# Patient Record
Sex: FEMALE | Race: WHITE | NOT HISPANIC OR LATINO
[De-identification: names, ages, dates, MRNs, and addresses within clinical notes are randomized per-mention and may not be internally consistent; named-entity substitution may affect disease eponyms.]

---

## 2017-03-17 ENCOUNTER — APPOINTMENT (OUTPATIENT)
Dept: OTOLARYNGOLOGY | Facility: CLINIC | Age: 67
End: 2017-03-17

## 2017-03-17 VITALS
HEIGHT: 60 IN | BODY MASS INDEX: 38.87 KG/M2 | HEART RATE: 67 BPM | SYSTOLIC BLOOD PRESSURE: 133 MMHG | WEIGHT: 198 LBS | DIASTOLIC BLOOD PRESSURE: 76 MMHG

## 2017-03-17 DIAGNOSIS — H90.3 SENSORINEURAL HEARING LOSS, BILATERAL: ICD-10-CM

## 2017-03-17 DIAGNOSIS — R09.81 NASAL CONGESTION: ICD-10-CM

## 2017-03-17 DIAGNOSIS — R49.0 DYSPHONIA: ICD-10-CM

## 2017-04-12 ENCOUNTER — APPOINTMENT (OUTPATIENT)
Dept: INTERNAL MEDICINE | Facility: CLINIC | Age: 67
End: 2017-04-12

## 2017-04-12 VITALS
HEART RATE: 68 BPM | SYSTOLIC BLOOD PRESSURE: 116 MMHG | DIASTOLIC BLOOD PRESSURE: 80 MMHG | WEIGHT: 196 LBS | BODY MASS INDEX: 38.48 KG/M2 | HEIGHT: 60 IN | OXYGEN SATURATION: 97 %

## 2017-04-12 LAB
25(OH)D3 SERPL-MCNC: 30.6 NG/ML
ALBUMIN SERPL ELPH-MCNC: 4 G/DL
ALP BLD-CCNC: 48 U/L
ALT SERPL-CCNC: 24 U/L
ANION GAP SERPL CALC-SCNC: 13 MMOL/L
APTT BLD: 32.2 SEC
AST SERPL-CCNC: 22 U/L
BASOPHILS # BLD AUTO: 0.02 K/UL
BASOPHILS NFR BLD AUTO: 0.4 %
BILIRUB SERPL-MCNC: 0.4 MG/DL
BUN SERPL-MCNC: 20 MG/DL
CALCIUM SERPL-MCNC: 9.3 MG/DL
CHLORIDE SERPL-SCNC: 101 MMOL/L
CO2 SERPL-SCNC: 23 MMOL/L
CREAT SERPL-MCNC: 0.63 MG/DL
EOSINOPHIL # BLD AUTO: 0.1 K/UL
EOSINOPHIL NFR BLD AUTO: 1.8 %
FOLATE SERPL-MCNC: 12.3 NG/ML
GLUCOSE SERPL-MCNC: 112 MG/DL
HBA1C MFR BLD HPLC: 6.1
HBA1C MFR BLD HPLC: 6.1 %
HCG SERPL QL: NEGATIVE
HCT VFR BLD CALC: 40.6 %
HGB BLD-MCNC: 12.7 G/DL
IMM GRANULOCYTES NFR BLD AUTO: 0.2 %
INR PPP: 0.92 RATIO
IRON SERPL-MCNC: 85 UG/DL
LYMPHOCYTES # BLD AUTO: 2.15 K/UL
LYMPHOCYTES NFR BLD AUTO: 38.9 %
MAN DIFF?: NORMAL
MCHC RBC-ENTMCNC: 29 PG
MCHC RBC-ENTMCNC: 31.3 GM/DL
MCV RBC AUTO: 92.7 FL
MONOCYTES # BLD AUTO: 0.5 K/UL
MONOCYTES NFR BLD AUTO: 9.1 %
NEUTROPHILS # BLD AUTO: 2.74 K/UL
NEUTROPHILS NFR BLD AUTO: 49.6 %
PAPP-A SERPL-ACNC: <1 MIU/ML
PLATELET # BLD AUTO: 260 K/UL
POTASSIUM SERPL-SCNC: 4.4 MMOL/L
PROT SERPL-MCNC: 7 G/DL
PT BLD: 10.4 SEC
RBC # BLD: 4.38 M/UL
RBC # FLD: 14 %
SODIUM SERPL-SCNC: 137 MMOL/L
TSH SERPL-ACNC: 0.93 UIU/ML
VIT B12 SERPL-MCNC: 378 PG/ML
WBC # FLD AUTO: 5.52 K/UL

## 2017-04-12 RX ORDER — OMEPRAZOLE 20 MG/1
20 CAPSULE, DELAYED RELEASE ORAL
Qty: 28 | Refills: 0 | Status: COMPLETED | COMMUNITY
Start: 2017-03-17

## 2017-04-18 ENCOUNTER — APPOINTMENT (OUTPATIENT)
Dept: OTOLARYNGOLOGY | Facility: CLINIC | Age: 67
End: 2017-04-18

## 2017-04-18 VITALS
BODY MASS INDEX: 38.48 KG/M2 | DIASTOLIC BLOOD PRESSURE: 89 MMHG | WEIGHT: 196 LBS | HEART RATE: 73 BPM | SYSTOLIC BLOOD PRESSURE: 131 MMHG | HEIGHT: 60 IN

## 2017-04-18 DIAGNOSIS — R68.89 OTHER GENERAL SYMPTOMS AND SIGNS: ICD-10-CM

## 2017-04-18 LAB — VIT B1 SERPL-MCNC: 111.8 NMOL/L

## 2017-05-15 ENCOUNTER — APPOINTMENT (OUTPATIENT)
Dept: INTERNAL MEDICINE | Facility: CLINIC | Age: 67
End: 2017-05-15

## 2017-05-15 VITALS
HEIGHT: 60 IN | WEIGHT: 196 LBS | SYSTOLIC BLOOD PRESSURE: 126 MMHG | BODY MASS INDEX: 38.48 KG/M2 | DIASTOLIC BLOOD PRESSURE: 78 MMHG

## 2017-05-20 ENCOUNTER — EMERGENCY (EMERGENCY)
Facility: HOSPITAL | Age: 67
LOS: 1 days | Discharge: HOME | End: 2017-05-20
Attending: EMERGENCY MEDICINE | Admitting: EMERGENCY MEDICINE
Payer: MEDICARE

## 2017-05-20 VITALS
TEMPERATURE: 98 F | RESPIRATION RATE: 18 BRPM | WEIGHT: 195.11 LBS | DIASTOLIC BLOOD PRESSURE: 57 MMHG | OXYGEN SATURATION: 94 % | SYSTOLIC BLOOD PRESSURE: 96 MMHG | HEIGHT: 60 IN | HEART RATE: 73 BPM

## 2017-05-20 DIAGNOSIS — F17.210 NICOTINE DEPENDENCE, CIGARETTES, UNCOMPLICATED: ICD-10-CM

## 2017-05-20 DIAGNOSIS — Z79.899 OTHER LONG TERM (CURRENT) DRUG THERAPY: ICD-10-CM

## 2017-05-20 DIAGNOSIS — I10 ESSENTIAL (PRIMARY) HYPERTENSION: ICD-10-CM

## 2017-05-20 DIAGNOSIS — M25.562 PAIN IN LEFT KNEE: ICD-10-CM

## 2017-05-20 DIAGNOSIS — M79.89 OTHER SPECIFIED SOFT TISSUE DISORDERS: ICD-10-CM

## 2017-05-20 DIAGNOSIS — R06.02 SHORTNESS OF BREATH: ICD-10-CM

## 2017-05-20 LAB
APTT BLD: 19.5 SEC — LOW (ref 27.5–37.4)
HCT VFR BLD CALC: 40.5 % — SIGNIFICANT CHANGE UP (ref 34.5–45)
HGB BLD-MCNC: 13.6 G/DL — SIGNIFICANT CHANGE UP (ref 11.5–15.5)
INR BLD: 0.87 RATIO — LOW (ref 0.88–1.16)
MCHC RBC-ENTMCNC: 31.1 PG — SIGNIFICANT CHANGE UP (ref 27–34)
MCHC RBC-ENTMCNC: 33.4 GM/DL — SIGNIFICANT CHANGE UP (ref 32–36)
MCV RBC AUTO: 93.1 FL — SIGNIFICANT CHANGE UP (ref 80–100)
PLATELET # BLD AUTO: 142 K/UL — LOW (ref 150–400)
PROTHROM AB SERPL-ACNC: 9.5 SEC — LOW (ref 9.8–12.7)
RBC # BLD: 4.36 M/UL — SIGNIFICANT CHANGE UP (ref 3.8–5.2)
RBC # FLD: 12.4 % — SIGNIFICANT CHANGE UP (ref 10.3–14.5)
WBC # BLD: 7.8 K/UL — SIGNIFICANT CHANGE UP (ref 3.8–10.5)
WBC # FLD AUTO: 7.8 K/UL — SIGNIFICANT CHANGE UP (ref 3.8–10.5)

## 2017-05-20 PROCEDURE — 99284 EMERGENCY DEPT VISIT MOD MDM: CPT | Mod: 25

## 2017-05-20 PROCEDURE — 85730 THROMBOPLASTIN TIME PARTIAL: CPT

## 2017-05-20 PROCEDURE — 73562 X-RAY EXAM OF KNEE 3: CPT | Mod: 26,LT

## 2017-05-20 PROCEDURE — 99285 EMERGENCY DEPT VISIT HI MDM: CPT

## 2017-05-20 PROCEDURE — 94640 AIRWAY INHALATION TREATMENT: CPT

## 2017-05-20 PROCEDURE — 93971 EXTREMITY STUDY: CPT | Mod: 26

## 2017-05-20 PROCEDURE — 85610 PROTHROMBIN TIME: CPT

## 2017-05-20 PROCEDURE — 73562 X-RAY EXAM OF KNEE 3: CPT

## 2017-05-20 PROCEDURE — 93971 EXTREMITY STUDY: CPT

## 2017-05-20 PROCEDURE — 85027 COMPLETE CBC AUTOMATED: CPT

## 2017-05-20 RX ORDER — IPRATROPIUM/ALBUTEROL SULFATE 18-103MCG
3 AEROSOL WITH ADAPTER (GRAM) INHALATION ONCE
Qty: 0 | Refills: 0 | Status: COMPLETED | OUTPATIENT
Start: 2017-05-20 | End: 2017-05-20

## 2017-05-20 RX ORDER — IBUPROFEN 200 MG
600 TABLET ORAL ONCE
Qty: 0 | Refills: 0 | Status: COMPLETED | OUTPATIENT
Start: 2017-05-20 | End: 2017-05-20

## 2017-05-20 RX ADMIN — Medication 3 MILLILITER(S): at 13:04

## 2017-05-20 RX ADMIN — Medication 600 MILLIGRAM(S): at 13:04

## 2017-05-20 NOTE — ED ADULT NURSE NOTE - OBJECTIVE STATEMENT
67yo female pt AxOx3 ambulatory to ED c/o LLE swelling and pain and L knee pain, intermittent, throbbing, pain moves around. Pt denies CP/SOB/fever/chills. B/L lungs wheezing noted, resp even, unlabored. Pt is a daily smoker. B/L lower extremities swelling noted 65yo female pt AxOx3 ambulatory to ED c/o LLE swelling and pain and L knee pain, intermittent, throbbing, pain moves around. Pt denies CP/SOB/fever/chills. B/L lungs min wheezing noted, resp even, unlabored. Pt is a daily smoker. B/L lower extremities swelling noted, skin intact, no redness noted. VSS. NAD noted. MD at bedside for eval. Labs drawn and sent. Safety maintained. Spouse at bedside.

## 2017-05-20 NOTE — ED PROVIDER NOTE - ATTENDING CONTRIBUTION TO CARE
PMD Arsalan Hawkins, PCP, [facility], [facility]  66y female pmh HTN,no dm,hld,cad,cancer. pt has hx of  chronic swelling. Pt now comes to ed co pain left knee, Worse with bending. No truama, no fc, rash, tick bite, no hip pain. Pt concerned for dvt and came to ed +tobacco. no change in chronic edema, No cough/hemoptysis cp, PE WDWN female awake alert nad except rt knee. NCAT chest clear ap abd soft mild obese no ttp,+bs. CV no rgm, Neuro no focal defects. Rt knee no swelling no rash/warmth. Mild pain on flexion, no instability. maribel 1+pedal edema.  Iker Murillo MD, Facep

## 2017-05-20 NOTE — ED PROVIDER NOTE - MEDICAL DECISION MAKING DETAILS
unlikely DVT but will obtain US, chronic LE- no worsening SOB or concern of heart failure/liver disease. will get xray knee likely arthritic pain.

## 2017-05-20 NOTE — ED PROVIDER NOTE - CARE PLAN
Principal Discharge DX:	Acute pain of left knee  Instructions for follow-up, activity and diet:	1. Return to ED for worsening, progressive or any other concerning symptoms   2. Follow up with your primary care doctor in 2-3days   3. Take motrin 600mg every 6 hours as needed for pain and or Take Tylenol up to 650 mg every 6 hours as needed for pain.

## 2017-05-20 NOTE — ED PROVIDER NOTE - NS ED ROS FT
ROS: +SOB. no cough. no n/v/d/c. no abd pain. no rash. no bleeding. no urinary complaints. no weakness. no vision changes. no HA. no neck/back pain. + extremity swelling.

## 2017-05-20 NOTE — ED PROVIDER NOTE - PLAN OF CARE
1. Return to ED for worsening, progressive or any other concerning symptoms   2. Follow up with your primary care doctor in 2-3days   3. Take motrin 600mg every 6 hours as needed for pain and or Take Tylenol up to 650 mg every 6 hours as needed for pain.

## 2017-05-20 NOTE — ED PROVIDER NOTE - PHYSICAL EXAMINATION
Gen: NAD, AOx3  Head: NCAT  HEENT: PERRL, oral mucosa moist, normal conjunctiva, neck supple  Lung: scattered wheezes b/l, no respiratory distress  CV: rrr, +harsh systolic murmur, Normal perfusion  Abd: soft, NTND  MSK: +2 b/l LE edema, no asymmetry, no visible deformities, +ttp left lateral joint line, +crepitus with pressure on patella  Neuro: No focal neurologic deficits  Skin: No rash   Psych: normal affect

## 2017-05-20 NOTE — ED PROVIDER NOTE - OBJECTIVE STATEMENT
67yo F with HTN/COPD current smoker with 1 week of left knee pain worse with going down stairs, throbbing, intermittent, moves around knee, mostly posterior/lateral. chronic LE swelling, doesn't appreciate any increase. 67yo F with HTN/COPD current smoker with 1 week of left knee pain worse with going down stairs, throbbing, intermittent, moves around knee, mostly posterior/lateral. chronic LE swelling, doesn't appreciate any increase or asymmetry, also with ankle pain. atraumatic. no fever/chills. chronic SOB. being worked up for gastric bypass    PCP- Levi Hawikns

## 2017-06-12 ENCOUNTER — APPOINTMENT (OUTPATIENT)
Dept: INTERNAL MEDICINE | Facility: CLINIC | Age: 67
End: 2017-06-12

## 2017-07-17 ENCOUNTER — APPOINTMENT (OUTPATIENT)
Dept: INTERNAL MEDICINE | Facility: CLINIC | Age: 67
End: 2017-07-17

## 2017-07-17 VITALS
BODY MASS INDEX: 38.48 KG/M2 | WEIGHT: 196 LBS | HEIGHT: 60 IN | DIASTOLIC BLOOD PRESSURE: 82 MMHG | SYSTOLIC BLOOD PRESSURE: 138 MMHG

## 2017-07-18 ENCOUNTER — APPOINTMENT (OUTPATIENT)
Dept: OTOLARYNGOLOGY | Facility: CLINIC | Age: 67
End: 2017-07-18

## 2017-07-19 ENCOUNTER — MEDICATION RENEWAL (OUTPATIENT)
Age: 67
End: 2017-07-19

## 2017-08-14 ENCOUNTER — APPOINTMENT (OUTPATIENT)
Dept: INTERNAL MEDICINE | Facility: CLINIC | Age: 67
End: 2017-08-14

## 2017-08-14 VITALS
HEIGHT: 60 IN | WEIGHT: 202 LBS | SYSTOLIC BLOOD PRESSURE: 138 MMHG | BODY MASS INDEX: 39.66 KG/M2 | DIASTOLIC BLOOD PRESSURE: 72 MMHG

## 2017-08-17 ENCOUNTER — RX RENEWAL (OUTPATIENT)
Age: 67
End: 2017-08-17

## 2017-08-22 ENCOUNTER — FORM ENCOUNTER (OUTPATIENT)
Age: 67
End: 2017-08-22

## 2017-08-23 ENCOUNTER — APPOINTMENT (OUTPATIENT)
Dept: RADIOLOGY | Facility: HOSPITAL | Age: 67
End: 2017-08-23
Payer: MEDICARE

## 2017-08-23 ENCOUNTER — APPOINTMENT (OUTPATIENT)
Dept: ULTRASOUND IMAGING | Facility: HOSPITAL | Age: 67
End: 2017-08-23
Payer: MEDICARE

## 2017-08-23 ENCOUNTER — OUTPATIENT (OUTPATIENT)
Dept: OUTPATIENT SERVICES | Facility: HOSPITAL | Age: 67
LOS: 1 days | End: 2017-08-23
Payer: MEDICARE

## 2017-08-23 DIAGNOSIS — I10 ESSENTIAL (PRIMARY) HYPERTENSION: ICD-10-CM

## 2017-08-23 PROCEDURE — 74241: CPT

## 2017-08-23 PROCEDURE — 74241: CPT | Mod: 26

## 2017-08-23 PROCEDURE — 76700 US EXAM ABDOM COMPLETE: CPT | Mod: 26

## 2017-08-23 PROCEDURE — 76700 US EXAM ABDOM COMPLETE: CPT

## 2017-09-11 ENCOUNTER — APPOINTMENT (OUTPATIENT)
Dept: INTERNAL MEDICINE | Facility: CLINIC | Age: 67
End: 2017-09-11

## 2017-09-11 VITALS
BODY MASS INDEX: 39.27 KG/M2 | DIASTOLIC BLOOD PRESSURE: 86 MMHG | HEART RATE: 62 BPM | WEIGHT: 200 LBS | SYSTOLIC BLOOD PRESSURE: 138 MMHG | HEIGHT: 60 IN

## 2017-09-18 ENCOUNTER — APPOINTMENT (OUTPATIENT)
Dept: CT IMAGING | Facility: CLINIC | Age: 67
End: 2017-09-18
Payer: MEDICARE

## 2017-09-18 ENCOUNTER — OUTPATIENT (OUTPATIENT)
Dept: OUTPATIENT SERVICES | Facility: HOSPITAL | Age: 67
LOS: 1 days | End: 2017-09-18
Payer: MEDICARE

## 2017-09-18 DIAGNOSIS — Z00.8 ENCOUNTER FOR OTHER GENERAL EXAMINATION: ICD-10-CM

## 2017-09-18 PROCEDURE — 71250 CT THORAX DX C-: CPT

## 2017-09-18 PROCEDURE — 71250 CT THORAX DX C-: CPT | Mod: 26

## 2017-09-27 ENCOUNTER — MED ADMIN CHARGE (OUTPATIENT)
Age: 67
End: 2017-09-27

## 2017-10-16 ENCOUNTER — RX RENEWAL (OUTPATIENT)
Age: 67
End: 2017-10-16

## 2017-10-27 ENCOUNTER — APPOINTMENT (OUTPATIENT)
Dept: SURGERY | Facility: CLINIC | Age: 67
End: 2017-10-27
Payer: MEDICARE

## 2017-10-27 PROCEDURE — 99215 OFFICE O/P EST HI 40 MIN: CPT

## 2017-11-03 ENCOUNTER — OUTPATIENT (OUTPATIENT)
Dept: OUTPATIENT SERVICES | Facility: HOSPITAL | Age: 67
LOS: 1 days | End: 2017-11-03
Payer: MEDICARE

## 2017-11-03 VITALS
HEART RATE: 81 BPM | SYSTOLIC BLOOD PRESSURE: 110 MMHG | HEIGHT: 60 IN | DIASTOLIC BLOOD PRESSURE: 70 MMHG | TEMPERATURE: 98 F | OXYGEN SATURATION: 97 % | RESPIRATION RATE: 16 BRPM | WEIGHT: 197.98 LBS

## 2017-11-03 DIAGNOSIS — I35.0 NONRHEUMATIC AORTIC (VALVE) STENOSIS: ICD-10-CM

## 2017-11-03 DIAGNOSIS — G47.33 OBSTRUCTIVE SLEEP APNEA (ADULT) (PEDIATRIC): ICD-10-CM

## 2017-11-03 DIAGNOSIS — Z98.890 OTHER SPECIFIED POSTPROCEDURAL STATES: Chronic | ICD-10-CM

## 2017-11-03 DIAGNOSIS — J44.9 CHRONIC OBSTRUCTIVE PULMONARY DISEASE, UNSPECIFIED: ICD-10-CM

## 2017-11-03 DIAGNOSIS — E66.9 OBESITY, UNSPECIFIED: ICD-10-CM

## 2017-11-03 DIAGNOSIS — Z01.818 ENCOUNTER FOR OTHER PREPROCEDURAL EXAMINATION: ICD-10-CM

## 2017-11-03 DIAGNOSIS — E66.01 MORBID (SEVERE) OBESITY DUE TO EXCESS CALORIES: ICD-10-CM

## 2017-11-03 DIAGNOSIS — J38.3 OTHER DISEASES OF VOCAL CORDS: ICD-10-CM

## 2017-11-03 LAB
ALBUMIN SERPL ELPH-MCNC: 4.3 G/DL — SIGNIFICANT CHANGE UP (ref 3.3–5)
ALP SERPL-CCNC: 61 U/L — SIGNIFICANT CHANGE UP (ref 40–120)
ALT FLD-CCNC: 30 U/L — SIGNIFICANT CHANGE UP (ref 10–45)
ANION GAP SERPL CALC-SCNC: 16 MMOL/L — SIGNIFICANT CHANGE UP (ref 5–17)
AST SERPL-CCNC: 29 U/L — SIGNIFICANT CHANGE UP (ref 10–40)
BILIRUB SERPL-MCNC: 0.6 MG/DL — SIGNIFICANT CHANGE UP (ref 0.2–1.2)
BLD GP AB SCN SERPL QL: NEGATIVE — SIGNIFICANT CHANGE UP
BUN SERPL-MCNC: 21 MG/DL — SIGNIFICANT CHANGE UP (ref 7–23)
CALCIUM SERPL-MCNC: 10 MG/DL — SIGNIFICANT CHANGE UP (ref 8.4–10.5)
CHLORIDE SERPL-SCNC: 100 MMOL/L — SIGNIFICANT CHANGE UP (ref 96–108)
CO2 SERPL-SCNC: 23 MMOL/L — SIGNIFICANT CHANGE UP (ref 22–31)
CREAT SERPL-MCNC: 0.73 MG/DL — SIGNIFICANT CHANGE UP (ref 0.5–1.3)
GLUCOSE SERPL-MCNC: 102 MG/DL — HIGH (ref 70–99)
HBA1C BLD-MCNC: 5.9 % — HIGH (ref 4–5.6)
HCT VFR BLD CALC: 39.2 % — SIGNIFICANT CHANGE UP (ref 34.5–45)
HGB BLD-MCNC: 12.5 G/DL — SIGNIFICANT CHANGE UP (ref 11.5–15.5)
MCHC RBC-ENTMCNC: 28.4 PG — SIGNIFICANT CHANGE UP (ref 27–34)
MCHC RBC-ENTMCNC: 31.9 GM/DL — LOW (ref 32–36)
MCV RBC AUTO: 89.1 FL — SIGNIFICANT CHANGE UP (ref 80–100)
PLATELET # BLD AUTO: 282 K/UL — SIGNIFICANT CHANGE UP (ref 150–400)
POTASSIUM SERPL-MCNC: 4.5 MMOL/L — SIGNIFICANT CHANGE UP (ref 3.5–5.3)
POTASSIUM SERPL-SCNC: 4.5 MMOL/L — SIGNIFICANT CHANGE UP (ref 3.5–5.3)
PROT SERPL-MCNC: 7.6 G/DL — SIGNIFICANT CHANGE UP (ref 6–8.3)
RBC # BLD: 4.4 M/UL — SIGNIFICANT CHANGE UP (ref 3.8–5.2)
RBC # FLD: 13.7 % — SIGNIFICANT CHANGE UP (ref 10.3–14.5)
RH IG SCN BLD-IMP: POSITIVE — SIGNIFICANT CHANGE UP
SODIUM SERPL-SCNC: 139 MMOL/L — SIGNIFICANT CHANGE UP (ref 135–145)
WBC # BLD: 6.88 K/UL — SIGNIFICANT CHANGE UP (ref 3.8–10.5)
WBC # FLD AUTO: 6.88 K/UL — SIGNIFICANT CHANGE UP (ref 3.8–10.5)

## 2017-11-03 PROCEDURE — 83036 HEMOGLOBIN GLYCOSYLATED A1C: CPT

## 2017-11-03 PROCEDURE — G0463: CPT

## 2017-11-03 PROCEDURE — 86901 BLOOD TYPING SEROLOGIC RH(D): CPT

## 2017-11-03 PROCEDURE — 85027 COMPLETE CBC AUTOMATED: CPT

## 2017-11-03 PROCEDURE — 86850 RBC ANTIBODY SCREEN: CPT

## 2017-11-03 PROCEDURE — 80053 COMPREHEN METABOLIC PANEL: CPT

## 2017-11-03 PROCEDURE — 86900 BLOOD TYPING SEROLOGIC ABO: CPT

## 2017-11-03 RX ORDER — CEFAZOLIN SODIUM 1 G
2000 VIAL (EA) INJECTION ONCE
Qty: 0 | Refills: 0 | Status: DISCONTINUED | OUTPATIENT
Start: 2017-11-15 | End: 2017-11-16

## 2017-11-03 RX ORDER — PANTOPRAZOLE SODIUM 20 MG/1
1 TABLET, DELAYED RELEASE ORAL
Qty: 0 | Refills: 0 | COMMUNITY

## 2017-11-03 RX ORDER — NIFEDIPINE 30 MG
1 TABLET, EXTENDED RELEASE 24 HR ORAL
Qty: 0 | Refills: 0 | COMMUNITY

## 2017-11-03 RX ORDER — LISINOPRIL 2.5 MG/1
1 TABLET ORAL
Qty: 0 | Refills: 0 | COMMUNITY

## 2017-11-03 NOTE — H&P PST ADULT - OTHER CARE PROVIDERS
Dr. Fields Cardio next appt 11/7 , Dr. Erazo [facility] [phone] appt made for patient on 11/6/17 10.45 am

## 2017-11-03 NOTE — H&P PST ADULT - HEARING CHANGE
L/with intermittent ear aches > 6 months/R R/with intermittent ear aches x 6 months patient to follow up with ENT 11/6 prior to surgery./L

## 2017-11-03 NOTE — H&P PST ADULT - HISTORY OF PRESENT ILLNESS
66 year old female with PMH of HTN, Emphysema/ chronic bronchitis, Aortic stenosis ( will obtain recent Echo), GERD with complaints of Obesity planned for laparoscopic vertical sleeve gastrectomy.       *** Patient was evaluated by ENT for throat discomfort/ Hearing difficulties 4/2017, found to have some very mild leukoplakia on left vocal cord with recommendations to follow up in 6 weeks, patient didn't have a follow up since, made appointment for 11/6 10.45 am for ENT eval with Dr. Santana prior to surgery.

## 2017-11-03 NOTE — H&P PST ADULT - PROBLEM SELECTOR PLAN 2
continue Home Inhaler ( patient to bring inhaler to [facility] the am of surgery)  will obtain recent Pulmonary note

## 2017-11-03 NOTE — H&P PST ADULT - PROBLEM SELECTOR PLAN 1
planned for laparoscopic vertical sleeve gastrectomy.   PST labs send  preprocedure surgical scrub instructions given

## 2017-11-03 NOTE — H&P PST ADULT - RESPIRATORY AND THORAX COMMENTS
hx of COPD/emphysema/ chronic bronchitis ex-smoker quit 3 months ago hx of COPD/emphysema/ chronic bronchitis

## 2017-11-03 NOTE — H&P PST ADULT - PMH
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

## 2017-11-06 ENCOUNTER — APPOINTMENT (OUTPATIENT)
Dept: OTOLARYNGOLOGY | Facility: CLINIC | Age: 67
End: 2017-11-06
Payer: MEDICARE

## 2017-11-06 VITALS
WEIGHT: 197 LBS | HEART RATE: 71 BPM | DIASTOLIC BLOOD PRESSURE: 81 MMHG | BODY MASS INDEX: 38.68 KG/M2 | HEIGHT: 60 IN | SYSTOLIC BLOOD PRESSURE: 120 MMHG

## 2017-11-06 DIAGNOSIS — H92.02 OTALGIA, LEFT EAR: ICD-10-CM

## 2017-11-06 DIAGNOSIS — K21.9 GASTRO-ESOPHAGEAL REFLUX DISEASE W/OUT ESOPHAGITIS: ICD-10-CM

## 2017-11-06 PROCEDURE — 31575 DIAGNOSTIC LARYNGOSCOPY: CPT

## 2017-11-06 PROCEDURE — 99214 OFFICE O/P EST MOD 30 MIN: CPT | Mod: 25

## 2017-11-15 ENCOUNTER — APPOINTMENT (OUTPATIENT)
Dept: SURGERY | Facility: HOSPITAL | Age: 67
End: 2017-11-15
Payer: MEDICARE

## 2017-11-15 ENCOUNTER — INPATIENT (INPATIENT)
Facility: HOSPITAL | Age: 67
LOS: 0 days | Discharge: HOME | DRG: 621 | End: 2017-11-16
Attending: SURGERY | Admitting: SURGERY
Payer: MEDICARE

## 2017-11-15 ENCOUNTER — RESULT REVIEW (OUTPATIENT)
Age: 67
End: 2017-11-15

## 2017-11-15 VITALS
DIASTOLIC BLOOD PRESSURE: 87 MMHG | TEMPERATURE: 98 F | HEART RATE: 60 BPM | SYSTOLIC BLOOD PRESSURE: 116 MMHG | WEIGHT: 196.87 LBS | RESPIRATION RATE: 18 BRPM | OXYGEN SATURATION: 97 %

## 2017-11-15 DIAGNOSIS — Z98.890 OTHER SPECIFIED POSTPROCEDURAL STATES: Chronic | ICD-10-CM

## 2017-11-15 DIAGNOSIS — E66.01 MORBID (SEVERE) OBESITY DUE TO EXCESS CALORIES: ICD-10-CM

## 2017-11-15 LAB
ANION GAP SERPL CALC-SCNC: 11 MMOL/L — SIGNIFICANT CHANGE UP (ref 5–17)
BASOPHILS # BLD AUTO: 0 K/UL — SIGNIFICANT CHANGE UP (ref 0–0.2)
BASOPHILS NFR BLD AUTO: 0.1 % — SIGNIFICANT CHANGE UP (ref 0–2)
BUN SERPL-MCNC: 14 MG/DL — SIGNIFICANT CHANGE UP (ref 7–23)
CALCIUM SERPL-MCNC: 8.5 MG/DL — SIGNIFICANT CHANGE UP (ref 8.4–10.5)
CHLORIDE SERPL-SCNC: 107 MMOL/L — SIGNIFICANT CHANGE UP (ref 96–108)
CO2 SERPL-SCNC: 25 MMOL/L — SIGNIFICANT CHANGE UP (ref 22–31)
CREAT SERPL-MCNC: 0.72 MG/DL — SIGNIFICANT CHANGE UP (ref 0.5–1.3)
EOSINOPHIL # BLD AUTO: 0 K/UL — SIGNIFICANT CHANGE UP (ref 0–0.5)
EOSINOPHIL NFR BLD AUTO: 0.4 % — SIGNIFICANT CHANGE UP (ref 0–6)
GLUCOSE BLDC GLUCOMTR-MCNC: 108 MG/DL — HIGH (ref 70–99)
GLUCOSE SERPL-MCNC: 127 MG/DL — HIGH (ref 70–99)
HCT VFR BLD CALC: 36.4 % — SIGNIFICANT CHANGE UP (ref 34.5–45)
HGB BLD-MCNC: 12.1 G/DL — SIGNIFICANT CHANGE UP (ref 11.5–15.5)
LYMPHOCYTES # BLD AUTO: 1.2 K/UL — SIGNIFICANT CHANGE UP (ref 1–3.3)
LYMPHOCYTES # BLD AUTO: 11.2 % — LOW (ref 13–44)
MAGNESIUM SERPL-MCNC: 2 MG/DL — SIGNIFICANT CHANGE UP (ref 1.6–2.6)
MCHC RBC-ENTMCNC: 30.7 PG — SIGNIFICANT CHANGE UP (ref 27–34)
MCHC RBC-ENTMCNC: 33.4 GM/DL — SIGNIFICANT CHANGE UP (ref 32–36)
MCV RBC AUTO: 91.9 FL — SIGNIFICANT CHANGE UP (ref 80–100)
MONOCYTES # BLD AUTO: 0.3 K/UL — SIGNIFICANT CHANGE UP (ref 0–0.9)
MONOCYTES NFR BLD AUTO: 2.3 % — SIGNIFICANT CHANGE UP (ref 2–14)
NEUTROPHILS # BLD AUTO: 9.4 K/UL — HIGH (ref 1.8–7.4)
NEUTROPHILS NFR BLD AUTO: 86 % — HIGH (ref 43–77)
PHOSPHATE SERPL-MCNC: 3.9 MG/DL — SIGNIFICANT CHANGE UP (ref 2.5–4.5)
PLATELET # BLD AUTO: 212 K/UL — SIGNIFICANT CHANGE UP (ref 150–400)
POTASSIUM SERPL-MCNC: 4.1 MMOL/L — SIGNIFICANT CHANGE UP (ref 3.5–5.3)
POTASSIUM SERPL-SCNC: 4.1 MMOL/L — SIGNIFICANT CHANGE UP (ref 3.5–5.3)
RBC # BLD: 3.96 M/UL — SIGNIFICANT CHANGE UP (ref 3.8–5.2)
RBC # FLD: 12.5 % — SIGNIFICANT CHANGE UP (ref 10.3–14.5)
RH IG SCN BLD-IMP: POSITIVE — SIGNIFICANT CHANGE UP
SODIUM SERPL-SCNC: 143 MMOL/L — SIGNIFICANT CHANGE UP (ref 135–145)
WBC # BLD: 10.9 K/UL — HIGH (ref 3.8–10.5)
WBC # FLD AUTO: 10.9 K/UL — HIGH (ref 3.8–10.5)

## 2017-11-15 PROCEDURE — 43775 LAP SLEEVE GASTRECTOMY: CPT | Mod: 82

## 2017-11-15 PROCEDURE — 88305 TISSUE EXAM BY PATHOLOGIST: CPT | Mod: 26

## 2017-11-15 RX ORDER — HYDROMORPHONE HYDROCHLORIDE 2 MG/ML
0.5 INJECTION INTRAMUSCULAR; INTRAVENOUS; SUBCUTANEOUS
Qty: 0 | Refills: 0 | Status: DISCONTINUED | OUTPATIENT
Start: 2017-11-15 | End: 2017-11-16

## 2017-11-15 RX ORDER — POTASSIUM CHLORIDE 20 MEQ
10 PACKET (EA) ORAL
Qty: 0 | Refills: 0 | Status: DISCONTINUED | OUTPATIENT
Start: 2017-11-15 | End: 2017-11-16

## 2017-11-15 RX ORDER — HEPARIN SODIUM 5000 [USP'U]/ML
5000 INJECTION INTRAVENOUS; SUBCUTANEOUS ONCE
Qty: 0 | Refills: 0 | Status: COMPLETED | OUTPATIENT
Start: 2017-11-15 | End: 2017-11-15

## 2017-11-15 RX ORDER — SODIUM CHLORIDE 9 MG/ML
1000 INJECTION, SOLUTION INTRAVENOUS
Qty: 0 | Refills: 0 | Status: DISCONTINUED | OUTPATIENT
Start: 2017-11-15 | End: 2017-11-16

## 2017-11-15 RX ORDER — ACETAMINOPHEN 500 MG
1000 TABLET ORAL ONCE
Qty: 0 | Refills: 0 | Status: COMPLETED | OUTPATIENT
Start: 2017-11-16 | End: 2017-11-16

## 2017-11-15 RX ORDER — ACETAMINOPHEN 500 MG
1000 TABLET ORAL ONCE
Qty: 0 | Refills: 0 | Status: DISCONTINUED | OUTPATIENT
Start: 2017-11-16 | End: 2017-11-16

## 2017-11-15 RX ORDER — HEPARIN SODIUM 5000 [USP'U]/ML
5000 INJECTION INTRAVENOUS; SUBCUTANEOUS EVERY 8 HOURS
Qty: 0 | Refills: 0 | Status: DISCONTINUED | OUTPATIENT
Start: 2017-11-15 | End: 2017-11-16

## 2017-11-15 RX ORDER — FENTANYL CITRATE 50 UG/ML
25 INJECTION INTRAVENOUS
Qty: 0 | Refills: 0 | Status: DISCONTINUED | OUTPATIENT
Start: 2017-11-15 | End: 2017-11-15

## 2017-11-15 RX ORDER — HYDRALAZINE HCL 50 MG
10 TABLET ORAL EVERY 6 HOURS
Qty: 0 | Refills: 0 | Status: DISCONTINUED | OUTPATIENT
Start: 2017-11-15 | End: 2017-11-16

## 2017-11-15 RX ORDER — ONDANSETRON 8 MG/1
4 TABLET, FILM COATED ORAL EVERY 6 HOURS
Qty: 0 | Refills: 0 | Status: DISCONTINUED | OUTPATIENT
Start: 2017-11-15 | End: 2017-11-16

## 2017-11-15 RX ORDER — KETOROLAC TROMETHAMINE 30 MG/ML
30 SYRINGE (ML) INJECTION EVERY 6 HOURS
Qty: 0 | Refills: 0 | Status: DISCONTINUED | OUTPATIENT
Start: 2017-11-15 | End: 2017-11-16

## 2017-11-15 RX ORDER — HYDRALAZINE HCL 50 MG
10 TABLET ORAL EVERY 6 HOURS
Qty: 0 | Refills: 0 | Status: DISCONTINUED | OUTPATIENT
Start: 2017-11-15 | End: 2017-11-15

## 2017-11-15 RX ORDER — SODIUM CHLORIDE 9 MG/ML
3 INJECTION INTRAMUSCULAR; INTRAVENOUS; SUBCUTANEOUS EVERY 8 HOURS
Qty: 0 | Refills: 0 | Status: DISCONTINUED | OUTPATIENT
Start: 2017-11-15 | End: 2017-11-15

## 2017-11-15 RX ORDER — CEFAZOLIN SODIUM 1 G
2000 VIAL (EA) INJECTION EVERY 8 HOURS
Qty: 0 | Refills: 0 | Status: COMPLETED | OUTPATIENT
Start: 2017-11-15 | End: 2017-11-15

## 2017-11-15 RX ORDER — PANTOPRAZOLE SODIUM 20 MG/1
40 TABLET, DELAYED RELEASE ORAL DAILY
Qty: 0 | Refills: 0 | Status: DISCONTINUED | OUTPATIENT
Start: 2017-11-15 | End: 2017-11-16

## 2017-11-15 RX ORDER — ONDANSETRON 8 MG/1
4 TABLET, FILM COATED ORAL EVERY 4 HOURS
Qty: 0 | Refills: 0 | Status: DISCONTINUED | OUTPATIENT
Start: 2017-11-15 | End: 2017-11-15

## 2017-11-15 RX ORDER — LIDOCAINE HCL 20 MG/ML
0.2 VIAL (ML) INJECTION ONCE
Qty: 0 | Refills: 0 | Status: DISCONTINUED | OUTPATIENT
Start: 2017-11-15 | End: 2017-11-15

## 2017-11-15 RX ORDER — HYOSCYAMINE SULFATE 0.13 MG
0.12 TABLET ORAL EVERY 6 HOURS
Qty: 0 | Refills: 0 | Status: DISCONTINUED | OUTPATIENT
Start: 2017-11-15 | End: 2017-11-16

## 2017-11-15 RX ORDER — ACETAMINOPHEN 500 MG
1000 TABLET ORAL ONCE
Qty: 0 | Refills: 0 | Status: COMPLETED | OUTPATIENT
Start: 2017-11-15 | End: 2017-11-15

## 2017-11-15 RX ADMIN — HYDROMORPHONE HYDROCHLORIDE 0.5 MILLIGRAM(S): 2 INJECTION INTRAMUSCULAR; INTRAVENOUS; SUBCUTANEOUS at 15:45

## 2017-11-15 RX ADMIN — HYDROMORPHONE HYDROCHLORIDE 0.5 MILLIGRAM(S): 2 INJECTION INTRAMUSCULAR; INTRAVENOUS; SUBCUTANEOUS at 14:30

## 2017-11-15 RX ADMIN — Medication 30 MILLIGRAM(S): at 18:02

## 2017-11-15 RX ADMIN — HEPARIN SODIUM 5000 UNIT(S): 5000 INJECTION INTRAVENOUS; SUBCUTANEOUS at 18:32

## 2017-11-15 RX ADMIN — Medication 100 MILLIGRAM(S): at 22:21

## 2017-11-15 RX ADMIN — Medication 1000 MILLIGRAM(S): at 20:08

## 2017-11-15 RX ADMIN — Medication 400 MILLIGRAM(S): at 19:48

## 2017-11-15 RX ADMIN — ONDANSETRON 4 MILLIGRAM(S): 8 TABLET, FILM COATED ORAL at 20:12

## 2017-11-15 RX ADMIN — ONDANSETRON 4 MILLIGRAM(S): 8 TABLET, FILM COATED ORAL at 18:08

## 2017-11-15 RX ADMIN — SODIUM CHLORIDE 250 MILLILITER(S): 9 INJECTION, SOLUTION INTRAVENOUS at 14:48

## 2017-11-15 RX ADMIN — Medication 0.12 MILLIGRAM(S): at 14:52

## 2017-11-15 RX ADMIN — HYDROMORPHONE HYDROCHLORIDE 0.5 MILLIGRAM(S): 2 INJECTION INTRAMUSCULAR; INTRAVENOUS; SUBCUTANEOUS at 15:27

## 2017-11-15 RX ADMIN — HYDROMORPHONE HYDROCHLORIDE 0.5 MILLIGRAM(S): 2 INJECTION INTRAMUSCULAR; INTRAVENOUS; SUBCUTANEOUS at 14:15

## 2017-11-15 RX ADMIN — Medication 30 MILLIGRAM(S): at 19:42

## 2017-11-15 RX ADMIN — Medication 100 MILLIGRAM(S): at 20:08

## 2017-11-15 RX ADMIN — PANTOPRAZOLE SODIUM 40 MILLIGRAM(S): 20 TABLET, DELAYED RELEASE ORAL at 14:48

## 2017-11-15 RX ADMIN — Medication 10 MILLIGRAM(S): at 22:21

## 2017-11-15 RX ADMIN — Medication 0.12 MILLIGRAM(S): at 20:11

## 2017-11-15 RX ADMIN — HEPARIN SODIUM 5000 UNIT(S): 5000 INJECTION INTRAVENOUS; SUBCUTANEOUS at 09:11

## 2017-11-15 NOTE — PATIENT PROFILE ADULT. - PMH
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

## 2017-11-15 NOTE — BRIEF OPERATIVE NOTE - PROCEDURE
<<-----Click on this checkbox to enter Procedure Lysis of adhesions  11/15/2017    Active  ALLYSON  Umbilical hernia repair  11/15/2017    Active  ALLYSON  Gastrectomy, sleeve, laparoscopic  11/15/2017    Active  ALLYSON

## 2017-11-15 NOTE — PHARMACY COMMUNICATION NOTE - COMMENTS
Patient medication reconciliation done. Patient currently taking:     Lisinopril 20mg tab by mouth daily  Nifedipine ER 30mg tab by mouth daily   Hydrochlorothiazide 12.5mg tab by mouth daily  Zolpidem 5mg tab by mouth at bedtime as needed  Stiolto respimat 2 inhalations daily  Vitamin D3 daily    Patient was informed to take daily multivitamins post surgically. Patient reeducated on NSAID avoidance (ibuprofen, ASA, naproxen, aleve) as they increased risk of GI bleeding. Patient informed to use APAP for mild pain otherwise contact prescriber for consult. Patient was informed on indications and directions for administration for hyoscyamine SL, oxycodone liquid, ondansetron ODT, and omeprazole . Patient was instructed to take the medications as follows:    Crush lisinopril, vitamin D and zolpidem  Discontinue hydrochlorothiazide after surgery per Dr. Fields (Cardiology)  Switch nifedipine ER to amlodipine 5mg tab by mouth daily (crush) per Dr. Fields  Continue Stiolto inhaler

## 2017-11-15 NOTE — PROGRESS NOTE ADULT - SUBJECTIVE AND OBJECTIVE BOX
Surgery Post-Op Note    Pre-Op Dx: Obesity  Procedure: Gastrectomy, sleeve, laparoscopic  Umbilical hernia repair  Lysis of adhesions    Surgeon: Dr. Lopez    Subjective:   Pt seen and examined at the bedside. Pt w/ no complaints. Denies F/C/N/V. Pain controlled with medication.     Vital Signs Last 24 Hrs  T(C): 36.2 (15 Nov 2017 13:50), Max: 36.8 (15 Nov 2017 08:41)  T(F): 97.2 (15 Nov 2017 13:50), Max: 98.2 (15 Nov 2017 08:41)  HR: 64 (15 Nov 2017 19:00) (58 - 81)  BP: 165/76 (15 Nov 2017 19:00) (116/87 - 165/76)  BP(mean): 109 (15 Nov 2017 19:00) (94 - 116)  RR: 15 (15 Nov 2017 19:00) (14 - 18)  SpO2: 93% (15 Nov 2017 19:00) (81% - 99%)    Physical Exam:  General: NAD, resting comfortably in bed  Neuro: A/O x 3, no focal deficits  Pulmonary: Nonlabored breathing, no respiratory distress  Cardiovascular: NSR  Abdominal: soft, ATTP. ND  Incision: C/D/I   Extremities: WWP        LABS:                        12.1   10.9  )-----------( 212      ( 15 Nov 2017 14:48 )             36.4     11-15    143  |  107  |  14  ----------------------------<  127<H>  4.1   |  25  |  0.72    Ca    8.5      15 Nov 2017 14:48  Phos  3.9     11-15  Mg     2.0     11-15    Assessment:66y Female s/p lap vertical sleeve    Plan:  IVF, Diet: NPO, will advance to [facility] in the am  d/v dung, f/u TOV  monitor vitals  Pain/nausea control PRN  Incentive spirometer/OOB/Ambulate

## 2017-11-15 NOTE — BRIEF OPERATIVE NOTE - OPERATION/FINDINGS
Adhesions around umbilicus and right lateral abdomen, divided.  Small umbilical hernia repaired primarily.  Sleeve gastrectomy performed over 36-Fr calibration tube.  No leak on methylene blue test.  Staple line reinforced with interrupted omentopexy and Evicel.

## 2017-11-16 ENCOUNTER — TRANSCRIPTION ENCOUNTER (OUTPATIENT)
Age: 67
End: 2017-11-16

## 2017-11-16 VITALS — WEIGHT: 99.87 LBS

## 2017-11-16 LAB
ANION GAP SERPL CALC-SCNC: 12 MMOL/L — SIGNIFICANT CHANGE UP (ref 5–17)
BASOPHILS # BLD AUTO: 0 K/UL — SIGNIFICANT CHANGE UP (ref 0–0.2)
BASOPHILS NFR BLD AUTO: 0.3 % — SIGNIFICANT CHANGE UP (ref 0–2)
BUN SERPL-MCNC: 11 MG/DL — SIGNIFICANT CHANGE UP (ref 7–23)
CALCIUM SERPL-MCNC: 8.8 MG/DL — SIGNIFICANT CHANGE UP (ref 8.4–10.5)
CHLORIDE SERPL-SCNC: 104 MMOL/L — SIGNIFICANT CHANGE UP (ref 96–108)
CO2 SERPL-SCNC: 24 MMOL/L — SIGNIFICANT CHANGE UP (ref 22–31)
CREAT SERPL-MCNC: 0.75 MG/DL — SIGNIFICANT CHANGE UP (ref 0.5–1.3)
EOSINOPHIL # BLD AUTO: 0 K/UL — SIGNIFICANT CHANGE UP (ref 0–0.5)
EOSINOPHIL NFR BLD AUTO: 0.2 % — SIGNIFICANT CHANGE UP (ref 0–6)
GLUCOSE SERPL-MCNC: 100 MG/DL — HIGH (ref 70–99)
HCT VFR BLD CALC: 36.5 % — SIGNIFICANT CHANGE UP (ref 34.5–45)
HGB BLD-MCNC: 12.1 G/DL — SIGNIFICANT CHANGE UP (ref 11.5–15.5)
LYMPHOCYTES # BLD AUTO: 2.2 K/UL — SIGNIFICANT CHANGE UP (ref 1–3.3)
LYMPHOCYTES # BLD AUTO: 23.4 % — SIGNIFICANT CHANGE UP (ref 13–44)
MCHC RBC-ENTMCNC: 30.2 PG — SIGNIFICANT CHANGE UP (ref 27–34)
MCHC RBC-ENTMCNC: 33.1 GM/DL — SIGNIFICANT CHANGE UP (ref 32–36)
MCV RBC AUTO: 91.3 FL — SIGNIFICANT CHANGE UP (ref 80–100)
MONOCYTES # BLD AUTO: 0.8 K/UL — SIGNIFICANT CHANGE UP (ref 0–0.9)
MONOCYTES NFR BLD AUTO: 8.3 % — SIGNIFICANT CHANGE UP (ref 2–14)
NEUTROPHILS # BLD AUTO: 6.3 K/UL — SIGNIFICANT CHANGE UP (ref 1.8–7.4)
NEUTROPHILS NFR BLD AUTO: 67.8 % — SIGNIFICANT CHANGE UP (ref 43–77)
PLATELET # BLD AUTO: 228 K/UL — SIGNIFICANT CHANGE UP (ref 150–400)
POTASSIUM SERPL-MCNC: 3.9 MMOL/L — SIGNIFICANT CHANGE UP (ref 3.5–5.3)
POTASSIUM SERPL-SCNC: 3.9 MMOL/L — SIGNIFICANT CHANGE UP (ref 3.5–5.3)
RBC # BLD: 4 M/UL — SIGNIFICANT CHANGE UP (ref 3.8–5.2)
RBC # FLD: 12.7 % — SIGNIFICANT CHANGE UP (ref 10.3–14.5)
SODIUM SERPL-SCNC: 140 MMOL/L — SIGNIFICANT CHANGE UP (ref 135–145)
WBC # BLD: 9.3 K/UL — SIGNIFICANT CHANGE UP (ref 3.8–10.5)
WBC # FLD AUTO: 9.3 K/UL — SIGNIFICANT CHANGE UP (ref 3.8–10.5)

## 2017-11-16 PROCEDURE — 93010 ELECTROCARDIOGRAM REPORT: CPT

## 2017-11-16 RX ORDER — ONDANSETRON 8 MG/1
1 TABLET, FILM COATED ORAL
Qty: 21 | Refills: 0
Start: 2017-11-16 | End: 2017-11-23

## 2017-11-16 RX ORDER — NIFEDIPINE 30 MG
1 TABLET, EXTENDED RELEASE 24 HR ORAL
Qty: 0 | Refills: 0 | COMMUNITY

## 2017-11-16 RX ORDER — OXYCODONE HYDROCHLORIDE 5 MG/1
5 TABLET ORAL
Qty: 120 | Refills: 0
Start: 2017-11-16 | End: 2017-11-20

## 2017-11-16 RX ORDER — OMEPRAZOLE 10 MG/1
1 CAPSULE, DELAYED RELEASE ORAL
Qty: 30 | Refills: 0
Start: 2017-11-16 | End: 2017-12-16

## 2017-11-16 RX ORDER — OMEPRAZOLE 10 MG/1
1 CAPSULE, DELAYED RELEASE ORAL
Qty: 0 | Refills: 0 | COMMUNITY

## 2017-11-16 RX ADMIN — Medication 1000 MILLIGRAM(S): at 09:04

## 2017-11-16 RX ADMIN — ONDANSETRON 4 MILLIGRAM(S): 8 TABLET, FILM COATED ORAL at 01:44

## 2017-11-16 RX ADMIN — HEPARIN SODIUM 5000 UNIT(S): 5000 INJECTION INTRAVENOUS; SUBCUTANEOUS at 06:37

## 2017-11-16 RX ADMIN — Medication 10 MILLIGRAM(S): at 10:45

## 2017-11-16 RX ADMIN — ONDANSETRON 4 MILLIGRAM(S): 8 TABLET, FILM COATED ORAL at 11:36

## 2017-11-16 RX ADMIN — Medication 30 MILLIGRAM(S): at 01:44

## 2017-11-16 RX ADMIN — Medication 30 MILLIGRAM(S): at 11:37

## 2017-11-16 RX ADMIN — Medication 0.12 MILLIGRAM(S): at 01:44

## 2017-11-16 RX ADMIN — Medication 400 MILLIGRAM(S): at 08:45

## 2017-11-16 RX ADMIN — Medication 10 MILLIGRAM(S): at 06:37

## 2017-11-16 RX ADMIN — Medication 0.12 MILLIGRAM(S): at 08:44

## 2017-11-16 RX ADMIN — Medication 30 MILLIGRAM(S): at 07:08

## 2017-11-16 RX ADMIN — PANTOPRAZOLE SODIUM 40 MILLIGRAM(S): 20 TABLET, DELAYED RELEASE ORAL at 11:36

## 2017-11-16 RX ADMIN — Medication 400 MILLIGRAM(S): at 01:45

## 2017-11-16 RX ADMIN — Medication 30 MILLIGRAM(S): at 06:37

## 2017-11-16 RX ADMIN — ONDANSETRON 4 MILLIGRAM(S): 8 TABLET, FILM COATED ORAL at 06:38

## 2017-11-16 NOTE — DISCHARGE NOTE ADULT - CARE PLAN
Principal Discharge DX:	Obesity (BMI 30-39.9)  Goal:	Pt will make healthy lifestyle changes for improve quality of life  Instructions for follow-up, activity and diet:	as explained and outlined on the gastric sleeve instructions.  if you have shortness of breath, chest pain, calf pain with swelling, difficulty breathing visit the nearest emergency room. For abdominal pain, vomiting, fever, general  weakness, inflammation at your surgical sites, call the surgeon's office immediately.

## 2017-11-16 NOTE — DISCHARGE NOTE ADULT - PATIENT PORTAL LINK FT
“You can access the Follow[facility] Patient Portal, offered by [facility], by registering with the following website: [email or web address]”

## 2017-11-16 NOTE — PROGRESS NOTE ADULT - SUBJECTIVE AND OBJECTIVE BOX
Post Op Day#: 1    Subjective: Feeling good, a bit of abdominal soreness, no abdominal pain    Objective: A bit of nausea last night with epigastric discomfort. Now resolved, EKG sinus bradycardia with arrythmia,  no change from baseline EKG.                 ambulated around the unit, effective pain control. Afebrile, v/s stable. Tolerating bariatric liquid. Return of normal voiding pattern.                                               Vital Signs Last 24 Hrs  T(C): 36.9 (16 Nov 2017 08:23), Max: 36.9 (15 Nov 2017 22:07)  T(F): 98.5 (16 Nov 2017 08:23), Max: 98.5 (15 Nov 2017 22:07)  HR: 65 (16 Nov 2017 08:23) (50 - 81)  BP: 130/70 (16 Nov 2017 08:23) (130/70 - 165/79)  BP(mean): 102 (15 Nov 2017 19:30) (94 - 116)  RR: 18 (16 Nov 2017 08:23) (14 - 18)  SpO2: 97% (16 Nov 2017 08:23) (81% - 99%)                                               I&O's Summary    15 Nov 2017 07:01  -  16 Nov 2017 07:00  --------------------------------------------------------  IN: 3250 mL / OUT: 1375 mL / NET: 1875 mL    16 Nov 2017 07:01  -  16 Nov 2017 11:42  --------------------------------------------------------  IN: 125 mL / OUT: 0 mL / NET: 125 mL    ORAL 400ml+                                                                    12.1   9.3   )-----------( 228      ( 16 Nov 2017 07:21 )             36.5                                                 11-16    140  |  104  |  11  ----------------------------<  100<H>  3.9   |  24  |  0.75    Ca    8.8      16 Nov 2017 07:21  Phos  3.9     11-15  Mg     2.0     11-15      Physical Exam:         Lungs:  clear breath sounds b/l       Heart:  Regular rate & rhythm       Abdomen:  Soft, non-distended.  Scopes sites clean, dry and intact. + bs, - flatus, no rebound or guarding       Skin:  No rash       Extremities: + pulses, no edema, no calf tenderness, negative aubrey's                Caprini VTE Risk Score: 3-4 ( moderate) No extended prophylaxis recommendedpost-discharge  [facility] VTE RISK SCORE: 12 (low) No extended prophylaxis recommended post-discharge    Assessment and Plan: S/P lap Vertical Sleeve Gastrectomy, HH Repair, UHR    - Bariatric Clear diet today and advance to protocol-derived staged meal progression supervised by RD   - continue DVT/GI prophylaxis, Incentive spirometry  - Ambulation at least every 3 hours or as indicated  - Procedure specific education including diet, vitamins and VTE prevention. Written materials given  - Medication reviewed and reconciled, Bariatric meds obtained from [facility]  - Met Bariatric 8-Point d/c criteria   - Follow up with Dr Lopez in 7-10 days, Dietitian and PMD in 30 days.      Najma Elena, LULU, ANP  [phone] Post Op Day#: 1    Subjective: Feeling good, a bit of abdominal soreness, no abdominal pain    Objective: A bit of nausea last night with epigastric discomfort. Now resolved, EKG sinus bradycardia with arrythmia,  no change from baseline EKG.                 ambulated around the unit, effective pain control. Afebrile, v/s stable. Tolerating bariatric liquid. Return of normal voiding pattern.                                               Vital Signs Last 24 Hrs  T(C): 36.9 (16 Nov 2017 08:23), Max: 36.9 (15 Nov 2017 22:07)  T(F): 98.5 (16 Nov 2017 08:23), Max: 98.5 (15 Nov 2017 22:07)  HR: 65 (16 Nov 2017 08:23) (50 - 81)  BP: 130/70 (16 Nov 2017 08:23) (130/70 - 165/79)  BP(mean): 102 (15 Nov 2017 19:30) (94 - 116)  RR: 18 (16 Nov 2017 08:23) (14 - 18)  SpO2: 97% (16 Nov 2017 08:23) (81% - 99%)                                               I&O's Summary    15 Nov 2017 07:01  -  16 Nov 2017 07:00  --------------------------------------------------------  IN: 3250 mL / OUT: 1375 mL / NET: 1875 mL    16 Nov 2017 07:01  -  16 Nov 2017 11:42  --------------------------------------------------------  IN: 125 mL / OUT: 0 mL / NET: 125 mL    ORAL 400ml+                                                                    12.1   9.3   )-----------( 228      ( 16 Nov 2017 07:21 )             36.5                                                 11-16    140  |  104  |  11  ----------------------------<  100<H>  3.9   |  24  |  0.75    Ca    8.8      16 Nov 2017 07:21  Phos  3.9     11-15  Mg     2.0     11-15      Physical Exam:         Lungs:  clear breath sounds b/l       Heart:  Regular rate & rhythm       Abdomen:  Soft, non-distended.  Scopes sites clean, dry and intact. + bs, - flatus, no rebound or guarding       Skin:  No rash       Extremities: + pulses, no edema, no calf tenderness, negative aubrey's                Caprini VTE Risk Score: 3-4 ( moderate) No extended prophylaxis recommendedpost-discharge  [facility] VTE RISK SCORE: 12 (low) No extended prophylaxis recommended post-discharge    Assessment and Plan: S/P lap Vertical Sleeve Gastrectomy, UHR    - Bariatric Clear diet today and advance to protocol-derived staged meal progression supervised by RD   - continue DVT/GI prophylaxis, Incentive spirometry  - Ambulation at least every 3 hours or as indicated  - Procedure specific education including diet, vitamins and VTE prevention. Written materials given  - Medication reviewed and reconciled, Bariatric meds obtained from [facility]  - Met Bariatric 8-Point d/c criteria   - Follow up with Dr Lopez in 7-10 days, Dietitian and PMD in 30 days.      Najma Elena, LULU, ANP  [phone]

## 2017-11-16 NOTE — DIETITIAN INITIAL EVALUATION ADULT. - PERTINENT MEDS FT
lactated ringers, MVI/Thiamine/folic acid in NaCl 0.9%, aluminum hydroxide/magnesium hydroxide/simethicone, Levsin, Zofran, Protonix, KCl

## 2017-11-16 NOTE — DISCHARGE NOTE ADULT - INSTRUCTIONS
Bariatric clear liquid diet today and then start bariatric full liquid diet tomorrow for a period of 2 weeks. Follow up with your dietitian in 30 days. No carbonated beverages,

## 2017-11-16 NOTE — DIETITIAN INITIAL EVALUATION ADULT. - OTHER INFO
Consult received for bariatric surgery. Per chart pt has tried multiple wt loss attempts. Per outpatient RD note on 9/8/17 pt has tried [employer], [employer], Weight Watchers, shakes and soups and watching portion sizes but was unable to lose a significant amount of wt and keep it off. Pt reports weighing 204 pounds before going on the 2 week full liquid diet and her presurgical wt was 196.9 pounds. Pt now S/P laparoscopic vertical sleeve gastrectomy. Pt denies nausea/vomiting, sipping clear liquid bariatric diet at time of visit. Pt with knowledge of bariatric full liquid diet however receptive to in depth review/reinforcement. Pt states having protein shakes at home. Pt states she also purchased some of the necessary vitamins. Pt was advised to purchase chewable/liquid multivitamin with added elemental iron, chewable/liquid calcium citrate with vitamin D and sublingual B12. Pt was advised to take the chewable/liquid multivitamin with added elemental iron at least 2 hours apart from the chewable/liquid calcium citrate with vitamin D for optimal absorption. Pt states she plans to follow up with outpatient RD. Pt able to teach back all points discussed during interview.

## 2017-11-16 NOTE — DISCHARGE NOTE ADULT - NS AS DC STROKE ED MATERIALS
Call 911 for Stroke/Risk Factors for Stroke/Prescribed Medications/Stroke Warning Signs and Symptoms/Stroke Education Booklet/Need for Followup After Discharge

## 2017-11-16 NOTE — PROVIDER CONTACT NOTE (OTHER) - ACTION/TREATMENT ORDERED:
MD notified and to assess and Hydralazine to be admin. MD suspects that blood tinged sputum maybe d/t intubation. Will con't to monitor

## 2017-11-16 NOTE — DIETITIAN INITIAL EVALUATION ADULT. - ADHERENCE
Pt reports following a full liquid diet for 2 weeks PTA as instructed by outpatient RD. Pt reports having Premier protein shakes, yogurt, broth, Sugar free jello, tea, lentil and other pureed soups.

## 2017-11-16 NOTE — DIETITIAN INITIAL EVALUATION ADULT. - ENERGY NEEDS
Ht: 60“, Wt: 196.9lbs- presurgical, BMI: 38.5kg/m2, IBW: 100 lbs (+/-10%), %IBW: 197%  no edema or pressure injuries

## 2017-11-16 NOTE — DISCHARGE NOTE ADULT - MEDICATION SUMMARY - MEDICATIONS TO STOP TAKING
I will STOP taking the medications listed below when I get home from the hospital:    NIFEdipine 30 mg oral tablet, extended release  -- 1 tab(s) by mouth once a day Am - hold for 1 month after surgery    hydroCHLOROthiazide 12.5 mg oral capsule  -- 1 cap(s) by mouth once a day - discontinue after surgery

## 2017-11-16 NOTE — DISCHARGE NOTE ADULT - NS AS ACTIVITY OBS
Walking-Outdoors allowed/No Heavy lifting/straining/Walking-Indoors allowed/Showering allowed/Stairs allowed

## 2017-11-16 NOTE — PROGRESS NOTE ADULT - SUBJECTIVE AND OBJECTIVE BOX
Bariatric Surgery Progress Note    Post Op Day#:     24 hr events/Subjective:     No acute issues overnight  Pain controlled with medications  Nausea controlled with anti-emetics   Voiding      Procedure:  Gastrectomy, sleeve, laparoscopic  Umbilical hernia repair  Lysis of adhesions      Vital Signs Last 24 Hrs  T(C): 36.9 (16 Nov 2017 00:00), Max: 36.9 (15 Nov 2017 22:07)  T(F): 98.5 (16 Nov 2017 00:00), Max: 98.5 (15 Nov 2017 22:07)  HR: 67 (16 Nov 2017 00:34) (50 - 81)  BP: 137/83 (16 Nov 2017 00:00) (116/87 - 165/79)  BP(mean): 102 (15 Nov 2017 19:30) (94 - 116)  RR: 18 (16 Nov 2017 00:00) (14 - 18)  SpO2: 96% (16 Nov 2017 00:34) (81% - 99%)    Weight (kg): 89.3 (11-15 @ 08:41)  I&O's Summary    15 Nov 2017 07:01  -  16 Nov 2017 05:15  --------------------------------------------------------  IN: 1400 mL / OUT: 1025 mL / NET: 375 mL      I&O's Detail    15 Nov 2017 07:01  -  16 Nov 2017 05:15  --------------------------------------------------------  IN:    multivitamin/thiamine/folic acid in sodium chloride 0.9%: 1400 mL  Total IN: 1400 mL    OUT:    Indwelling Catheter - Urethral: 625 mL    Voided: 400 mL  Total OUT: 1025 mL    Total NET: 375 mL          Physical Exam:    General:  Appears stated age, well-groomed, well-nourished, no distress  Chest:  clear breath sounds  Cardiovascular:  Regular rate & rhythm  Abdomen:  Soft, incisions clean, dry intact, tenderness around incisions, no rebound or guarding  Skin:  No rash  Neuro/Psych:  Alert, oriented to time, place and person                           12.1   10.9  )-----------( 212      ( 15 Nov 2017 14:48 )             36.4       11-15    143  |  107  |  14  ----------------------------<  127<H>  4.1   |  25  |  0.72    Ca    8.5      15 Nov 2017 14:48  Phos  3.9     11-15  Mg     2.0     11-15        acetaminophen  IVPB. milliGRAM(s) IV Intermittent once  acetaminophen  IVPB. milliGRAM(s) IV Intermittent once  aluminum hydroxide/magnesium hydroxide/simethicone Suspension milliLiter(s) Oral every 4 hours PRN  ceFAZolin   IVPB milliGRAM(s) IV Intermittent once  heparin  Injectable Unit(s) SubCutaneous every 8 hours  hydrALAZINE Injectable milliGRAM(s) IV Push every 6 hours  HYDROmorphone  Injectable milliGRAM(s) IV Push every 10 minutes PRN  hyoscyamine SL milliGRAM(s) SubLingual every 6 hours  ketorolac   Injectable milliGRAM(s) IV Push every 6 hours  lactated ringers. milliLiter(s) IV Continuous <Continuous>  multivitamin/thiamine/folic acid in sodium chloride 0.9% milliLiter(s) IV Continuous <Continuous>  ondansetron Injectable milliGRAM(s) IV Push every 6 hours  pantoprazole  Injectable milliGRAM(s) IV Push daily  potassium chloride  10 mEq/100 mL IVPB milliEquivalent(s) IV Intermittent every 1 hour PRN          Assessment and Plan:  66y y/o Female s/p Gastrectomy, sleeve, laparoscopic  Umbilical hernia repair  Lysis of adhesions  , POD #     - Start PO liquid oxycodone PRN for pain  - Continue ambulation   - Encourage Incentive Spirometer use  - Bariatric clears   - Continue chemical and mechanical DVT prophylaxis  - Discharge home once tolerating adequate PO and 8 point discharge criteria met    Discuss with attending Bariatric Surgery Progress Note    Post Op Day#: 1    24 hr events/Subjective:     Patient was bradycardic to the 40s while sleeping  Had 1 episode of bloody emesis  Pain controlled with medications  Nausea controlled with anti-emetics   Voiding      Procedure:  Gastrectomy, sleeve, laparoscopic  Umbilical hernia repair  Lysis of adhesions      Vital Signs Last 24 Hrs  T(C): 36.9 (16 Nov 2017 00:00), Max: 36.9 (15 Nov 2017 22:07)  T(F): 98.5 (16 Nov 2017 00:00), Max: 98.5 (15 Nov 2017 22:07)  HR: 67 (16 Nov 2017 00:34) (50 - 81)  BP: 137/83 (16 Nov 2017 00:00) (116/87 - 165/79)  BP(mean): 102 (15 Nov 2017 19:30) (94 - 116)  RR: 18 (16 Nov 2017 00:00) (14 - 18)  SpO2: 96% (16 Nov 2017 00:34) (81% - 99%)    Weight (kg): 89.3 (11-15 @ 08:41)  I&O's Summary    15 Nov 2017 07:01  -  16 Nov 2017 05:15  --------------------------------------------------------  IN: 1400 mL / OUT: 1025 mL / NET: 375 mL      I&O's Detail    15 Nov 2017 07:01  -  16 Nov 2017 05:15  --------------------------------------------------------  IN:    multivitamin/thiamine/folic acid in sodium chloride 0.9%: 1400 mL  Total IN: 1400 mL    OUT:    Indwelling Catheter - Urethral: 625 mL    Voided: 400 mL  Total OUT: 1025 mL    Total NET: 375 mL          Physical Exam:    General:  Appears stated age, well-groomed, well-nourished, no distress  Chest:  clear breath sounds  Cardiovascular:  Bradycardic  Abdomen:  Soft, incisions clean, dry intact, tenderness around incisions, no rebound or guarding  Skin:  No rash  Neuro/Psych:  Alert, oriented to time, place and person                           12.1   10.9  )-----------( 212      ( 15 Nov 2017 14:48 )             36.4       11-15    143  |  107  |  14  ----------------------------<  127<H>  4.1   |  25  |  0.72    Ca    8.5      15 Nov 2017 14:48  Phos  3.9     11-15  Mg     2.0     11-15        acetaminophen  IVPB. milliGRAM(s) IV Intermittent once  acetaminophen  IVPB. milliGRAM(s) IV Intermittent once  aluminum hydroxide/magnesium hydroxide/simethicone Suspension milliLiter(s) Oral every 4 hours PRN  ceFAZolin   IVPB milliGRAM(s) IV Intermittent once  heparin  Injectable Unit(s) SubCutaneous every 8 hours  hydrALAZINE Injectable milliGRAM(s) IV Push every 6 hours  HYDROmorphone  Injectable milliGRAM(s) IV Push every 10 minutes PRN  hyoscyamine SL milliGRAM(s) SubLingual every 6 hours  ketorolac   Injectable milliGRAM(s) IV Push every 6 hours  lactated ringers. milliLiter(s) IV Continuous <Continuous>  multivitamin/thiamine/folic acid in sodium chloride 0.9% milliLiter(s) IV Continuous <Continuous>  ondansetron Injectable milliGRAM(s) IV Push every 6 hours  pantoprazole  Injectable milliGRAM(s) IV Push daily  potassium chloride  10 mEq/100 mL IVPB milliEquivalent(s) IV Intermittent every 1 hour PRN          Assessment and Plan:  66y y/o Female s/p Gastrectomy, sleeve, laparoscopic  Umbilical hernia repair  Lysis of adhesions  , POD # 1    - Start PO liquid oxycodone PRN for pain  - Continue ambulation   - Encourage Incentive Spirometer use  - Bariatric clears   - Continue chemical and mechanical DVT prophylaxis  - Discharge home once tolerating adequate PO and 8 point discharge criteria met    Discuss with attending

## 2017-11-16 NOTE — DISCHARGE NOTE ADULT - HOSPITAL COURSE
On November 14, 2017 Mrs. Lal  underwent Laparoscopic Hiatal Hernia Repair and Sleeve Gastrectomy for morbid obesity BMI 38.  She received VTE and SSI prophylaxis. Following intubation an indwelling razo was placed. The procedure went as planned, intraoperative leak test showed no evidence of leak.  She was subsequently extubated in the OR and taken to the recovery room. Postoperatively her pain was managed with opioid and non-opioid analgesia. Once hemodynamically stable she was transferred to the bariatric unit and placed on bedside continuous pulse oximetry. She was able to ambulate with assistance evening of surgery and voided following razo removal. .      On POD#1, she remained stable, c/o of chest discomfort overnight for which EGD obtained and findings within normal limits to preop baseline. She ambulated independently around the unit. The rest of her hospital course was uneventful and she was subsequently cleared for discharge. Procedure specific written discharge instructions explained, written materials given to include signs and symptoms of postop complications and VTE prevention. Routine bariatric medications obtained from pharmacy. She will follow a protocol-derived staged meal progression supervised by her dietician and follow up in 30 days. She will see Dr Lopez in 7-10 days with subsequent visits at one, three and six months, and then annually. On November 14, 2017 Mrs. Lal  underwent Laparoscopic Umbilical Hernia Repair and Sleeve Gastrectomy for morbid obesity BMI 38.  She received VTE and SSI prophylaxis. Following intubation an indwelling razo was placed. The procedure went as planned, intraoperative leak test showed no evidence of leak.  She was subsequently extubated in the OR and taken to the recovery room. Postoperatively her pain was managed with opioid and non-opioid analgesia. Once hemodynamically stable she was transferred to the bariatric unit and placed on bedside continuous pulse oximetry. She was able to ambulate with assistance evening of surgery and voided following razo removal. .      On POD#1, she remained stable, c/o of chest discomfort overnight for which EGD obtained and findings within normal limits to preop baseline. She ambulated independently around the unit. The rest of her hospital course was uneventful and she was subsequently cleared for discharge. Procedure specific written discharge instructions explained, written materials given to include signs and symptoms of postop complications and VTE prevention. Routine bariatric medications obtained from pharmacy. She will follow a protocol-derived staged meal progression supervised by her dietician and follow up in 30 days. She will see Dr Lopez in 7-10 days with subsequent visits at one, three and six months, and then annually.

## 2017-11-16 NOTE — DISCHARGE NOTE ADULT - MEDICATION SUMMARY - MEDICATIONS TO TAKE
I will START or STAY ON the medications listed below when I get home from the hospital:    oxyCODONE 5 mg/5 mL oral solution  -- 5 milliliter(s) by mouth every 4 hours MDD:30 as needed  -- Caution federal law prohibits the transfer of this drug to any person other  than the person for whom it was prescribed.  May cause drowsiness.  Alcohol may intensify this effect.  Use care when operating dangerous machinery.  This prescription cannot be refilled.  Using more of this medication than prescribed may cause serious breathing problems.    -- Indication: For pain    lisinopril 20 mg oral tablet  -- 1 tab(s) by mouth once a day (at bedtime)  -- Indication: For hypertension    Zofran ODT 4 mg oral tablet, disintegrating  -- 1 tab(s) by mouth 3 times a day MDD:3 as needed  -- Indication: For nausea    zolpidem 10 mg oral tablet  -- 1 tab(s) by mouth once a day (at bedtime)  -- Indication: For Anxiety    Stiolto Respimat  -- inhaled 2 times a day ( patient to bring medication to [facility] the day of procedure).   -- Indication: For COPD (chronic obstructive pulmonary disease)    NIFEdipine 30 mg oral tablet, extended release  -- 1 tab(s) by mouth once a day Am - hold for 1 month after surgery  -- Indication: For heart diseasse    amLODIPine 5 mg oral tablet  -- 1 tab(s) by mouth once a day - start after surgery  -- Indication: For heart disease    omeprazole 40 mg oral delayed release capsule  -- 1 cap(s) by mouth once a day MDD:1 open and mix in applesauce  -- do not swallow whole,mix content in applesauce  -- Indication: For reflux

## 2017-11-16 NOTE — DISCHARGE NOTE ADULT - CARE PROVIDER_API CALL
Tashi Lopez), Surgery  [address] [facility]  [address] [place] [id number]  Phone: [phone]  Fax: [phone]

## 2017-11-16 NOTE — PROGRESS NOTE ADULT - ATTENDING COMMENTS
I saw and examined the patient, and reviewed  the history and data with the patient and staff  Agree with note which was also reviewed and edited where appropriate.  D/W patient, RN, residents and Fellow    Evaluate for pm D/C.

## 2017-11-16 NOTE — DISCHARGE NOTE ADULT - PLAN OF CARE
Pt will make healthy lifestyle changes for improve quality of life as explained and outlined on the gastric sleeve instructions.  if you have shortness of breath, chest pain, calf pain with swelling, difficulty breathing visit the nearest emergency room. For abdominal pain, vomiting, fever, general  weakness, inflammation at your surgical sites, call the surgeon's office immediately.

## 2017-11-24 LAB — SURGICAL PATHOLOGY STUDY: SIGNIFICANT CHANGE UP

## 2017-11-27 ENCOUNTER — APPOINTMENT (OUTPATIENT)
Dept: SURGERY | Facility: CLINIC | Age: 67
End: 2017-11-27
Payer: MEDICARE

## 2017-11-27 PROCEDURE — 99024 POSTOP FOLLOW-UP VISIT: CPT

## 2017-11-27 RX ORDER — HYDROCHLOROTHIAZIDE 12.5 MG/1
12.5 TABLET ORAL
Refills: 0 | Status: DISCONTINUED | COMMUNITY
Start: 2017-09-11 | End: 2017-11-27

## 2017-11-27 RX ORDER — SODIUM SULFATE, POTASSIUM SULFATE, MAGNESIUM SULFATE 17.5; 3.13; 1.6 G/ML; G/ML; G/ML
17.5-3.13-1.6 SOLUTION, CONCENTRATE ORAL
Qty: 354 | Refills: 0 | Status: DISCONTINUED | COMMUNITY
Start: 2017-04-04 | End: 2017-11-27

## 2017-11-27 RX ORDER — NIFEDIPINE 60 MG/1
60 TABLET, FILM COATED, EXTENDED RELEASE ORAL
Qty: 90 | Refills: 0 | Status: DISCONTINUED | COMMUNITY
Start: 2017-08-22 | End: 2017-11-27

## 2017-11-27 RX ORDER — NIFEDIPINE 30 MG/1
30 TABLET, FILM COATED, EXTENDED RELEASE ORAL
Qty: 30 | Refills: 0 | Status: DISCONTINUED | COMMUNITY
Start: 2017-10-26 | End: 2017-11-27

## 2017-11-27 RX ORDER — METHYLPREDNISOLONE 4 MG/1
4 TABLET ORAL
Qty: 1 | Refills: 0 | Status: DISCONTINUED | COMMUNITY
Start: 2017-03-17 | End: 2017-11-27

## 2017-12-13 ENCOUNTER — MEDICATION RENEWAL (OUTPATIENT)
Age: 67
End: 2017-12-13

## 2017-12-19 PROCEDURE — 86901 BLOOD TYPING SEROLOGIC RH(D): CPT

## 2017-12-19 PROCEDURE — 93005 ELECTROCARDIOGRAM TRACING: CPT

## 2017-12-19 PROCEDURE — 85027 COMPLETE CBC AUTOMATED: CPT

## 2017-12-19 PROCEDURE — 80048 BASIC METABOLIC PNL TOTAL CA: CPT

## 2017-12-19 PROCEDURE — 84100 ASSAY OF PHOSPHORUS: CPT

## 2017-12-19 PROCEDURE — 83735 ASSAY OF MAGNESIUM: CPT

## 2017-12-19 PROCEDURE — 82962 GLUCOSE BLOOD TEST: CPT

## 2017-12-19 PROCEDURE — 86900 BLOOD TYPING SEROLOGIC ABO: CPT

## 2017-12-19 PROCEDURE — C1889: CPT

## 2017-12-19 PROCEDURE — 88305 TISSUE EXAM BY PATHOLOGIST: CPT

## 2018-01-03 ENCOUNTER — MEDICATION RENEWAL (OUTPATIENT)
Age: 68
End: 2018-01-03

## 2018-01-08 ENCOUNTER — APPOINTMENT (OUTPATIENT)
Dept: SURGERY | Facility: CLINIC | Age: 68
End: 2018-01-08
Payer: MEDICARE

## 2018-01-08 VITALS
HEART RATE: 67 BPM | WEIGHT: 178.31 LBS | SYSTOLIC BLOOD PRESSURE: 131 MMHG | DIASTOLIC BLOOD PRESSURE: 79 MMHG | HEIGHT: 60 IN | BODY MASS INDEX: 35.01 KG/M2 | OXYGEN SATURATION: 97 %

## 2018-01-08 PROCEDURE — 99024 POSTOP FOLLOW-UP VISIT: CPT

## 2018-03-20 ENCOUNTER — RX RENEWAL (OUTPATIENT)
Age: 68
End: 2018-03-20

## 2018-04-09 ENCOUNTER — APPOINTMENT (OUTPATIENT)
Dept: SURGERY | Facility: CLINIC | Age: 68
End: 2018-04-09
Payer: MEDICARE

## 2018-04-09 VITALS
SYSTOLIC BLOOD PRESSURE: 114 MMHG | OXYGEN SATURATION: 97 % | TEMPERATURE: 98.3 F | DIASTOLIC BLOOD PRESSURE: 77 MMHG | RESPIRATION RATE: 15 BRPM | BODY MASS INDEX: 33.38 KG/M2 | HEART RATE: 55 BPM | HEIGHT: 60 IN | WEIGHT: 170 LBS

## 2018-04-09 LAB
BASOPHILS # BLD AUTO: 0.02 K/UL
BASOPHILS NFR BLD AUTO: 0.3 %
EOSINOPHIL # BLD AUTO: 0.11 K/UL
EOSINOPHIL NFR BLD AUTO: 1.5 %
HCT VFR BLD CALC: 41.2 %
HGB BLD-MCNC: 13 G/DL
IMM GRANULOCYTES NFR BLD AUTO: 0 %
LYMPHOCYTES # BLD AUTO: 2.75 K/UL
LYMPHOCYTES NFR BLD AUTO: 38.4 %
MAN DIFF?: NORMAL
MCHC RBC-ENTMCNC: 28.6 PG
MCHC RBC-ENTMCNC: 31.6 GM/DL
MCV RBC AUTO: 90.5 FL
MONOCYTES # BLD AUTO: 0.59 K/UL
MONOCYTES NFR BLD AUTO: 8.2 %
NEUTROPHILS # BLD AUTO: 3.7 K/UL
NEUTROPHILS NFR BLD AUTO: 51.6 %
PLATELET # BLD AUTO: 298 K/UL
RBC # BLD: 4.55 M/UL
RBC # FLD: 14.8 %
WBC # FLD AUTO: 7.17 K/UL

## 2018-04-09 PROCEDURE — 99213 OFFICE O/P EST LOW 20 MIN: CPT

## 2018-04-09 RX ORDER — OMEPRAZOLE 20 MG/1
20 CAPSULE, DELAYED RELEASE ORAL DAILY
Qty: 1 | Refills: 0 | Status: DISCONTINUED | COMMUNITY
Start: 2017-12-13 | End: 2018-04-09

## 2018-04-09 RX ORDER — OMEPRAZOLE MAGNESIUM 20 MG/1
20 TABLET, DELAYED RELEASE ORAL DAILY
Qty: 1 | Refills: 3 | Status: DISCONTINUED | COMMUNITY
Start: 2017-03-17 | End: 2018-04-09

## 2018-04-10 ENCOUNTER — APPOINTMENT (OUTPATIENT)
Dept: ORTHOPEDIC SURGERY | Facility: CLINIC | Age: 68
End: 2018-04-10
Payer: MEDICARE

## 2018-04-10 VITALS — WEIGHT: 170 LBS | HEIGHT: 60 IN | BODY MASS INDEX: 33.38 KG/M2

## 2018-04-10 DIAGNOSIS — M76.62 ACHILLES TENDINITIS, LEFT LEG: ICD-10-CM

## 2018-04-10 DIAGNOSIS — M77.9 ENTHESOPATHY, UNSPECIFIED: ICD-10-CM

## 2018-04-10 DIAGNOSIS — M21.6X2 OTHER ACQUIRED DEFORMITIES OF LEFT FOOT: ICD-10-CM

## 2018-04-10 LAB
25(OH)D3 SERPL-MCNC: 36.7 NG/ML
ALBUMIN SERPL ELPH-MCNC: 4.4 G/DL
ALP BLD-CCNC: 63 U/L
ALT SERPL-CCNC: 23 U/L
ANION GAP SERPL CALC-SCNC: 14 MMOL/L
AST SERPL-CCNC: 21 U/L
BILIRUB SERPL-MCNC: 0.6 MG/DL
BUN SERPL-MCNC: 19 MG/DL
CALCIUM SERPL-MCNC: 9.7 MG/DL
CHLORIDE SERPL-SCNC: 97 MMOL/L
CO2 SERPL-SCNC: 26 MMOL/L
CREAT SERPL-MCNC: 0.72 MG/DL
FOLATE SERPL-MCNC: >20 NG/ML
GLUCOSE SERPL-MCNC: 101 MG/DL
IRON SERPL-MCNC: 77 UG/DL
POTASSIUM SERPL-SCNC: 4.5 MMOL/L
PROT SERPL-MCNC: 7.1 G/DL
SODIUM SERPL-SCNC: 137 MMOL/L
VIT B12 SERPL-MCNC: 969 PG/ML

## 2018-04-10 PROCEDURE — 99203 OFFICE O/P NEW LOW 30 MIN: CPT

## 2018-04-10 PROCEDURE — 73630 X-RAY EXAM OF FOOT: CPT | Mod: LT

## 2018-05-07 PROBLEM — M77.9 ENTHESOPATHY OF FOOT: Status: ACTIVE | Noted: 2018-05-07

## 2018-07-16 PROBLEM — J44.9 CHRONIC OBSTRUCTIVE PULMONARY DISEASE, UNSPECIFIED: Chronic | Status: ACTIVE | Noted: 2017-11-03

## 2018-07-18 ENCOUNTER — RX RENEWAL (OUTPATIENT)
Age: 68
End: 2018-07-18

## 2018-07-23 ENCOUNTER — APPOINTMENT (OUTPATIENT)
Dept: SURGERY | Facility: CLINIC | Age: 68
End: 2018-07-23
Payer: MEDICARE

## 2018-07-23 PROBLEM — I35.0 NONRHEUMATIC AORTIC (VALVE) STENOSIS: Chronic | Status: ACTIVE | Noted: 2017-11-03

## 2018-07-23 PROBLEM — Z87.891 PERSONAL HISTORY OF NICOTINE DEPENDENCE: Chronic | Status: ACTIVE | Noted: 2017-11-03

## 2018-07-23 PROBLEM — K21.9 GASTRO-ESOPHAGEAL REFLUX DISEASE WITHOUT ESOPHAGITIS: Chronic | Status: ACTIVE | Noted: 2017-05-20

## 2018-07-23 PROBLEM — J38.3 OTHER DISEASES OF VOCAL CORDS: Chronic | Status: ACTIVE | Noted: 2017-11-03

## 2018-07-23 PROBLEM — I10 ESSENTIAL (PRIMARY) HYPERTENSION: Chronic | Status: ACTIVE | Noted: 2017-05-20

## 2018-07-23 PROBLEM — E66.9 OBESITY, UNSPECIFIED: Chronic | Status: ACTIVE | Noted: 2017-11-03

## 2018-07-23 PROCEDURE — 99406 BEHAV CHNG SMOKING 3-10 MIN: CPT

## 2018-07-23 PROCEDURE — 99213 OFFICE O/P EST LOW 20 MIN: CPT | Mod: 25

## 2018-08-13 ENCOUNTER — APPOINTMENT (OUTPATIENT)
Dept: INTERNAL MEDICINE | Facility: CLINIC | Age: 68
End: 2018-08-13
Payer: MEDICARE

## 2018-08-13 VITALS — SYSTOLIC BLOOD PRESSURE: 106 MMHG | DIASTOLIC BLOOD PRESSURE: 66 MMHG

## 2018-08-13 DIAGNOSIS — M70.61 TROCHANTERIC BURSITIS, RIGHT HIP: ICD-10-CM

## 2018-08-13 DIAGNOSIS — Z91.89 OTHER SPECIFIED PERSONAL RISK FACTORS, NOT ELSEWHERE CLASSIFIED: ICD-10-CM

## 2018-08-13 PROCEDURE — 99214 OFFICE O/P EST MOD 30 MIN: CPT

## 2018-08-13 RX ORDER — OMEPRAZOLE 20 MG/1
20 CAPSULE, DELAYED RELEASE ORAL DAILY
Qty: 30 | Refills: 0 | Status: DISCONTINUED | COMMUNITY
Start: 2017-07-19 | End: 2018-08-13

## 2018-08-13 RX ORDER — OMEPRAZOLE 40 MG/1
40 CAPSULE, DELAYED RELEASE ORAL TWICE DAILY
Qty: 60 | Refills: 1 | Status: COMPLETED | COMMUNITY
Start: 2018-01-03 | End: 2018-01-13

## 2018-08-13 NOTE — PHYSICAL EXAM
[No Acute Distress] : no acute distress [Well Nourished] : well nourished [Well Developed] : well developed [Well-Appearing] : well-appearing [PERRL] : pupils equal round and reactive to light [Normal Oropharynx] : the oropharynx was normal [No JVD] : no jugular venous distention [Thyroid Normal, No Nodules] : the thyroid was normal and there were no nodules present [No Respiratory Distress] : no respiratory distress  [Clear to Auscultation] : lungs were clear to auscultation bilaterally [No Accessory Muscle Use] : no accessory muscle use [Normal Rate] : normal rate  [Regular Rhythm] : with a regular rhythm [Normal S1, S2] : normal S1 and S2 [No Edema] : there was no peripheral edema [de-identified] : II/VI syst murmur [de-identified] : R trochanteric tenderness

## 2018-08-13 NOTE — ASSESSMENT
[FreeTextEntry1] : 1) Needs to halve the AMBIEN as all women max dosage is 5mg\par 2) taper PPI add ranitidine 150BID and omeprazole 40 QOD then 20mg\par 3) high potassium and Mg++ foods avocado, banana, nuts for leg crqmps\par 4) stretching for trochanteric bursitis\par 5) Smoking Cessation Program w vouchers [address] [facility] email  Still smoking. States she doesn't need help, yet "dabbling" with cigs as recent as yesterday.

## 2018-08-13 NOTE — HISTORY OF PRESENT ILLNESS
[FreeTextEntry1] : HTN and pre DM\par \par Reports awakening with leg cramps R>L almost every night. Recently restarted the GYM 6 mos and stopped achilles\par R shin and R thigh [de-identified] : 68 yo woman\par HTN, preDM. obesity, smoker, S/P bariatric surgery\par \par 1/4 PPD x 20+ years\par Sleeve surgery [id number] Dr Moore\par \par Helping someone  Pt living in her house and smoking in her house. Last cigarette. Yesterday. Two weeks before

## 2018-08-13 NOTE — PHYSICAL EXAM
[No Acute Distress] : no acute distress [Well Nourished] : well nourished [Well Developed] : well developed [Well-Appearing] : well-appearing [PERRL] : pupils equal round and reactive to light [Normal Oropharynx] : the oropharynx was normal [No JVD] : no jugular venous distention [Thyroid Normal, No Nodules] : the thyroid was normal and there were no nodules present [No Respiratory Distress] : no respiratory distress  [Clear to Auscultation] : lungs were clear to auscultation bilaterally [No Accessory Muscle Use] : no accessory muscle use [Normal Rate] : normal rate  [Regular Rhythm] : with a regular rhythm [Normal S1, S2] : normal S1 and S2 [No Edema] : there was no peripheral edema [de-identified] : II/VI syst murmur [de-identified] : R trochanteric tenderness

## 2018-08-13 NOTE — ASSESSMENT
[FreeTextEntry1] : 1) Needs to halve the AMBIEN as all women max dosage is 5mg\par 2) taper PPI add ranitidine 150BID and omeprazole 40 QOD then 20mg\par 3) high potassium and Mg++ foods avocado, banana, nuts for leg crqmps\par 4) stretching for trochanteric bursitis\par 5) Smoking Cessation Program w vouchers [facility] [facility] email  Still smoking. States she doesn't need help, yet "dabbling" with cigs as recent as yesterday.

## 2018-08-13 NOTE — HISTORY OF PRESENT ILLNESS
[FreeTextEntry1] : HTN and pre DM\par \par Reports awakening with leg cramps R>L almost every night. Recently restarted the GYM 6 mos and stopped achilles\par R shin and R thigh [de-identified] : 68 yo woman\par HTN, preDM. obesity, smoker, S/P bariatric surgery\par \par 1/4 PPD x 20+ years\par Sleeve surgery [id number] Dr Moore\par \par Helping someone  Pt living in her house and smoking in her house. Last cigarette. Yesterday. Two weeks before

## 2018-08-15 LAB
CHOLEST SERPL-MCNC: 207 MG/DL
CHOLEST/HDLC SERPL: 2.9 RATIO
HBA1C MFR BLD HPLC: 5.9 %
HCV AB SER QL: NONREACTIVE
HCV S/CO RATIO: 0.14 S/CO
HDLC SERPL-MCNC: 71 MG/DL
LDLC SERPL CALC-MCNC: 120 MG/DL
TRIGL SERPL-MCNC: 81 MG/DL

## 2018-09-19 ENCOUNTER — APPOINTMENT (OUTPATIENT)
Dept: INTERNAL MEDICINE | Facility: CLINIC | Age: 68
End: 2018-09-19
Payer: MEDICARE

## 2018-09-19 VITALS
OXYGEN SATURATION: 96 % | BODY MASS INDEX: 32.98 KG/M2 | HEART RATE: 75 BPM | WEIGHT: 168 LBS | TEMPERATURE: 98.4 F | HEIGHT: 60 IN | DIASTOLIC BLOOD PRESSURE: 70 MMHG | SYSTOLIC BLOOD PRESSURE: 120 MMHG

## 2018-09-19 VITALS — SYSTOLIC BLOOD PRESSURE: 122 MMHG | DIASTOLIC BLOOD PRESSURE: 70 MMHG

## 2018-09-19 DIAGNOSIS — Z87.891 PERSONAL HISTORY OF NICOTINE DEPENDENCE: ICD-10-CM

## 2018-09-19 DIAGNOSIS — Z13.820 ENCOUNTER FOR SCREENING FOR OSTEOPOROSIS: ICD-10-CM

## 2018-09-19 PROCEDURE — 99397 PER PM REEVAL EST PAT 65+ YR: CPT | Mod: 25

## 2018-09-19 PROCEDURE — 90662 IIV NO PRSV INCREASED AG IM: CPT

## 2018-09-19 PROCEDURE — G0008: CPT

## 2018-09-19 RX ORDER — OMEPRAZOLE 40 MG/1
40 CAPSULE, DELAYED RELEASE ORAL
Refills: 0 | Status: COMPLETED | COMMUNITY
End: 2018-08-19

## 2018-09-19 RX ORDER — ZOSTER VACCINE RECOMBINANT, ADJUVANTED 50 MCG/0.5
50 KIT INTRAMUSCULAR
Qty: 1 | Refills: 0 | Status: COMPLETED | OUTPATIENT
Start: 2018-08-13 | End: 2018-08-13

## 2018-09-19 NOTE — HISTORY OF PRESENT ILLNESS
To:  Dr. Bowser  Date:  2024  Patient Name: Blaze Barnes-Age / Sex: 10/22/1968-A: 55 y  male  Medical Records: [id number]   CSN: [id number]      Clearance for Surgery Requested by Surgeon    Request for:  Cardiac Clearance    Requested by Surgeon: Dr. Bryce Herrera    Surgical Date: 3/6/2024    Procedure: ROBOTIC UMBILICAL HERNIORRHAPHY, N/A      Please fax the clearance note to the Pre-Admission Testing department.  Thank you.   [FreeTextEntry1] : CPE   [de-identified] : PULM Dr JASON Raza ordered CT\par \par 67\par 1) went to ENT placed on PPI and patient weaned herself off it\par 2) COPD on LABA=antiAch   PULM\par 3) HTN   Cardiologist Dr. Lise Fields gives amlo + HCTZ 12.5  lisinopril 20\par 4) at risk CAD\par 5) 3 weeks ago urgent care scratchy throat  Z altagracia given\par 6) R ache leg painful awakens her.  On crestor   Never before crestor

## 2018-09-19 NOTE — HEALTH RISK ASSESSMENT
[Good] : ~his/her~ current health as good [] : Yes [No falls in past year] : Patient reported no falls in the past year [0] : 2) Feeling down, depressed, or hopeless: Not at all (0) [Discussed at today's visit] : Advance Directives Discussed at today's visit [Change in mental status noted] : No change in mental status noted [PapSmearDate] : 07/17

## 2018-09-19 NOTE — REVIEW OF SYSTEMS
[Vomiting] : vomiting [Muscle Pain] : muscle pain [Fever] : no fever [Dryness] : no dryness  [Hearing Loss] : no hearing loss [Chest Pain] : no chest pain [Palpitations] : no palpitations [Lower Ext Edema] : no lower extremity edema [Shortness Of Breath] : no shortness of breath [Wheezing] : no wheezing [Cough] : no cough [Dyspnea on Exertion] : no dyspnea on exertion [Abdominal Pain] : no abdominal pain [Nausea] : no nausea [Constipation] : no constipation [Diarrhea] : diarrhea [Headache] : no headache [Dizziness] : no dizziness [Fainting] : no fainting [FreeTextEntry9] : RLE awakens her

## 2018-09-23 ENCOUNTER — FORM ENCOUNTER (OUTPATIENT)
Age: 68
End: 2018-09-23

## 2018-09-24 ENCOUNTER — APPOINTMENT (OUTPATIENT)
Dept: CT IMAGING | Facility: CLINIC | Age: 68
End: 2018-09-24
Payer: MEDICARE

## 2018-09-24 ENCOUNTER — OUTPATIENT (OUTPATIENT)
Dept: OUTPATIENT SERVICES | Facility: HOSPITAL | Age: 68
LOS: 1 days | End: 2018-09-24
Payer: MEDICARE

## 2018-09-24 DIAGNOSIS — Z00.8 ENCOUNTER FOR OTHER GENERAL EXAMINATION: ICD-10-CM

## 2018-09-24 DIAGNOSIS — Z98.890 OTHER SPECIFIED POSTPROCEDURAL STATES: Chronic | ICD-10-CM

## 2018-09-24 PROCEDURE — 71250 CT THORAX DX C-: CPT | Mod: 26

## 2018-09-24 PROCEDURE — 71250 CT THORAX DX C-: CPT

## 2018-10-30 ENCOUNTER — RX RENEWAL (OUTPATIENT)
Age: 68
End: 2018-10-30

## 2018-11-26 ENCOUNTER — APPOINTMENT (OUTPATIENT)
Dept: SURGERY | Facility: CLINIC | Age: 68
End: 2018-11-26
Payer: MEDICARE

## 2018-11-26 VITALS
HEART RATE: 58 BPM | WEIGHT: 165 LBS | BODY MASS INDEX: 32.39 KG/M2 | DIASTOLIC BLOOD PRESSURE: 75 MMHG | RESPIRATION RATE: 15 BRPM | OXYGEN SATURATION: 96 % | SYSTOLIC BLOOD PRESSURE: 115 MMHG | TEMPERATURE: 98.6 F | HEIGHT: 60 IN

## 2018-11-26 DIAGNOSIS — E66.9 OBESITY, UNSPECIFIED: ICD-10-CM

## 2018-11-26 DIAGNOSIS — H91.90 UNSPECIFIED HEARING LOSS, UNSPECIFIED EAR: ICD-10-CM

## 2018-11-26 DIAGNOSIS — Z98.84 BARIATRIC SURGERY STATUS: ICD-10-CM

## 2018-11-26 PROCEDURE — 99214 OFFICE O/P EST MOD 30 MIN: CPT

## 2018-11-26 RX ORDER — TIOTROPIUM BROMIDE AND OLODATEROL 3.124; 2.736 UG/1; UG/1
2.5-2.5 SPRAY, METERED RESPIRATORY (INHALATION)
Qty: 4 | Refills: 0 | Status: DISCONTINUED | COMMUNITY
Start: 2017-10-16 | End: 2018-11-26

## 2018-11-26 RX ORDER — SENNOSIDES 50; 8.6 MG/1; MG/1
200 TABLET ORAL
Qty: 90 | Refills: 3 | Status: DISCONTINUED | COMMUNITY
Start: 2018-09-19 | End: 2018-11-26

## 2018-11-26 RX ORDER — NICOTINE 4 MG/1
10 INHALANT RESPIRATORY (INHALATION)
Qty: 1 | Refills: 11 | Status: DISCONTINUED | COMMUNITY
Start: 2018-09-19 | End: 2018-11-26

## 2018-11-26 RX ORDER — ZOSTER VACCINE RECOMBINANT, ADJUVANTED 50 MCG/0.5
50 KIT INTRAMUSCULAR
Qty: 1 | Refills: 0 | Status: DISCONTINUED | COMMUNITY
Start: 2018-09-19 | End: 2018-11-26

## 2019-03-11 ENCOUNTER — APPOINTMENT (OUTPATIENT)
Dept: PULMONOLOGY | Facility: CLINIC | Age: 69
End: 2019-03-11
Payer: MEDICARE

## 2019-03-11 VITALS
HEIGHT: 60 IN | BODY MASS INDEX: 31.22 KG/M2 | OXYGEN SATURATION: 95 % | WEIGHT: 159 LBS | DIASTOLIC BLOOD PRESSURE: 70 MMHG | HEART RATE: 94 BPM | SYSTOLIC BLOOD PRESSURE: 130 MMHG

## 2019-03-11 VITALS — RESPIRATION RATE: 16 BRPM

## 2019-03-11 DIAGNOSIS — I35.0 NONRHEUMATIC AORTIC (VALVE) STENOSIS: ICD-10-CM

## 2019-03-11 PROCEDURE — 99213 OFFICE O/P EST LOW 20 MIN: CPT

## 2019-03-11 RX ORDER — AMLODIPINE BESYLATE 10 MG/1
10 TABLET ORAL
Refills: 0 | Status: DISCONTINUED | COMMUNITY
End: 2019-03-11

## 2019-03-11 RX ORDER — HYDROCHLOROTHIAZIDE 12.5 MG/1
12.5 TABLET ORAL
Qty: 30 | Refills: 5 | Status: DISCONTINUED | COMMUNITY
End: 2019-03-11

## 2019-03-11 NOTE — PHYSICAL EXAM
[General Appearance - Well Developed] : well developed [General Appearance - Well Nourished] : well nourished [Normal Conjunctiva] : the conjunctiva exhibited no abnormalities [Normal Oropharynx] : normal oropharynx [] : the neck was supple [Heart Sounds] : normal S1 and S2 [Auscultation Breath Sounds / Voice Sounds] : lungs were clear to auscultation bilaterally [Abdomen Soft] : soft [Abdomen Tenderness] : non-tender [Nail Clubbing] : no clubbing of the fingernails [Cyanosis, Localized] : no localized cyanosis

## 2019-03-22 ENCOUNTER — OTHER (OUTPATIENT)
Age: 69
End: 2019-03-22

## 2019-03-22 NOTE — ASSESSMENT
[FreeTextEntry1] : Clinically doing well s/p AVR\par \par Continue present bronchodilator therapy\par Change HFN to PRN\par \par For f/u CT Chest.

## 2019-03-22 NOTE — HISTORY OF PRESENT ILLNESS
[FreeTextEntry1] : Had aortic valve replaced 1 month ago , still having chest discomfort and breathing is getting better over past month Using Stiolto and nebulizer 3 times a day. has some mucus which is also getting better\par Still some pain. Not SOB. Positive TERRAZAS.\par \par No longer on Lasix, only on baby asa

## 2019-04-09 ENCOUNTER — FORM ENCOUNTER (OUTPATIENT)
Age: 69
End: 2019-04-09

## 2019-04-10 ENCOUNTER — OUTPATIENT (OUTPATIENT)
Dept: OUTPATIENT SERVICES | Facility: HOSPITAL | Age: 69
LOS: 1 days | End: 2019-04-10
Payer: MEDICARE

## 2019-04-10 ENCOUNTER — APPOINTMENT (OUTPATIENT)
Dept: CT IMAGING | Facility: CLINIC | Age: 69
End: 2019-04-10
Payer: MEDICARE

## 2019-04-10 DIAGNOSIS — Z00.8 ENCOUNTER FOR OTHER GENERAL EXAMINATION: ICD-10-CM

## 2019-04-10 DIAGNOSIS — Z98.890 OTHER SPECIFIED POSTPROCEDURAL STATES: Chronic | ICD-10-CM

## 2019-04-10 PROCEDURE — 71250 CT THORAX DX C-: CPT

## 2019-04-10 PROCEDURE — 71250 CT THORAX DX C-: CPT | Mod: 26

## 2019-06-03 ENCOUNTER — APPOINTMENT (OUTPATIENT)
Dept: SURGERY | Facility: CLINIC | Age: 69
End: 2019-06-03

## 2019-09-18 ENCOUNTER — APPOINTMENT (OUTPATIENT)
Dept: INTERNAL MEDICINE | Facility: CLINIC | Age: 69
End: 2019-09-18
Payer: MEDICARE

## 2019-09-18 VITALS
DIASTOLIC BLOOD PRESSURE: 82 MMHG | OXYGEN SATURATION: 97 % | HEART RATE: 68 BPM | TEMPERATURE: 97.7 F | SYSTOLIC BLOOD PRESSURE: 144 MMHG

## 2019-09-18 DIAGNOSIS — H57.89 OTHER SPECIFIED DISORDERS OF EYE AND ADNEXA: ICD-10-CM

## 2019-09-18 PROCEDURE — 36415 COLL VENOUS BLD VENIPUNCTURE: CPT

## 2019-09-18 RX ORDER — OFLOXACIN 3 MG/ML
0.3 SOLUTION/ DROPS OPHTHALMIC
Qty: 1 | Refills: 0 | Status: COMPLETED | COMMUNITY
Start: 2019-09-18 | End: 2019-10-03

## 2019-09-23 ENCOUNTER — RX RENEWAL (OUTPATIENT)
Age: 69
End: 2019-09-23

## 2019-09-25 ENCOUNTER — RX RENEWAL (OUTPATIENT)
Age: 69
End: 2019-09-25

## 2019-10-24 ENCOUNTER — APPOINTMENT (OUTPATIENT)
Dept: INTERNAL MEDICINE | Facility: CLINIC | Age: 69
End: 2019-10-24
Payer: MEDICARE

## 2019-10-24 VITALS — TEMPERATURE: 98.2 F

## 2019-10-24 DIAGNOSIS — Z23 ENCOUNTER FOR IMMUNIZATION: ICD-10-CM

## 2019-10-24 PROCEDURE — 90653 IIV ADJUVANT VACCINE IM: CPT

## 2019-10-24 PROCEDURE — G0008: CPT

## 2019-11-22 ENCOUNTER — EMERGENCY (EMERGENCY)
Facility: HOSPITAL | Age: 69
LOS: 1 days | Discharge: HOME | End: 2019-11-22
Attending: EMERGENCY MEDICINE
Payer: COMMERCIAL

## 2019-11-22 VITALS
SYSTOLIC BLOOD PRESSURE: 134 MMHG | TEMPERATURE: 98 F | RESPIRATION RATE: 18 BRPM | DIASTOLIC BLOOD PRESSURE: 82 MMHG | HEART RATE: 69 BPM | OXYGEN SATURATION: 95 %

## 2019-11-22 VITALS
HEART RATE: 73 BPM | DIASTOLIC BLOOD PRESSURE: 70 MMHG | SYSTOLIC BLOOD PRESSURE: 129 MMHG | OXYGEN SATURATION: 94 % | RESPIRATION RATE: 16 BRPM | TEMPERATURE: 98 F

## 2019-11-22 DIAGNOSIS — Z98.890 OTHER SPECIFIED POSTPROCEDURAL STATES: Chronic | ICD-10-CM

## 2019-11-22 PROCEDURE — 99285 EMERGENCY DEPT VISIT HI MDM: CPT

## 2019-11-22 PROCEDURE — 99283 EMERGENCY DEPT VISIT LOW MDM: CPT
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
## 2019-11-22 NOTE — ED ADULT NURSE NOTE - NSIMPLEMENTINTERV_GEN_ALL_ED
Implemented All Fall Risk Interventions:  [place] to call system. Call bell, personal items and telephone within reach. Instruct patient to call for assistance. Room bathroom lighting operational. Non-slip footwear when patient is off stretcher. Physically safe environment: no spills, clutter or unnecessary equipment. Stretcher in lowest position, wheels locked, appropriate side rails in place. Provide visual cue, wrist band, yellow gown, etc. Monitor gait and stability. Monitor for mental status changes and reorient to person, place, and time. Review medications for side effects contributing to fall risk. Reinforce activity limits and safety measures with patient and family.

## 2019-11-22 NOTE — ED PROVIDER NOTE - NSFOLLOWUPINSTRUCTIONS_ED_ALL_ED_FT
- stay hydrated.   -take home medications as prescribed  - follow up with your pcp in 1-2 days.  - follow up with ortho in 2-3 days.  - return if symptoms worsen, fever, weakness, numbness/tingling, blurred vision, difficulty ambulating and all other concerns. - stay hydrated.   -take home medications as prescribed  - follow up with your pcp in 1-2 days.  - return if symptoms worsen, fever, weakness, numbness/tingling, blurred vision, difficulty ambulating and all other concerns. -Patient is safe for confinement--    - stay hydrated.   -take home medications as prescribed  - follow up with your pcp in 1-2 days  - return if symptoms worsen, fever, weakness, numbness/tingling, blurred vision, difficulty ambulating and all other concerns. -Patient is fit for confinement--    - stay hydrated.   -take home medications as prescribed  - follow up with your pcp in 1-2 days  - return if symptoms worsen, fever, weakness, numbness/tingling, blurred vision, difficulty ambulating and all other concerns.
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
## 2020-03-04 ENCOUNTER — APPOINTMENT (OUTPATIENT)
Dept: PULMONOLOGY | Facility: CLINIC | Age: 70
End: 2020-03-04
Payer: MEDICARE

## 2020-03-04 VITALS — SYSTOLIC BLOOD PRESSURE: 133 MMHG | DIASTOLIC BLOOD PRESSURE: 80 MMHG

## 2020-03-04 VITALS
OXYGEN SATURATION: 96 % | BODY MASS INDEX: 31.02 KG/M2 | WEIGHT: 158 LBS | RESPIRATION RATE: 16 BRPM | HEART RATE: 75 BPM | TEMPERATURE: 98.2 F | HEIGHT: 60 IN

## 2020-03-04 PROCEDURE — 99214 OFFICE O/P EST MOD 30 MIN: CPT | Mod: 25

## 2020-03-04 PROCEDURE — 71046 X-RAY EXAM CHEST 2 VIEWS: CPT

## 2020-03-04 PROCEDURE — 99406 BEHAV CHNG SMOKING 3-10 MIN: CPT

## 2020-03-04 PROCEDURE — 94060 EVALUATION OF WHEEZING: CPT

## 2020-03-04 RX ORDER — TIOTROPIUM BROMIDE AND OLODATEROL 3.124; 2.736 UG/1; UG/1
2.5-2.5 SPRAY, METERED RESPIRATORY (INHALATION)
Qty: 1 | Refills: 3 | Status: DISCONTINUED | COMMUNITY
Start: 2018-10-30 | End: 2020-03-04

## 2020-03-04 NOTE — REVIEW OF SYSTEMS
[Nasal Congestion] : nasal congestion [Negative] : Endocrine [TextBox_3] : hpi [TextBox_30] : hpi [TextBox_44] : aortic valve replaement

## 2020-03-04 NOTE — ASSESSMENT
[FreeTextEntry1] : prednisone charisse, no further antibiotics, gave samples of Nicorette lozenges 4mg\par gave samples of Stiolto and will see if anoro is cheaper at the pharmacy\par Continue present bronchodilator therapy\par Change HFN to PRN\par \par For f/u CT Chest after treatment will set up on r/t in 2 weeks\par If no improvement will call or come into office earlier or go to ER

## 2020-03-04 NOTE — HISTORY OF PRESENT ILLNESS
[Current] : current [< 30 pack-years] : < 30 pack-years [TextBox_4] : Congestion and cough no temp for 1 month , went to [facility] med 2 times and got Keflex for 7 days and Tessalon pearls without help, has wheeze getting worse , now with dyspnea past 2 weeks, using proair 6 times a day. could’t afford stiolto since the beginning of year , did find it helpful. finished Keflex 3 days ago\par \par due for f/u ct chest  [TextBox_11] : 1 [TextBox_13] : 15

## 2020-03-04 NOTE — PROCEDURE
[FreeTextEntry1] : \par \par 03/04/2020\par Pulmonary function testing\par These data demonstrate a moderate obstructive ventilatory deficit. There is no significant bronchodilator response.

## 2020-03-04 NOTE — PHYSICAL EXAM
[General Appearance - Well Nourished] : well nourished [General Appearance - Well Developed] : well developed [Normal Conjunctiva] : the conjunctiva exhibited no abnormalities [Normal Oropharynx] : normal oropharynx [Heart Sounds] : normal S1 and S2 [Abdomen Soft] : soft [Cyanosis, Localized] : no localized cyanosis [Nail Clubbing] : no clubbing of the fingernails [Abdomen Tenderness] : non-tender [] : no respiratory distress [Exaggerated Use Of Accessory Muscles For Inspiration] : no accessory muscle use [Lungs Percussion] : the lungs were normal to percussion [FreeTextEntry1] : 1-2 plus bilateral wheeze.

## 2020-03-18 ENCOUNTER — APPOINTMENT (OUTPATIENT)
Dept: PULMONOLOGY | Facility: CLINIC | Age: 70
End: 2020-03-18

## 2020-10-21 ENCOUNTER — RX RENEWAL (OUTPATIENT)
Age: 70
End: 2020-10-21

## 2020-12-11 ENCOUNTER — RX RENEWAL (OUTPATIENT)
Age: 70
End: 2020-12-11

## 2020-12-17 ENCOUNTER — APPOINTMENT (OUTPATIENT)
Dept: OTOLARYNGOLOGY | Facility: CLINIC | Age: 70
End: 2020-12-17

## 2021-01-30 NOTE — H&P PST ADULT - PSYCHIATRIC DETAILS
01/30/21 1028   Vital Signs   BP (!) 156/78   Temp 97.7 °F (36.5 °C)   Pulse 111   Resp 16   Weight 158 lb 1.1 oz (71.7 kg)   Weight Method Bed scale   Percent Weight Change -4.14   Post-Hemodialysis Assessment   Post-Treatment Procedures Blood returned; Access bleeding time < 10 minutes   Machine Disinfection Process Exterior Machine Disinfection   Rinseback Volume (ml) 300 ml   Total Liters Processed (l/min) 0 l/min   Dialyzer Clearance Lightly streaked   Duration of Treatment (minutes) 180 minutes   Hemodialysis Intake (ml) 300 ml   Hemodialysis Output (ml) 3300 ml   NET Removed (ml) 3000 ml   Tolerated Treatment Good   Patient Response to Treatment tolerated well, blood returned, needles pulled, sites held, stasis acheived, bandaids applied, +thrill, +bruit normal behavior/normal affect

## 2021-03-30 ENCOUNTER — APPOINTMENT (OUTPATIENT)
Dept: PULMONOLOGY | Facility: CLINIC | Age: 71
End: 2021-03-30
Payer: MEDICARE

## 2021-03-30 VITALS
SYSTOLIC BLOOD PRESSURE: 193 MMHG | OXYGEN SATURATION: 98 % | HEART RATE: 78 BPM | DIASTOLIC BLOOD PRESSURE: 82 MMHG | RESPIRATION RATE: 15 BRPM | TEMPERATURE: 98.2 F

## 2021-03-30 DIAGNOSIS — F17.200 NICOTINE DEPENDENCE, UNSPECIFIED, UNCOMPLICATED: ICD-10-CM

## 2021-03-30 PROCEDURE — 99072 ADDL SUPL MATRL&STAF TM PHE: CPT

## 2021-03-30 PROCEDURE — 99214 OFFICE O/P EST MOD 30 MIN: CPT | Mod: 25

## 2021-03-30 PROCEDURE — G0296 VISIT TO DETERM LDCT ELIG: CPT

## 2021-03-30 RX ORDER — AMOXICILLIN AND CLAVULANATE POTASSIUM 875; 125 MG/1; MG/1
875-125 TABLET, COATED ORAL TWICE DAILY
Qty: 14 | Refills: 0 | Status: DISCONTINUED | COMMUNITY
Start: 2020-03-31 | End: 2021-03-30

## 2021-03-30 RX ORDER — UMECLIDINIUM BROMIDE AND VILANTEROL TRIFENATATE 62.5; 25 UG/1; UG/1
62.5-25 POWDER RESPIRATORY (INHALATION)
Qty: 1 | Refills: 2 | Status: DISCONTINUED | COMMUNITY
Start: 2020-03-04 | End: 2021-03-30

## 2021-03-30 RX ORDER — LISINOPRIL 20 MG/1
20 TABLET ORAL
Refills: 0 | Status: DISCONTINUED | COMMUNITY
Start: 2021-03-30 | End: 2021-03-30

## 2021-03-30 RX ORDER — TIOTROPIUM BROMIDE AND OLODATEROL 3.124; 2.736 UG/1; UG/1
2.5-2.5 SPRAY, METERED RESPIRATORY (INHALATION)
Qty: 1 | Refills: 5 | Status: DISCONTINUED | COMMUNITY
Start: 2019-03-11 | End: 2021-03-30

## 2021-03-30 RX ORDER — PREDNISONE 10 MG/1
10 TABLET ORAL
Qty: 40 | Refills: 0 | Status: DISCONTINUED | COMMUNITY
Start: 2020-03-04 | End: 2021-03-30

## 2021-03-30 NOTE — PHYSICAL EXAM
[General Appearance - Well Developed] : well developed [General Appearance - Well Nourished] : well nourished [Normal Conjunctiva] : the conjunctiva exhibited no abnormalities [Normal Oropharynx] : normal oropharynx [Heart Sounds] : normal S1 and S2 [] : no respiratory distress [Exaggerated Use Of Accessory Muscles For Inspiration] : no accessory muscle use [Lungs Percussion] : the lungs were normal to percussion [Abdomen Soft] : soft [Abdomen Tenderness] : non-tender [Nail Clubbing] : no clubbing of the fingernails [Cyanosis, Localized] : no localized cyanosis [FreeTextEntry1] : Rare wheeze.

## 2021-03-30 NOTE — ASSESSMENT
[FreeTextEntry1] : \par Change HFN to PRN\par Change to Losartan 100/day. D/C Lisinopril\par Change to Wixela and Spiriva\par For PFT. \par Smoking cessation.

## 2021-03-30 NOTE — COUNSELING
[Risk of tobacco use and health benefits of smoking cessation discussed] : Risk of tobacco use and health benefits of smoking cessation discussed [Cessation strategies including cessation program discussed] : Cessation strategies including cessation program discussed [Use of nicotine replacement therapies and other medications discussed] : Use of nicotine replacement therapies and other medications discussed [Encouraged to pick a quit date and identify support needed to quit] : Encouraged to pick a quit date and identify support needed to quit [Willing to Quit Smoking] : Willing to quit smoking [ - Annual Lung Cancer Screening/Share Decision Making Discussion] : Annual Lung Cancer Screening/Share Decision Making Discussion. (I have advised this patient to have a Low Dose CT (LDCT) scan of the lungs and have discussed the following with the patient in a shared decision making discussion:   Benefits of Detection and Early Treatment: There is adequate evidence that annual screening for lung cancer with LDCT in a population of high-risk persons can prevent a substantial number of lung cancer–related deaths. The magnitude of benefit depends on the individual patient's risk for lung cancer, as those who are at highest risk are most likely to benefit. Screening cannot prevent most lung cancer–related deaths, and does not replace smoking cessation. Harms of Detection and Early Intervention and Treatment: The harms associated with LDCT screening include false-negative and false-positive results, incidental findings, over diagnosis, and radiation exposure. False-positive LDCT results occur in a substantial proportion of screened persons; 95% of all positive results do not lead to a diagnosis of cancer. In a high-quality screening program, further imaging can resolve most false-positive results; however, some patients may require invasive procedures. Radiation harms, including cancer resulting from cumulative exposure to radiation, vary depending on the age at the start of screening; the number of scans received; and the person's exposure to other sources of radiation, particularly other medical imaging.)

## 2021-03-30 NOTE — HISTORY OF PRESENT ILLNESS
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 Was put on for increase b/p [TextBox_11] : 1 [TextBox_13] : 30

## 2021-03-30 NOTE — DISCUSSION/SUMMARY
[FreeTextEntry1] : Chronic obstructive pulmonary disease.\par Abnormal chest CT.\par Current every day smoker.\par Component of increased cough may be related to ACE inhibitor.

## 2021-03-31 RX ORDER — FLUTICASONE PROPIONATE AND SALMETEROL 250; 50 UG/1; UG/1
250-50 POWDER RESPIRATORY (INHALATION)
Qty: 1 | Refills: 5 | Status: DISCONTINUED | COMMUNITY
Start: 2021-03-30 | End: 2021-03-31

## 2021-04-20 ENCOUNTER — APPOINTMENT (OUTPATIENT)
Dept: OTOLARYNGOLOGY | Facility: CLINIC | Age: 71
End: 2021-04-20
Payer: MEDICARE

## 2021-04-20 VITALS
TEMPERATURE: 97.4 F | DIASTOLIC BLOOD PRESSURE: 81 MMHG | BODY MASS INDEX: 31.61 KG/M2 | HEIGHT: 60 IN | HEART RATE: 66 BPM | SYSTOLIC BLOOD PRESSURE: 168 MMHG | WEIGHT: 161 LBS

## 2021-04-20 DIAGNOSIS — H90.3 SENSORINEURAL HEARING LOSS, BILATERAL: ICD-10-CM

## 2021-04-20 DIAGNOSIS — R09.82 POSTNASAL DRIP: ICD-10-CM

## 2021-04-20 DIAGNOSIS — J34.89 OTHER SPECIFIED DISORDERS OF NOSE AND NASAL SINUSES: ICD-10-CM

## 2021-04-20 PROCEDURE — 92557 COMPREHENSIVE HEARING TEST: CPT

## 2021-04-20 PROCEDURE — 31231 NASAL ENDOSCOPY DX: CPT

## 2021-04-20 PROCEDURE — 99204 OFFICE O/P NEW MOD 45 MIN: CPT | Mod: 25

## 2021-04-20 PROCEDURE — 92567 TYMPANOMETRY: CPT

## 2021-04-20 PROCEDURE — 99072 ADDL SUPL MATRL&STAF TM PHE: CPT

## 2021-04-20 NOTE — REASON FOR VISIT
[Initial Evaluation] : an initial evaluation for [FreeTextEntry2] : hearing loss and throat discomfort

## 2021-04-20 NOTE — PHYSICAL EXAM
[Nasal Endoscopy Performed] : nasal endoscopy was performed, see procedure section for findings [Midline] : trachea located in midline position [Normal] : no rashes [de-identified] : Septal perf noted and mucus discharge

## 2021-04-20 NOTE — HISTORY OF PRESENT ILLNESS
[de-identified] : Patient complains of throat discomfort x several months. It feels like there is something in her throat, constantly clearing her throat. She is currently a smoker. ALso complains of hearing loss, constantly asking people to repeat themselves. \par Pt has no ear pain, ear drainage, tinnitus, vertigo, nasal congestion, nasal discharge, epistaxis, sinus infections, facial pain, facial pressure, throat pain, dysphagia or fevers\par \par

## 2021-04-20 NOTE — ASSESSMENT
[FreeTextEntry1] : Patient is a smoker here for evaluation also complaining of some diminished hearing in her ears bilaterally think she may need a hearing aid on examination there is no cerumen endoscopically she has a large septal perforation a lot of thick secretions which were suctioned out and fiberoptic evaluation of her larynx is perfectly normal with no evidence of any tumors or masses obviously she was told to stop smoking she had a chest x-ray done by her pulmonologist.  She will follow up with us annually hearing does show that she has bilateral sensorineural hearing loss she certainly is indicated for hearing aid evaluation and we referred her over for that.\par \par I personally saw and examined [ ] in detail. I have spoken to Amalia BARFIELD regarding the assessment and plan of care. I reviewed the above assessment and plan of care, and agree. I have made changes in the body of the note where appropriate.\par

## 2021-04-20 NOTE — PROCEDURE
[FreeTextEntry6] : Nasal Endoscopy (06489)\par \par After informed verbal consent, nasal endoscopy was performed due to anterior rhinoscopy insufficient to account for symptoms. Flexible  scope # 36 was used.  Topical vasoconstrictor and anesthetic was used.  The exam showed a septal perforation and mucus \par \par The nasal endoscope is passed via the right nasal cavity. The inferior, middle, and superior turbinates responded to vasoconstrictors. The inferior, middle and superior meati were examined. The osteomeatal complex is clear with no polyposis or purulence. The sphenoethmoidal recess is clear with no polyposis or purulence. The choana is patent. \par \par The nasal endoscope is passed via the left nasal cavity. The inferior, middle, and superior turbinates responded to vasoconstrictors. The inferior, middle and superior meati were examined. The osteomeatal complex is clear with no polyposis or purulence. The sphenoethmoidal recess is clear with no polyposis or purulence. The choana is patent.  \par \par There is []% obstruction of the nasopharynx with adenoid tissue\par

## 2021-05-03 ENCOUNTER — APPOINTMENT (OUTPATIENT)
Dept: PULMONOLOGY | Facility: CLINIC | Age: 71
End: 2021-05-03

## 2021-05-24 NOTE — PATIENT PROFILE ADULT. - TOBACCO USE
Pt given biopsy results and reminded of surgical consultation appointment    ----- Message from Nataliia León MD sent at 5/24/2021 11:00 AM EDT -----  PATHOLOGY:Invasive mammary carcinoma with ductal and lobular features.The pathology result is concordant with the imaging findings.Recommend surgical consultation.The patient will be notified of the results and recommendations by our breast care nurse.      
Former smoker

## 2021-06-16 NOTE — ED ADULT NURSE NOTE - PRO INTERPRETER NEED 2
[facility] Daily Progress     Patient Name: Alla Shrestha  Date: 2/27/2018  Visit #: 11/12  PTA visit #:  6+2  Referral Diagnosis: Primary osteoarthritis of right knee  Referring provider: Justice Willingham PAC  Visit Diagnosis:     ICD-10-CM    1. Primary osteoarthritis of right knee M17.11    2. History of total right knee replacement Z96.651    3. Knee stiffness, right M25.661    4. Swelling of knee joint, right M25.461    5. Abnormality of gait R26.9    6. Essential hypertension I10    7. Mixed hyperlipidemia E78.2    8. Mild intermittent asthma in adult without complication J45.20    9. CKD (chronic kidney disease) stage 4, GFR 15-29 ml/min N18.4          Assessment:     HEP/POC compliance is  good .  Patient demonstrates understanding/independence with home program.  Response to Intervention good  Patient is benefitting from skilled physical therapy and is making steady progress toward functional goals.  Patient is appropriate to continue with skilled physical therapy intervention, as indicated by initial plan of care.    Goal Status:  Pt. will demonstrate/verbalize independence in self-management of condition in : 6 weeks  Pt. will be independent with home exercise program in : 6 weeks  Pt. will be able to walk : 30 minutes;around the house;with less pain;with less difficulty;for household mobility;for community mobility;in 12 weeks  Comment:: without AD   status: able to ambulate at home without walker  Patient will stand : 30 minutes;with less pain;with less difficultty;for home chores;in 12 weeks  Patient will sit: 30 minutes;for driving;for work;with less pain;with less difficultty;in 6 weeks  Patient will transfer: sit/stand;for car;for toileting;for in/out of bed;for in/out of chair;in 12 weeks;Partially met  Patient will work: without restrictions;in 6 weeks  Patient will return to: other activity;in 4 weeks  other activity: driving  Pt will: be able to drive in 4-6 weeks.    status:  "is driving now. Met    Plan / Patient Education:     Continue with initial plan of care.  Progress with home program as tolerated.  continue x 1 visit.     Subjective:     Pain Rating: 3  Returned to work full time last week. Having more swelling in both legs with prolonged sitting for work. She is trying to take walk breaks every hour. Working on finding something to elevate legs during the day.  Pain is \"okay\". (L) knee is doing really good. (R) knee could still use a little work, hurts a little at night.  .       Objective:     Generalized swelling (B) lower legs.  Substitution patterns noted with step exercises, uses hip flex/ext instead of flexing knee with step ups.     Treatment Today     TREATMENT MINUTES COMMENTS   Evaluation     Self-care/ Home management     Manual therapy     Neuromuscular Re-education     Therapeutic Activity     Therapeutic Exercises 30 Exercises per flow sheet.   Discussed doing ankle pumps at work for swelling, using compression socks if she still has them    Gait training     Modality__________________                Total 30    Blank areas are intentional and mean the treatment did not include these items.       Nanette Adkins  2/27/2018    " English

## 2021-07-20 ENCOUNTER — APPOINTMENT (OUTPATIENT)
Dept: PULMONOLOGY | Facility: CLINIC | Age: 71
End: 2021-07-20
Payer: MEDICARE

## 2021-07-20 VITALS
HEART RATE: 74 BPM | DIASTOLIC BLOOD PRESSURE: 75 MMHG | SYSTOLIC BLOOD PRESSURE: 147 MMHG | TEMPERATURE: 98.7 F | OXYGEN SATURATION: 93 %

## 2021-07-20 PROCEDURE — 71046 X-RAY EXAM CHEST 2 VIEWS: CPT

## 2021-07-20 PROCEDURE — 99214 OFFICE O/P EST MOD 30 MIN: CPT | Mod: 25

## 2021-07-20 PROCEDURE — 94010 BREATHING CAPACITY TEST: CPT

## 2021-07-21 NOTE — ASSESSMENT
[FreeTextEntry1] : \par Change  to trelegy 200\par Course of prednisone and Zithromax.\par ADA diet while on prednisone.\par Follow-up in few weeks or sooner on a as needed basis.\par Smoking cessation.\par

## 2021-07-21 NOTE — PHYSICAL EXAM
[General Appearance - Well Developed] : well developed [General Appearance - Well Nourished] : well nourished [Normal Conjunctiva] : the conjunctiva exhibited no abnormalities [Normal Oropharynx] : normal oropharynx [Heart Sounds] : normal S1 and S2 [] : no respiratory distress [Exaggerated Use Of Accessory Muscles For Inspiration] : no accessory muscle use [Lungs Percussion] : the lungs were normal to percussion [Abdomen Soft] : soft [Abdomen Tenderness] : non-tender [Nail Clubbing] : no clubbing of the fingernails [Cyanosis, Localized] : no localized cyanosis [FreeTextEntry1] : 2+ wheezing auscultated bilaterally.  Bilateral rhonchi.

## 2021-07-21 NOTE — HISTORY OF PRESENT ILLNESS
[Current] : current [>= 30 pack years] : >= 30 pack years [TextBox_4] : \par Some SOB. Feels has increased. \par Better in A/C\par Is smoking about 2/day\par Using neb 1-2 x day\par More frequent beta agonist use. \par On Breo. 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

## 2021-07-21 NOTE — DISCUSSION/SUMMARY
[FreeTextEntry1] : Chronic obstructive pulmonary disease exacerbation\par Abnormal chest CT.\par Current every day smoker.\par

## 2021-07-21 NOTE — PROCEDURE
[FreeTextEntry1] : Addendum to chest radiograph report.  There is no significant change compared to prior radiograph of March 4, 2020.\par \par 07/20/2021\par Pulmonary function testing\par These data demonstrate a moderate obstructive ventilatory deficit. \par No significant change in flow rates compared to March 2020.  Decreased compared to 2018.

## 2021-09-01 ENCOUNTER — RX RENEWAL (OUTPATIENT)
Age: 71
End: 2021-09-01

## 2021-12-23 ENCOUNTER — RX RENEWAL (OUTPATIENT)
Age: 71
End: 2021-12-23

## 2022-02-14 ENCOUNTER — NON-APPOINTMENT (OUTPATIENT)
Age: 72
End: 2022-02-14

## 2022-03-07 ENCOUNTER — RX RENEWAL (OUTPATIENT)
Age: 72
End: 2022-03-07

## 2022-03-18 ENCOUNTER — APPOINTMENT (OUTPATIENT)
Dept: PULMONOLOGY | Facility: CLINIC | Age: 72
End: 2022-03-18
Payer: MEDICARE

## 2022-03-18 VITALS
HEART RATE: 104 BPM | SYSTOLIC BLOOD PRESSURE: 135 MMHG | OXYGEN SATURATION: 92 % | DIASTOLIC BLOOD PRESSURE: 83 MMHG | TEMPERATURE: 96.1 F

## 2022-03-18 DIAGNOSIS — R50.9 FEVER, UNSPECIFIED: ICD-10-CM

## 2022-03-18 DIAGNOSIS — J44.9 CHRONIC OBSTRUCTIVE PULMONARY DISEASE, UNSPECIFIED: ICD-10-CM

## 2022-03-18 PROCEDURE — 71046 X-RAY EXAM CHEST 2 VIEWS: CPT

## 2022-03-18 PROCEDURE — 99214 OFFICE O/P EST MOD 30 MIN: CPT | Mod: 25

## 2022-03-18 RX ORDER — AZITHROMYCIN 250 MG/1
250 TABLET, FILM COATED ORAL
Qty: 1 | Refills: 0 | Status: DISCONTINUED | COMMUNITY
Start: 2021-07-20 | End: 2022-03-18

## 2022-03-18 NOTE — PHYSICAL EXAM
[No Acute Distress] : no acute distress [Supple] : supple [Normal S1, S2] : normal s1, s2 [No Murmurs] : no murmurs [Normal to Percussion] : normal to percussion [No Abnormalities] : no abnormalities [Benign] : benign [No HSM] : no hsm [No Clubbing] : no clubbing [No Cyanosis] : no cyanosis [No Edema] : no edema [TextBox_68] : 1-2 plus diffuse wheezing.

## 2022-03-18 NOTE — HISTORY OF PRESENT ILLNESS
[Former] : former [TextBox_4] : has been having fevers low grade for 1 week and saw Dr Green internist last week and was Xray: and told it was virus\par no treatment was given\par having wheezing \par using albuterol 3 times a day\par on trelegy daily got from friend. \par cant smoke for weeks\par COVID negative swab\par flu negative\par \par Stopped smoking 1 month ago. \par No DM.  [YearQuit] : 2022

## 2022-03-18 NOTE — DISCUSSION/SUMMARY
[FreeTextEntry1] : Chronic obstructive pulmonary disease exacerbation [place] secondary to infectious process.\par Fever now improved.  No definitive evidence of pneumonitis.\par Abnormal chest CT.\par Current every day smoker.,just quit\par

## 2022-03-18 NOTE — ASSESSMENT
[FreeTextEntry1] : \par Change  to trelegy 200\par Course of prednisone and oral antibiotic.\par ADA diet while on prednisone.\par Follow-up in few weeks or sooner on a as needed basis.\par Arrange lung function testing on return to office.\par We will need CT of the chest.  We will arrange on return to office.\par Smoking cessation.cont to stay off cigarettes\par

## 2022-03-21 LAB
ANION GAP SERPL CALC-SCNC: 14 MMOL/L
BASOPHILS # BLD AUTO: 0.06 K/UL
BASOPHILS NFR BLD AUTO: 0.3 %
BUN SERPL-MCNC: 10 MG/DL
CALCIUM SERPL-MCNC: 9.1 MG/DL
CHLORIDE SERPL-SCNC: 97 MMOL/L
CO2 SERPL-SCNC: 24 MMOL/L
CREAT SERPL-MCNC: 0.7 MG/DL
EGFR: 92 ML/MIN/1.73M2
EOSINOPHIL # BLD AUTO: 0.02 K/UL
EOSINOPHIL NFR BLD AUTO: 0.1 %
ESTIMATED AVERAGE GLUCOSE: 120 MG/DL
GLUCOSE SERPL-MCNC: 113 MG/DL
HBA1C MFR BLD HPLC: 5.8 %
HCT VFR BLD CALC: 39.8 %
HGB BLD-MCNC: 12.9 G/DL
IMM GRANULOCYTES NFR BLD AUTO: 0.6 %
LYMPHOCYTES # BLD AUTO: 0.71 K/UL
LYMPHOCYTES NFR BLD AUTO: 3.6 %
MAN DIFF?: NORMAL
MCHC RBC-ENTMCNC: 29.7 PG
MCHC RBC-ENTMCNC: 32.4 GM/DL
MCV RBC AUTO: 91.7 FL
MONOCYTES # BLD AUTO: 0.89 K/UL
MONOCYTES NFR BLD AUTO: 4.5 %
NEUTROPHILS # BLD AUTO: 18.18 K/UL
NEUTROPHILS NFR BLD AUTO: 90.9 %
PLATELET # BLD AUTO: 225 K/UL
POTASSIUM SERPL-SCNC: 4 MMOL/L
RBC # BLD: 4.34 M/UL
RBC # FLD: 13.3 %
SODIUM SERPL-SCNC: 136 MMOL/L
WBC # FLD AUTO: 19.98 K/UL

## 2022-04-01 ENCOUNTER — APPOINTMENT (OUTPATIENT)
Dept: PULMONOLOGY | Facility: CLINIC | Age: 72
End: 2022-04-01
Payer: MEDICARE

## 2022-04-01 VITALS
HEART RATE: 87 BPM | DIASTOLIC BLOOD PRESSURE: 82 MMHG | BODY MASS INDEX: 31.83 KG/M2 | WEIGHT: 163 LBS | SYSTOLIC BLOOD PRESSURE: 125 MMHG | RESPIRATION RATE: 14 BRPM | TEMPERATURE: 97.8 F | OXYGEN SATURATION: 94 %

## 2022-04-01 DIAGNOSIS — R91.1 SOLITARY PULMONARY NODULE: ICD-10-CM

## 2022-04-01 PROCEDURE — ZZZZZ: CPT

## 2022-04-01 PROCEDURE — 94727 GAS DIL/WSHOT DETER LNG VOL: CPT

## 2022-04-01 PROCEDURE — 94729 DIFFUSING CAPACITY: CPT

## 2022-04-01 PROCEDURE — 94010 BREATHING CAPACITY TEST: CPT

## 2022-04-01 PROCEDURE — 99214 OFFICE O/P EST MOD 30 MIN: CPT | Mod: 25

## 2022-04-01 PROCEDURE — G0296 VISIT TO DETERM LDCT ELIG: CPT

## 2022-04-01 RX ORDER — CEFUROXIME AXETIL 500 MG/1
500 TABLET ORAL
Qty: 14 | Refills: 0 | Status: DISCONTINUED | COMMUNITY
Start: 2022-03-18 | End: 2022-04-01

## 2022-04-02 NOTE — ASSESSMENT
[FreeTextEntry1] : cont trelegy 200\par add ALVesco 80 samples 2 puffs daily\par \par repeat cbc done in office\par \par ct screening ct ordered\par Urged continued smoking cessation.\par \par \par 35 minutes spent in evaluation and management.

## 2022-04-02 NOTE — DISCUSSION/SUMMARY
[FreeTextEntry1] : Chronic obstructive pulmonary disease status post exacerbation with significant improvement.\par Lung function stable but some persistent wheezing.\par Abnormal chest CT.\par Prior smoker discontinued x6 weeks.\par Due for follow-up CT of the chest screening protocol appropriate.  Discussed.

## 2022-04-02 NOTE — PROCEDURE
[FreeTextEntry1] : 04/01/2022 \par PA and lateral chest radiograph without acute infiltrates.\par \par \par 04/01/2022\par Pulmonary function testing\par There is a moderate ventilatory impairment in a combined obstructive and restrictive pattern. There is elevation in the RV/TLC ratio indicative of possible air trapping. There is a mild diffusion impairment. Corrects to normal with lung volume correction \par There is no significant change in flow rates compared to July 2021.\par \par \par

## 2022-04-02 NOTE — HISTORY OF PRESENT ILLNESS
[Former] : former [TextBox_4] : Here for f/u\par \par done with steroids did not take the last 2 days because had stomach virus and antibiotics last week feeling better\par small amount of mucus\par On trelegy\par less sob. Significantly improved. \par \par still not smoking , for the past 6 weeks\par \par S/p AVR 2/19 [YearQuit] : 2022

## 2022-04-04 LAB
BASOPHILS # BLD AUTO: 0.04 K/UL
BASOPHILS NFR BLD AUTO: 0.3 %
EOSINOPHIL # BLD AUTO: 0.03 K/UL
EOSINOPHIL NFR BLD AUTO: 0.2 %
HCT VFR BLD CALC: 37.8 %
HGB BLD-MCNC: 12 G/DL
IMM GRANULOCYTES NFR BLD AUTO: 0.6 %
LYMPHOCYTES # BLD AUTO: 2.06 K/UL
LYMPHOCYTES NFR BLD AUTO: 16.3 %
MAN DIFF?: NORMAL
MCHC RBC-ENTMCNC: 29.3 PG
MCHC RBC-ENTMCNC: 31.7 GM/DL
MCV RBC AUTO: 92.2 FL
MONOCYTES # BLD AUTO: 1.21 K/UL
MONOCYTES NFR BLD AUTO: 9.6 %
NEUTROPHILS # BLD AUTO: 9.23 K/UL
NEUTROPHILS NFR BLD AUTO: 73 %
PLATELET # BLD AUTO: 290 K/UL
RBC # BLD: 4.1 M/UL
RBC # FLD: 13.2 %
WBC # FLD AUTO: 12.64 K/UL

## 2022-04-09 ENCOUNTER — INPATIENT (INPATIENT)
Facility: HOSPITAL | Age: 72
LOS: 17 days | Discharge: HOME | DRG: 216 | End: 2022-04-27
Attending: THORACIC SURGERY (CARDIOTHORACIC VASCULAR SURGERY) | Admitting: SPECIALIST
Payer: MEDICARE

## 2022-04-09 VITALS
HEIGHT: 60 IN | RESPIRATION RATE: 18 BRPM | SYSTOLIC BLOOD PRESSURE: 111 MMHG | OXYGEN SATURATION: 98 % | TEMPERATURE: 98 F | DIASTOLIC BLOOD PRESSURE: 60 MMHG | HEART RATE: 70 BPM

## 2022-04-09 DIAGNOSIS — I63.9 CEREBRAL INFARCTION, UNSPECIFIED: ICD-10-CM

## 2022-04-09 DIAGNOSIS — Z98.890 OTHER SPECIFIED POSTPROCEDURAL STATES: Chronic | ICD-10-CM

## 2022-04-09 LAB
ALBUMIN SERPL ELPH-MCNC: 3.4 G/DL — SIGNIFICANT CHANGE UP (ref 3.3–5)
ALP SERPL-CCNC: 64 U/L — SIGNIFICANT CHANGE UP (ref 40–120)
ALT FLD-CCNC: 24 U/L — SIGNIFICANT CHANGE UP (ref 10–45)
ANION GAP SERPL CALC-SCNC: 13 MMOL/L — SIGNIFICANT CHANGE UP (ref 5–17)
APTT BLD: 32.2 SEC — SIGNIFICANT CHANGE UP (ref 27.5–35.5)
AST SERPL-CCNC: 23 U/L — SIGNIFICANT CHANGE UP (ref 10–40)
BASOPHILS # BLD AUTO: 0.02 K/UL — SIGNIFICANT CHANGE UP (ref 0–0.2)
BASOPHILS NFR BLD AUTO: 0.2 % — SIGNIFICANT CHANGE UP (ref 0–2)
BILIRUB SERPL-MCNC: 0.5 MG/DL — SIGNIFICANT CHANGE UP (ref 0.2–1.2)
BUN SERPL-MCNC: 16 MG/DL — SIGNIFICANT CHANGE UP (ref 7–23)
CALCIUM SERPL-MCNC: 8.9 MG/DL — SIGNIFICANT CHANGE UP (ref 8.4–10.5)
CHLORIDE SERPL-SCNC: 104 MMOL/L — SIGNIFICANT CHANGE UP (ref 96–108)
CO2 SERPL-SCNC: 24 MMOL/L — SIGNIFICANT CHANGE UP (ref 22–31)
CREAT SERPL-MCNC: 0.58 MG/DL — SIGNIFICANT CHANGE UP (ref 0.5–1.3)
EGFR: 97 ML/MIN/1.73M2 — SIGNIFICANT CHANGE UP
EOSINOPHIL # BLD AUTO: 0.06 K/UL — SIGNIFICANT CHANGE UP (ref 0–0.5)
EOSINOPHIL NFR BLD AUTO: 0.6 % — SIGNIFICANT CHANGE UP (ref 0–6)
FLUAV AG NPH QL: SIGNIFICANT CHANGE UP
FLUBV AG NPH QL: SIGNIFICANT CHANGE UP
GLUCOSE SERPL-MCNC: 114 MG/DL — HIGH (ref 70–99)
HCT VFR BLD CALC: 30.8 % — LOW (ref 34.5–45)
HGB BLD-MCNC: 9.8 G/DL — LOW (ref 11.5–15.5)
IMM GRANULOCYTES NFR BLD AUTO: 0.5 % — SIGNIFICANT CHANGE UP (ref 0–1.5)
INR BLD: 1.3 RATIO — HIGH (ref 0.88–1.16)
LYMPHOCYTES # BLD AUTO: 1.59 K/UL — SIGNIFICANT CHANGE UP (ref 1–3.3)
LYMPHOCYTES # BLD AUTO: 15.2 % — SIGNIFICANT CHANGE UP (ref 13–44)
MCHC RBC-ENTMCNC: 28.7 PG — SIGNIFICANT CHANGE UP (ref 27–34)
MCHC RBC-ENTMCNC: 31.8 GM/DL — LOW (ref 32–36)
MCV RBC AUTO: 90.3 FL — SIGNIFICANT CHANGE UP (ref 80–100)
MONOCYTES # BLD AUTO: 1.03 K/UL — HIGH (ref 0–0.9)
MONOCYTES NFR BLD AUTO: 9.9 % — SIGNIFICANT CHANGE UP (ref 2–14)
NEUTROPHILS # BLD AUTO: 7.69 K/UL — HIGH (ref 1.8–7.4)
NEUTROPHILS NFR BLD AUTO: 73.6 % — SIGNIFICANT CHANGE UP (ref 43–77)
NRBC # BLD: 0 /100 WBCS — SIGNIFICANT CHANGE UP (ref 0–0)
PLATELET # BLD AUTO: 296 K/UL — SIGNIFICANT CHANGE UP (ref 150–400)
POTASSIUM SERPL-MCNC: 3.8 MMOL/L — SIGNIFICANT CHANGE UP (ref 3.5–5.3)
POTASSIUM SERPL-SCNC: 3.8 MMOL/L — SIGNIFICANT CHANGE UP (ref 3.5–5.3)
PROT SERPL-MCNC: 7.1 G/DL — SIGNIFICANT CHANGE UP (ref 6–8.3)
PROTHROM AB SERPL-ACNC: 15.1 SEC — HIGH (ref 10.5–13.4)
RBC # BLD: 3.41 M/UL — LOW (ref 3.8–5.2)
RBC # FLD: 13.4 % — SIGNIFICANT CHANGE UP (ref 10.3–14.5)
RSV RNA NPH QL NAA+NON-PROBE: SIGNIFICANT CHANGE UP
SARS-COV-2 RNA SPEC QL NAA+PROBE: SIGNIFICANT CHANGE UP
SODIUM SERPL-SCNC: 141 MMOL/L — SIGNIFICANT CHANGE UP (ref 135–145)
TROPONIN T, HIGH SENSITIVITY RESULT: 338 NG/L — HIGH (ref 0–51)
TROPONIN T, HIGH SENSITIVITY RESULT: 349 NG/L — HIGH (ref 0–51)
WBC # BLD: 10.44 K/UL — SIGNIFICANT CHANGE UP (ref 3.8–10.5)
WBC # FLD AUTO: 10.44 K/UL — SIGNIFICANT CHANGE UP (ref 3.8–10.5)

## 2022-04-09 PROCEDURE — 0042T: CPT

## 2022-04-09 PROCEDURE — 93010 ELECTROCARDIOGRAM REPORT: CPT

## 2022-04-09 PROCEDURE — 99232 SBSQ HOSP IP/OBS MODERATE 35: CPT

## 2022-04-09 PROCEDURE — 70496 CT ANGIOGRAPHY HEAD: CPT | Mod: 26,MA

## 2022-04-09 PROCEDURE — 99285 EMERGENCY DEPT VISIT HI MDM: CPT | Mod: 25

## 2022-04-09 PROCEDURE — 70498 CT ANGIOGRAPHY NECK: CPT | Mod: 26,MA

## 2022-04-09 RX ORDER — ALBUTEROL 90 UG/1
2 AEROSOL, METERED ORAL EVERY 6 HOURS
Refills: 0 | Status: DISCONTINUED | OUTPATIENT
Start: 2022-04-09 | End: 2022-04-18

## 2022-04-09 RX ORDER — CHOLECALCIFEROL (VITAMIN D3) 125 MCG
1000 CAPSULE ORAL DAILY
Refills: 0 | Status: DISCONTINUED | OUTPATIENT
Start: 2022-04-09 | End: 2022-04-18

## 2022-04-09 RX ORDER — ATORVASTATIN CALCIUM 80 MG/1
80 TABLET, FILM COATED ORAL AT BEDTIME
Refills: 0 | Status: DISCONTINUED | OUTPATIENT
Start: 2022-04-09 | End: 2022-04-11

## 2022-04-09 RX ORDER — LISINOPRIL 2.5 MG/1
1 TABLET ORAL
Qty: 0 | Refills: 0 | DISCHARGE

## 2022-04-09 RX ORDER — CHOLECALCIFEROL (VITAMIN D3) 125 MCG
1000 CAPSULE ORAL ONCE
Refills: 0 | Status: DISCONTINUED | OUTPATIENT
Start: 2022-04-09 | End: 2022-04-09

## 2022-04-09 RX ORDER — ENOXAPARIN SODIUM 100 MG/ML
40 INJECTION SUBCUTANEOUS EVERY 24 HOURS
Refills: 0 | Status: DISCONTINUED | OUTPATIENT
Start: 2022-04-09 | End: 2022-04-12

## 2022-04-09 RX ORDER — AMLODIPINE BESYLATE 2.5 MG/1
1 TABLET ORAL
Qty: 0 | Refills: 0 | DISCHARGE

## 2022-04-09 RX ORDER — TIOTROPIUM BROMIDE AND OLODATEROL 3.124; 2.736 UG/1; UG/1
0 SPRAY, METERED RESPIRATORY (INHALATION)
Qty: 0 | Refills: 0 | DISCHARGE

## 2022-04-09 RX ORDER — ZOLPIDEM TARTRATE 10 MG/1
5 TABLET ORAL AT BEDTIME
Refills: 0 | Status: DISCONTINUED | OUTPATIENT
Start: 2022-04-09 | End: 2022-04-12

## 2022-04-09 RX ORDER — ASPIRIN/CALCIUM CARB/MAGNESIUM 324 MG
81 TABLET ORAL DAILY
Refills: 0 | Status: DISCONTINUED | OUTPATIENT
Start: 2022-04-09 | End: 2022-04-18

## 2022-04-09 RX ADMIN — Medication 1000 UNIT(S): at 22:27

## 2022-04-09 RX ADMIN — Medication 81 MILLIGRAM(S): at 19:34

## 2022-04-09 RX ADMIN — ENOXAPARIN SODIUM 40 MILLIGRAM(S): 100 INJECTION SUBCUTANEOUS at 22:27

## 2022-04-09 RX ADMIN — ATORVASTATIN CALCIUM 80 MILLIGRAM(S): 80 TABLET, FILM COATED ORAL at 22:27

## 2022-04-09 RX ADMIN — ZOLPIDEM TARTRATE 5 MILLIGRAM(S): 10 TABLET ORAL at 23:18

## 2022-04-09 NOTE — ED PROVIDER NOTE - PHYSICAL EXAMINATION
Const: Well-nourished, Well-developed, appearing stated age.  Eyes: no conjunctival injection, and symmetrical lids.  HEENT: Head NCAT, no lesions. Atraumatic external nose and ears.   Neck: Symmetric, trachea midline.   CVS: +S1/S2, Radial and DP pulses 2+ b/l.   RESP: Unlabored respiratory effort. Clear to auscultation bilaterally.  GI: Nontender/Nondistended, No CVA tenderness b/l.   MSK: Normocephalic/Atraumatic, Lower Extremities w/o edema b/l.   Skin: Warm, dry and intact.   Neuro: No dysmetria or dysarthria. Motor & Sensation grossly intact. +Right hemianopsia. Ambulation wnl.   Psych: Awake, Alert, & Oriented (AAO) x3. Appropriate mood and affect.

## 2022-04-09 NOTE — ED PROVIDER NOTE - OBJECTIVE STATEMENT
71F w/ prior "open heart surgery", HTN, Emphysema/ chronic bronchitis, Aortic stenosis, GERD s/p laparoscopic vertical sleeve gastrectomy presents with acute onset speech disturbance, vision change that occurred around 10am. Code stroke called upon presentation. PT states that the symptoms were transient, but she feels normal now. Per EMS, pt's family reported blurry vision, gait imbalance, and speech disturbance (mild loss in fluency).

## 2022-04-09 NOTE — CONSULT NOTE ADULT - SUBJECTIVE AND OBJECTIVE BOX
HPI:  71F w/ prior open heart surgery, HTN, Emphysema/ chronic bronchitis, Aortic stenosis, GERD s/p laparoscopic vertical sleeve gastrectomy presents with acute onset speech disturbance, vision change and gait imbalance. Per EMS, LKN: 4/9/22 at 10am per family. Pt's family reported blurry vision, gait imbalance, and speech disturbance (mild loss in fluency). Pt unable to provide full hx.     (Stroke only)  LKN: 4/9/22 at 10am  NIHSS: 3  preMRS: 0  Pt is not a candidate for tpa due to outside tpa window   Pt is not a candidate for mechanical thrombectomy due to no large vessel occlusion on CTA    REVIEW OF SYSTEMS    A 10-system ROS was performed and is negative except for those items noted above and/or in the HPI.    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      FAMILY HISTORY:    SOCIAL HISTORY:   T/E/D:   Occupation:   Lives with:     MEDICATIONS (HOME):  Home Medications:  amLODIPine 5 mg oral tablet: 1 tab(s) orally once a day - start after surgery (15 Nov 2017 17:11)  lisinopril 20 mg oral tablet: 1 tab(s) orally once a day (at bedtime) (15 Nov 2017 17:11)  Stiolto Respimat: inhaled 2 times a day ( patient to bring medication to [facility] the day of procedure).  (15 Nov 2017 17:11)  zolpidem 10 mg oral tablet: 1 tab(s) orally once a day (at bedtime) (15 Nov 2017 17:11)    MEDICATIONS  (STANDING):    MEDICATIONS  (PRN):    ALLERGIES/INTOLERANCES:  Allergies  No Known Allergies    Intolerances    VITALS & EXAMINATION:  Vital Signs Last 24 Hrs  T(C): 36.7 (09 Apr 2022 17:02), Max: 36.7 (09 Apr 2022 17:02)  T(F): 98 (09 Apr 2022 17:02), Max: 98 (09 Apr 2022 17:02)  HR: 70 (09 Apr 2022 17:02) (70 - 70)  BP: 111/60 (09 Apr 2022 17:02) (111/60 - 111/60)  BP(mean): --  RR: 18 (09 Apr 2022 17:02) (18 - 18)  SpO2: 98% (09 Apr 2022 17:02) (98% - 98%)    General:  Constitutional: Obese Female, appears stated age, in no apparent distress including pain  Head: Normocephalic & atraumatic.  Respiratory: No increased work of breathing  Extremities: No cyanosis, clubbing, or edema.  Skin: No rashes, bruising, or discoloration.    Neurological (>12):  MS: Awake, alert, oriented to person, place, situation, time. Normal affect. Follows all commands.    Language: Speech is clear, fluent with good repetition & comprehension (able to name objects___)    CNs: PERRL (R = 3mm, L = 3mm). LHH. EOMI no nystagmus. V1-3 intact to LT, well developed masseter muscles b/l. No facial asymmetry b/l, full eye closure strength b/l. Hearing grossly normal (rubbing fingers) b/l. Symmetric palate elevation in midline. Gag reflex deferred. Head turning & shoulder shrug intact b/l. Tongue midline, normal movements, no atrophy.    Motor: Normal muscle bulk & tone. No noticeable tremor or seizure. No pronator drift.              Deltoid	Biceps	Triceps	Wrist	Finger ABd	   R	5	5	5	5	5		5 	  L	5	5	5	5	5		5    	H-Flex	K-Flex	K-Ext	D-Flex	P-Flex  R	5	5	5	5	5	  L	5	5	5	5	5	     Sensation: Intact to LT b/l throughout.     Cortical: Extinction on DSS (neglect): none    Reflexes:              Biceps(C5)       BR(C6)     Triceps(C7)               Patellar(L4)    Achilles(S1)    Plantar Resp  R	2	          2	             2		        2		    2		Down   L	2	          2	             2		        2		    2		Down     Coordination: intact rapid-alt movements. No dysmetria to FTN/HTS    Gait: Normal Romberg. No postural instability. Normal stance and tandem gait.     LABORATORY:    STUDIES & IMAGING:  Studies (EKG, EEG, EMG, etc):     Radiology (XR, CT, MR, U/S, TTE/FERMIN):

## 2022-04-09 NOTE — PATIENT PROFILE ADULT - FALL HARM RISK - HARM RISK INTERVENTIONS

## 2022-04-09 NOTE — ED ADULT NURSE NOTE - OBJECTIVE STATEMENT
70 y/o Female presenting to the ED by EMS from home, A&Ox3, complaining of p 70 y/o Female presenting to the ED by EMS from home, A&Ox3, complaining of episode of dizziness, unsteady gait and slight aphasia which has now resolved. Upon arrival code stroke initiated, pt brought right to CT scan by EMS. Pt found to have Right hemonus hemianopsia. Pupils 4mm PERRL, equal strength and sensation in all extremities. NIH score found to be 3 by neuro MD. Pt denies CP, SOB, N/V/D, abdominal pain. Pt able to ambulate but slightly unsteady on her feet, red socks in place. Pt warm to touch, found to be 100.2 rectally, MD aware. Cardiac monitor in place showing NSR. Pt in no current distress. Safety and comfort measures provided, bed locked and in lowest position, side rails up for safety. Call bell within reach.

## 2022-04-09 NOTE — ED PROVIDER NOTE - NS ED ROS FT
CONST: no fevers, no chills, no trauma  EYES: no pain, + blurry vision   ENT: no sore throat, no epistaxis, no rhinorrhea  CV: no chest pain, no palpitations, no orthopnea, no extremity pain or swelling  RESP: no shortness of breath, no cough, no sputum, no pleurisy, no wheezing  ABD: no abdominal pain, no nausea, no vomiting, no diarrhea, no black or bloody stool  : no dysuria, no hematuria, no frequency, no urgency  MSK: no back pain, no neck pain, no extremity pain  NEURO: no headache, no sensory disturbances, no focal weakness, no dizziness, +word finding difficulty   HEME: no easy bleeding or bruising  SKIN: no diaphoresis, no rash

## 2022-04-09 NOTE — H&P ADULT - ATTENDING COMMENTS
VASCULAR NEUROLOGY ATTENDING  The patient is seen and examined the history and imaging are reviewed. I agree with the resident note unless otherwise noted. Patient with R PCA infarct. Possible large artery athero vs. cardioembolism. Agree with ASA statin. Will need TTE and ILR. PT/OT/SS. Early hospital discharge.

## 2022-04-09 NOTE — H&P ADULT - NSHPLABSRESULTS_GEN_ALL_CORE
LABS:                        9.8    10.44 )-----------( 296      ( 09 Apr 2022 17:13 )             30.8     09 Apr 2022 17:13    141    |  104    |  16     ----------------------------<  114    3.8     |  24     |  0.58     Ca    8.9        09 Apr 2022 17:13    TPro  7.1    /  Alb  3.4    /  TBili  0.5    /  DBili  x      /  AST  23     /  ALT  24     /  AlkPhos  64     09 Apr 2022 17:13    PT/INR - ( 09 Apr 2022 17:13 )   PT: 15.1 sec;   INR: 1.30 ratio         PTT - ( 09 Apr 2022 17:13 )  PTT:32.2 sec      RADIOLOGY:

## 2022-04-09 NOTE — ED PROVIDER NOTE - PROGRESS NOTE DETAILS
Pt's initial trop elevated - reassessed, no CP/SOB or other ACS symptoms. EKG non actionable. Delta trop negative. Admitted to neuro stroke unit. LEORA Matias PGY3

## 2022-04-09 NOTE — H&P ADULT - NSHPPHYSICALEXAM_GEN_ALL_CORE
Vital Signs Last 24 Hrs  T(C): 36.7 (09 Apr 2022 17:02), Max: 36.7 (09 Apr 2022 17:02)  T(F): 98 (09 Apr 2022 17:02), Max: 98 (09 Apr 2022 17:02)  HR: 70 (09 Apr 2022 17:02) (70 - 70)  BP: 111/60 (09 Apr 2022 17:02) (111/60 - 111/60)  BP(mean): --  RR: 18 (09 Apr 2022 17:02) (18 - 18)  SpO2: 98% (09 Apr 2022 17:02) (98% - 98%)    General:  Constitutional: Obese, appears stated age, in no apparent distress including pain  Head: Normocephalic & atraumatic.   Respiratory: No increased work of breathing  Skin: No rashes, bruising, or discoloration.    Neurological (>12):  MS: Awake, alert, oriented to person, place, situation, time. Normal affect. Follows all commands.  Language: Speech is clear, mildly dysfluent with good repetition & comprehension (able to name objects mask, thumb). No paraphasic errors    CNs: PERRL (R = 3mm, L = 3mm). LHH. EOMI but looking more to R, able to cross midline. V1-3 intact to LT, well developed masseter muscles b/l. No facial asymmetry b/l, full eye closure strength b/l. Hearing grossly normal (rubbing fingers) b/l. Symmetric palate elevation in midline. Gag reflex deferred. Head turning & shoulder shrug intact b/l. Tongue midline, normal movements, no atrophy.    Motor: Normal muscle bulk. No noticeable resting tremor.  LLE drift on subsequent exam but not initial exam.              Deltoid	Biceps	Triceps	Wrist	Finger ABd	   R	    5	    5	        5	        5	        5		        5 	  L	    4+	    5	        5	        5	        4+		        5    	H-Flex	K-Flex	K-Ext	D-Flex	P-Flex  R	5	        5	        5	        5	        5	          L	4+	        5	        5	        4+	        5	           Sensation: Intact to LT b/l throughout.   Cortical: Extinction on DSS (neglect): none    Reflexes:              Biceps(C5)       BR(C6)         Triceps(C7)       Patellar(L4)    Achilles(S1)    Plantar Resp  R	    2	       2	             		              2	0		       mute  L	    2	       2	             		              2	0		       mute    Coordination: No dysmetria to FTN.  Gait: Normal Romberg. No postural instability. Normal stance although cautious at first and gait.

## 2022-04-09 NOTE — H&P ADULT - ASSESSMENT
71F w/ AS w/ prior open heart aortic valve replacement 2019, HTN, BETSEY, former smoker, Emphysema/ chronic bronchitis, GERD s/p laparoscopic vertical sleeve gastrectomy presents with acute onset speech disturbance, vision change and gait imbalance. Per EMS, LKN: 4/9/22 at 10am per family. Pt's family reported blurry vision, gait imbalance, and speech disturbance (mild loss in fluency). She still endorses blurry vision but denies any weakness, numbness/tingling. Pt takes a baby aspirin 81mg daily. NIHSS: 3, preMRS: 0. No tpa or MT. Neuro exam notable for LHH, possible 4+/5 L hemiparesis. CTH w/     Impression: LHH w/ possible L hemiparesis possibly 2/2 R brain dysfunction, possibly 2/2 R PCA infarct seen on CTH vs. R MCA territory infarct.    Plan:  -f/u CTH and CTA final read    Stroke acute management:  - Frequent neuro-checks q4h and VS q4h; STAT CTH for change in neuro exam  - Permissive HTN up to 220/110 for 24-48h from symptom onset followed by gradual normotension over 2-3 days   - NS at 100/hr for 10 hrs  - NPO unless passes dysphagia screen; swallow eval if fails  - DVT ppx: SCDs    Secondary prevention of stroke:  -Aspirin 81mg PO daily (325 per rectum if fails dysphagia)   -will discuss consider further antiplatelets  -Atorvastatin 80 mg PO daily (long-term goal LDL < 70)  -Tight glucose control (long-term goal HgbA1c < 6%)  -Rehabilitation: PT/OT/S+S/SW/CM consults    Stroke workup:  -MRI brain w/o contrast  -TTE  -Consider implantable loop recorder  -Telemetry to monitor for arrhythmia  -Check HgbA1C, fasting lipid panel, utox    Other:   -outpatient angiogram for 6.7mm Acomm aneurysm seen on CTA  -[facility]    Case discussed with stroke fellow Dr. Antonia Bajwa, under supervision of attending Dr. Melody Arcos  Case to be seen and discussed with Dr. Dubois     71F RH w/ AS w/ prior open heart aortic valve replacement 2019, HTN, BETSEY, former smoker, Emphysema/ chronic bronchitis, GERD s/p laparoscopic vertical sleeve gastrectomy presents with acute onset speech disturbance, vision change and gait imbalance. Per EMS, LKN: 4/9/22 at 10am per family. Pt's family reported blurry vision, gait imbalance, and speech disturbance (mild loss in fluency). She still endorses blurry vision but denies any weakness, numbness/tingling. Pt takes a baby aspirin 81mg daily. NIHSS: 3, preMRS: 0. No tpa or MT. Neuro exam notable for LHH, possible 4+/5 L hemiparesis. CTH w/     Impression: LHH w/ possible L hemiparesis possibly 2/2 R brain dysfunction, possibly 2/2 R PCA infarct seen on CTH vs. R MCA territory infarct.    Plan:  -f/u CTH and CTA final read    Stroke acute management:  - Frequent neuro-checks q4h and VS q4h; STAT CTH for change in neuro exam  - Permissive HTN up to 220/110 for 24-48h from symptom onset followed by gradual normotension over 2-3 days   - NS at 100/hr for 10 hrs  - NPO unless passes dysphagia screen; swallow eval if fails  - DVT ppx: SCDs    Secondary prevention of stroke:  -Aspirin 81mg PO daily (325 per rectum if fails dysphagia)   -will discuss consider further antiplatelets  -increase home rosuvastatin to atorvastatin 80 mg PO daily (long-term goal LDL < 70)  -Tight glucose control (long-term goal HgbA1c < 6%)  -Rehabilitation: PT/OT/S+S/SW/CM consults    Stroke workup:  -MRI brain w/o contrast  -TTE  -Consider implantable loop recorder  -Telemetry to monitor for arrhythmia  -Check HgbA1C, fasting lipid panel, utox    Other:   -outpatient angiogram for 6.7mm Acomm aneurysm seen on CTA    Case discussed with stroke fellow Dr. Antonia Bajwa, under supervision of attending Dr. Melody Arcos  Case to be seen and discussed with Dr. Dubois

## 2022-04-09 NOTE — H&P ADULT - HISTORY OF PRESENT ILLNESS
71F w/ AS w/ prior open heart aortic valve replacement 2019, HTN, BETSEY, former smoker, Emphysema/ chronic bronchitis, GERD s/p laparoscopic vertical sleeve gastrectomy presents with acute onset speech disturbance, vision change and gait imbalance. Per EMS, LKN: 4/9/22 at 10am per family. Pt's family reported blurry vision, gait imbalance, and speech disturbance (mild loss in fluency). Pt unable to provide full hx because she was confused on timing. She still endorses blurry vision but denies any weakness, numbness/tingling. Pt takes a baby aspirin 81mg daily. Pt ambulates without assistance or assistive devices at baseline.    (Stroke only)  LKN: 4/9/22 at 10am  NIHSS: 3  preMRS: 0  Pt is not a candidate for tpa due to outside tpa window   Pt is not a candidate for mechanical thrombectomy due to no large vessel occlusion on CTA     71F RH w/ AS w/ prior open heart aortic valve replacement 2019, HTN, BETSEY, former smoker, Emphysema/ chronic bronchitis, GERD s/p laparoscopic vertical sleeve gastrectomy presents with acute onset speech disturbance, vision change and gait imbalance. Per EMS, LKN: 4/9/22 at 10am per family. Pt's family reported blurry vision, gait imbalance, and speech disturbance (mild loss in fluency). Pt unable to provide full hx because she was confused on timing. She still endorses blurry vision but denies any weakness, numbness/tingling. Pt takes a baby aspirin 81mg daily. Pt ambulates without assistance or assistive devices at baseline.    (Stroke only)  LKN: 4/9/22 at 10am  NIHSS: 3  preMRS: 0  Pt is not a candidate for tpa due to outside tpa window   Pt is not a candidate for mechanical thrombectomy due to no large vessel occlusion on CTA

## 2022-04-09 NOTE — ED ADULT NURSE NOTE - INTERVENTIONS DEFINITIONS
[place] to call system/Call bell, personal items and telephone within reach/Non-slip footwear when patient is off stretcher/Physically safe environment: no spills, clutter or unnecessary equipment/Stretcher in lowest position, wheels locked, appropriate side rails in place/Monitor for mental status changes and reorient to person, place, and time/Provide visual clues: red socks

## 2022-04-09 NOTE — H&P ADULT - NSHPREVIEWOFSYSTEMS_GEN_ALL_CORE
General: No fevers, chills  HEENT: + acute visual changes  CVS: No chest pain, palpitations  Resp: No cough, dyspnea, wheezing  GI: No abdominal pain, nausea, vomiting  : No dysuria, frequency  MSK: No joint pain or swelling  Ext: No edema, no claudication  Skin: No rashes or itching  Heme: No easy bruising  Neuro: No confusion  Endocrine: No excessive heat or cold symptoms

## 2022-04-09 NOTE — ED PROVIDER NOTE - ATTENDING CONTRIBUTION TO CARE
Attending MD Wilkins:  I personally have seen and examined this patient. I have performed a substantive portion of the visit including all aspects of the medical decision making.  Resident note reviewed and agree on plan of care and except where noted.        71F w/ prior "open heart surgery", HTN, Emphysema/ chronic bronchitis, Aortic stenosis, GERD s/p laparoscopic vertical sleeve gastrectomy presents with acute onset speech disturbance, vision change that occurred around 10 to 12pm today. Code stroke activated by triage team. Exam notable for subtle RUE drift, left hemianopsia. CT with hypodensity R occipital lobe concerning for subacute CVA. Outside of window for IV tPA. No LVO noted by report. Plan for admission to stroke service for further work up.           *The above represents an initial assessment/impression. Please refer to progress notes for potential changes in patient clinical course*

## 2022-04-09 NOTE — ED PROVIDER NOTE - NSICDXPASTMEDICALHX_GEN_ALL_CORE_FT
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

## 2022-04-10 DIAGNOSIS — Z95.2 PRESENCE OF PROSTHETIC HEART VALVE: ICD-10-CM

## 2022-04-10 DIAGNOSIS — I63.9 CEREBRAL INFARCTION, UNSPECIFIED: ICD-10-CM

## 2022-04-10 DIAGNOSIS — I10 ESSENTIAL (PRIMARY) HYPERTENSION: ICD-10-CM

## 2022-04-10 LAB
A1C WITH ESTIMATED AVERAGE GLUCOSE RESULT: 6.3 % — HIGH (ref 4–5.6)
APPEARANCE UR: ABNORMAL
BACTERIA # UR AUTO: NEGATIVE — SIGNIFICANT CHANGE UP
BILIRUB UR-MCNC: NEGATIVE — SIGNIFICANT CHANGE UP
CHOLEST SERPL-MCNC: 106 MG/DL — SIGNIFICANT CHANGE UP
CK MB BLD-MCNC: 3.8 % — HIGH (ref 0–3.5)
CK MB CFR SERPL CALC: 2.2 NG/ML — SIGNIFICANT CHANGE UP (ref 0–3.8)
CK SERPL-CCNC: 58 U/L — SIGNIFICANT CHANGE UP (ref 25–170)
COLOR SPEC: YELLOW — SIGNIFICANT CHANGE UP
DIFF PNL FLD: ABNORMAL
EPI CELLS # UR: 15 /HPF — HIGH
ESTIMATED AVERAGE GLUCOSE: 134 MG/DL — HIGH (ref 68–114)
FERRITIN SERPL-MCNC: 314 NG/ML — HIGH (ref 15–150)
FOLATE SERPL-MCNC: 15.3 NG/ML — SIGNIFICANT CHANGE UP
GLUCOSE UR QL: NEGATIVE — SIGNIFICANT CHANGE UP
HCT VFR BLD CALC: 28.3 % — LOW (ref 34.5–45)
HCT VFR BLD CALC: 31 % — LOW (ref 34.5–45)
HCV AB S/CO SERPL IA: 0.1 S/CO — SIGNIFICANT CHANGE UP (ref 0–0.99)
HCV AB SERPL-IMP: SIGNIFICANT CHANGE UP
HDLC SERPL-MCNC: 32 MG/DL — LOW
HGB BLD-MCNC: 8.9 G/DL — LOW (ref 11.5–15.5)
HGB BLD-MCNC: 9.6 G/DL — LOW (ref 11.5–15.5)
HYALINE CASTS # UR AUTO: 4 /LPF — HIGH (ref 0–2)
IRON SATN MFR SERPL: 11 % — LOW (ref 14–50)
IRON SATN MFR SERPL: 19 UG/DL — LOW (ref 30–160)
KETONES UR-MCNC: NEGATIVE — SIGNIFICANT CHANGE UP
LEUKOCYTE ESTERASE UR-ACNC: ABNORMAL
LIPID PNL WITH DIRECT LDL SERPL: 62 MG/DL — SIGNIFICANT CHANGE UP
MCHC RBC-ENTMCNC: 28.5 PG — SIGNIFICANT CHANGE UP (ref 27–34)
MCHC RBC-ENTMCNC: 28.8 PG — SIGNIFICANT CHANGE UP (ref 27–34)
MCHC RBC-ENTMCNC: 31 GM/DL — LOW (ref 32–36)
MCHC RBC-ENTMCNC: 31.4 GM/DL — LOW (ref 32–36)
MCV RBC AUTO: 91.6 FL — SIGNIFICANT CHANGE UP (ref 80–100)
MCV RBC AUTO: 92 FL — SIGNIFICANT CHANGE UP (ref 80–100)
NITRITE UR-MCNC: NEGATIVE — SIGNIFICANT CHANGE UP
NON HDL CHOLESTEROL: 75 MG/DL — SIGNIFICANT CHANGE UP
NRBC # BLD: 0 /100 WBCS — SIGNIFICANT CHANGE UP (ref 0–0)
NRBC # BLD: 0 /100 WBCS — SIGNIFICANT CHANGE UP (ref 0–0)
PH UR: 6 — SIGNIFICANT CHANGE UP (ref 5–8)
PLATELET # BLD AUTO: 283 K/UL — SIGNIFICANT CHANGE UP (ref 150–400)
PLATELET # BLD AUTO: 289 K/UL — SIGNIFICANT CHANGE UP (ref 150–400)
PROT UR-MCNC: ABNORMAL
RBC # BLD: 3.09 M/UL — LOW (ref 3.8–5.2)
RBC # BLD: 3.37 M/UL — LOW (ref 3.8–5.2)
RBC # FLD: 13.5 % — SIGNIFICANT CHANGE UP (ref 10.3–14.5)
RBC # FLD: 13.6 % — SIGNIFICANT CHANGE UP (ref 10.3–14.5)
RBC CASTS # UR COMP ASSIST: 3 /HPF — SIGNIFICANT CHANGE UP (ref 0–4)
SP GR SPEC: 1.03 — HIGH (ref 1.01–1.02)
TIBC SERPL-MCNC: 178 UG/DL — LOW (ref 220–430)
TRIGL SERPL-MCNC: 63 MG/DL — SIGNIFICANT CHANGE UP
TROPONIN T, HIGH SENSITIVITY RESULT: 264 NG/L — HIGH (ref 0–51)
TROPONIN T, HIGH SENSITIVITY RESULT: 269 NG/L — HIGH (ref 0–51)
TROPONIN T, HIGH SENSITIVITY RESULT: 308 NG/L — HIGH (ref 0–51)
TROPONIN T, HIGH SENSITIVITY RESULT: 333 NG/L — HIGH (ref 0–51)
TSH SERPL-MCNC: 0.63 UIU/ML — SIGNIFICANT CHANGE UP (ref 0.27–4.2)
UIBC SERPL-MCNC: 159 UG/DL — SIGNIFICANT CHANGE UP (ref 110–370)
UROBILINOGEN FLD QL: ABNORMAL
VIT B12 SERPL-MCNC: 1570 PG/ML — HIGH (ref 232–1245)
WBC # BLD: 9.03 K/UL — SIGNIFICANT CHANGE UP (ref 3.8–10.5)
WBC # BLD: 9.79 K/UL — SIGNIFICANT CHANGE UP (ref 3.8–10.5)
WBC # FLD AUTO: 9.03 K/UL — SIGNIFICANT CHANGE UP (ref 3.8–10.5)
WBC # FLD AUTO: 9.79 K/UL — SIGNIFICANT CHANGE UP (ref 3.8–10.5)
WBC UR QL: 23 /HPF — HIGH (ref 0–5)

## 2022-04-10 PROCEDURE — 70551 MRI BRAIN STEM W/O DYE: CPT | Mod: 26

## 2022-04-10 RX ORDER — BENZOCAINE AND MENTHOL 5; 1 G/100ML; G/100ML
1 LIQUID ORAL DAILY
Refills: 0 | Status: DISCONTINUED | OUTPATIENT
Start: 2022-04-10 | End: 2022-04-18

## 2022-04-10 RX ORDER — PANTOPRAZOLE SODIUM 20 MG/1
40 TABLET, DELAYED RELEASE ORAL DAILY
Refills: 0 | Status: DISCONTINUED | OUTPATIENT
Start: 2022-04-10 | End: 2022-04-13

## 2022-04-10 RX ORDER — CEFTRIAXONE 500 MG/1
1000 INJECTION, POWDER, FOR SOLUTION INTRAMUSCULAR; INTRAVENOUS EVERY 24 HOURS
Refills: 0 | Status: DISCONTINUED | OUTPATIENT
Start: 2022-04-10 | End: 2022-04-11

## 2022-04-10 RX ORDER — ALPRAZOLAM 0.25 MG
1 TABLET ORAL ONCE
Refills: 0 | Status: DISCONTINUED | OUTPATIENT
Start: 2022-04-10 | End: 2022-04-10

## 2022-04-10 RX ORDER — SENNA PLUS 8.6 MG/1
1 TABLET ORAL DAILY
Refills: 0 | Status: DISCONTINUED | OUTPATIENT
Start: 2022-04-10 | End: 2022-04-18

## 2022-04-10 RX ADMIN — SENNA PLUS 1 TABLET(S): 8.6 TABLET ORAL at 16:24

## 2022-04-10 RX ADMIN — ENOXAPARIN SODIUM 40 MILLIGRAM(S): 100 INJECTION SUBCUTANEOUS at 21:39

## 2022-04-10 RX ADMIN — Medication 1000 UNIT(S): at 11:23

## 2022-04-10 RX ADMIN — Medication 81 MILLIGRAM(S): at 11:23

## 2022-04-10 RX ADMIN — Medication 5 MILLIGRAM(S): at 17:47

## 2022-04-10 RX ADMIN — CEFTRIAXONE 100 MILLIGRAM(S): 500 INJECTION, POWDER, FOR SOLUTION INTRAMUSCULAR; INTRAVENOUS at 23:33

## 2022-04-10 RX ADMIN — PANTOPRAZOLE SODIUM 40 MILLIGRAM(S): 20 TABLET, DELAYED RELEASE ORAL at 23:33

## 2022-04-10 RX ADMIN — ATORVASTATIN CALCIUM 80 MILLIGRAM(S): 80 TABLET, FILM COATED ORAL at 21:39

## 2022-04-10 RX ADMIN — Medication 1 MILLIGRAM(S): at 14:54

## 2022-04-10 RX ADMIN — Medication 30 MILLILITER(S): at 17:54

## 2022-04-10 RX ADMIN — BENZOCAINE AND MENTHOL 1 LOZENGE: 5; 1 LIQUID ORAL at 22:44

## 2022-04-10 RX ADMIN — Medication 30 MILLILITER(S): at 13:30

## 2022-04-10 NOTE — OCCUPATIONAL THERAPY INITIAL EVALUATION ADULT - PRECAUTIONS/LIMITATIONS, REHAB EVAL
71F RH w/ AS w/ prior open heart aortic valve replacement 2019, HTN, BETSEY, former smoker, Emphysema/ chronic bronchitis, GERD s/p laparoscopic vertical sleeve gastrectomy presents with acute onset speech disturbance, vision change and gait imbalance. Per EMS, LKN: 4/9/22 at 10am per family. Pt's family reported blurry vision, gait imbalance, and speech disturbance (mild loss in fluency). She still endorses blurry vision but denies any weakness, numbness/tingling. Pt takes a baby aspirin 81mg daily. NIHSS: 3, preMRS: 0. No tpa or MT. Neuro exam notable for LHH, possible 4+/5 L hemiparesis. Impression: LHH w/ possible L hemiparesis possibly 2/2 R brain dysfunction, possibly 2/2 R PCA infarct seen on CTH vs. R MCA territory infarct./fall precautions

## 2022-04-10 NOTE — CONSULT NOTE ADULT - SUBJECTIVE AND OBJECTIVE BOX
CHIEF COMPLAINT:    HISTORY OF PRESENT ILLNESS:  71F RH w/ AS s/p aortic valve replacement 2019, HTN, BETSEY, former smoker, Emphysema/ chronic bronchitis, GERD s/p laparoscopic vertical sleeve gastrectomy presents with acute onset speech disturbance, vision change and gait imbalance. Per EMS, LKN: 4/9/22 at 10am per family. Pt's family reported blurry vision, gait imbalance, and speech disturbance (mild loss in fluency). Pt unable to provide full hx because she was confused on timing. She still endorses blurry vision but denies any weakness, numbness/tingling. Pt takes a baby aspirin 81mg daily. Pt ambulates without assistance or assistive devices at baseline.    (Stroke only)  LKN: 4/9/22 at 10am  NIHSS: 3  preMRS: 0  Pt is not a candidate for tpa due to outside tpa window   Pt is not a candidate for mechanical thrombectomy due to no large vessel occlusion on CTA        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            MEDICATIONS:  aspirin enteric coated 81 milliGRAM(s) Oral daily  enoxaparin Injectable 40 milliGRAM(s) SubCutaneous every 24 hours      ALBUTerol    90 MICROgram(s) HFA Inhaler 2 Puff(s) Inhalation every 6 hours PRN    zolpidem 5 milliGRAM(s) Oral at bedtime PRN      atorvastatin 80 milliGRAM(s) Oral at bedtime    cholecalciferol 1000 Unit(s) Oral daily      FAMILY HISTORY:      SOCIAL HISTORY:    [ ] Non-smoker  [ ] Smoker  [ ] Alcohol    Allergies    No Known Allergies    Intolerances    	    REVIEW OF SYSTEMS:  CONSTITUTIONAL: No fever, weight loss, or fatigue  EYES: No eye pain, visual disturbances, or discharge  ENMT:  No difficulty hearing, tinnitus, vertigo; No sinus or throat pain  NECK: No pain or stiffness  RESPIRATORY: No cough, wheezing, chills or hemoptysis; No Shortness of Breath  CARDIOVASCULAR: No chest pain, palpitations, passing out, dizziness, or leg swelling  GASTROINTESTINAL: No abdominal or epigastric pain. No nausea, vomiting, or hematemesis; No diarrhea or constipation. No melena or hematochezia.  GENITOURINARY: No dysuria, frequency, hematuria, or incontinence  NEUROLOGICAL: No headaches, memory loss, loss of strength, numbness, or tremors  SKIN: No itching, burning, rashes, or lesions   LYMPH Nodes: No enlarged glands  ENDOCRINE: No heat or cold intolerance; No hair loss  MUSCULOSKELETAL: + joint pain or swelling; No muscle, back, or extremity pain  PSYCHIATRIC: No depression, anxiety, mood swings, or difficulty sleeping  HEME/LYMPH: No easy bruising, or bleeding gums  ALLERY AND IMMUNOLOGIC: No hives or eczema	    [ ] All others negative	  [ ] Unable to obtain    PHYSICAL EXAM:  T(C): 37.3 (04-10-22 @ 05:07), Max: 37.9 (04-09-22 @ 18:00)  HR: 78 (04-10-22 @ 05:07) (70 - 92)  BP: 101/64 (04-10-22 @ 05:07) (101/64 - 123/65)  RR: 18 (04-10-22 @ 05:07) (18 - 20)  SpO2: 95% (04-10-22 @ 05:07) (95% - 99%)  Wt(kg): --  I&O's Summary    09 Apr 2022 07:01  -  10 Apr 2022 07:00  --------------------------------------------------------  IN: 240 mL / OUT: 0 mL / NET: 240 mL        Appearance: Normal	  HEENT:   Normal oral mucosa, PERRL, EOMI	  Lymphatic: No lymphadenopathy  Cardiovascular: Normal S1 S2, No JVD, 3/6 systolic ejection murmurs, No edema  Respiratory: Lungs clear to auscultation	  Psychiatry: A & O x 3, Mood & affect appropriate  Gastrointestinal:  Soft, Non-tender, + BS	  Skin: No rashes, No ecchymoses, No cyanosis	  Extremities: Normal range of motion, No clubbing, cyanosis or edema  Vascular: Peripheral pulses palpable 2+ bilaterally  Neurological (>12):  MS: Awake, alert, oriented to person, place, situation, time. Normal affect. Follows all commands.  Language: Speech is clear, mildly dysfluent with good repetition & comprehension (able to name objects mask, thumb). No paraphasic errors    CNs: PERRL (R = 3mm, L = 3mm). LHH. EOMI but looking more to R, able to cross midline. V1-3 intact to LT, well developed masseter muscles b/l. No facial asymmetry b/l, full eye closure strength b/l. Hearing grossly normal (rubbing fingers) b/l. Symmetric palate elevation in midline. Gag reflex deferred. Head turning & shoulder shrug intact b/l. Tongue midline, normal movements, no atrophy.    Motor: Normal muscle bulk. No noticeable resting tremor.  LLE drift on subsequent exam but not initial exam.              Deltoid	Biceps	Triceps	Wrist	Finger ABd	   R	    5	    5	        5	        5	        5		        5 	  L	    4+	    5	        5	        5	        4+		        5    	H-Flex	K-Flex	K-Ext	D-Flex	P-Flex  R	5	        5	        5	        5	        5	          L	4+	        5	        5	        4+	        5	           Sensation: Intact to LT b/l throughout.   Cortical: Extinction on DSS (neglect): none    Reflexes:              Biceps(C5)       BR(C6)         Triceps(C7)       Patellar(L4)    Achilles(S1)    Plantar Resp  R	    2	       2	             		              2	0		       mute  L	    2	       2	             		              2	0		       mute    Coordination: No dysmetria to FTN.  Gait: Normal Romberg. No postural instability. Normal stance although cautious at first and gait.      TELEMETRY: SR 	    ECG:  	  RADIOLOGY:  < from: CT Perfusion w/ Maps w/ IV Cont (04.09.22 @ 17:34) >    ACC: [id number] EXAM:  CT PERFUSION W MAPS IC                        ACC: [id number] EXAM:  CT ANGIO BRAIN (W)AW IC                        ACC: [id number] EXAM:  CT BRAIN STROKE PROTOCOL                        ACC: [id number] EXAM:  CT ANGIO NECK (W)AW IC                        PROCEDURE DATE:  04/09/2022          INTERPRETATION:  CT angiography of the [place] and neck.   Noncontrast brain CT. Perfusion maps    CLINICAL INDICATION: Left homonymous hemianopsia    TECHNIQUE: Direct axial CT scanning of the [place] and neck was   obtained from the vertex to the level of the clavicular heads after the   dynamic intravenous injection of 1 20 cc of Omnipaque 300. Sagittal and   coronal maximum intensity projection reformats were provided.    Three-dimensional reconstructions were performed by the radiologist using   the [employer] workstation. Contiguous axial images of the brain were   obtained with sagittal coronal reformatted images. Rapid artificial   intelligence was used for theplantar evaluation of intracranial   hemorrhage. Perfusion data was post processed with rapid perfusion   software    COMPARISON: None available    FINDINGS:    Brain CT::    No acute hemorrhage, hydrocephalus, midline shift or extra-axial   collections are identified. There is an acute right PCA distribution   infarct without hemorrhage.    Age-appropriate involutional changes and mild microvascular ischemic   changes are present.    The orbits are not remarkable in appearance.    There is a left maxillary sinus polyp versus retention cysts.    The tympanomastoid cavities are free of acute disease.    Neck CTA:    The visualized aortic arch is patent. Atherosclerotic calcified plaque is   noted along the border as well as the takeoff of the great vessels.    There is narrowing of the takeoff of the right vertebral artery. The   remainder of the right vertebral artery is normal.    The left vertebral artery and its origin are well visualized. There is   mild left vertebral arterial dominance.    The common carotid, internal carotid and external carotid arteries are   patent. Calcification is noted at the left carotid bifurcation without   stenosis.    Brain CTA:    No large vessel occlusion or stenosis is identified.    There is a downward projecting anterior communicating artery aneurysm   extending from the junction of the right A1 A2 segment measuring 6.7 mm.    The dural sinuses enhance normally. Perfusion maps:    CBF less than 30%: 5 mL  Tmax greater than 6 seconds: 51 mL.    Mismatch volume 46 mL.    There is a core infarct in the distribution of the right posterior   cerebral artery. There is area of brain at risk involving the right PCA   distribution but with additional areas noted within the right frontal and   bilateral temporal regions suggesting there is some element of artifact.    Impression:    Brain CT: Acute right occipital lobe infarct in the distribution of the   right PCA. No evidence for hemorrhage.    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 No   hemodynamically significant stenosis within the anterior circulation.    Brain CTA: No large vessel occlusion or stenosis. 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    Perfusion maps: Core infarct in the distribution of the right PCA.   Questionable areas of brain at risk in the right PCA as well as the   bilateral temporal and right frontal region.      The results of the brain CT were discussed with Dr. Delgado at 5:33 PM on   4/9/2022.    The results of the head and neck CTA and perfusion maps were discussed   with Dr. Delgado at 5:44 PM on 4/9/2022.    The additional finding of and a, aneurysm was discussed with Dr. Delgado at   6:00 PM on 4/9/2022.    --- End of Report ---            DANITA URBANO MD; Attending Radiologist  This document has been electronically signed. Apr 9 2022  6:14PM    < end of copied text >    OTHER: 	  	  LABS:	 	    CARDIAC MARKERS:                                  8.9    9.03  )-----------( 289      ( 10 Apr 2022 06:34 )             28.3     04-09    139  |  102  |  15  ----------------------------<  110<H>  3.9   |  24  |  0.58    Ca    8.7      09 Apr 2022 18:45    TPro  7.1  /  Alb  3.4  /  TBili  0.5  /  DBili  x   /  AST  23  /  ALT  24  /  AlkPhos  64  04-09    proBNP:   Lipid Profile:   HgA1c:   TSH:

## 2022-04-10 NOTE — PHYSICAL THERAPY INITIAL EVALUATION ADULT - PRECAUTIONS/LIMITATIONS, REHAB EVAL
numbness/tingling. Pt takes a baby aspirin 81mg daily. NIHSS: 3, preMRS: 0. No tpa or MT. Neuro exam notable for LHH, possible 4+/5 L hemiparesis. CTH w/ Impression: LHH w/ possible L hemiparesis possibly 2/2 R brain dysfunction, possibly 2/2 R PCA infarct seen on CTH vs. R MCA territory infarct. numbness/tingling. Pt takes a baby aspirin 81mg daily. NIHSS: 3, preMRS: 0. No tpa or MT. Neuro exam notable for LHH, possible 4+/5 L hemiparesis. CTH w/ Impression: LHH w/ possible L hemiparesis possibly 2/2 R brain dysfunction, possibly 2/2 R PCA infarct seen on CTH vs. R MCA territory infarct./fall precautions

## 2022-04-10 NOTE — SPEECH LANGUAGE PATHOLOGY EVALUATION - SLP ORGANIZATION
pt asked to name 7 animals: "horse, cow, goat, sheep, rooster, cat, dog, cow". Perseveration and unaware of errors./impaired

## 2022-04-10 NOTE — SPEECH LANGUAGE PATHOLOGY EVALUATION - SLP ABLE TO IDENTIFY OBJECTS
frequent latency in response and self corrections, also if items was placed too far on the R or L (most often) pt unable to locate/impaired

## 2022-04-10 NOTE — SPEECH LANGUAGE PATHOLOGY EVALUATION - SLP PERTINENT HISTORY OF CURRENT PROBLEM
ILENE AGUSTIN is a 71F RH w/ AS w/ prior open heart aortic valve replacement 2019, HTN, BETSEY, former smoker, Emphysema/ chronic bronchitis, GERD s/p laparoscopic vertical sleeve gastrectomy presents with acute onset speech disturbance, vision change and gait imbalance. Per EMS, LKN: 4/9/22 at 10am per family. Pt's family reported blurry vision, gait imbalance, and speech disturbance (mild loss in fluency).

## 2022-04-10 NOTE — SPEECH LANGUAGE PATHOLOGY EVALUATION - SLP DIAGNOSIS
Pt p/w core infarct in the distribution of the right PCA. Presenting w/ Cognitive linguistic deficits and notable L neglect throughout testing.

## 2022-04-10 NOTE — SPEECH LANGUAGE PATHOLOGY EVALUATION - RESPONSIVE NAMING
Needs refill for Adderall. [facility] [place]    Also asks if his dosage for Zoloft can be decreased  - he feels it is making him extremely fatigued.    Please send new RX to [facility] also latency in responses 75%, self corrected to 100%/impaired

## 2022-04-10 NOTE — CONSULT NOTE ADULT - ASSESSMENT
71F RH w/ AS w/ prior open heart aortic valve replacement 2019, HTN, BETSEY, former smoker, Emphysema/ chronic bronchitis, GERD s/p laparoscopic vertical sleeve gastrectomy presents with acute onset speech disturbance, vision change and gait imbalance. Per EMS, LKN: 4/9/22 at 10am per family. Pt's family reported blurry vision, gait imbalance, and speech disturbance (mild loss in fluency). She still endorses blurry vision but denies any weakness, numbness/tingling. Pt takes a baby aspirin 81mg daily. NIHSS: 3, preMRS: 0. No tpa or MT. Neuro exam notable for LHH, possible 4+/5 L hemiparesis. CTH w/     Impression:  Core infarct in the distribution of the right PCA.

## 2022-04-10 NOTE — SPEECH LANGUAGE PATHOLOGY EVALUATION - SLP ABLE TO FOLLOW COMMANDS
Of note, pt indicating that she is slightly [place], therefore provided increase in volume of voice, pt frequently repeating instructions aloud while completing task and/or asking for repetition of direction

## 2022-04-10 NOTE — SPEECH LANGUAGE PATHOLOGY EVALUATION - SLP PRECAUTIONS/LIMITATIONS: VISION
+glasses; pt indicating that her current glasses are 'not the best for seeing'./assistive device in use

## 2022-04-10 NOTE — SPEECH LANGUAGE PATHOLOGY EVALUATION - COMMENTS
Verbal expression p/w mild deficits. Uncertain if related to visual disturbance as frequently appreciated with objects/pictures. Significantly reduced ability for reading tasks for one-two words in [employer]AR[employer] as well as with Colored materials for better visualization. For instance pt unable to read "Yes" in Left corner of paper in VF of 4 and unable to read "don't know" in bottom L corner on same paper. Pt indicate that "I see the Don't..", pt then proceeded to spell out "K-N-O-W" and then said "Don't know". She indicated that is just did not "compute". Continued history:  -Pt is not a candidate for tpa due to outside tpa window   -Pt is not a candidate for mechanical thrombectomy due to no large vessel occlusion on CTA  -Neuro exam notable for LHH, possible 4+/5 L hemiparesis.    Imaging:   -Brain CT: Acute right occipital lobe infarct in the distribution of the right PCA. No evidence for hemorrhage.  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 No hemodynamically significant stenosis within the anterior circulation.  -Brain CTA: No large vessel occlusion or stenosis. 6.7 mm anteriorly and inferiorly projecting anterior communicating artery aneurysm. Perfusion maps: Core infarct in the distribution of the right PCA. Questionable areas of brain at risk in the right PCA as well as the bilateral temporal and right frontal region.    Speech history: No reports in SCM. Pt is new to service. Pt denied history of SLP intervention PTA. GOAL; Pt  will improve cognitive linguistic skills for functional communication. Uncertain if pt in denial of deficits as frequently providing rationale for inability to locate or complete task such as eye glasses not being the correct prescription, having cataracts  in addition to indicating that she does not see the bed in the room as "her bed" that is why she was unable to locate it. Auditory comprehension skills appear w/ mild-moderate deficits as described above. Additional testing warranted.  It should be noted that pt frequently unable to locate objects on the R or L (most often noted), which were approximately 1-3 feet on her peripheral, such as water pitcher, cups, bed and her personal belonging bag that was on top of her bed. Concern for safety with regards to this persistent occurrences as pt becoming confused on location of items within 1 foot proximity. adequate functional Cognitive linguistic skills p/w suspected deficits as described above. Additional testing is warranted.  Clock drawing- pt able to draw [place] for clock, put all numbers in the correct location, no numbers missing. Pt asked to place the hands on the clock to indicate 1230. Notable hesitations. Pt required 2-3 minutes to be able to place the hands on the clock for 1230, appearing unable to locate the area to place them with frequently moving the paper around. Patient able to write her name w/ ease. No other writing task provided. See above for clock drawing details.

## 2022-04-10 NOTE — SPEECH LANGUAGE PATHOLOGY EVALUATION - SLP PATIENT/FAMILY GOALS STATEMENT
I know I had a little stroke and I have some things going on but I have to get back to where I was before, no excuses

## 2022-04-10 NOTE — CONSULT NOTE ADULT - PROBLEM SELECTOR RECOMMENDATION 3
Permissive HTN up to 220/110 for 24-48h from symptom onset followed by gradual normotension over 2-3 days

## 2022-04-11 ENCOUNTER — TRANSCRIPTION ENCOUNTER (OUTPATIENT)
Age: 72
End: 2022-04-11

## 2022-04-11 LAB
ANION GAP SERPL CALC-SCNC: 12 MMOL/L — SIGNIFICANT CHANGE UP (ref 5–17)
BUN SERPL-MCNC: 10 MG/DL — SIGNIFICANT CHANGE UP (ref 7–23)
CALCIUM SERPL-MCNC: 8.5 MG/DL — SIGNIFICANT CHANGE UP (ref 8.4–10.5)
CHLORIDE SERPL-SCNC: 101 MMOL/L — SIGNIFICANT CHANGE UP (ref 96–108)
CO2 SERPL-SCNC: 24 MMOL/L — SIGNIFICANT CHANGE UP (ref 22–31)
CREAT SERPL-MCNC: 0.51 MG/DL — SIGNIFICANT CHANGE UP (ref 0.5–1.3)
EGFR: 100 ML/MIN/1.73M2 — SIGNIFICANT CHANGE UP
GLUCOSE SERPL-MCNC: 104 MG/DL — HIGH (ref 70–99)
HCT VFR BLD CALC: 27.6 % — LOW (ref 34.5–45)
HGB BLD-MCNC: 8.6 G/DL — LOW (ref 11.5–15.5)
MCHC RBC-ENTMCNC: 28.5 PG — SIGNIFICANT CHANGE UP (ref 27–34)
MCHC RBC-ENTMCNC: 31.2 GM/DL — LOW (ref 32–36)
MCV RBC AUTO: 91.4 FL — SIGNIFICANT CHANGE UP (ref 80–100)
NRBC # BLD: 0 /100 WBCS — SIGNIFICANT CHANGE UP (ref 0–0)
PLATELET # BLD AUTO: 289 K/UL — SIGNIFICANT CHANGE UP (ref 150–400)
POTASSIUM SERPL-MCNC: 4.4 MMOL/L — SIGNIFICANT CHANGE UP (ref 3.5–5.3)
POTASSIUM SERPL-SCNC: 4.4 MMOL/L — SIGNIFICANT CHANGE UP (ref 3.5–5.3)
RBC # BLD: 3.02 M/UL — LOW (ref 3.8–5.2)
RBC # FLD: 13.4 % — SIGNIFICANT CHANGE UP (ref 10.3–14.5)
SODIUM SERPL-SCNC: 137 MMOL/L — SIGNIFICANT CHANGE UP (ref 135–145)
VIT D25+D1,25 OH+D1,25 PNL SERPL-MCNC: 51.6 PG/ML — SIGNIFICANT CHANGE UP (ref 19.9–79.3)
WBC # BLD: 8.71 K/UL — SIGNIFICANT CHANGE UP (ref 3.8–10.5)
WBC # FLD AUTO: 8.71 K/UL — SIGNIFICANT CHANGE UP (ref 3.8–10.5)

## 2022-04-11 PROCEDURE — 99233 SBSQ HOSP IP/OBS HIGH 50: CPT

## 2022-04-11 PROCEDURE — 99222 1ST HOSP IP/OBS MODERATE 55: CPT

## 2022-04-11 PROCEDURE — 99232 SBSQ HOSP IP/OBS MODERATE 35: CPT

## 2022-04-11 PROCEDURE — 93306 TTE W/DOPPLER COMPLETE: CPT | Mod: 26

## 2022-04-11 RX ORDER — ATORVASTATIN CALCIUM 80 MG/1
40 TABLET, FILM COATED ORAL AT BEDTIME
Refills: 0 | Status: DISCONTINUED | OUTPATIENT
Start: 2022-04-11 | End: 2022-04-18

## 2022-04-11 RX ORDER — ALPRAZOLAM 0.25 MG
0.25 TABLET ORAL ONCE
Refills: 0 | Status: DISCONTINUED | OUTPATIENT
Start: 2022-04-11 | End: 2022-04-11

## 2022-04-11 RX ADMIN — Medication 81 MILLIGRAM(S): at 11:18

## 2022-04-11 RX ADMIN — Medication 5 MILLIGRAM(S): at 21:16

## 2022-04-11 RX ADMIN — ALBUTEROL 2 PUFF(S): 90 AEROSOL, METERED ORAL at 13:47

## 2022-04-11 RX ADMIN — ZOLPIDEM TARTRATE 5 MILLIGRAM(S): 10 TABLET ORAL at 21:16

## 2022-04-11 RX ADMIN — ATORVASTATIN CALCIUM 40 MILLIGRAM(S): 80 TABLET, FILM COATED ORAL at 21:16

## 2022-04-11 RX ADMIN — PANTOPRAZOLE SODIUM 40 MILLIGRAM(S): 20 TABLET, DELAYED RELEASE ORAL at 11:20

## 2022-04-11 RX ADMIN — Medication 1000 UNIT(S): at 11:18

## 2022-04-11 RX ADMIN — Medication 0.25 MILLIGRAM(S): at 13:57

## 2022-04-11 RX ADMIN — SENNA PLUS 1 TABLET(S): 8.6 TABLET ORAL at 11:18

## 2022-04-11 RX ADMIN — Medication 30 MILLILITER(S): at 11:19

## 2022-04-11 NOTE — PROGRESS NOTE ADULT - SUBJECTIVE AND OBJECTIVE BOX
PULMONARY PROGRESS NOTE    ILENE ORTIZ  MRN-[id number]    Patient is a 71y old  Female who presents with a chief complaint of concern for stroke (11 Apr 2022 13:43)      HPI:  -known to Dr Leigh  -COPD on trelegy not on oxygen, former heavy smoker. recent COPD flare (finished prednisone early april) also with AS w/ prior open heart aortic valve replacement 2019, HTN, BETSEY, GERD s/p laparoscopic vertical sleeve gastrectomy admitted with speech disturbance  - found to have right PCA infarct & a brain aneurysm  - seen today on room air, noc ough, no wheezing. overwhelmed with neuro findings     ROS:   -    ACTIVE MEDICATION LIST:  MEDICATIONS  (STANDING):  aspirin enteric coated 81 milliGRAM(s) Oral daily  atorvastatin 40 milliGRAM(s) Oral at bedtime  benzocaine 15 mG/menthol 3.6 mG Lozenge 1 Lozenge Oral daily  bisacodyl 5 milliGRAM(s) Oral at bedtime  cholecalciferol 1000 Unit(s) Oral daily  enoxaparin Injectable 40 milliGRAM(s) SubCutaneous every 24 hours  pantoprazole  Injectable 40 milliGRAM(s) IV Push daily  senna 1 Tablet(s) Oral daily    MEDICATIONS  (PRN):  ALBUTerol    90 MICROgram(s) HFA Inhaler 2 Puff(s) Inhalation every 6 hours PRN Shortness of Breath and/or Wheezing  aluminum hydroxide/magnesium hydroxide/simethicone Suspension 30 milliLiter(s) Oral every 4 hours PRN Dyspepsia  zolpidem 5 milliGRAM(s) Oral at bedtime PRN Insomnia      EXAM:  Vital Signs Last 24 Hrs  T(C): 36.8 (11 Apr 2022 13:07), Max: 37.6 (10 Apr 2022 21:32)  T(F): 98.3 (11 Apr 2022 13:07), Max: 99.7 (10 Apr 2022 21:32)  HR: 80 (11 Apr 2022 13:07) (76 - 81)  BP: 146/73 (11 Apr 2022 13:07) (109/71 - 146/73)  BP(mean): --  RR: 23 (11 Apr 2022 13:07) (18 - 24)  SpO2: 96% (11 Apr 2022 13:07) (95% - 96%)    GENERAL: The patient is awake and alert in no apparent distress.     LUNGS:  slight wheeze b/l expiratory                            8.6    8.71  )-----------( 289      ( 11 Apr 2022 06:01 )             27.6       04-11    137  |  101  |  10  ----------------------------<  104<H>  4.4   |  24  |  0.51    Ca    8.5      11 Apr 2022 06:01    TPro  7.1  /  Alb  3.4  /  TBili  0.5  /  DBili  x   /  AST  23  /  ALT  24  /  AlkPhos  64  04-09     NO CHEST XRAY      PROBLEM LIST:  71y Female with HEALTH ISSUES - PROBLEM Dx:  CVA (cerebrovascular accident)    S/P aortic valve replacement    HTN (hypertension)              RECS:  trelegy outpatient  if she remains inpatient, symbicort 180 BID and spiriva qhs  close f/u with Dr Leigh  should be assessed for BETSEY again outpatient          Please call with any questions.    Renetta Snyder DO  [facility] Pulmonary/Sleep Medicine  [phone]

## 2022-04-11 NOTE — DISCHARGE NOTE PROVIDER - NSDCFUADDINST_GEN_ALL_CORE_FT
Resume activity as tolerated  Resume low cholesterol low sodium diet  Shower daily and wash all incisions with soap and water  Ambulate at least four times daily  Use incentive spirometer every hour during the day  Record daily weight and report changes greater than 3lbs  Please follow up with your cardiologist in 7-10 days; Dr Connor  Please follow up with Dr Jay 5/6 at 2:15pm  Please follow up with Dr Sharma from infectious disease. Call office for an appointment.  Please follow up with electrophysiology; Dr Arsalan Lucero on 5/4/22 at 10:40 AM call Dr. Jay for fever > 101  PICC line removal when antibiotics completed 5/30 - call Dr. Sharma  refer to cardiac surgery do and don't checklist  no driving until cleared by Dr. Jay   antibiotic prophylaxis prior to any invasive procedure including dental work

## 2022-04-11 NOTE — DISCHARGE NOTE PROVIDER - REASON FOR ADMISSION
concern for stroke S/P 4/18/22 Reop aortic valve replacement with tissue valve/ aortic root abscess/ RA pericardial patch/ reimplantation of coronary arteries

## 2022-04-11 NOTE — DISCHARGE NOTE PROVIDER - NSDCACTIVITY_GEN_ALL_CORE
Do not drive or operate machinery Do not drive or operate machinery/Stairs allowed/Walking - Indoors allowed/No heavy lifting/straining/Walking - Outdoors allowed Sex allowed/Do not drive or operate machinery/Showering allowed/Do not make important decisions/Stairs allowed/Walking - Indoors allowed/No heavy lifting/straining/Walking - Outdoors allowed/Follow Instructions Provided by your Surgical Team

## 2022-04-11 NOTE — DISCHARGE NOTE PROVIDER - NSDCCPTREATMENT_GEN_ALL_CORE_FT
PRINCIPAL PROCEDURE  Procedure: Homograft aortic valve replacement  Findings and Treatment: epicardial lead placement

## 2022-04-11 NOTE — PROGRESS NOTE ADULT - ASSESSMENT
71F RH w/ AS w/ prior open heart aortic valve replacement 2019, HTN, BETSEY, former smoker, Emphysema/ chronic bronchitis, GERD s/p laparoscopic vertical sleeve gastrectomy presents with acute onset speech disturbance, vision change and gait imbalance. Per EMS, LKN: 4/9/22 at 10am per family. Pt's family reported blurry vision, gait imbalance, and speech disturbance (mild loss in fluency). She still endorses blurry vision but denies any weakness, numbness/tingling. Pt takes a baby aspirin 81mg daily. NIHSS: 3, preMRS: 0. No tpa or MT. Neuro exam notable for LHH, possible 4+/5 L hemiparesis.    Impression:  R PCA infarct due to possible large artery athero vs. cardioembolism      NEURO: Continue close monitoring for neurologic deterioration, permissive HTN, LDL 62:  MRI Brain w/o noted above,  Physical therapy/OT: AR    ANTITHROMBOTIC THERAPY: ASA    PULMONARY:  protecting airway, saturating well     CARDIOVASCULAR: check TTE, cardiac monitoring, S/P aortic valve replacement cardiology consulted, Troponin elevated.                               SBP goal: normotension    GASTROINTESTINAL:  dysphagia screen- passed, tolerating diet       Diet: Regular    RENAL: BUN/Cr without acute change, good urine output      Na Goal: Greater than 135     Sharma: N    HEMATOLOGY: H/H stable, Platelets 289     DVT ppx: Heparin s.c [] LMWH [x]     ID: afebrile, no leukocytosis, UA concerning for UTI started on IV abx.      OTHER:     DISPOSITION: AR once stable and workup is complete      CORE MEASURES:        Admission NIHSS: 3     TPA: [] YES [x] NO      LDL/HDL: 62/32     Depression Screen: 0     Statin Therapy: Y     Dysphagia Screen: [x] PASS [] FAIL     Smoking [] YES [x] NO      Afib [] YES [x] NO     Stroke Education [x] YES [] NO    Obtain screening lower extremity venous ultrasound in patients who meet 1 or more of the following criteria as patient is high risk for DVT/PE on admission:   [] History of DVT/PE  []Hypercoagulable states (Factor V Leiden, Cancer, OCP, etc. )  []Prolonged immobility (hemiplegia/hemiparesis/post operative or any other extended immobilization)  [] Transferred from outside facility (Rehab or Long term care)  [] Age </= to 50. 71F RH w/ AS w/ prior open heart aortic valve replacement 2019, HTN, BETSEY, former smoker, Emphysema/ chronic bronchitis, GERD s/p laparoscopic vertical sleeve gastrectomy presents with acute onset speech disturbance, vision change and gait imbalance. Per EMS, LKN: 4/9/22 at 10am per family. Pt's family reported blurry vision, gait imbalance, and speech disturbance (mild loss in fluency). She still endorses blurry vision but denies any weakness, numbness/tingling. Pt takes a baby aspirin 81mg daily. NIHSS: 3, preMRS: 0. No tpa or MT. Neuro exam notable for LHH, possible 4+/5 L hemiparesis.    Impression:  R PCA infarct etiology ESUS and incidental 6 mm ACOM aneurysm    NEURO: Continue close monitoring for neurologic deterioration, found to have an incidental aneurysm will follow with Dr. Contreras as outpatient, LDL 62: continue home dose statin  MRI Brain w/o noted above,  Physical therapy/OT: AR    ANTITHROMBOTIC THERAPY: ASA    PULMONARY:  protecting airway, saturating well     CARDIOVASCULAR: check TTE, cardiac monitoring, S/P aortic valve replacement cardiology consulted, Troponin elevated. ILR placed to rule out A. Fib as possible source                              SBP goal: normotension    GASTROINTESTINAL:  dysphagia screen- passed, tolerating diet       Diet: Regular    RENAL: BUN/Cr without acute change, good urine output      Na Goal: Greater than 135     Sharma: N    HEMATOLOGY: H/H 8.6/27.6 anemia of chronic disease, Platelets 289     DVT ppx: Heparin s.c [] LMWH [x]     ID: afebrile, no leukocytosis    OTHER: Plan of care discussed with patient and sister at bedside.     DISPOSITION: AR once stable and workup is complete      CORE MEASURES:        Admission NIHSS: 3     TPA: [] YES [x] NO      LDL/HDL: 62/32     Depression Screen: 0     Statin Therapy: Y     Dysphagia Screen: [x] PASS [] FAIL     Smoking [] YES [x] NO      Afib [] YES [x] NO     Stroke Education [x] YES [] NO    Obtain screening lower extremity venous ultrasound in patients who meet 1 or more of the following criteria as patient is high risk for DVT/PE on admission:   [] History of DVT/PE  []Hypercoagulable states (Factor V Leiden, Cancer, OCP, etc. )  []Prolonged immobility (hemiplegia/hemiparesis/post operative or any other extended immobilization)  [] Transferred from outside facility (Rehab or Long term care)  [] Age </= to 50.

## 2022-04-11 NOTE — CONSULT NOTE ADULT - SUBJECTIVE AND OBJECTIVE BOX
EP attending    HISTORY OF PRESENT ILLNESS: HPI:  71F RH w/ AS w/ prior open heart aortic valve replacement 2019, HTN, BETSEY, former smoker, Emphysema/ chronic bronchitis, GERD s/p laparoscopic vertical sleeve gastrectomy presents with acute onset speech disturbance, vision change and gait imbalance. Per EMS, LKN: 4/9/22 at 10am per family. Pt's family reported blurry vision, gait imbalance, and speech disturbance (mild loss in fluency). Pt unable to provide full hx because she was confused on timing. She still endorses blurry vision but denies any weakness, numbness/tingling. Pt takes a baby aspirin 81mg daily. Pt ambulates without assistance or assistive devices at baseline.    (Stroke only)  LKN: 4/9/22 at 10am  NIHSS: 3  preMRS: 0  Pt is not a candidate for tpa due to outside tpa window   Pt is not a candidate for mechanical thrombectomy due to no large vessel occlusion on CTA   (09 Apr 2022 17:56)    Date of service 4/11- recovering well from stroke on medical management, still has some wordfinding difficulty but is otherwise without complaint Can transfer from bed-to-chair and vice versa on her own, and eating a normal consistency diet.  10 pt ROS is otherwise negative.  Assigns her followup to Dr Marquez at [facility], and requests all records be sent to him for further management.    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    MEDICATIONS  (STANDING):  aspirin enteric coated 81 milliGRAM(s) Oral daily  atorvastatin 40 milliGRAM(s) Oral at bedtime  benzocaine 15 mG/menthol 3.6 mG Lozenge 1 Lozenge Oral daily  bisacodyl 5 milliGRAM(s) Oral at bedtime  cholecalciferol 1000 Unit(s) Oral daily  enoxaparin Injectable 40 milliGRAM(s) SubCutaneous every 24 hours  pantoprazole  Injectable 40 milliGRAM(s) IV Push daily  senna 1 Tablet(s) Oral daily    Allergies    No Known Allergies    Intolerances    FAMILY HISTORY:    Non-contributary for premature coronary disease or sudden cardiac death    SOCIAL HISTORY:    [ x] Non-smoker  [ ] Smoker  [ ] Alcohol    T(C): 36.8 (04-11-22 @ 13:07), Max: 37.6 (04-10-22 @ 21:32)  HR: 80 (04-11-22 @ 13:07) (76 - 81)  BP: 146/73 (04-11-22 @ 13:07) (109/71 - 146/73)  RR: 23 (04-11-22 @ 13:07) (18 - 24)  SpO2: 96% (04-11-22 @ 13:07) (95% - 96%)  Wt(kg): --    Appearance: Normal appearing adult woman in no acute distress, resting comfortably  HEENT:   Normal oral mucosa, PERRL, EOMI	  Lymphatic: No lymphadenopathy , no edema  Cardiovascular: Normal S1 S2, No JVD, No murmurs , Peripheral pulses palpable 2+ bilaterally  Respiratory: Lungs clear to auscultation, normal effort 	  Gastrointestinal:  Soft, Non-tender, + BS	  Skin: No rashes, No ecchymoses, No cyanosis, warm to touch  Musculoskeletal: Normal range of motion, normal strength  Psychiatry:  Mood & affect appropriate      TELEMETRY: NSR, no AFib 	    ECG: NSR  Echo: pending  	  LABS:	 	                     8.6    8.71  )-----------( 289      ( 11 Apr 2022 06:01 )             27.6     04-11    137  |  101  |  10  ----------------------------<  104<H>  4.4   |  24  |  0.51    Ca    8.5      11 Apr 2022 06:01    TPro  7.1  /  Alb  3.4  /  TBili  0.5  /  DBili  x   /  AST  23  /  ALT  24  /  AlkPhos  64  04-09    ASSESSMENT/PLAN: 	71y Female with acute PCA stroke.  Recovering well.  Hx Aortic Valve replacement, BETSEY, COPD.    Stroke workup ongoing, either large vessel dz or cardioembolic.  A loop recorder was recommended specifically for long term surveillance for atrial fibrillation after stroke of unknown source.  Her Echocardiogram is pending but the TTE results are unlikely to .   From my perspective, OK for discharge when cleared by neurology.  Will follow up with Dr Marquez at [facility].      Austin Dia M.D.  Cardiac Electrophysiology    office [phone]  pager [phone]

## 2022-04-11 NOTE — PROGRESS NOTE ADULT - SUBJECTIVE AND OBJECTIVE BOX
THE PATIENT WAS SEEN AND EXAMINED BY ME WITH THE HOUSESTAFF AND STROKE TEAM DURING MORNING ROUNDS.   HPI:  71F RH w/ AS w/ prior open heart aortic valve replacement 2019, HTN, BETSEY, former smoker, Emphysema/ chronic bronchitis, GERD s/p laparoscopic vertical sleeve gastrectomy presents with acute onset speech disturbance, vision change and gait imbalance. Per EMS, LKN: 4/9/22 at 10am per family. Pt's family reported blurry vision, gait imbalance, and speech disturbance (mild loss in fluency). Pt unable to provide full hx because she was confused on timing. She still endorses blurry vision but denies any weakness, numbness/tingling. Pt takes a baby aspirin 81mg daily. Pt ambulates without assistance or assistive devices at baseline.  (Stroke only)  LKN: 4/9/22 at 10am  NIHSS: 3  preMRS: 0  Pt is not a candidate for tpa due to outside tpa window   Pt is not a candidate for mechanical thrombectomy due to no large vessel occlusion on CTA      SUBJECTIVE: No events overnight.  No new neurologic complaints.      ALBUTerol    90 MICROgram(s) HFA Inhaler 2 Puff(s) Inhalation every 6 hours PRN  aluminum hydroxide/magnesium hydroxide/simethicone Suspension 30 milliLiter(s) Oral every 4 hours PRN  aspirin enteric coated 81 milliGRAM(s) Oral daily  atorvastatin 80 milliGRAM(s) Oral at bedtime  benzocaine 15 mG/menthol 3.6 mG Lozenge 1 Lozenge Oral daily  bisacodyl 5 milliGRAM(s) Oral at bedtime  cefTRIAXone   IVPB 1000 milliGRAM(s) IV Intermittent every 24 hours  cholecalciferol 1000 Unit(s) Oral daily  enoxaparin Injectable 40 milliGRAM(s) SubCutaneous every 24 hours  pantoprazole  Injectable 40 milliGRAM(s) IV Push daily  senna 1 Tablet(s) Oral daily  zolpidem 5 milliGRAM(s) Oral at bedtime PRN      PHYSICAL EXAM:   Vital Signs Last 24 Hrs  T(C): 37.1 (11 Apr 2022 05:12), Max: 37.6 (10 Apr 2022 21:32)  T(F): 98.8 (11 Apr 2022 05:12), Max: 99.7 (10 Apr 2022 21:32)  HR: 77 (11 Apr 2022 05:12) (76 - 88)  BP: 109/71 (11 Apr 2022 05:12) (109/71 - 143/82)  RR: 18 (11 Apr 2022 05:12) (18 - 18)  SpO2: 95% (11 Apr 2022 05:12) (95% - 99%)    General: No acute distress  HEENT: EOM intact, H  Abdomen: Soft, nontender, nondistended   Extremities: No edema    NEUROLOGICAL EXAM:  Mental status: Awake, alert, oriented x3, no aphasia, no neglect, normal memory   Cranial Nerves: No facial asymmetry, no nystagmus, no dysarthria,  tongue midline  Motor exam: Normal tone, no drift, 5/5 RUE, 5/5 RLE, 5/5 LUE, 5/5 LLE, normal fine finger movements.  Sensation: Intact to light touch   Coordination/ Gait: No dysmetria, AMILCAR intact and symmetric bilaterally    LABS:                        8.6    8.71  )-----------( 289      ( 11 Apr 2022 06:01 )             27.6    04-09    139  |  102  |  15  ----------------------------<  110<H>  3.9   |  24  |  0.58    Ca    8.7      09 Apr 2022 18:45    TPro  7.1  /  Alb  3.4  /  TBili  0.5  /  DBili  x   /  AST  23  /  ALT  24  /  AlkPhos  64  04-09  PT/INR - ( 09 Apr 2022 17:13 )   PT: 15.1 sec;   INR: 1.30 ratio         PTT - ( 09 Apr 2022 17:13 )  PTT:32.2 sec      IMAGING: Reviewed by me.     04/09/22:  Brain CT: Acute right occipital lobe infarct in the distribution of the   right PCA. No evidence for hemorrhage.    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 No   hemodynamically significant stenosis within the anterior circulation.    Brain CTA: No large vessel occlusion or stenosis. 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    Perfusion maps: Core infarct in the distribution of the right PCA.   Questionable areas of brain at risk in the right PCA as well as the   bilateral temporal and right frontal region.     THE PATIENT WAS SEEN AND EXAMINED BY ME WITH THE HOUSESTAFF AND STROKE TEAM DURING MORNING ROUNDS.   HPI:  71F RH w/ AS w/ prior open heart aortic valve replacement 2019, HTN, BETSEY, former smoker, Emphysema/ chronic bronchitis, GERD s/p laparoscopic vertical sleeve gastrectomy presents with acute onset speech disturbance, vision change and gait imbalance. Per EMS, LKN: 4/9/22 at 10am per family. Pt's family reported blurry vision, gait imbalance, and speech disturbance (mild loss in fluency). Pt unable to provide full hx because she was confused on timing. She still endorses blurry vision but denies any weakness, numbness/tingling. Pt takes a baby aspirin 81mg daily. Pt ambulates without assistance or assistive devices at baseline.  (Stroke only)  LKN: 4/9/22 at 10am  NIHSS: 3  preMRS: 0  Pt is not a candidate for tpa due to outside tpa window   Pt is not a candidate for mechanical thrombectomy due to no large vessel occlusion on CTA      SUBJECTIVE: No events overnight.  No new neurologic complaints.      ALBUTerol    90 MICROgram(s) HFA Inhaler 2 Puff(s) Inhalation every 6 hours PRN  aluminum hydroxide/magnesium hydroxide/simethicone Suspension 30 milliLiter(s) Oral every 4 hours PRN  aspirin enteric coated 81 milliGRAM(s) Oral daily  atorvastatin 80 milliGRAM(s) Oral at bedtime  benzocaine 15 mG/menthol 3.6 mG Lozenge 1 Lozenge Oral daily  bisacodyl 5 milliGRAM(s) Oral at bedtime  cefTRIAXone   IVPB 1000 milliGRAM(s) IV Intermittent every 24 hours  cholecalciferol 1000 Unit(s) Oral daily  enoxaparin Injectable 40 milliGRAM(s) SubCutaneous every 24 hours  pantoprazole  Injectable 40 milliGRAM(s) IV Push daily  senna 1 Tablet(s) Oral daily  zolpidem 5 milliGRAM(s) Oral at bedtime PRN      PHYSICAL EXAM:   Vital Signs Last 24 Hrs  T(C): 37.1 (11 Apr 2022 05:12), Max: 37.6 (10 Apr 2022 21:32)  T(F): 98.8 (11 Apr 2022 05:12), Max: 99.7 (10 Apr 2022 21:32)  HR: 77 (11 Apr 2022 05:12) (76 - 88)  BP: 109/71 (11 Apr 2022 05:12) (109/71 - 143/82)  RR: 18 (11 Apr 2022 05:12) (18 - 18)  SpO2: 95% (11 Apr 2022 05:12) (95% - 99%)    General: No acute distress  HEENT: EOM intact, H  Abdomen: Soft, nontender, nondistended   Extremities: No edema    NEUROLOGICAL EXAM:  Mental status: Awake, alert, oriented x3, no aphasia, no neglect, normal memory   Cranial Nerves: No facial asymmetry, no nystagmus, no dysarthria,  tongue midline  Motor exam: Normal tone, no drift, 5/5 RUE, 5/5 RLE, 5/5 LUE, 5/5 LLE, normal fine finger movements.  Sensation: Intact to light touch   Coordination/ Gait: No dysmetria, AMILCAR intact and symmetric bilaterally    LABS:                        8.6    8.71  )-----------( 289      ( 11 Apr 2022 06:01 )             27.6    04-09    139  |  102  |  15  ----------------------------<  110<H>  3.9   |  24  |  0.58    Ca    8.7      09 Apr 2022 18:45    TPro  7.1  /  Alb  3.4  /  TBili  0.5  /  DBili  x   /  AST  23  /  ALT  24  /  AlkPhos  64  04-09  PT/INR - ( 09 Apr 2022 17:13 )   PT: 15.1 sec;   INR: 1.30 ratio         PTT - ( 09 Apr 2022 17:13 )  PTT:32.2 sec      IMAGING: Reviewed by me.     MRI HEAD 04/10/22: Acute right occipital lobe infarct in a right PCA vascular distribution   with an additional punctate acute infarct involving the left cerebellum   and right splenium of the corpus callosum.    04/09/22:  Brain CT: Acute right occipital lobe infarct in the distribution of the   right PCA. No evidence for hemorrhage.    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 No   hemodynamically significant stenosis within the anterior circulation.    Brain CTA: No large vessel occlusion or stenosis. 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    Perfusion maps: Core infarct in the distribution of the right PCA.   Questionable areas of brain at risk in the right PCA as well as the   bilateral temporal and right frontal region.

## 2022-04-11 NOTE — DISCHARGE NOTE PROVIDER - NSDCFUSCHEDAPPT_GEN_ALL_CORE_FT
ILENE ORTIZ ; 05/04/2022 ; NPP Cardio Electro [address]  [place] [facility]  Cardio Electro [address]  Scheduled Appointment: 05/04/2022    Ramez Jay  [place] [facility]  CT Surg [address]  Scheduled Appointment: 05/06/2022

## 2022-04-11 NOTE — DISCHARGE NOTE PROVIDER - NSDCHHNEEDSERVICE_GEN_ALL_CORE
Medication teaching and assessment/Observation and assessment/Rehabilitation services/Teaching and training/Wound care and assessment Medication teaching and assessment/Observation and assessment/Teaching and training/Wound care and assessment

## 2022-04-11 NOTE — CONSULT NOTE ADULT - SUBJECTIVE AND OBJECTIVE BOX
Patient is a 71y old  Female who presents with a chief complaint of concern for stroke (2022 10:46)    Admission HPI:  71F RH w/ AS w/ prior open heart aortic valve replacement 2019, HTN, BETSEY, former smoker, Emphysema/ chronic bronchitis, GERD s/p laparoscopic vertical sleeve gastrectomy presents with acute onset speech disturbance, vision change and gait imbalance. Per EMS, LKN: 22 at 10am per family. Pt's family reported blurry vision, gait imbalance, and speech disturbance (mild loss in fluency). Pt unable to provide full hx because she was confused on timing. She still endorses blurry vision but denies any weakness, numbness/tingling. Pt takes a baby aspirin 81mg daily. Pt ambulates without assistance or assistive devices at baseline.    (Stroke only)  LKN: 22 at 10am  NIHSS: 3  preMRS: 0  Pt is not a candidate for tpa due to outside tpa window   Pt is not a candidate for mechanical thrombectomy due to no large vessel occlusion on CTA     (2022 17:56)    Interval History:  patient had imaging    CT Brain Stroke Protocol (22 @ 17:30) >  Brain CT: Acute right occipital lobe infarct in the distribution of the   right PCA. No evidence for hemorrhage.    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 No   hemodynamically significant stenosis within the anterior circulation.    Brain CTA: No large vessel occlusion or stenosis. 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    Perfusion maps: Core infarct in the distribution of the right PCA.   Questionable areas of brain at risk in the right PCA as well as the   bilateral temporal and right frontal region.     MR Head No Cont (04.10.22 @ 18:38) >  Acute right occipital lobe infarct in a right PCA vascular distribution   with an additional punctate acute infarct involving the left cerebellum   and right splenium of the corpus callosum.    REVIEW OF SYSTEMS: No chest pain, shortness of breath, nausea, vomiting or diarhea; other ROS neg     PAST MEDICAL & SURGICAL HISTORY  Acid reflux    HTN (hypertension)    BETSEY on CPAP    Ex-smoker    COPD (chronic obstructive pulmonary disease)    Obesity (BMI 30-39.9)    Aortic valve stenosis, etiology of cardiac valve disease unspecified    Leukoplakia of vocal cords    S/P right knee arthroscopy    S/P wrist surgery    FUNCTIONAL HISTORY:   Lives alone  PTA Independent    CURRENT FUNCTIONAL STATUS:  CG transfers/ gait    FAMILY HISTORY   Neg    MEDICATIONS   ALBUTerol    90 MICROgram(s) HFA Inhaler 2 Puff(s) Inhalation every 6 hours PRN  aluminum hydroxide/magnesium hydroxide/simethicone Suspension 30 milliLiter(s) Oral every 4 hours PRN  aspirin enteric coated 81 milliGRAM(s) Oral daily  atorvastatin 80 milliGRAM(s) Oral at bedtime  benzocaine 15 mG/menthol 3.6 mG Lozenge 1 Lozenge Oral daily  bisacodyl 5 milliGRAM(s) Oral at bedtime  cefTRIAXone   IVPB 1000 milliGRAM(s) IV Intermittent every 24 hours  cholecalciferol 1000 Unit(s) Oral daily  enoxaparin Injectable 40 milliGRAM(s) SubCutaneous every 24 hours  pantoprazole  Injectable 40 milliGRAM(s) IV Push daily  senna 1 Tablet(s) Oral daily  zolpidem 5 milliGRAM(s) Oral at bedtime PRN    ALLERGIES  No Known Allergies    VITALS  T(C): 36.7 (22 @ 09:16), Max: 37.6 (04-10-22 @ 21:32)  HR: 76 (22 @ 09:16) (76 - 88)  BP: 119/65 (22 @ 09:16) (109/71 - 143/82)  RR: 24 (22 @ 09:16) (18 - 24)  SpO2: 96% (22 @ 09:16) (95% - 99%)  Wt(kg): --    PHYSICAL EXAM  Constitutional - NAD, Comfortable  HEENT - NCAT, EOMI  Neck - Supple, No limited ROM  Chest - CTA bilaterally, No wheeze, No rhonchi, No crackles  Cardiovascular - RRR, S1S2, No murmurs  Abdomen - BS+, Soft, NTND  Extremities - No C/C/E, No calf tenderness   Neurologic Exam -                    Cognitive - Awake, Alert, AAO to self, place, date, year, situation     Communication - Fluent, No dysarthria, no aphasia     Cranial Nerves - CN 2-12 intact     Motor - No focal deficits      Sensory - Intact to LT     Reflexes - DTR Intact, No primitive reflexive  Psychiatric - Mood stable, Affect WNL    RECENT LABS/IMAGING  CBC Full  -  ( 2022 06:01 )  WBC Count : 8.71 K/uL  RBC Count : 3.02 M/uL  Hemoglobin : 8.6 g/dL  Hematocrit : 27.6 %  Platelet Count - Automated : 289 K/uL  Mean Cell Volume : 91.4 fl  Mean Cell Hemoglobin : 28.5 pg  Mean Cell Hemoglobin Concentration : 31.2 gm/dL  Auto Neutrophil # : x  Auto Lymphocyte # : x  Auto Monocyte # : x  Auto Eosinophil # : x  Auto Basophil # : x  Auto Neutrophil % : x  Auto Lymphocyte % : x  Auto Monocyte % : x  Auto Eosinophil % : x  Auto Basophil % : x    04-    137  |  101  |  10  ----------------------------<  104<H>  4.4   |  24  |  0.51    Ca    8.5      2022 06:01    TPro  7.1  /  Alb  3.4  /  TBili  0.5  /  DBili  x   /  AST  23  /  ALT  24  /  AlkPhos  64  04-09    Urinalysis Basic - ( 10 Apr 2022 21:40 )    Color: Yellow / Appearance: Slightly Turbid / S.031 / pH: x  Gluc: x / Ketone: Negative  / Bili: Negative / Urobili: 3 mg/dL   Blood: x / Protein: 30 mg/dL / Nitrite: Negative   Leuk Esterase: Large / RBC: 3 /hpf / WBC 23 /HPF   Sq Epi: x / Non Sq Epi: 15 /hpf / Bacteria: Negative    Impression:  70 yo with functional deficits secondary to diagnosis of CVA    Plan:  PT- ROM, Bed Mob, Transfers, Amb w AD and bracing as needed  OT- ADLs, bracing  SLP- Dysphagia eval and treat  Prec- Falls, Cardiac  DVT Prophylaxis- Lovenox  Skin- Turn q2 h  Dispo-  Patient is a 71y old  Female who presents with a chief complaint of concern for stroke (2022 10:46)    Admission HPI:  71F RH w/ AS w/ prior open heart aortic valve replacement 2019, HTN, BETSEY, former smoker, Emphysema/ chronic bronchitis, GERD s/p laparoscopic vertical sleeve gastrectomy presents with acute onset speech disturbance, vision change and gait imbalance. Per EMS, LKN: 22 at 10am per family. Pt's family reported blurry vision, gait imbalance, and speech disturbance (mild loss in fluency). Pt unable to provide full hx because she was confused on timing. She still endorses blurry vision but denies any weakness, numbness/tingling. Pt takes a baby aspirin 81mg daily. Pt ambulates without assistance or assistive devices at baseline.    (Stroke only)  LKN: 22 at 10am  NIHSS: 3  preMRS: 0  Pt is not a candidate for tpa due to outside tpa window   Pt is not a candidate for mechanical thrombectomy due to no large vessel occlusion on CTA     (2022 17:56)    Interval History:  patient had imaging    CT Brain Stroke Protocol (22 @ 17:30) >  Brain CT: Acute right occipital lobe infarct in the distribution of the   right PCA. No evidence for hemorrhage.    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 No   hemodynamically significant stenosis within the anterior circulation.    Brain CTA: No large vessel occlusion or stenosis. 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    Perfusion maps: Core infarct in the distribution of the right PCA.   Questionable areas of brain at risk in the right PCA as well as the   bilateral temporal and right frontal region.     MR Head No Cont (04.10.22 @ 18:38) >  Acute right occipital lobe infarct in a right PCA vascular distribution   with an additional punctate acute infarct involving the left cerebellum   and right splenium of the corpus callosum.    REVIEW OF SYSTEMS: Poor balance, poor mood, No chest pain, shortness of breath, nausea, vomiting or diarhea; other ROS neg     PAST MEDICAL & SURGICAL HISTORY  Acid reflux    HTN (hypertension)    BETSEY on CPAP    Ex-smoker    COPD (chronic obstructive pulmonary disease)    Obesity (BMI 30-39.9)    Aortic valve stenosis, etiology of cardiac valve disease unspecified    Leukoplakia of vocal cords    S/P right knee arthroscopy    S/P wrist surgery    FUNCTIONAL HISTORY:   Lives alone  PTA Independent    CURRENT FUNCTIONAL STATUS:  CG transfers/ gait    FAMILY HISTORY   Neg    MEDICATIONS   ALBUTerol    90 MICROgram(s) HFA Inhaler 2 Puff(s) Inhalation every 6 hours PRN  aluminum hydroxide/magnesium hydroxide/simethicone Suspension 30 milliLiter(s) Oral every 4 hours PRN  aspirin enteric coated 81 milliGRAM(s) Oral daily  atorvastatin 80 milliGRAM(s) Oral at bedtime  benzocaine 15 mG/menthol 3.6 mG Lozenge 1 Lozenge Oral daily  bisacodyl 5 milliGRAM(s) Oral at bedtime  cefTRIAXone   IVPB 1000 milliGRAM(s) IV Intermittent every 24 hours  cholecalciferol 1000 Unit(s) Oral daily  enoxaparin Injectable 40 milliGRAM(s) SubCutaneous every 24 hours  pantoprazole  Injectable 40 milliGRAM(s) IV Push daily  senna 1 Tablet(s) Oral daily  zolpidem 5 milliGRAM(s) Oral at bedtime PRN    ALLERGIES  No Known Allergies    VITALS  T(C): 36.7 (22 @ 09:16), Max: 37.6 (04-10-22 @ 21:32)  HR: 76 (22 @ 09:16) (76 - 88)  BP: 119/65 (22 @ 09:16) (109/71 - 143/82)  RR: 24 (22 @ 09:16) (18 - 24)  SpO2: 96% (22 @ 09:16) (95% - 99%)  Wt(kg): --    PHYSICAL EXAM  Constitutional - NAD, Comfortable, becomes tearful and not fully participatory due to poor mood  HEENT - NCAT, EOMI  Neck - Supple, No limited ROM  Chest - CTA bilaterally, No wheeze, No rhonchi, No crackles  Cardiovascular - RRR, S1S2, No murmurs  Abdomen - BS+, Soft, NTND  Extremities - No C/C/E, No calf tenderness   Neurologic Exam -                 SERVIN x 4  Speech appropriate  Follows verbal instruction  Psychiatric - Tearful    RECENT LABS/IMAGING  CBC Full  -  ( 2022 06:01 )  WBC Count : 8.71 K/uL  RBC Count : 3.02 M/uL  Hemoglobin : 8.6 g/dL  Hematocrit : 27.6 %  Platelet Count - Automated : 289 K/uL  Mean Cell Volume : 91.4 fl  Mean Cell Hemoglobin : 28.5 pg  Mean Cell Hemoglobin Concentration : 31.2 gm/dL  Auto Neutrophil # : x  Auto Lymphocyte # : x  Auto Monocyte # : x  Auto Eosinophil # : x  Auto Basophil # : x  Auto Neutrophil % : x  Auto Lymphocyte % : x  Auto Monocyte % : x  Auto Eosinophil % : x  Auto Basophil % : x    04-    137  |  101  |  10  ----------------------------<  104<H>  4.4   |  24  |  0.51    Ca    8.5      2022 06:01    TPro  7.1  /  Alb  3.4  /  TBili  0.5  /  DBili  x   /  AST  23  /  ALT  24  /  AlkPhos  64  04-09    Urinalysis Basic - ( 10 Apr 2022 21:40 )    Color: Yellow / Appearance: Slightly Turbid / S.031 / pH: x  Gluc: x / Ketone: Negative  / Bili: Negative / Urobili: 3 mg/dL   Blood: x / Protein: 30 mg/dL / Nitrite: Negative   Leuk Esterase: Large / RBC: 3 /hpf / WBC 23 /HPF   Sq Epi: x / Non Sq Epi: 15 /hpf / Bacteria: Negative    Impression:  70 yo with functional deficits secondary to diagnosis of CVA    Plan:  PT- ROM, Bed Mob, Transfers, Amb w AD and bracing as needed  OT- ADLs, bracing  SLP- Dysphagia eval and treat  Prec- Falls, Cardiac  Monitor anemia  DVT Prophylaxis- Lovenox  Skin- Turn q2 h  Dispo- Acute Rehab- can tolerate 3h/d of therapies and requires daily physician visits

## 2022-04-11 NOTE — DISCHARGE NOTE PROVIDER - NSDCCPCAREPLAN_GEN_ALL_CORE_FT
PRINCIPAL DISCHARGE DIAGNOSIS  Diagnosis: Stroke  Assessment and Plan of Treatment: Please follow up with neurologist after being discharged from rehab. Continue taking medications as prescribed. Monitor your blood pressure. Reduce fat, cholesterol and salt in your diet. Increase intake of fruits and vegetables. Limit alcohol to minimum and do not smoke. You may be at risk for falling, make changes to your home to help you walk easier. Keep up to date on vaccinations.  If you experience any symptoms of facial drooping, slurred speech, arm or leg weakness, severe headache, vision changes or any worsening symptoms, notify provider immediatley and return to ER.        SECONDARY DISCHARGE DIAGNOSES  Diagnosis: Brain aneurysm  Assessment and Plan of Treatment: found to have an incidental aneurysm, will follow with Dr. Contreras as outpatient for management     PRINCIPAL DISCHARGE DIAGNOSIS  Diagnosis: Endocarditis, valve  Assessment and Plan of Treatment:       SECONDARY DISCHARGE DIAGNOSES  Diagnosis: CHB (complete heart block)  Assessment and Plan of Treatment:     Diagnosis: Brain aneurysm  Assessment and Plan of Treatment: found to have an incidental aneurysm, will follow with Dr. Contreras as outpatient for management     PRINCIPAL DISCHARGE DIAGNOSIS  Diagnosis: S/P aortic valve replacement  Assessment and Plan of Treatment: shower daily  weigh yourself daily  continue current prescriptions as ordered  increase activity as tolerated   no added salt; low fat; low cholesterol, low salt diet.   follow up with Cardiologist, Dr. Connor,  in 1-2 weeks. Call to schedule appointment.  follow up with cardiac surgeon, Dr. Jay on May 6th at 2:15 pm; call to confirm appointment. [phone]  follow up with EP doctor, Dr. Lucero, on May 4th at 10:40 am; call to confirm appointment. [phone]  follow up with Dr. Contreras, neurologist in 2-3 weeks. Call to schedule appointment.  follow up with infectious disease doctor, Zachary Collazo, for PICC removal after antibiotics completed 5/30; call to schedule appointment. [phone]        SECONDARY DISCHARGE DIAGNOSES  Diagnosis: Brain aneurysm  Assessment and Plan of Treatment: found to have an incidental aneurysm, will follow with Dr. Contreras; follow up with Dr. Contreras- call to schedule appointment.

## 2022-04-11 NOTE — DISCHARGE NOTE PROVIDER - NPI NUMBER (FOR SYSAD[employer] USE ONLY) :
[[id number]],[[id number]] [[id number]],[[id number]],[[id number]],[[id number]]

## 2022-04-11 NOTE — DISCHARGE NOTE PROVIDER - PROVIDER TOKENS
PROVIDER:[TOKEN:[[id number]],FOLLOWUP:[2 weeks]],PROVIDER:[TOKEN:[[id number]],FOLLOWUP:[2 weeks]],PROVIDER:[TOKEN:[[id number]],FOLLOWUP:[2 weeks]] PROVIDER:[TOKEN:[[id number]],FOLLOWUP:[2 weeks]],PROVIDER:[TOKEN:[[id number]],FOLLOWUP:[2 weeks]],PROVIDER:[TOKEN:[[id number]],FOLLOWUP:[2 weeks]],PROVIDER:[TOKEN:[[id number]],FOLLOWUP:[2 weeks]],PROVIDER:[TOKEN:[[id number]]],PROVIDER:[TOKEN:[[id number]],FOLLOWUP:[2 weeks]]

## 2022-04-11 NOTE — DISCHARGE NOTE PROVIDER - HOSPITAL COURSE
71F RH w/ AS w/ prior open heart aortic valve replacement 2019, HTN, BETSEY, former smoker, Emphysema/ chronic bronchitis, GERD s/p laparoscopic vertical sleeve gastrectomy presents with acute onset speech disturbance, vision change and gait imbalance. Per EMS, LKN: 4/9/22 at 10am per family. Pt's family reported blurry vision, gait imbalance, and speech disturbance (mild loss in fluency). She still endorses blurry vision but denies any weakness, numbness/tingling. Pt takes a baby aspirin 81mg daily. NIHSS: 3, preMRS: 0. No tpa or MT. Neuro exam notable for LHH, possible 4+/5 L hemiparesis.    MRI HEAD 04/10/22: Acute right occipital lobe infarct in a right PCA vascular distribution   with an additional punctate acute infarct involving the left cerebellum   and right splenium of the corpus callosum.    04/09/22:  Brain CT: Acute right occipital lobe infarct in the distribution of the   right PCA. No evidence for hemorrhage.    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 No   hemodynamically significant stenosis within the anterior circulation.    Brain CTA: No large vessel occlusion or stenosis. 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    Perfusion maps: Core infarct in the distribution of the right PCA.   Questionable areas of brain at risk in the right PCA as well as the   bilateral temporal and right frontal region.    Impression:  R PCA infarct etiology ESUS and incidental 6 mm ACOM aneurysm.  ILR placed to rule out A. Fib as possible source    Will follow with Dr. Contreras as outpatient for aneurysm.    Evaluated by PT/OT and was recommended AR. Patient stable for discharge. 71F RH w/ AS w/ prior open heart aortic valve replacement 2019, HTN, BETSEY, former smoker, Emphysema/ chronic bronchitis, GERD s/p laparoscopic vertical sleeve gastrectomy presents with acute onset speech disturbance, vision change and gait imbalance. Per EMS, LKN: 4/9/22 at 10am per family. Pt's family reported blurry vision, gait imbalance, and speech disturbance (mild loss in fluency). She still endorses blurry vision but denies any weakness, numbness/tingling. Pt takes a baby aspirin 81mg daily. NIHSS: 3, preMRS: 0. No tpa or MT. Neuro exam notable for LHH, possible 4+/5 L hemiparesis.    MRI HEAD 04/10/22: Acute right occipital lobe infarct in a right PCA vascular distribution   with an additional punctate acute infarct involving the left cerebellum   and right splenium of the corpus callosum.    04/09/22:  Brain CT: Acute right occipital lobe infarct in the distribution of the   right PCA. No evidence for hemorrhage.    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 No   hemodynamically significant stenosis within the anterior circulation.    Brain CTA: No large vessel occlusion or stenosis. 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    Perfusion maps: Core infarct in the distribution of the right PCA.   Questionable areas of brain at risk in the right PCA as well as the   bilateral temporal and right frontal region.    Impression:  R PCA infarct etiology ESUS and incidental 6 mm ACOM aneurysm.  ILR placed to rule out A. Fib as possible source    Transthoracic Echocardiogram (04.11.22 @ 10:18)   Conclusions:  1. Normal mitral valve. Mild-moderate mitral regurgitation.  2. Bioprosthetic aortic valve.  The valve is well seated.  Peak transaortic valve gradient equals 56 mm Hg, mean  transaortic valve gradient equals 38 mm Hg, which is  elevated even in the presence of a bioprosthetic aortic  valve. However the Di is 0.34, the elevated gradients may  be secondary to the hyperdynamic left ventricle.  Mild  aortic regurgitation.  3. Moderately dilated left atrium.  LA volume index = 46  cc/m2. A large round mass measuring 3.8cm by 3cm,  is seen  in the left atrium suggestive of atrial myxoma.  It appears  to be attached to the atrial septum although no stalk is  visualized.  Endocardial visualization enhanced with intravenous  injection of Ultrasonic Enhancing Agent (Definity), the  mass appears to be vascular as it demonstrates uptake of  Definity.  4. Hyperdynamic left ventricular systolic function.  Endocardial visualization enhanced with intravenous  injection of Ultrasonic Enhancing Agent (Definity).  No  left ventricular thrombus.    Will follow with Dr. Contreras as outpatient for aneurysm.    Evaluated by PT/OT and was recommended AR. Patient stable for discharge. 71F RH w/ AS w/ prior open heart aortic valve replacement 2019, HTN, BETSEY, former smoker, Emphysema/ chronic bronchitis, GERD s/p laparoscopic vertical sleeve gastrectomy presents with acute onset speech disturbance, vision change and gait imbalance. Per EMS, LKN: 4/9/22 at 10am per family. Pt's family reported blurry vision, gait imbalance, and speech disturbance (mild loss in fluency). She still endorses blurry vision but denies any weakness, numbness/tingling. Pt takes a baby aspirin 81mg daily. NIHSS: 3, preMRS: 0. No tpa or MT. Neuro exam notable for LHH, possible 4+/5 L hemiparesis.    MRI HEAD 04/10/22: Acute right occipital lobe infarct in a right PCA vascular distribution   with an additional punctate acute infarct involving the left cerebellum   and right splenium of the corpus callosum.    04/09/22:  Brain CT: Acute right occipital lobe infarct in the distribution of the   right PCA. No evidence for hemorrhage.    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 No   hemodynamically significant stenosis within the anterior circulation.    Brain CTA: No large vessel occlusion or stenosis. 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    Perfusion maps: Core infarct in the distribution of the right PCA.   Questionable areas of brain at risk in the right PCA as well as the   bilateral temporal and right frontal region.    Impression:  R PCA infarct etiology ESUS and incidental 6 mm ACOM aneurysm.  ILR placed to rule out A. Fib as possible source    Transthoracic Echocardiogram (04.11.22 @ 10:18)   Conclusions:  1. Normal mitral valve. Mild-moderate mitral regurgitation.  2. Bioprosthetic aortic valve.  The valve is well seated.  Peak transaortic valve gradient equals 56 mm Hg, mean  transaortic valve gradient equals 38 mm Hg, which is  elevated even in the presence of a bioprosthetic aortic  valve. However the Di is 0.34, the elevated gradients may  be secondary to the hyperdynamic left ventricle.  Mild  aortic regurgitation.  3. Moderately dilated left atrium.  LA volume index = 46  cc/m2. A large round mass measuring 3.8cm by 3cm,  is seen  in the left atrium suggestive of atrial myxoma.  It appears  to be attached to the atrial septum although no stalk is  visualized.  Endocardial visualization enhanced with intravenous  injection of Ultrasonic Enhancing Agent (Definity), the  mass appears to be vascular as it demonstrates uptake of  Definity.  4. Hyperdynamic left ventricular systolic function.  Endocardial visualization enhanced with intravenous  injection of Ultrasonic Enhancing Agent (Definity).  No  left ventricular thrombus.    Will follow with Dr. Contreras as outpatient for aneurysm.    Evaluated by PT/OT and was recommended AR. Patient stable for discharge.   71F RH w/ AS w/ prior open heart aortic valve replacement 2019, HTN, BETSEY, former smoker, Emphysema/ chronic bronchitis, GERD s/p laparoscopic vertical sleeve gastrectomy presents with acute onset speech disturbance, vision change and gait imbalance.     4/9 CTH with Right PCA infarct, MRI on 4/10 Acute R occipital lobe infarct in a R PCA vascular distribution with an additional punctate acute infarct involving the L cerebellum and R splenium of the corpus callosum.    4/11 Underwent ECHO-. A large round mass measuring 3.8cm by 3cm,  is seen in the left atrium suggestive of atrial myxoma.  It appears to be attached to the atrial septum although no stalk is visualized.  4/12 CT surgery consult called  patient seen and examined in  no acute distress  family  at bedside  amenable to cardiac surgery workup  and surgery. Discussed with Stroke team benefits > risk of cardiac surgery    Of note patient febrile overnight  -tmax  101.8 on cefTRIAXone   IVPB 1000 milliGRAM(s) IV Intermittent every 24 hours for positive UA.  Blood cultures-  Cardiac cath scheduled for Wednesday 4/13. Tentative OR date for Friday April 15.  4/13 VSS; cath today; wbc 8 pt afebrile; + ceftriaxone for UTI; repeat UA neg 4//12- BC testing- ID not clearing the patient for OR for am 4/14as per neuro- pt cleared for [facility]  ?re-scheduled OR date- when cleared by ID   4/15 VSS Cleared by ID for OR Likely Mon/Tuesday 4/16 Preop workup in progress  OR Monday 4/18 OR with Dr. 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 Explant of prosthetic valve. Debridement of aortic root and abscess with sat in the non-coronary sinus and above the anterior leaflet of the mitral valve. Reconstruction using a 21mm aortic homograft, with reimplantation of right and left main coronary buttons. There was a weakening from the infection of the right atrium adjacent to the abscess cavity which was reinforced with a bovine pericardial patch.  4/20 EP consulted for CHB/SSS post-operatively   4/21 Micro PPM placed  4/22 Right groin stitch removed s/p Micra. OR Cultures negative to date.  ID Dr. Sharma following.  Continues on Vanco 1gram Q12/Ceftriaxone 2gram QD.  Will eventually need PICC line for long term ABX. TX to 2cohen floor bed.   4/23 VSS; V.Paced 80-90; continue abx as per ID; ID following; follow OR cx; BC neg 4/21; pt will need picc line  4/24 VSS, NAD. Continue abx as per ID. BCx from 4/21 remain NGTD - will f/u w/ ID regarding clearance for PICC line.   4/25 pending PICC line placement to continue Vanco and ceftriaxone for 6 week abx course  4/26 Pending insurance authorization prior to discharge   discharge planning- PT r refusing acute rehab pt wants home w/ PT      Problem/Plan - 1:  ·  Problem: S/P aortic valve replacement.   ·  Plan: 4/18 OR with Dr. 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 Explant of prosthetic valve. Debridement of aortic root and abscess with sat in the non-coronary sinus and above the anterior leaflet of the mitral valve. Reconstruction using a 21mm aortic homograft, with reimplantation of right and left main coronary buttons. There was a weakening from the infection of the right atrium adjacent to the abscess cavity which was reinforced with a bovine pericardial patch.    Continue postop care   Continue ASA 81mg QD and Lopressor 25mg Q12  Continue diuresis w/ Lasix 40mg PO QD and Aldactone 25mg Q12  ID Dr. Sharma following   Continue Vanco 1gram Q12 and Ceftriaxone 2gram QD per ID  OR cx and BCx from 4/21 NGTD - will discuss when pt will be cleared for PICC line w/ Dr. Sharma   Pulm toilet, encourage incentive spirometry 10x/hr while awake   Increase activity as tolerated, ambulate 4x daily and w/ PT   Pain management, wound care and assessment  Discharge planning- PT recommending acute rehab when stable, pt wants home w/ PT. 71F RH w/ AS w/ prior open heart aortic valve replacement 2019, HTN, BETSEY, former smoker, Emphysema/ chronic bronchitis, GERD s/p laparoscopic vertical sleeve gastrectomy presents with acute onset speech disturbance, vision change and gait imbalance.     4/9 CTH with Right PCA infarct, MRI on 4/10 Acute R occipital lobe infarct in a R PCA vascular distribution with an additional punctate acute infarct involving the L cerebellum and R splenium of the corpus callosum.    4/11 Underwent ECHO-. A large round mass measuring 3.8cm by 3cm,  is seen in the left atrium suggestive of atrial myxoma.  It appears to be attached to the atrial septum although no stalk is visualized.  4/12 CT surgery consult called  patient seen and examined in  no acute distress  family  at bedside  amenable to cardiac surgery workup  and surgery. Discussed with Stroke team benefits > risk of cardiac surgery    Of note patient febrile overnight  -tmax  101.8 on cefTRIAXone   IVPB 1000 milliGRAM(s) IV Intermittent every 24 hours for positive UA.  Blood cultures-  Cardiac cath scheduled for Wednesday 4/13. Tentative OR date for Friday April 15.  4/13 VSS; cath today; wbc 8 pt afebrile; + ceftriaxone for UTI; repeat UA neg 4//12- BC testing- ID not clearing the patient for OR for am 4/14as per neuro- pt cleared for [facility]  ?re-scheduled OR date- when cleared by ID   4/15 VSS Cleared by ID for OR Likely Mon/Tuesday 4/16 Preop workup in progress  OR Monday 4/18 OR with Dr. 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 Explant of prosthetic valve. Debridement of aortic root and abscess with sat in the non-coronary sinus and above the anterior leaflet of the mitral valve. Reconstruction using a 21mm aortic homograft, with reimplantation of right and left main coronary buttons. There was a weakening from the infection of the right atrium adjacent to the abscess cavity which was reinforced with a bovine pericardial patch.  4/20 EP consulted for CHB/SSS post-operatively   4/21 Micro PPM placed  4/22 Right groin stitch removed s/p Micra. OR Cultures negative to date.  ID Dr. Sharma following.  Continues on Vanco 1gram Q12/Ceftriaxone 2gram QD.  Will eventually need PICC line for long term ABX. TX to 2cohen floor bed.   4/23 VSS; V.Paced 80-90; continue abx as per ID; ID following; follow OR cx; BC neg 4/21; pt will need picc line  4/24 VSS, NAD. Continue abx as per ID. BCx from 4/21 remain NGTD - will f/u w/ ID regarding clearance for PICC line.   4/25 pending PICC line placement to continue Vanco and ceftriaxone for 6 week abx course  4/26 Pending insurance authorization prior to discharge   4/27 VSS; V. paced 80-90; continue abx as per ID until 5/30; discharge pt home today as per Dr. Jay- pt refusing rehab

## 2022-04-11 NOTE — DISCHARGE NOTE PROVIDER - NSDCPNSUBOBJ_GEN_ALL_CORE
PHYSICAL EXAM    Subjective: "I'm going home today."   Neurology: alert and oriented x 3, nonfocal, no gross deficits  CV : tele: v.paced 80-90   Sternal Wound :  CDI MC- sternum stable   Lungs: clear. RR easy, unlabored   Abdomen: soft, nontender, nondistended, positive bowel sounds, + postop bowel movement   Neg N/V/D   :  pt voiding without difficulty   Extremities:   SERVIN; neg edema, neg calf tenderness.   PPP bilaterally ; + RUE picc cdi with dressing    Discharge pt home today 4/27 as per Dr. Jay

## 2022-04-11 NOTE — PROGRESS NOTE ADULT - SUBJECTIVE AND OBJECTIVE BOX
Subjective: Patient seen and examined. No new events except as noted.   Feels ok   sister at bedside     REVIEW OF SYSTEMS:    CONSTITUTIONAL:+ weakness, fevers or chills  EYES/ENT: No visual changes;  No vertigo or throat pain   NECK: No pain or stiffness  RESPIRATORY: No cough, wheezing, hemoptysis; No shortness of breath  CARDIOVASCULAR: No chest pain or palpitations  GASTROINTESTINAL: No abdominal or epigastric pain. No nausea, vomiting, or hematemesis; No diarrhea or constipation. No melena or hematochezia.  GENITOURINARY: No dysuria, frequency or hematuria  NEUROLOGICAL: No numbness or weakness  SKIN: No itching, burning, rashes, or lesions   All other review of systems is negative unless indicated above.    MEDICATIONS:  MEDICATIONS  (STANDING):  aspirin enteric coated 81 milliGRAM(s) Oral daily  atorvastatin 80 milliGRAM(s) Oral at bedtime  benzocaine 15 mG/menthol 3.6 mG Lozenge 1 Lozenge Oral daily  bisacodyl 5 milliGRAM(s) Oral at bedtime  cefTRIAXone   IVPB 1000 milliGRAM(s) IV Intermittent every 24 hours  cholecalciferol 1000 Unit(s) Oral daily  enoxaparin Injectable 40 milliGRAM(s) SubCutaneous every 24 hours  pantoprazole  Injectable 40 milliGRAM(s) IV Push daily  senna 1 Tablet(s) Oral daily      PHYSICAL EXAM:  T(C): 36.7 (04-11-22 @ 09:16), Max: 37.6 (04-10-22 @ 21:32)  HR: 76 (04-11-22 @ 09:16) (76 - 88)  BP: 119/65 (04-11-22 @ 09:16) (109/71 - 143/82)  RR: 24 (04-11-22 @ 09:16) (18 - 24)  SpO2: 96% (04-11-22 @ 09:16) (95% - 99%)  Wt(kg): --  I&O's Summary    10 Apr 2022 07:01  -  11 Apr 2022 07:00  --------------------------------------------------------  IN: 840 mL / OUT: 0 mL / NET: 840 mL    11 Apr 2022 07:01  -  11 Apr 2022 10:46  --------------------------------------------------------  IN: 360 mL / OUT: 0 mL / NET: 360 mL          Appearance: Normal	  HEENT:   Normal oral mucosa, PERRL, EOMI	  Lymphatic: No lymphadenopathy , no edema  Cardiovascular: Normal S1 S2, No JVD, No murmurs , Peripheral pulses palpable 2+ bilaterally  Respiratory: Lungs clear to auscultation, normal effort 	  Gastrointestinal:  Soft, Non-tender, + BS	  Skin: No rashes, No ecchymoses, No cyanosis, warm to touch  Musculoskeletal: Normal range of motion, normal strength  Psychiatry:  Mood & affect appropriate  Ext: No edema      Appearance: Normal	  HEENT:   Normal oral mucosa, PERRL, EOMI	  Lymphatic: No lymphadenopathy  Cardiovascular: Normal S1 S2, No JVD, 3/6 systolic ejection murmurs, No edema  Respiratory: Lungs clear to auscultation	  Psychiatry: A & O x 3, Mood & affect appropriate  Gastrointestinal:  Soft, Non-tender, + BS	  Skin: No rashes, No ecchymoses, No cyanosis	  Extremities: Normal range of motion, No clubbing, cyanosis or edema  Vascular: Peripheral pulses palpable 2+ bilaterally  Neurological (>12):  MS: Awake, alert, oriented to person, place, situation, time. Normal affect. Follows all commands.  Language: Speech is clear, mildly dysfluent with good repetition & comprehension (able to name objects mask, thumb). No paraphasic errors    CNs: PERRL (R = 3mm, L = 3mm). LHH. EOMI but looking more to R, able to cross midline. V1-3 intact to LT, well developed masseter muscles b/l. No facial asymmetry b/l, full eye closure strength b/l. Hearing grossly normal (rubbing fingers) b/l. Symmetric palate elevation in midline. Gag reflex deferred. Head turning & shoulder shrug intact b/l. Tongue midline, normal movements, no atrophy.    Motor: Normal muscle bulk. No noticeable resting tremor.  LLE drift on subsequent exam but not initial exam.              Deltoid	Biceps	Triceps	Wrist	Finger ABd	   R	    5	    5	        5	        5	        5		        5 	  L	    4+	    5	        5	        5	        4+		        5    	H-Flex	K-Flex	K-Ext	D-Flex	P-Flex  R	5	        5	        5	        5	        5	          L	4+	        5	        5	        4+	        5	           Sensation: Intact to LT b/l throughout.   Cortical: Extinction on DSS (neglect): none    Reflexes:              Biceps(C5)       BR(C6)         Triceps(C7)       Patellar(L4)    Achilles(S1)    Plantar Resp  R	    2	       2	             		              2	0		       mute  L	    2	       2	             		              2	0		       mute    Coordination: No dysmetria to FTN.  Gait: Normal Romberg. No postural instability. Normal stance although cautious at first and gait.      LABS:    CARDIAC MARKERS:  CARDIAC MARKERS ( 10 Apr 2022 13:58 )  x     / x     / 58 U/L / x     / 2.2 ng/mL                                8.6    8.71  )-----------( 289      ( 11 Apr 2022 06:01 )             27.6     04-11    137  |  101  |  10  ----------------------------<  104<H>  4.4   |  24  |  0.51    Ca    8.5      11 Apr 2022 06:01    TPro  7.1  /  Alb  3.4  /  TBili  0.5  /  DBili  x   /  AST  23  /  ALT  24  /  AlkPhos  64  04-09          TELEMETRY: 	SR    ECG:  	  RADIOLOGY:   DIAGNOSTIC TESTING:  [ ] Echocardiogram:  [ ]  Catheterization:  [ ] Stress Test:    OTHER:

## 2022-04-11 NOTE — DISCHARGE NOTE PROVIDER - NSDCQMANTICOAGCONTRA_GEN_A_CORE
Patient:   CORINA ROE            MRN: [id number]            FIN: [id number]              Age:   33 years     Sex:  FEMALE     :  86   Associated Diagnoses:   None   Author:   MARGUERITE LUCIO     Procedure note performed by attending Dr aguilera (per patient request)  Date/Time: 11/10/2019 #23:59  Indication: Labor pain  Confirmed: Time-out taken prior to procedure, Patient, procedure.   Preparation: Betadine, With sterile drape.    Approach: Midline.    Procedural sedation: None.  Consent: Has signed consent.    Benefits and risks (such as PDPHA, bleeding, infection, hypotension, and nerve damage) were explained to the patient.  Patient position: Sitting  Technique: L 3-4 was identified. Local anesthetic infiltration to skin and SC with 3 mL lidocaine 1% with a 25 g, 1.5\" needle. Next, a 17 g, 3.5\" Touhy needle was advanced to loss of resistance to air at 7 cm.  No blood, CSF, or paresthesia was noted.  The epidural catheter (19g [place]) was advanced to 13 cm at the skin; negative aspiration; negative test dose of 3 mL 1.5% lidocaine with epinephrine 1:200,000.  There were no observed complications., The Integrity of all instruments and equipment used on the patient during the procedure remained intact after use..   Patient tolerated: Well.    Performed by: Dr Aguilera    Vitals between:   10-NOV-2019 05:28:56   TO   2019 05:28:56                   LAST RESULT MINIMUM MAXIMUM  Temperature 37.1 36.5 37.5  Heart Rate 71 61 102  Respiratory Rate 16 16 16  NISBP           124 94 158  NIDBP           50 50 99  NIMBP           75 71 118  SpO2                    100 99 100   Patient does not have atrial fibrillation/flutter

## 2022-04-11 NOTE — DISCHARGE NOTE PROVIDER - CARE PROVIDERS DIRECT ADDRESSES
,DirectAddress_Unknown,[email or web address],[email or web address] ,DirectAddress_Unknown,[email or web address],DirectAddress_Unknown,[email or web address] ([email or web address]

## 2022-04-11 NOTE — DISCHARGE NOTE PROVIDER - CARE PROVIDER_API CALL
Geri Purvis (NP; RN)  NP in [facility]  [address], [address]  [place] [address]  Phone: [phone]  Fax: [phone]  Follow Up Time: 2 weeks    Trino Contreras)  Neurology; Vascular Neurology  [address], [address] [place] [address]  Phone: [phone]  Fax: [phone]  Follow Up Time: 2 weeks    Iker Lanza ()  Cardiology; Internal Medicine  [address], [address]  [place] [address]  Phone: [phone]  Fax: [phone]  Follow Up Time: 2 weeks   Geri Purvis (NP; RN)  NP in [facility]  [address], [address]  [place] [address]  Phone: [phone]  Fax: [phone]  Follow Up Time: 2 weeks    Trino Contreras)  Neurology; Vascular Neurology  [address], [address] [place] [address]  Phone: [phone]  Fax: [phone]  Follow Up Time: 2 weeks    Iker Lanza (DO)  Cardiology; Internal Medicine  [address], [address]  [place] [address]  Phone: [phone]  Fax: [phone]  Follow Up Time: 2 weeks    Ramez Jay)  Surgery; Thoracic and Cardiac Surgery  [address] [place] [address]  Phone: [phone]  Fax: [phone]  Follow Up Time: 2 weeks    Gloria Lucero)  Cardiac Electrophysiology; Cardiovascular Disease; Internal Medicine  [address] [place] [address]  Phone: [phone]  Fax: [phone]  Follow Up Time:     Sim Sharma)  Infectious Disease; Internal Medicine  [address] [place] [address]  Phone: [phone]  Fax: [phone]  Follow Up Time: 2 weeks

## 2022-04-11 NOTE — DISCHARGE NOTE PROVIDER - NSDCMRMEDTOKEN_GEN_ALL_CORE_FT
albuterol 90 mcg/inh inhalation aerosol: 2 puff(s) inhaled every 6 hours, As Needed  aspirin 81 mg oral delayed release tablet: 1 tab(s) orally once a day  irbesartan 300 mg oral tablet: 1 tab(s) orally once a day  multivitamin: 1 tab(s) orally once a day  rosuvastatin 5 mg oral tablet: 1 tab(s) orally once a day  Vitamin D3 25 mcg (1000 intl units) oral tablet: 1 tab(s) orally once a day  zolpidem 10 mg oral tablet: 1 tab(s) orally once a day (at bedtime)   acetaminophen 325 mg oral capsule: 1 cap(s) orally every 8 hours, As Needed   albuterol 90 mcg/inh inhalation aerosol: 2 puff(s) inhaled every 6 hours, As Needed  aspirin 81 mg oral delayed release tablet: 1 tab(s) orally once a day  atorvastatin 20 mg oral tablet: 1 tab(s) orally once a day (at bedtime)  cefTRIAXone: 1 gram(s) intravenously once a day  furosemide 40 mg oral tablet: 1 tab(s) orally once a day  metoprolol succinate 50 mg oral tablet, extended release: 1 tab(s) orally once a day  multivitamin: 1 tab(s) orally once a day  oxyCODONE 5 mg oral tablet: 1 tab(s) orally every 6 hours MDD:four tabs  pantoprazole 40 mg oral delayed release tablet: 1 tab(s) orally once a day (before a meal)  senna oral tablet: 2 tab(s) orally once a day (at bedtime)  spironolactone 25 mg oral tablet: 1 tab(s) orally every 12 hours  vancomycin 1 g intravenous injection: 1 gram(s) intravenous every 12 hours  Vitamin D3 25 mcg (1000 intl units) oral tablet: 1 tab(s) orally once a day   acetaminophen 325 mg oral capsule: 1 cap(s) orally every 8 hours, As Needed   albuterol 90 mcg/inh inhalation aerosol: 2 puff(s) inhaled every 6 hours, As Needed  aspirin 81 mg oral delayed release tablet: 1 tab(s) orally once a day  atorvastatin 20 mg oral tablet: 1 tab(s) orally once a day (at bedtime)  cefTRIAXone: 2 gram(s) intravenously once a day until 5/30/22 then stop   furosemide 40 mg oral tablet: 1 tab(s) orally once a day  metoprolol succinate 50 mg oral tablet, extended release: 1 tab(s) orally once a day  multivitamin: 1 tab(s) orally once a day  oxyCODONE 5 mg oral tablet: 1 tab(s) orally every 6 hours MDD:four tabs  oxyCODONE 5 mg oral tablet: 1 tab(s) orally every 6 hours MDD:4  pantoprazole 40 mg oral delayed release tablet: 1 tab(s) orally once a day (before a meal)  polyethylene glycol 3350 oral powder for reconstitution: 17 gram(s) orally once a day  senna oral tablet: 2 tab(s) orally once a day (at bedtime)  spironolactone 25 mg oral tablet: 1 tab(s) orally every 12 hours  vancomycin 1 g intravenous injection: 1 gram(s) intravenous every 12 hours until 5/30/22 then stop   Vitamin D3 25 mcg (1000 intl units) oral tablet: 1 tab(s) orally once a day

## 2022-04-12 DIAGNOSIS — I51.89 OTHER ILL-DEFINED HEART DISEASES: ICD-10-CM

## 2022-04-12 DIAGNOSIS — N39.0 URINARY TRACT INFECTION, SITE NOT SPECIFIED: ICD-10-CM

## 2022-04-12 LAB
ALBUMIN SERPL ELPH-MCNC: 3.1 G/DL — LOW (ref 3.3–5)
ALP SERPL-CCNC: 76 U/L — SIGNIFICANT CHANGE UP (ref 40–120)
ALT FLD-CCNC: 24 U/L — SIGNIFICANT CHANGE UP (ref 10–45)
ANION GAP SERPL CALC-SCNC: 10 MMOL/L — SIGNIFICANT CHANGE UP (ref 5–17)
ANION GAP SERPL CALC-SCNC: 13 MMOL/L — SIGNIFICANT CHANGE UP (ref 5–17)
APPEARANCE UR: CLEAR — SIGNIFICANT CHANGE UP
APTT BLD: 30.5 SEC — SIGNIFICANT CHANGE UP (ref 27.5–35.5)
AST SERPL-CCNC: 25 U/L — SIGNIFICANT CHANGE UP (ref 10–40)
BILIRUB SERPL-MCNC: 0.3 MG/DL — SIGNIFICANT CHANGE UP (ref 0.2–1.2)
BILIRUB UR-MCNC: NEGATIVE — SIGNIFICANT CHANGE UP
BUN SERPL-MCNC: 14 MG/DL — SIGNIFICANT CHANGE UP (ref 7–23)
BUN SERPL-MCNC: 15 MG/DL — SIGNIFICANT CHANGE UP (ref 7–23)
CALCIUM SERPL-MCNC: 8.7 MG/DL — SIGNIFICANT CHANGE UP (ref 8.4–10.5)
CALCIUM SERPL-MCNC: 8.7 MG/DL — SIGNIFICANT CHANGE UP (ref 8.4–10.5)
CHLORIDE SERPL-SCNC: 100 MMOL/L — SIGNIFICANT CHANGE UP (ref 96–108)
CHLORIDE SERPL-SCNC: 103 MMOL/L — SIGNIFICANT CHANGE UP (ref 96–108)
CO2 SERPL-SCNC: 25 MMOL/L — SIGNIFICANT CHANGE UP (ref 22–31)
CO2 SERPL-SCNC: 26 MMOL/L — SIGNIFICANT CHANGE UP (ref 22–31)
COLOR SPEC: YELLOW — SIGNIFICANT CHANGE UP
CREAT SERPL-MCNC: 0.49 MG/DL — LOW (ref 0.5–1.3)
CREAT SERPL-MCNC: 0.58 MG/DL — SIGNIFICANT CHANGE UP (ref 0.5–1.3)
DIFF PNL FLD: NEGATIVE — SIGNIFICANT CHANGE UP
EGFR: 101 ML/MIN/1.73M2 — SIGNIFICANT CHANGE UP
EGFR: 97 ML/MIN/1.73M2 — SIGNIFICANT CHANGE UP
GLUCOSE SERPL-MCNC: 114 MG/DL — HIGH (ref 70–99)
GLUCOSE SERPL-MCNC: 121 MG/DL — HIGH (ref 70–99)
GLUCOSE UR QL: NEGATIVE — SIGNIFICANT CHANGE UP
HCT VFR BLD CALC: 27.9 % — LOW (ref 34.5–45)
HCT VFR BLD CALC: 30.1 % — LOW (ref 34.5–45)
HGB BLD-MCNC: 8.7 G/DL — LOW (ref 11.5–15.5)
HGB BLD-MCNC: 9.5 G/DL — LOW (ref 11.5–15.5)
INR BLD: 1.3 RATIO — HIGH (ref 0.88–1.16)
KETONES UR-MCNC: NEGATIVE — SIGNIFICANT CHANGE UP
LEUKOCYTE ESTERASE UR-ACNC: NEGATIVE — SIGNIFICANT CHANGE UP
MCHC RBC-ENTMCNC: 28.4 PG — SIGNIFICANT CHANGE UP (ref 27–34)
MCHC RBC-ENTMCNC: 29.1 PG — SIGNIFICANT CHANGE UP (ref 27–34)
MCHC RBC-ENTMCNC: 31.2 GM/DL — LOW (ref 32–36)
MCHC RBC-ENTMCNC: 31.6 GM/DL — LOW (ref 32–36)
MCV RBC AUTO: 91.2 FL — SIGNIFICANT CHANGE UP (ref 80–100)
MCV RBC AUTO: 92 FL — SIGNIFICANT CHANGE UP (ref 80–100)
MRSA PCR RESULT.: SIGNIFICANT CHANGE UP
NITRITE UR-MCNC: NEGATIVE — SIGNIFICANT CHANGE UP
NRBC # BLD: 0 /100 WBCS — SIGNIFICANT CHANGE UP (ref 0–0)
NRBC # BLD: 0 /100 WBCS — SIGNIFICANT CHANGE UP (ref 0–0)
NT-PROBNP SERPL-SCNC: 1462 PG/ML — HIGH (ref 0–300)
PA ADP PRP-ACNC: 349 PRU — SIGNIFICANT CHANGE UP (ref 194–417)
PH UR: 5.5 — SIGNIFICANT CHANGE UP (ref 5–8)
PLATELET # BLD AUTO: 292 K/UL — SIGNIFICANT CHANGE UP (ref 150–400)
PLATELET # BLD AUTO: 320 K/UL — SIGNIFICANT CHANGE UP (ref 150–400)
POTASSIUM SERPL-MCNC: 3.9 MMOL/L — SIGNIFICANT CHANGE UP (ref 3.5–5.3)
POTASSIUM SERPL-MCNC: 4.3 MMOL/L — SIGNIFICANT CHANGE UP (ref 3.5–5.3)
POTASSIUM SERPL-SCNC: 3.9 MMOL/L — SIGNIFICANT CHANGE UP (ref 3.5–5.3)
POTASSIUM SERPL-SCNC: 4.3 MMOL/L — SIGNIFICANT CHANGE UP (ref 3.5–5.3)
PROT SERPL-MCNC: 6.8 G/DL — SIGNIFICANT CHANGE UP (ref 6–8.3)
PROT UR-MCNC: SIGNIFICANT CHANGE UP
PROTHROM AB SERPL-ACNC: 15 SEC — HIGH (ref 10.5–13.4)
RBC # BLD: 3.06 M/UL — LOW (ref 3.8–5.2)
RBC # BLD: 3.27 M/UL — LOW (ref 3.8–5.2)
RBC # FLD: 13.3 % — SIGNIFICANT CHANGE UP (ref 10.3–14.5)
RBC # FLD: 13.5 % — SIGNIFICANT CHANGE UP (ref 10.3–14.5)
S AUREUS DNA NOSE QL NAA+PROBE: SIGNIFICANT CHANGE UP
SODIUM SERPL-SCNC: 138 MMOL/L — SIGNIFICANT CHANGE UP (ref 135–145)
SODIUM SERPL-SCNC: 139 MMOL/L — SIGNIFICANT CHANGE UP (ref 135–145)
SP GR SPEC: 1.03 — HIGH (ref 1.01–1.02)
T3 SERPL-MCNC: 75 NG/DL — LOW (ref 80–200)
T4 AB SER-ACNC: 7 UG/DL — SIGNIFICANT CHANGE UP (ref 4.6–12)
TSH SERPL-MCNC: 1 UIU/ML — SIGNIFICANT CHANGE UP (ref 0.27–4.2)
UROBILINOGEN FLD QL: NEGATIVE — SIGNIFICANT CHANGE UP
WBC # BLD: 9.23 K/UL — SIGNIFICANT CHANGE UP (ref 3.8–10.5)
WBC # BLD: 9.4 K/UL — SIGNIFICANT CHANGE UP (ref 3.8–10.5)
WBC # FLD AUTO: 9.23 K/UL — SIGNIFICANT CHANGE UP (ref 3.8–10.5)
WBC # FLD AUTO: 9.4 K/UL — SIGNIFICANT CHANGE UP (ref 3.8–10.5)

## 2022-04-12 PROCEDURE — 99232 SBSQ HOSP IP/OBS MODERATE 35: CPT

## 2022-04-12 PROCEDURE — 99221 1ST HOSP IP/OBS SF/LOW 40: CPT

## 2022-04-12 PROCEDURE — 71045 X-RAY EXAM CHEST 1 VIEW: CPT | Mod: 26

## 2022-04-12 PROCEDURE — 99233 SBSQ HOSP IP/OBS HIGH 50: CPT

## 2022-04-12 PROCEDURE — 99222 1ST HOSP IP/OBS MODERATE 55: CPT

## 2022-04-12 RX ORDER — CEFTRIAXONE 500 MG/1
1000 INJECTION, POWDER, FOR SOLUTION INTRAMUSCULAR; INTRAVENOUS EVERY 24 HOURS
Refills: 0 | Status: COMPLETED | OUTPATIENT
Start: 2022-04-12 | End: 2022-04-14

## 2022-04-12 RX ORDER — INSULIN LISPRO 100/ML
VIAL (ML) SUBCUTANEOUS
Refills: 0 | Status: DISCONTINUED | OUTPATIENT
Start: 2022-04-12 | End: 2022-04-18

## 2022-04-12 RX ORDER — ALPRAZOLAM 0.25 MG
0.25 TABLET ORAL ONCE
Refills: 0 | Status: DISCONTINUED | OUTPATIENT
Start: 2022-04-12 | End: 2022-04-12

## 2022-04-12 RX ORDER — ENOXAPARIN SODIUM 100 MG/ML
40 INJECTION SUBCUTANEOUS ONCE
Refills: 0 | Status: COMPLETED | OUTPATIENT
Start: 2022-04-12 | End: 2022-04-12

## 2022-04-12 RX ORDER — LANOLIN ALCOHOL/MO/W.PET/CERES
1 CREAM (GRAM) TOPICAL ONCE
Refills: 0 | Status: COMPLETED | OUTPATIENT
Start: 2022-04-12 | End: 2022-04-12

## 2022-04-12 RX ORDER — ACETAMINOPHEN 500 MG
1000 TABLET ORAL ONCE
Refills: 0 | Status: COMPLETED | OUTPATIENT
Start: 2022-04-12 | End: 2022-04-12

## 2022-04-12 RX ADMIN — Medication 81 MILLIGRAM(S): at 11:35

## 2022-04-12 RX ADMIN — ENOXAPARIN SODIUM 40 MILLIGRAM(S): 100 INJECTION SUBCUTANEOUS at 00:08

## 2022-04-12 RX ADMIN — CEFTRIAXONE 100 MILLIGRAM(S): 500 INJECTION, POWDER, FOR SOLUTION INTRAMUSCULAR; INTRAVENOUS at 02:53

## 2022-04-12 RX ADMIN — Medication 1000 MILLIGRAM(S): at 03:00

## 2022-04-12 RX ADMIN — Medication 5 MILLIGRAM(S): at 22:29

## 2022-04-12 RX ADMIN — Medication 0.25 MILLIGRAM(S): at 11:55

## 2022-04-12 RX ADMIN — ENOXAPARIN SODIUM 40 MILLIGRAM(S): 100 INJECTION SUBCUTANEOUS at 13:38

## 2022-04-12 RX ADMIN — Medication 400 MILLIGRAM(S): at 02:28

## 2022-04-12 RX ADMIN — Medication 1000 UNIT(S): at 11:35

## 2022-04-12 RX ADMIN — PANTOPRAZOLE SODIUM 40 MILLIGRAM(S): 20 TABLET, DELAYED RELEASE ORAL at 11:36

## 2022-04-12 RX ADMIN — ATORVASTATIN CALCIUM 40 MILLIGRAM(S): 80 TABLET, FILM COATED ORAL at 22:29

## 2022-04-12 RX ADMIN — Medication 1 MILLIGRAM(S): at 23:13

## 2022-04-12 RX ADMIN — SENNA PLUS 1 TABLET(S): 8.6 TABLET ORAL at 11:35

## 2022-04-12 RX ADMIN — BENZOCAINE AND MENTHOL 1 LOZENGE: 5; 1 LIQUID ORAL at 11:35

## 2022-04-12 NOTE — PROGRESS NOTE ADULT - SUBJECTIVE AND OBJECTIVE BOX
PULMONARY PROGRESS NOTE    ILENE ORTIZ  MRN-[id number]    Patient is a 71y old  Female who presents with a chief complaint of concern for stroke (11 Apr 2022 13:43)      HPI:  no resp complaints  on room air  awaiting surgery    ROS:   -    MEDICATIONS  (STANDING):  aspirin enteric coated 81 milliGRAM(s) Oral daily  atorvastatin 40 milliGRAM(s) Oral at bedtime  benzocaine 15 mG/menthol 3.6 mG Lozenge 1 Lozenge Oral daily  bisacodyl 5 milliGRAM(s) Oral at bedtime  cefTRIAXone   IVPB 1000 milliGRAM(s) IV Intermittent every 24 hours  cholecalciferol 1000 Unit(s) Oral daily  pantoprazole  Injectable 40 milliGRAM(s) IV Push daily  senna 1 Tablet(s) Oral daily    MEDICATIONS  (PRN):  ALBUTerol    90 MICROgram(s) HFA Inhaler 2 Puff(s) Inhalation every 6 hours PRN Shortness of Breath and/or Wheezing  aluminum hydroxide/magnesium hydroxide/simethicone Suspension 30 milliLiter(s) Oral every 4 hours PRN Dyspepsia  zolpidem 5 milliGRAM(s) Oral at bedtime PRN Insomnia        EXAM:  Vital Signs Last 24 Hrs  T(C): 36.7 (12 Apr 2022 13:12), Max: 38.8 (12 Apr 2022 01:35)  T(F): 98 (12 Apr 2022 13:12), Max: 101.8 (12 Apr 2022 01:35)  HR: 80 (12 Apr 2022 13:12) (74 - 86)  BP: 122/75 (12 Apr 2022 13:12) (122/75 - 143/84)  BP(mean): --  RR: 19 (12 Apr 2022 13:12) (18 - 20)  SpO2: 95% (12 Apr 2022 13:12) (95% - 97%)    GENERAL: The patient is awake and alert in no apparent distress.     LUNGS:  clear bilat                           9.5    9.40  )-----------( 320      ( 12 Apr 2022 10:57 )             30.1   04-12    139  |  100  |  14  ----------------------------<  114<H>  4.3   |  26  |  0.49<L>    Ca    8.7      12 Apr 2022 10:58    TPro  6.8  /  Alb  3.1<L>  /  TBili  0.3  /  DBili  x   /  AST  25  /  ALT  24  /  AlkPhos  76  04-12        PROBLEM LIST:  71y Female with HEALTH ISSUES - PROBLEM Dx:  CVA (cerebrovascular accident)    S/P aortic valve replacement    HTN (hypertension)      RECS:   symbicort 180 BID and spiriva qhs (on trelegy outpt)  appreciate cardiothoracic surgery  close f/u with Dr Leigh  should be assessed for BETSEY again outpatient          Please call with any questions.  Lucita Zarate MD  [facility] Pulmonary/Sleep Medicine  [phone]

## 2022-04-12 NOTE — PROGRESS NOTE ADULT - ASSESSMENT
71F RH w/ AS w/ prior open heart aortic valve replacement 2019, HTN, BETSEY, former smoker, Emphysema/ chronic bronchitis, GERD s/p laparoscopic vertical sleeve gastrectomy presented with acute onset speech disturbance, vision change and gait imbalance. Per EMS, LKN: 4/9/22 at 10am per family. Pt's family reported blurry vision, gait imbalance, and speech disturbance (mild loss in fluency). She still endorses blurry vision but denies any weakness, numbness/tingling. Pt takes a baby aspirin 81mg daily. NIHSS: 3, preMRS: 0. No tpa or MT.      Impression:  Right PCA infarct etiology likely cardioembolic given findings of atrial mass, also incidental 6 mm ACOMM aneurysm.     NEURO: neurologically without acute chagne, continue close monitoring for neurologic deterioration, found to have an incidental aneurysm will follow with Dr. Contreras as outpatient for further evaluation and possible treatment, LDL 62: continue home dose statin  MRI Brain w/o noted above,  Physical therapy/OT: AR    ANTITHROMBOTIC THERAPY: ASA    PULMONARY:  protecting airway, saturating well     CARDIOVASCULAR:  TTE: ef 45%, mild-moderate MR, mild mitral stenosis, left atrial mass with increased gradient, bioprosthetic AV, mild AR, moderately dilated LA, left atrial mass suggestive of myxoma,  cardiac monitoring, S/P aortic valve replacement cardiology consulted, Troponin elevated. ILR placed to rule out A. Fib as possible source.   CT surgery following. Anticipaet cardiac cath and tentative OR pending clinical course.                             SBP goal: normotension being tolerated- avoid SBP > 160mmHg in setting of unsecured itnracranial aneurysm.     GASTROINTESTINAL:  dysphagia screen- passed, tolerating diet       Diet: Regular    RENAL: BUN/Cr without acute change, good urine output , maintain adequate hydration      Na Goal: Greater than 135     Sharma: N    HEMATOLOGY: H/H   anemia , no acute change, no active bleeding Platelets 320, she should have all age and risk appropriate      DVT ppx: Heparin s.c [] LMWH [x]     ID: febrile, no leukocytosis, UA negative for UTI, blood cx pending, infectious workup in progress     OTHER: Plan of care discussed with patient and sister at bedside.     DISPOSITION: AR once stable and workup is complete, pending CT sx service transfer for further care, accepte to Dr. Jay service per NP Noemi SHIN Please call with any questions or concerns.       CORE MEASURES:        Admission NIHSS: 3     TPA: [] YES [x] NO      LDL/HDL: 62/32     Depression Screen: 0     Statin Therapy: Y     Dysphagia Screen: [x] PASS [] FAIL     Smoking [] YES [x] NO      Afib [] YES [x] NO     Stroke Education [x] YES [] NO    Obtain screening lower extremity venous ultrasound in patients who meet 1 or more of the following criteria as patient is high risk for DVT/PE on admission:   [] History of DVT/PE  []Hypercoagulable states (Factor V Leiden, Cancer, OCP, etc. )  []Prolonged immobility (hemiplegia/hemiparesis/post operative or any other extended immobilization)  [] Transferred from outside facility (Rehab or Long term care)  [] Age </= to 50. 71F RH w/ AS w/ prior open heart aortic valve replacement 2019, HTN, BETSEY, former smoker, Emphysema/ chronic bronchitis, GERD s/p laparoscopic vertical sleeve gastrectomy presented with acute onset speech disturbance, vision change and gait imbalance. Per EMS, LKN: 4/9/22 at 10am per family. Pt's family reported blurry vision, gait imbalance, and speech disturbance (mild loss in fluency). She still endorses blurry vision but denies any weakness, numbness/tingling. Pt takes a baby aspirin 81mg daily. NIHSS: 3, preMRS: 0. No tpa or MT.      Impression:  Right PCA infarct etiology likely cardioembolic given findings of atrial mass, also incidental 6 mm ACOMM aneurysm.     NEURO: neurologically without acute chagne, continue close monitoring for neurologic deterioration, found to have an incidental aneurysm will follow with Dr. Contreras as outpatient for further evaluation and possible treatment, LDL 62: continue home dose statin  MRI Brain w/o noted above,  Physical therapy/OT: AR    ANTITHROMBOTIC THERAPY: ASA    PULMONARY:  protecting airway, saturating well     CARDIOVASCULAR:  TTE: ef 45%, mild-moderate MR, mild mitral stenosis, left atrial mass with increased gradient, bioprosthetic AV, mild AR, moderately dilated LA, left atrial mass suggestive of myxoma,  cardiac monitoring, S/P aortic valve replacement cardiology consulted, Troponin elevated. ILR placed to rule out A. Fib as possible source.   CT surgery following. Anticipaet cardiac cath and tentative OR pending clinical course.                             SBP goal: normotension being tolerated- avoid SBP > 160mmHg in setting of unsecured itnracranial aneurysm.     GASTROINTESTINAL:  dysphagia screen- passed, tolerating diet       Diet: Regular    RENAL: BUN/Cr without acute change, good urine output , maintain adequate hydration      Na Goal: Greater than 135     Sharma: N    HEMATOLOGY: H/H   anemia , no acute change, no active bleeding Platelets 320, she should have all age and risk appropriate      DVT ppx: Heparin s.c [] LMWH [x]     ID: febrile, no leukocytosis, repeat UA negative for UTI, was on ctx prior for UTI, follow up sensitivities , blood cx pending, infectious workup in progress     OTHER: Plan of care discussed with patient and sister at bedside.     DISPOSITION: AR once stable and workup is complete, pending CT sx service transfer for further care, accepte to Dr. aJy service per NP Noemi SHIN Please call with any questions or concerns.       CORE MEASURES:        Admission NIHSS: 3     TPA: [] YES [x] NO      LDL/HDL: 62/32     Depression Screen: 0     Statin Therapy: Y     Dysphagia Screen: [x] PASS [] FAIL     Smoking [] YES [x] NO      Afib [] YES [x] NO     Stroke Education [x] YES [] NO    Obtain screening lower extremity venous ultrasound in patients who meet 1 or more of the following criteria as patient is high risk for DVT/PE on admission:   [] History of DVT/PE  []Hypercoagulable states (Factor V Leiden, Cancer, OCP, etc. )  []Prolonged immobility (hemiplegia/hemiparesis/post operative or any other extended immobilization)  [] Transferred from outside facility (Rehab or Long term care)  [] Age </= to 50.

## 2022-04-12 NOTE — PROGRESS NOTE ADULT - SUBJECTIVE AND OBJECTIVE BOX
Subjective: Patient seen and examined. No new events except as noted.   feels anxious   s/p ILR   REVIEW OF SYSTEMS:    CONSTITUTIONAL:+ weakness, fevers or chills  EYES/ENT: No visual changes;  No vertigo or throat pain   NECK: No pain or stiffness  RESPIRATORY: No cough, wheezing, hemoptysis; No shortness of breath  CARDIOVASCULAR: No chest pain or palpitations  GASTROINTESTINAL: No abdominal or epigastric pain. No nausea, vomiting, or hematemesis; No diarrhea or constipation. No melena or hematochezia.  GENITOURINARY: No dysuria, frequency or hematuria  NEUROLOGICAL: No numbness or weakness  SKIN: No itching, burning, rashes, or lesions   All other review of systems is negative unless indicated above.    MEDICATIONS:  MEDICATIONS  (STANDING):  aspirin enteric coated 81 milliGRAM(s) Oral daily  atorvastatin 40 milliGRAM(s) Oral at bedtime  benzocaine 15 mG/menthol 3.6 mG Lozenge 1 Lozenge Oral daily  bisacodyl 5 milliGRAM(s) Oral at bedtime  cefTRIAXone   IVPB 1000 milliGRAM(s) IV Intermittent every 24 hours  cholecalciferol 1000 Unit(s) Oral daily  enoxaparin Injectable 40 milliGRAM(s) SubCutaneous every 24 hours  pantoprazole  Injectable 40 milliGRAM(s) IV Push daily  senna 1 Tablet(s) Oral daily      PHYSICAL EXAM:  T(C): 36.4 (04-12-22 @ 05:17), Max: 38.8 (04-12-22 @ 01:35)  HR: 74 (04-12-22 @ 05:17) (74 - 86)  BP: 130/67 (04-12-22 @ 05:17) (119/65 - 146/73)  RR: 18 (04-12-22 @ 05:17) (18 - 24)  SpO2: 95% (04-12-22 @ 05:17) (95% - 97%)  Wt(kg): --  I&O's Summary    11 Apr 2022 07:01  -  12 Apr 2022 07:00  --------------------------------------------------------  IN: 600 mL / OUT: 0 mL / NET: 600 mL        Weight (kg): 85.5 (04-12 @ 02:14)      Appearance: NAD  HEENT:   Dry  oral mucosa, PERRL, EOMI	  Lymphatic: No lymphadenopathy  Cardiovascular: Normal S1 S2, No JVD, 3/6 systolic ejection murmurs, No edema  Respiratory: Lungs clear to auscultation	  Psychiatry: A & O x 3, Mood & affect appropriate  Gastrointestinal:  Soft, Non-tender, + BS	  Skin: No rashes, No ecchymoses, No cyanosis	  Extremities: Normal range of motion, No clubbing, cyanosis or edema  Vascular: Peripheral pulses palpable 2+ bilaterally  Neurological (>12):  MS: Awake, alert, oriented to person, place, situation, time. Normal affect. Follows all commands.  Language: Speech is clear, mildly dysfluent with good repetition & comprehension (able to name objects mask, thumb). No paraphasic errors    CNs: PERRL (R = 3mm, L = 3mm). LHH. EOMI but looking more to R, able to cross midline. V1-3 intact to LT, well developed masseter muscles b/l. No facial asymmetry b/l, full eye closure strength b/l. Hearing grossly normal (rubbing fingers) b/l. Symmetric palate elevation in midline. Gag reflex deferred. Head turning & shoulder shrug intact b/l. Tongue midline, normal movements, no atrophy.    Motor: Normal muscle bulk. No noticeable resting tremor.  LLE drift on subsequent exam but not initial exam.              Deltoid	Biceps	Triceps	Wrist	Finger ABd	   R	    5	    5	        5	        5	        5		        5 	  L	    4+	    5	        5	        5	        4+		        5    	H-Flex	K-Flex	K-Ext	D-Flex	P-Flex  R	5	        5	        5	        5	        5	          L	4+	        5	        5	        4+	        5	           Sensation: Intact to LT b/l throughout.   Cortical: Extinction on DSS (neglect): none    Reflexes:              Biceps(C5)       BR(C6)         Triceps(C7)       Patellar(L4)    Achilles(S1)    Plantar Resp  R	    2	       2	             		              2	0		       mute  L	    2	       2	             		              2	0		       mute    Coordination: No dysmetria to FTN.  Gait: Normal Romberg. No postural instability. Normal stance although cautious at first and gait.          LABS:    CARDIAC MARKERS:  CARDIAC MARKERS ( 10 Apr 2022 13:58 )  x     / x     / 58 U/L / x     / 2.2 ng/mL                                8.7    9.23  )-----------( 292      ( 12 Apr 2022 05:56 )             27.9     04-12    138  |  103  |  15  ----------------------------<  121<H>  3.9   |  25  |  0.58    Ca    8.7      12 Apr 2022 05:53      proBNP:   Lipid Profile:   HgA1c:   TSH:     4          TELEMETRY: 	    ECG:  	  RADIOLOGY:   DIAGNOSTIC TESTING:  [ ] Echocardiogram:  < from: Transthoracic Echocardiogram (04.11.22 @ 10:18) >    Patient name: ILENE ORTIZ  YOB: 1950   Age: 71 (F)   MR#: [id number]  Study Date: 4/11/2022  Location: O/PSonographer: Remigio Byrd RDCS  Study quality: Technically good  Referring Physician: Jayce Dubois MD  Blood Pressure: 119/68 mmHg  Height: 152 cm  Weight: 89 kg  BSA: 1.9 m2  Heart Rate: 76 mmHg  ------------------------------------------------------------------------  PROCEDURE: Transthoracic echocardiogram with 2-D, M-Mode  and complete spectral and color flow Doppler.  INDICATION: Other cerebral infarction (I63.89)  ------------------------------------------------------------------------  Dimensions:    Normal Values:  LA:     3.7    2.0 - 4.0 cm  Ao:     2.6    2.0 - 3.8 cm  SEPTUM: 1.1    0.6 - 1.2 cm  PWT:    1.4    0.6 - 1.1 cm  LVIDd:  4.2    3.0 - 5.6 cm  LVIDs:  2.3    1.8 - 4.0 cm  Derived variables:  LVMI: 105 g/m2  RWT: 0.66  Fractional short: 45 %  EF (Babcock Rule): 68 %Doppler Peak Velocity (m/sec):  MV=1.4 AoV=3.7  ------------------------------------------------------------------------  Observations:  Mitral Valve: Normal mitral valve. Mild-moderate mitral  regurgitation. Peak mitral valve gradient equals 8 mm Hg,  mean transmitral valve gradient equals 4 mm Hg, consistent  with mildmitral stenosis.  The increased gradient may be  secondary to obstruction by the left atrial mass.  Gradients measured at a HR of 73bpm.  Aortic Valve/Aorta: Bioprosthetic aortic valve.  The valve  is well seated.  Peak transaortic valve gradient equals 56  mm Hg, mean transaortic valve gradient equals 38 mm Hg,  which is elevated even in the presence of a bioprosthetic  aortic valve. However the Di is 0.34, the elevated  gradients may be secondary to the hyperdynamic left  ventricle.  Mild aortic regurgitation.  Peak left  ventricular outflow tract gradient equals 8 mm Hg, mean  gradient is equal to 4 mm Hg, LVOT velocity time integral  equals 28 cm.  Aortic Root: 2.6 cm.  LVOT diameter: 1.9 cm.  Left Atrium: Moderately dilated left atrium.LA volume  index = 46 cc/m2. A large round mass measuring 3.8cm by  3cm,  is seen in the left atrium suggestive of atrial  myxoma.  It appears to be attached to the atrial septum  although no stalk is visualized.  Endocardial visualization enhanced with intravenous  injection of Ultrasonic Enhancing Agent (Definity), the  mass appears to be vascular as it demonstrates uptake of  Definity.  Left Ventricle: Hyperdynamic left ventricular systolic  function. Endocardial visualization enhanced with  intravenous injection of Ultrasonic Enhancing Agent  (Definity).  No left ventricular thrombus. Mild concentric  left ventricular hypertrophy. Normal diastolic function  Right Heart: Normal right atrium. Normal right ventricular  size and function. Normal tricuspid valve. No tricuspid  regurgitation. Normal pulmonic valve. No pulmonic  regurgitation.  Pericardium/Pleura: Normal pericardium with no pericardial  effusion.  Hemodynamic: Color Doppler demonstrates no evidence of a  patent foramen ovale.  ------------------------------------------------------------------------  Conclusions:  1. Normal mitral valve. Mild-moderate mitral regurgitation.  2. Bioprosthetic aortic valve.  The valve is well seated.  Peak transaortic valve gradient equals 56 mm Hg, mean  transaortic valve gradient equals 38 mm Hg, which is  elevated even in the presence of a bioprosthetic aortic  valve. However the Di is 0.34, the elevated gradients may  be secondary to the hyperdynamic left ventricle.  Mild  aortic regurgitation.  3. Moderately dilated left atrium.  LA volume index = 46  cc/m2. A large round mass measuring 3.8cm by 3cm,  is seen  in the left atrium suggestive of atrial myxoma.  It appears  to be attached to the atrial septum although no stalk is  visualized.  Endocardial visualization enhanced with intravenous  injection of Ultrasonic Enhancing Agent (Definity), the  mass appears to be vascular as it demonstrates uptake of  Definity.  4. Hyperdynamic left ventricular systolic function.  Endocardial visualization enhanced with intravenous  injection of Ultrasonic Enhancing Agent (Definity).  No  left ventricular thrombus.  *** No previous Echo exam.  Images reviewed and results discussed with Dr. Iker Lanza.  ------------------------------------------------------------------------  Confirmed on  4/11/2022 - 15:24:47 by Maco Velasquez M.D.  ------------------------------------------------------------------------    < end of copied text >    [ ]  Catheterization:  [ ] Stress Test:    OTHER:

## 2022-04-12 NOTE — PROGRESS NOTE ADULT - SUBJECTIVE AND OBJECTIVE BOX
THE PATIENT WAS SEEN AND EXAMINED BY ME WITH THE HOUSESTAFF AND STROKE TEAM DURING MORNING ROUNDS.   HPI:  71F RH w/ AS w/ prior open heart aortic valve replacement 2019, HTN, BETSEY, former smoker, Emphysema/ chronic bronchitis, GERD s/p laparoscopic vertical sleeve gastrectomy presented with acute onset speech disturbance, vision change and gait imbalance. Per EMS, LKN: 4/9/22 at 10am per family. Pt's family reported blurry vision, gait imbalance, and speech disturbance (mild loss in fluency). Pt unable to provide full hx because she was confused on timing. She  endorsed blurry vision but denied any weakness, numbness/tingling. Pt takes a baby aspirin 81mg daily. Pt ambulates without assistance or assistive devices at baseline.  LKN: 4/9/22 at 10am  NIHSS: 3  preMRS: 0  Pt was not a candidate for tpa due to outside tpa window   Pt was not a candidate for mechanical thrombectomy due to no large vessel occlusion on CTA    SUBJECTIVE: Fever noted overnight.  No new neurologic complaints.  ROS reported negative unless otherwise noted.    ALBUTerol    90 MICROgram(s) HFA Inhaler 2 Puff(s) Inhalation every 6 hours PRN  aluminum hydroxide/magnesium hydroxide/simethicone Suspension 30 milliLiter(s) Oral every 4 hours PRN  aspirin enteric coated 81 milliGRAM(s) Oral daily  atorvastatin 40 milliGRAM(s) Oral at bedtime  benzocaine 15 mG/menthol 3.6 mG Lozenge 1 Lozenge Oral daily  bisacodyl 5 milliGRAM(s) Oral at bedtime  cefTRIAXone   IVPB 1000 milliGRAM(s) IV Intermittent every 24 hours  cholecalciferol 1000 Unit(s) Oral daily  pantoprazole  Injectable 40 milliGRAM(s) IV Push daily  senna 1 Tablet(s) Oral daily  zolpidem 5 milliGRAM(s) Oral at bedtime PRN      PHYSICAL EXAM:   Vital Signs Last 24 Hrs  T(C): 36.7 (12 Apr 2022 13:12), Max: 38.8 (12 Apr 2022 01:35)  T(F): 98 (12 Apr 2022 13:12), Max: 101.8 (12 Apr 2022 01:35)  HR: 80 (12 Apr 2022 13:12) (74 - 86)  BP: 122/75 (12 Apr 2022 13:12) (122/75 - 143/84)  BP(mean): --  RR: 19 (12 Apr 2022 13:12) (18 - 20)  SpO2: 95% (12 Apr 2022 13:12) (95% - 97%)    General: No acute distress  HEENT: EOM intact, LHH to counting (is aware)  Abdomen: Soft, nontender, nondistended   Extremities: No edema    NEUROLOGICAL EXAM:  Mental status: Awake, alert, oriented x3, no aphasia, no neglect, normal memory   Cranial Nerves: No facial asymmetry, LHHA to counting, no nystagmus, no dysarthria,  tongue midline  Motor exam: Normal tone, no drift, 5/5 RUE, 5/5 RLE, 5/5 LUE, 5/5 LLE, normal fine finger movements.  Sensation: Intact to light touch   Coordination/ Gait: No dysmetria, AMILCAR intact and symmetric bilaterally      LABS:                        9.5    9.40  )-----------( 320      ( 12 Apr 2022 10:57 )             30.1    04-12    139  |  100  |  14  ----------------------------<  114<H>  4.3   |  26  |  0.49<L>    Ca    8.7      12 Apr 2022 10:58    TPro  6.8  /  Alb  3.1<L>  /  TBili  0.3  /  DBili  x   /  AST  25  /  ALT  24  /  AlkPhos  76  04-12  PT/INR - ( 12 Apr 2022 10:59 )   PT: 15.0 sec;   INR: 1.30 ratio         PTT - ( 12 Apr 2022 10:59 )  PTT:30.5 sec      IMAGING: Reviewed by me.   MRI HEAD 04/10/22: Acute right occipital lobe infarct in a right PCA vascular distribution   with an additional punctate acute infarct involving the left cerebellum   and right splenium of the corpus callosum.    04/09/22:  Brain CT: Acute right occipital lobe infarct in the distribution of the   right PCA. No evidence for hemorrhage.  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 No   hemodynamically significant stenosis within the anterior circulation.  Brain CTA: No large vessel occlusion or stenosis. 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  Perfusion maps: Core infarct in the distribution of the right PCA.   Questionable areas of brain at risk in the right PCA as well as the   bilateral temporal and right frontal region.

## 2022-04-12 NOTE — CONSULT NOTE ADULT - ASSESSMENT
This is a 71F RH w/ AS w/ prior open heart aortic valve replacement 2019, HTN, BETSEY, former smoker, Emphysema/ chronic bronchitis, GERD s/p laparoscopic vertical sleeve gastrectomy presents with acute onset speech disturbance, vision change and gait imbalance. Per EMS, LKN: 4/9/22 at 10am per family. Pt's family reported blurry vision, gait imbalance, and speech disturbance (mild loss in fluency). She still endorses blurry vision but denies any weakness, numbness/tingling. Pt takes a baby aspirin 81mg daily. NIHSS: 3, preMRS: 0. No tpa or MT. Neuro exam notable for LHH, possible 4+/5 L hemiparesis. CTH w/   Impression  Core infarct in the distribution of the right PCA.    4/11 Underwent ECHO-. A large round mass measuring 3.8cm by 3cm,  is seen in the left atrium suggestive of atrial myxoma.  It appears to be attached to the atrial septum although no stalk is visualized.  4/12 CT surgery consult called  patient seen and examined in  no acute distress  family  at bedside  amenable to cardiac surgery workup  and surgery. Discussed with Stroke team benefits > risk of cardiac surgery    Of note patient febrile overnight  -tmax  101.8 on cefTRIAXone   IVPB 1000 milliGRAM(s) IV Intermittent every 24 hours for positive UA.  Blood cultures-  Cardiac cath scheduled for Wednesday 4/13. Tentative OR date for Friday April 15.      This is a 71F RH w/ AS w/ prior open heart aortic valve replacement 2019, HTN, BETSEY, former smoker, Emphysema/ chronic bronchitis, GERD s/p laparoscopic vertical sleeve gastrectomy presents with acute onset speech disturbance, vision change and gait imbalance. Per EMS, LKN: 4/9/22 at 10am per family. Pt's family reported blurry vision, gait imbalance, and speech disturbance (mild loss in fluency). She still endorses blurry vision but denies any weakness, numbness/tingling. Pt takes a baby aspirin 81mg daily. NIHSS: 3, preMRS: 0. No tpa or MT. Neuro exam notable for LHH, possible 4+/5 L hemiparesis. CTH w/   Impression  Core infarct in the distribution of the right PCA.    4/11 Underwent ECHO-. A large round mass measuring 3.8cm by 3cm,  is seen in the left atrium suggestive of atrial myxoma.  It appears to be attached to the atrial septum although no stalk is visualized.  4/12 CT surgery consult called  patient seen and examined in  no acute distress  family  at bedside  amenable to cardiac surgery workup  and surgery. Discussed with Stroke team benefits > risk of cardiac surgery    Of note patient febrile overnight  -tmax  101.8 on cefTRIAXone   IVPB 1000 milliGRAM(s) IV Intermittent every 24 hours for positive UA.  Blood cultures-  Cardiac cath scheduled for Wednesday 4/13. Tentative OR date for Friday April 15.  Plan of care d/w Dr Jay

## 2022-04-12 NOTE — CHART NOTE - NSCHARTNOTEFT_GEN_A_CORE
Obtain screening lower extremity venous ultrasound in patients who meet 1 or more of the following criteria as patient is high risk for DVT/PE on admission:   [] History of DVT/PE  []Hypercoagulable states (Factor V Leiden, Cancer, OCP, etc. )  []Prolonged immobility (hemiplegia/hemiparesis/post operative or any other extended immobilization)  [] Transferred from outside facility (Rehab or Long term care)  [] Age </= to 50
ILENE AGUSTIN is a 71F RH w/ AS w/ prior open heart aortic valve replacement 2019, HTN, BETSEY, former smoker, Emphysema/ chronic bronchitis, GERD s/p laparoscopic vertical sleeve gastrectomy presents with acute onset speech disturbance, vision change and gait imbalance. Per EMS, LKN: 4/9/22 at 10am per family. Pt's family reported blurry vision, gait imbalance, and speech disturbance (mild loss in fluency).  -Pt is not a candidate for tpa due to outside tpa window   -Pt is not a candidate for mechanical thrombectomy due to no large vessel occlusion on CTA  -Neuro exam notable for LHH, possible 4+/5 L hemiparesis.    Imaging:   -Brain CT: Acute right occipital lobe infarct in the distribution of the right PCA. No evidence for hemorrhage.  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 No hemodynamically significant stenosis within the anterior circulation.  -Brain CTA: No large vessel occlusion or stenosis. 6.7 mm anteriorly and inferiorly projecting anterior communicating artery aneurysm. Perfusion maps: Core infarct in the distribution of the right PCA. Questionable areas of brain at risk in the right PCA as well as the bilateral temporal and right frontal region.    Speech history: No reports in SCM. Pt is new to service. Pt denied history of SLP intervention PTA.    TODAY, pt seen for completion of ALPHA. Pending transfer to cardiac unit as per RN and pt verbal report. Encountered in bed Aox4. +glasses. Pt agreeable to participate with this testing. Please see below.     Subtest 6: Understanding Medicine Labels of Assessment of Language-Related Functional Activities (MAXX) performed. The patient scored 5 out of 10. This indicates a:   - indication of need for some level of assistance on this task  Of note, pt frequently reporting that she does not take medicine at specific times during the day and will take them all at once. Provided rationale for this testing.   -On section one; pt providing the name of the person prescribed the medication as the physician, when asked to name the prescribing MD. She indicated that the number of tablets was 20, which was the page number of the paper provided to the patient. The number of tablets was x4 daily. She continued to indicate that there was not enough fake tablets to complete task. Patient then placed 5 fake tablets on the calendar at the same time for that day; correct answer was 1 tablet x4 daily.   -On section two; patient frequently having difficulty locating the section to place the fake tablet and forgetting to place on the date that is today/Tuesday.   -On section three; Frequent latency and moving paper around 2/2 pt report subjectively difficult to see the printed information/details.     Notable frustration at the initiation of this task with progressive improvement however continued to demonstrate difficulty with completion of task effectively/safely and in a timely manner.     -Neuro team previously made aware of findings from initial evaluation with concern for safety if plan to d/c to home environment.     Recommendations:   -Acute REHAB   -Will remain on consult at this time

## 2022-04-12 NOTE — CONSULT NOTE ADULT - PROBLEM SELECTOR RECOMMENDATION 2
Cardiac surgery workup  MRSA  Bedside spirometry   COVID Type and screen  Cardiac Cath for Wednesday 4/13  tentative OR date Friday 4/15 with Dr Jay  Plan d/w Dr Jay
Check TTE   appears stenotic on exam

## 2022-04-12 NOTE — CONSULT NOTE ADULT - SUBJECTIVE AND OBJECTIVE BOX
History of Present Illness:   This is a 71F RH w/ AS w/ prior open heart aortic valve replacement 2019, HTN, BETSEY, former smoker, Emphysema/ chronic bronchitis, GERD s/p laparoscopic vertical sleeve gastrectomy presents with acute onset speech disturbance, vision change and gait imbalance. Per EMS, LKN: 4/9/22 at 10am per family. Pt's family reported blurry vision, gait imbalance, and speech disturbance (mild loss in fluency). Pt unable to provide full hx because she was confused on timing. She still endorses blurry vision but denies any weakness, numbness/tingling. Pt takes a baby aspirin 81mg daily. Pt ambulates without assistance or assistive devices at baseline.  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/2017 ENT note documentation/ patient to follow up with ENT prior to sx        Past Surgical History  S/P right knee arthroscopy    S/P wrist surgery  right        MEDICATIONS  (STANDING):  aspirin enteric coated 81 milliGRAM(s) Oral daily  atorvastatin 40 milliGRAM(s) Oral at bedtime  benzocaine 15 mG/menthol 3.6 mG Lozenge 1 Lozenge Oral daily  bisacodyl 5 milliGRAM(s) Oral at bedtime  cefTRIAXone   IVPB 1000 milliGRAM(s) IV Intermittent every 24 hours  cholecalciferol 1000 Unit(s) Oral daily  pantoprazole  Injectable 40 milliGRAM(s) IV Push daily  senna 1 Tablet(s) Oral daily    MEDICATIONS  (PRN):  ALBUTerol    90 MICROgram(s) HFA Inhaler 2 Puff(s) Inhalation every 6 hours PRN Shortness of Breath and/or Wheezing  aluminum hydroxide/magnesium hydroxide/simethicone Suspension 30 milliLiter(s) Oral every 4 hours PRN Dyspepsia  zolpidem 5 milliGRAM(s) Oral at bedtime PRN Insomnia      Vital Signs Last 24 Hrs  T(C): 36.4 (04-12-22 @ 09:15), Max: 38.8 (04-12-22 @ 01:35)  T(F): 97.6 (04-12-22 @ 09:15), Max: 101.8 (04-12-22 @ 01:35)  HR: 75 (04-12-22 @ 09:15) (74 - 86)  BP: 143/84 (04-12-22 @ 09:15) (124/68 - 146/73)  RR: 20 (04-12-22 @ 09:15) (18 - 23)  SpO2: 96% (04-12-22 @ 09:15) (95% - 97%)        Admit Wt: Drug Dosing Weight  Height (cm): 152.4 (09 Apr 2022 17:02)  Weight (kg): 85.5 (12 Apr 2022 02:14)  BMI (kg/m2): 36.8 (12 Apr 2022 02:14)  BSA (m2): 1.82 (12 Apr 2022 02:14)    Allergies: No Known Allergies      SOCIAL HISTORY:  Smoker: [ ] Yes  [X] No                 Pt denies  ETOH use: [ ] Yes  [X] No             Pt denies  Ilicit Drug use:  [ ] Yes  [X] No     Pt denies    FAMILY HISTORY:      Review of Systems  GENERAL:  no weakness, fatigue, fevers or chills  NEURO: no dizziness, numbness, tingling or weakness  SKIN: no itching, burning, rashes, or lesions   HEENT: no visual changes;  no headache, no vertigo, no recent colds  RESPIRATORY: no shortness of breath, no cough, sputum, wheezing  CARDIOVASCULAR:  no chest pain,  or palpitations  GI: no abd pain. no N/V/D.  PERIPHERAL VASCULAR: no swelling, no tenderness, no erythema    PHYSICAL EXAM  General: Well nourished, well developed, NAD.                                              Neuro: Normal exam oriented to person/place & time with no focal motor or sensory  deficits.                    Eyes: Normal exam of conjunctiva & lids, pupils equally reactive.   ENT: Normal exam of nasal/oral mucosa with absence of cyanosis.   Neck: Normal exam of jugular veins, trachea & thyroid.   Chest: Normal lung exam with good air movement absence of wheezes, rales, or rhonchi.                                                                         CV:  Auscultation: normal S1S2, RRR   Carotids: No Bruits[X]  Abdominal Aorta: normal [X] nonpalpable[X]                                                                         GI: Normal exam of abdomen with no noted masses or tenderness. +BSx4Q                                                                                            Extremities: Normal no evidence of cyanosis or deformity, Edema: none  Lower Extremity Pulses: Right[+2DP] Left[+2DP] Varicosities[none]  SKIN : Normal exam to inspection & palpation.                                                           LABS:                        9.5    9.40  )-----------( 320      ( 12 Apr 2022 10:57 )             30.1     04-12    139  |  100  |  14  ----------------------------<  114<H>  4.3   |  26  |  0.49<L>    Ca    8.7      12 Apr 2022 10:58    TPro  6.8  /  Alb  3.1<L>  /  TBili  0.3  /  DBili  x   /  AST  25  /  ALT  24  /  AlkPhos  76  04-12    PT/INR - ( 12 Apr 2022 10:59 )   PT: 15.0 sec;   INR: 1.30 ratio         PTT - ( 12 Apr 2022 10:59 )  PTT:30.5 sec      Cardiac Cath:    TTE / FERMIN:   History of Present Illness:   This is a 71F RH w/ AS w/ prior open heart aortic valve replacement 2019, HTN, BETSEY, former smoker, Emphysema/ chronic bronchitis, GERD s/p laparoscopic vertical sleeve gastrectomy presents with acute onset speech disturbance, vision change and gait imbalance. Per EMS, LKN: 4/9/22 at 10am per family. Pt's family reported blurry vision, gait imbalance, and speech disturbance (mild loss in fluency). Pt unable to provide full hx because she was confused on timing. She still endorses blurry vision but denies any weakness, numbness/tingling. Pt takes a baby aspirin 81mg daily. Pt ambulates without assistance or assistive devices at baseline.  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/2017 ENT note documentation/ patient to follow up with ENT prior to sx        Past Surgical History  S/P right knee arthroscopy    S/P wrist surgery  right        MEDICATIONS  (STANDING):  aspirin enteric coated 81 milliGRAM(s) Oral daily  atorvastatin 40 milliGRAM(s) Oral at bedtime  benzocaine 15 mG/menthol 3.6 mG Lozenge 1 Lozenge Oral daily  bisacodyl 5 milliGRAM(s) Oral at bedtime  cefTRIAXone   IVPB 1000 milliGRAM(s) IV Intermittent every 24 hours  cholecalciferol 1000 Unit(s) Oral daily  pantoprazole  Injectable 40 milliGRAM(s) IV Push daily  senna 1 Tablet(s) Oral daily    MEDICATIONS  (PRN):  ALBUTerol    90 MICROgram(s) HFA Inhaler 2 Puff(s) Inhalation every 6 hours PRN Shortness of Breath and/or Wheezing  aluminum hydroxide/magnesium hydroxide/simethicone Suspension 30 milliLiter(s) Oral every 4 hours PRN Dyspepsia  zolpidem 5 milliGRAM(s) Oral at bedtime PRN Insomnia      Vital Signs Last 24 Hrs  T(C): 36.4 (04-12-22 @ 09:15), Max: 38.8 (04-12-22 @ 01:35)  T(F): 97.6 (04-12-22 @ 09:15), Max: 101.8 (04-12-22 @ 01:35)  HR: 75 (04-12-22 @ 09:15) (74 - 86)  BP: 143/84 (04-12-22 @ 09:15) (124/68 - 146/73)  RR: 20 (04-12-22 @ 09:15) (18 - 23)  SpO2: 96% (04-12-22 @ 09:15) (95% - 97%)        Admit Wt: Drug Dosing Weight  Height (cm): 152.4 (09 Apr 2022 17:02)  Weight (kg): 85.5 (12 Apr 2022 02:14)  BMI (kg/m2): 36.8 (12 Apr 2022 02:14)  BSA (m2): 1.82 (12 Apr 2022 02:14)    Allergies: No Known Allergies      SOCIAL HISTORY:  Smoker: [ ] Yes  [X] No                 Pt denies  ETOH use: [ ] Yes  [X] No             Pt denies  Ilicit Drug use:  [ ] Yes  [X] No     Pt denies    FAMILY HISTORY:      Review of Systems  GENERAL:  no weakness, fatigue, fevers or chills  NEURO: no dizziness, numbness, tingling or weakness  SKIN: no itching, burning, rashes, or lesions   HEENT: blurry  visual changes;  no headache, no vertigo, no recent colds  RESPIRATORY: no shortness of breath, no cough, sputum, wheezing  CARDIOVASCULAR:  no chest pain,  or palpitations  GI: no abd pain. no N/V/D.  PERIPHERAL VASCULAR: no swelling, no tenderness, no erythema    PHYSICAL EXAM  General: Well nourished, well developed, NAD.                                              Neuro: Normal exam oriented to person/place & time with no focal motor or sensory  deficits.                    Eyes: Normal exam of conjunctiva & lids, pupils equally reactive.   ENT: Normal exam of nasal/oral mucosa with absence of cyanosis.   Neck: Normal exam of jugular veins, trachea & thyroid.   Chest: Normal lung exam with good air movement absence of wheezes, rales, or rhonchi.                                                                         CV:  Auscultation: normal S1S2, 2/6murmur   Carotids: No Bruits[X]  Abdominal Aorta: normal [X] nonpalpable[X]                                                                         GI: Normal exam of abdomen with no noted masses or tenderness. +BSx4Q                                                                                            Extremities: Normal no evidence of cyanosis or deformity, Edema: none  Lower Extremity Pulses: Right[+2DP] Left[+2DP] Varicosities[none]  SKIN : Normal exam to inspection & palpation.                                                           LABS:                        9.5    9.40  )-----------( 320      ( 12 Apr 2022 10:57 )             30.1     04-12    139  |  100  |  14  ----------------------------<  114<H>  4.3   |  26  |  0.49<L>    Ca    8.7      12 Apr 2022 10:58    TPro  6.8  /  Alb  3.1<L>  /  TBili  0.3  /  DBili  x   /  AST  25  /  ALT  24  /  AlkPhos  76  04-12    PT/INR - ( 12 Apr 2022 10:59 )   PT: 15.0 sec;   INR: 1.30 ratio         PTT - ( 12 Apr 2022 10:59 )  PTT:30.5 sec      Cardiac Cath: scheduled for  4/13    TTE / FERMIN: < from: Transthoracic Echocardiogram (04.11.22 @ 10:18) >  Conclusions:  1. Normal mitral valve. Mild-moderate mitral regurgitation.  2. Bioprosthetic aortic valve.  The valve is well seated.  Peak transaortic valve gradient equals 56 mm Hg, mean  transaortic valve gradient equals 38 mm Hg, which is  elevated even in the presence of a bioprosthetic aortic  valve. However the Di is 0.34, the elevated gradients may  be secondary to the hyperdynamic left ventricle.  Mild  aortic regurgitation.  3. Moderately dilated left atrium.  LA volume index = 46  cc/m2. A large round mass measuring 3.8cm by 3cm,  is seen  in the left atrium suggestive of atrial myxoma.  It appears  to be attached to the atrial septum although no stalk is  visualized.  Endocardial visualization enhanced with intravenous  injection of Ultrasonic Enhancing Agent (Definity), the  mass appears to be vascular as it demonstrates uptake of  Definity.  4. Hyperdynamic left ventricular systolic function.  Endocardial visualization enhanced with intravenous  injection of Ultrasonic Enhancing Agent (Definity).  No  left ventricular thrombus.  *** No previous Echo exam.  Images reviewed and results discussed with Dr. Iker Lanza.    < end of copied text >     History of Present Illness:   This is a 71F RH w/ AS w/ prior open heart aortic valve replacement 2019, HTN, BETSEY, former smoker, Emphysema/ chronic bronchitis, GERD s/p laparoscopic vertical sleeve gastrectomy presents with acute onset speech disturbance, vision change and gait imbalance. Per EMS, LKN: 4/9/22 at 10am per family. Pt's family reported blurry vision, gait imbalance, and speech disturbance (mild loss in fluency). Pt unable to provide full hx because she was confused on timing. She still endorses blurry vision but denies any weakness, numbness/tingling. Pt takes a baby aspirin 81mg daily. Pt ambulates without assistance or assistive devices at baseline.  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/2017 ENT note documentation/ patient to follow up with ENT prior to sx        Past Surgical History  S/P right knee arthroscopy    S/P wrist surgery  right        MEDICATIONS  (STANDING):  aspirin enteric coated 81 milliGRAM(s) Oral daily  atorvastatin 40 milliGRAM(s) Oral at bedtime  benzocaine 15 mG/menthol 3.6 mG Lozenge 1 Lozenge Oral daily  bisacodyl 5 milliGRAM(s) Oral at bedtime  cefTRIAXone   IVPB 1000 milliGRAM(s) IV Intermittent every 24 hours  cholecalciferol 1000 Unit(s) Oral daily  pantoprazole  Injectable 40 milliGRAM(s) IV Push daily  senna 1 Tablet(s) Oral daily    MEDICATIONS  (PRN):  ALBUTerol    90 MICROgram(s) HFA Inhaler 2 Puff(s) Inhalation every 6 hours PRN Shortness of Breath and/or Wheezing  aluminum hydroxide/magnesium hydroxide/simethicone Suspension 30 milliLiter(s) Oral every 4 hours PRN Dyspepsia  zolpidem 5 milliGRAM(s) Oral at bedtime PRN Insomnia      Vital Signs Last 24 Hrs  T(C): 36.4 (04-12-22 @ 09:15), Max: 38.8 (04-12-22 @ 01:35)  T(F): 97.6 (04-12-22 @ 09:15), Max: 101.8 (04-12-22 @ 01:35)  HR: 75 (04-12-22 @ 09:15) (74 - 86)  BP: 143/84 (04-12-22 @ 09:15) (124/68 - 146/73)  RR: 20 (04-12-22 @ 09:15) (18 - 23)  SpO2: 96% (04-12-22 @ 09:15) (95% - 97%)        Admit Wt: Drug Dosing Weight  Height (cm): 152.4 (09 Apr 2022 17:02)  Weight (kg): 85.5 (12 Apr 2022 02:14)  BMI (kg/m2): 36.8 (12 Apr 2022 02:14)  BSA (m2): 1.82 (12 Apr 2022 02:14)    Allergies: No Known Allergies      SOCIAL HISTORY:  Smoker: [ ] Yes  [X] No                 Pt denies  ETOH use: [ ] Yes  [X] No             Pt denies  Ilicit Drug use:  [ ] Yes  [X] No     Pt denies    FAMILY HISTORY:      Review of Systems  GENERAL:  no weakness, fatigue, fevers or chills  NEURO: no dizziness, numbness, tingling or weakness  SKIN: no itching, burning, rashes, or lesions   HEENT: blurry  visual changes;  no headache, no vertigo, no recent colds  RESPIRATORY: no shortness of breath, no cough, sputum, wheezing  CARDIOVASCULAR:  no chest pain,  or palpitations  GI: no abd pain. no N/V/D.  PERIPHERAL VASCULAR: no swelling, no tenderness, no erythema    PHYSICAL EXAM  General: Well nourished, well developed, NAD.                                              Neuro: Normal exam oriented to person/place & time with no focal motor or sensory  deficits.                    Eyes: Normal exam of conjunctiva & lids, pupils equally reactive.   ENT: Normal exam of nasal/oral mucosa with absence of cyanosis.   Neck: Normal exam of jugular veins, trachea & thyroid.   Chest: Normal lung exam with good air movement absence of wheezes, rales, or rhonchi.                                                                         CV:  Auscultation: normal S1S2, 2/6murmur   Carotids: No Bruits[X]  Abdominal Aorta: normal [X] nonpalpable[X]                                                                         GI: Normal exam of abdomen with no noted masses or tenderness. +BSx4Q                                                                                            Extremities: Normal no evidence of cyanosis or deformity, Edema: none  Lower Extremity Pulses: Right[+2DP] Left[+2DP] Varicosities[none]  SKIN : Normal exam to inspection & palpation.    focal Mid sternal erythema                                                           LABS:                        9.5    9.40  )-----------( 320      ( 12 Apr 2022 10:57 )             30.1     04-12    139  |  100  |  14  ----------------------------<  114<H>  4.3   |  26  |  0.49<L>    Ca    8.7      12 Apr 2022 10:58    TPro  6.8  /  Alb  3.1<L>  /  TBili  0.3  /  DBili  x   /  AST  25  /  ALT  24  /  AlkPhos  76  04-12    PT/INR - ( 12 Apr 2022 10:59 )   PT: 15.0 sec;   INR: 1.30 ratio         PTT - ( 12 Apr 2022 10:59 )  PTT:30.5 sec      Cardiac Cath: scheduled for  4/13    TTE / FERMIN: < from: Transthoracic Echocardiogram (04.11.22 @ 10:18) >  Conclusions:  1. Normal mitral valve. Mild-moderate mitral regurgitation.  2. Bioprosthetic aortic valve.  The valve is well seated.  Peak transaortic valve gradient equals 56 mm Hg, mean  transaortic valve gradient equals 38 mm Hg, which is  elevated even in the presence of a bioprosthetic aortic  valve. However the Di is 0.34, the elevated gradients may  be secondary to the hyperdynamic left ventricle.  Mild  aortic regurgitation.  3. Moderately dilated left atrium.  LA volume index = 46  cc/m2. A large round mass measuring 3.8cm by 3cm,  is seen  in the left atrium suggestive of atrial myxoma.  It appears  to be attached to the atrial septum although no stalk is  visualized.  Endocardial visualization enhanced with intravenous  injection of Ultrasonic Enhancing Agent (Definity), the  mass appears to be vascular as it demonstrates uptake of  Definity.  4. Hyperdynamic left ventricular systolic function.  Endocardial visualization enhanced with intravenous  injection of Ultrasonic Enhancing Agent (Definity).  No  left ventricular thrombus.  *** No previous Echo exam.  Images reviewed and results discussed with Dr. Iker Lanza.    < end of copied text >

## 2022-04-12 NOTE — CONSULT NOTE ADULT - ASSESSMENT
71 year old with AVR; COPD, GERD, GBP, presented with recent cva.  During workup, an echo revealed an atrial myxoma.  Last 24 she had a fever of 101. Denies -prior fevers, but states she was treated for pna prior to this admission  She is very confused but denies any complaints at present     currently on ceftriaxone; bc pending UA negative  no obvious source of fever, atrial myxomas can cause fever but it is a diagnosis of exclusion .  Need to also rule out PVE.  will order chest xray.  will discuss  with [facility]

## 2022-04-12 NOTE — PROGRESS NOTE ADULT - SUBJECTIVE AND OBJECTIVE BOX
Patient is a 71y old  Female who presents with a chief complaint of concern for stroke (11 Apr 2022 10:46)    Patient sitting in bed.  Comfortable.  Tolerating therapies.    MEDICATIONS  (STANDING):  aspirin enteric coated 81 milliGRAM(s) Oral daily  atorvastatin 40 milliGRAM(s) Oral at bedtime  benzocaine 15 mG/menthol 3.6 mG Lozenge 1 Lozenge Oral daily  bisacodyl 5 milliGRAM(s) Oral at bedtime  cefTRIAXone   IVPB 1000 milliGRAM(s) IV Intermittent every 24 hours  cholecalciferol 1000 Unit(s) Oral daily  enoxaparin Injectable 40 milliGRAM(s) SubCutaneous every 24 hours  pantoprazole  Injectable 40 milliGRAM(s) IV Push daily  senna 1 Tablet(s) Oral daily    MEDICATIONS  (PRN):  ALBUTerol    90 MICROgram(s) HFA Inhaler 2 Puff(s) Inhalation every 6 hours PRN Shortness of Breath and/or Wheezing  aluminum hydroxide/magnesium hydroxide/simethicone Suspension 30 milliLiter(s) Oral every 4 hours PRN Dyspepsia  zolpidem 5 milliGRAM(s) Oral at bedtime PRN Insomnia    Vital Signs Last 24 Hrs  T(C): 36.4 (12 Apr 2022 09:15), Max: 38.8 (12 Apr 2022 01:35)  T(F): 97.6 (12 Apr 2022 09:15), Max: 101.8 (12 Apr 2022 01:35)  HR: 75 (12 Apr 2022 09:15) (74 - 86)  BP: 143/84 (12 Apr 2022 09:15) (124/68 - 146/73)  BP(mean): --  RR: 20 (12 Apr 2022 09:15) (18 - 23)  SpO2: 96% (12 Apr 2022 09:15) (95% - 97%)    PHYSICAL EXAM  Constitutional - NAD, Comfortable  HEENT - NCAT, EOMI  Neck - Supple, No limited ROM  Chest - CTA bilaterally, No wheeze, No rhonchi, No crackles  Cardiovascular - RRR, S1S2, No murmurs  Abdomen - BS+, Soft, NTND  Extremities - No C/C/E, No calf tenderness   Neurologic Exam -                 SERVIN x 4  Speech appropriate  Follows verbal instruction  Motor nml grade  Psychiatric - Normal                          8.7    9.23  )-----------( 292      ( 12 Apr 2022 05:56 )             27.9     04-12    138  |  103  |  15  ----------------------------<  121<H>  3.9   |  25  |  0.58    Ca    8.7      12 Apr 2022 05:53    Impression:  70 yo with functional deficits secondary to diagnosis of CVA    Plan:  PT- ROM, Bed Mob, Transfers, Amb w AD and bracing as needed  OT- ADLs, bracing  SLP- Dysphagia eval and treat  Prec- Falls, Cardiac  Monitor anemia  DVT Prophylaxis- Lovenox  Skin- Turn q2 h  Dispo- Acute Rehab- can tolerate 3h/d of therapies and requires daily physician visits

## 2022-04-12 NOTE — CONSULT NOTE ADULT - SUBJECTIVE AND OBJECTIVE BOX
Patient is a 71y old  Female who presents with a chief complaint of concern for stroke (12 Apr 2022 14:36)    HPI:  71F RH w/ AS w/ prior open heart aortic valve replacement 2019, HTN, BETSEY, former smoker, Emphysema/ chronic bronchitis, GERD s/p laparoscopic vertical sleeve gastrectomy presents with acute onset speech disturbance, vision change and gait imbalance. Per EMS, LKN: 4/9/22 at 10am per family. Pt's family reported blurry vision, gait imbalance, and speech disturbance (mild loss in fluency). Pt unable to provide full hx because she was confused on timing. She still endorses blurry vision but denies any weakness, numbness/tingling. Pt takes a baby aspirin 81mg daily. Pt ambulates without assistance or assistive devices at baseline.    (Stroke only)  LKN: 4/9/22 at 10am  NIHSS: 3  preMRS: 0  Pt is not a candidate for tpa due to outside tpa window   Pt is not a candidate for mechanical thrombectomy due to no large vessel occlusion on CTA     (09 Apr 2022 17:56)      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        Social history:         Allergic/Immunologic:	No hives or rash   Allergies    No Known Allergies    Intolerances        Antimicrobials:    cefTRIAXone   IVPB 1000 milliGRAM(s) IV Intermittent every 24 hours        Vital Signs Last 24 Hrs  T(C): 36.7 (12 Apr 2022 13:12), Max: 38.8 (12 Apr 2022 01:35)  T(F): 98 (12 Apr 2022 13:12), Max: 101.8 (12 Apr 2022 01:35)  HR: 80 (12 Apr 2022 13:12) (74 - 86)  BP: 122/75 (12 Apr 2022 13:12) (122/75 - 143/84)  BP(mean): --  RR: 19 (12 Apr 2022 13:12) (18 - 20)  SpO2: 95% (12 Apr 2022 13:12) (95% - 96%)              No cachexia     Eyes:PERRL EOMI.NO discharge or conjunctival injection    ENMT:No sinus tenderness.No thrush.No pharyngeal exudate or erythema.Fair dental hygiene    Neck:Supple,No LN,no JVD      Respiratory:Good air entry bilaterally,CTA    Cardiovascular:S1 S2 wnl, No murmurs,rub or gallops    Gastrointestinal:Soft BS(+) no tenderness no masses ,No rebound or guarding    Genitourinary:No CVA tendereness     Rectal:    Extremities:No cyanosis,clubbing or edema.    Vascular:peripheral pulses felt    Neurological:AAO X 3,No grossly focal deficits    Skin:No rash     Lymph Nodes:No palpable LNs                                     9.5    9.40  )-----------( 320      ( 12 Apr 2022 10:57 )             30.1         04-12    139  |  100  |  14  ----------------------------<  114<H>  4.3   |  26  |  0.49<L>    Ca    8.7      12 Apr 2022 10:58    TPro  6.8  /  Alb  3.1<L>  /  TBili  0.3  /  DBili  x   /  AST  25  /  ALT  24  /  AlkPhos  76  04-12      RECENT CULTURES:      MICROBIOLOGY:          Radiology:      Assessment:        Recommendations and Plan:    Pager [phone]  After 5 pm/weekends or if no response :[phone]

## 2022-04-12 NOTE — CONSULT NOTE ADULT - PROBLEM SELECTOR RECOMMENDATION 3
febrile 101.8 tmax 4/12 febrile 101.8 tmax 0100 am  + UTI  cefTRIAXone   IVPB 1000 milliGRAM(s) IV Intermittent every 24 hoursx 3 days    4/12 Blood cultures pending

## 2022-04-12 NOTE — CONSULT NOTE ADULT - PROBLEM SELECTOR RECOMMENDATION 9
Frequent neuro-checks q4h and VS q4h; STAT CTH for change in neuro exam  - Permissive HTN up to 220/110 for 24-48h from symptom onset followed by gradual normotension over 2-3 days   ASA and statin   Check TTE   Monitor on Tele
Cleared by Stroke team for cardiac cath and OHS  6.7 mm anteriorly and   inferiorly projecting anterior communicating artery aneurysm.  Perfusion maps: Core infarct in the distribution of the right PCA.   Questionable areas of brain at risk in the right PCA as well as the   bilateral temporal and right frontal region.

## 2022-04-13 LAB
ALBUMIN SERPL ELPH-MCNC: 2.8 G/DL — LOW (ref 3.3–5)
ALP SERPL-CCNC: 61 U/L — SIGNIFICANT CHANGE UP (ref 40–120)
ALT FLD-CCNC: 26 U/L — SIGNIFICANT CHANGE UP (ref 10–45)
ANION GAP SERPL CALC-SCNC: 8 MMOL/L — SIGNIFICANT CHANGE UP (ref 5–17)
AST SERPL-CCNC: 29 U/L — SIGNIFICANT CHANGE UP (ref 10–40)
BILIRUB SERPL-MCNC: 0.1 MG/DL — LOW (ref 0.2–1.2)
BLD GP AB SCN SERPL QL: NEGATIVE — SIGNIFICANT CHANGE UP
BUN SERPL-MCNC: 13 MG/DL — SIGNIFICANT CHANGE UP (ref 7–23)
CALCIUM SERPL-MCNC: 8.5 MG/DL — SIGNIFICANT CHANGE UP (ref 8.4–10.5)
CHLORIDE SERPL-SCNC: 102 MMOL/L — SIGNIFICANT CHANGE UP (ref 96–108)
CO2 SERPL-SCNC: 29 MMOL/L — SIGNIFICANT CHANGE UP (ref 22–31)
CREAT SERPL-MCNC: 0.83 MG/DL — SIGNIFICANT CHANGE UP (ref 0.5–1.3)
EGFR: 75 ML/MIN/1.73M2 — SIGNIFICANT CHANGE UP
GLUCOSE BLDC GLUCOMTR-MCNC: 128 MG/DL — HIGH (ref 70–99)
GLUCOSE BLDC GLUCOMTR-MCNC: 136 MG/DL — HIGH (ref 70–99)
GLUCOSE BLDC GLUCOMTR-MCNC: 160 MG/DL — HIGH (ref 70–99)
GLUCOSE SERPL-MCNC: 120 MG/DL — HIGH (ref 70–99)
HCT VFR BLD CALC: 27 % — LOW (ref 34.5–45)
HGB BLD-MCNC: 8.3 G/DL — LOW (ref 11.5–15.5)
MCHC RBC-ENTMCNC: 28.6 PG — SIGNIFICANT CHANGE UP (ref 27–34)
MCHC RBC-ENTMCNC: 30.7 GM/DL — LOW (ref 32–36)
MCV RBC AUTO: 93.1 FL — SIGNIFICANT CHANGE UP (ref 80–100)
NRBC # BLD: 0 /100 WBCS — SIGNIFICANT CHANGE UP (ref 0–0)
PLATELET # BLD AUTO: 287 K/UL — SIGNIFICANT CHANGE UP (ref 150–400)
POTASSIUM SERPL-MCNC: 4.6 MMOL/L — SIGNIFICANT CHANGE UP (ref 3.5–5.3)
POTASSIUM SERPL-SCNC: 4.6 MMOL/L — SIGNIFICANT CHANGE UP (ref 3.5–5.3)
PROT SERPL-MCNC: 5.9 G/DL — LOW (ref 6–8.3)
RBC # BLD: 2.9 M/UL — LOW (ref 3.8–5.2)
RBC # FLD: 13.3 % — SIGNIFICANT CHANGE UP (ref 10.3–14.5)
RH IG SCN BLD-IMP: POSITIVE — SIGNIFICANT CHANGE UP
RH IG SCN BLD-IMP: POSITIVE — SIGNIFICANT CHANGE UP
SODIUM SERPL-SCNC: 139 MMOL/L — SIGNIFICANT CHANGE UP (ref 135–145)
WBC # BLD: 8.09 K/UL — SIGNIFICANT CHANGE UP (ref 3.8–10.5)
WBC # FLD AUTO: 8.09 K/UL — SIGNIFICANT CHANGE UP (ref 3.8–10.5)

## 2022-04-13 PROCEDURE — 99232 SBSQ HOSP IP/OBS MODERATE 35: CPT

## 2022-04-13 PROCEDURE — 99152 MOD SED SAME PHYS/QHP 5/>YRS: CPT

## 2022-04-13 PROCEDURE — 93454 CORONARY ARTERY ANGIO S&I: CPT | Mod: 26

## 2022-04-13 RX ORDER — LANOLIN ALCOHOL/MO/W.PET/CERES
3 CREAM (GRAM) TOPICAL ONCE
Refills: 0 | Status: COMPLETED | OUTPATIENT
Start: 2022-04-13 | End: 2022-04-13

## 2022-04-13 RX ORDER — PANTOPRAZOLE SODIUM 20 MG/1
40 TABLET, DELAYED RELEASE ORAL
Refills: 0 | Status: DISCONTINUED | OUTPATIENT
Start: 2022-04-13 | End: 2022-04-18

## 2022-04-13 RX ADMIN — Medication 81 MILLIGRAM(S): at 16:44

## 2022-04-13 RX ADMIN — CEFTRIAXONE 100 MILLIGRAM(S): 500 INJECTION, POWDER, FOR SOLUTION INTRAMUSCULAR; INTRAVENOUS at 02:49

## 2022-04-13 RX ADMIN — Medication 1000 UNIT(S): at 16:45

## 2022-04-13 RX ADMIN — ATORVASTATIN CALCIUM 40 MILLIGRAM(S): 80 TABLET, FILM COATED ORAL at 21:15

## 2022-04-13 RX ADMIN — BENZOCAINE AND MENTHOL 1 LOZENGE: 5; 1 LIQUID ORAL at 16:55

## 2022-04-13 RX ADMIN — SENNA PLUS 1 TABLET(S): 8.6 TABLET ORAL at 16:54

## 2022-04-13 RX ADMIN — Medication 3 MILLIGRAM(S): at 23:47

## 2022-04-13 RX ADMIN — Medication 5 MILLIGRAM(S): at 21:15

## 2022-04-13 RX ADMIN — Medication 1: at 16:44

## 2022-04-13 NOTE — PROGRESS NOTE ADULT - SUBJECTIVE AND OBJECTIVE BOX
Subjective: Patient seen and examined. No new events except as noted.     REVIEW OF SYSTEMS:    CONSTITUTIONAL:+ weakness, fevers or chills  EYES/ENT: No visual changes;  No vertigo or throat pain   NECK: No pain or stiffness  RESPIRATORY: No cough, wheezing, hemoptysis; No shortness of breath  CARDIOVASCULAR: No chest pain or palpitations  GASTROINTESTINAL: No abdominal or epigastric pain. No nausea, vomiting, or hematemesis; No diarrhea or constipation. No melena or hematochezia.  GENITOURINARY: No dysuria, frequency or hematuria  NEUROLOGICAL: No numbness or weakness  SKIN: No itching, burning, rashes, or lesions   All other review of systems is negative unless indicated above.    MEDICATIONS:  MEDICATIONS  (STANDING):  aspirin enteric coated 81 milliGRAM(s) Oral daily  atorvastatin 40 milliGRAM(s) Oral at bedtime  benzocaine 15 mG/menthol 3.6 mG Lozenge 1 Lozenge Oral daily  bisacodyl 5 milliGRAM(s) Oral at bedtime  cefTRIAXone   IVPB 1000 milliGRAM(s) IV Intermittent every 24 hours  cholecalciferol 1000 Unit(s) Oral daily  insulin lispro (ADMELOG) corrective regimen sliding scale   SubCutaneous three times a day before meals  pantoprazole  Injectable 40 milliGRAM(s) IV Push daily  senna 1 Tablet(s) Oral daily      PHYSICAL EXAM:  T(C): 37 (04-13-22 @ 04:42), Max: 37 (04-13-22 @ 04:42)  HR: 66 (04-13-22 @ 04:42) (66 - 93)  BP: 107/64 (04-13-22 @ 04:42) (107/64 - 143/84)  RR: 18 (04-13-22 @ 04:42) (18 - 20)  SpO2: 97% (04-13-22 @ 04:42) (95% - 98%)  Wt(kg): --  I&O's Summary    12 Apr 2022 07:01  -  13 Apr 2022 07:00  --------------------------------------------------------  IN: 600 mL / OUT: 700 mL / NET: -100 mL    13 Apr 2022 07:01  -  13 Apr 2022 08:42  --------------------------------------------------------  IN: 177 mL / OUT: 0 mL / NET: 177 mL          Appearance: NAD	  HEENT:   Normal oral mucosa, PERRL, EOMI	  Lymphatic: No lymphadenopathy , no edema  Cardiovascular: Normal S1 S2, No JVD, + systlic murmurs , Peripheral pulses palpable 2+ bilaterally  Respiratory: Lungs clear to auscultation, normal effort 	  Gastrointestinal:  Soft, Non-tender, + BS	  Skin: No rashes, No ecchymoses, No cyanosis, warm to touch  Musculoskeletal: Normal range of motion, normal strength  Psychiatry:  Mood & affect appropriate  Ext: No edema      LABS:    CARDIAC MARKERS:  CARDIAC MARKERS ( 10 Apr 2022 13:58 )  x     / x     / 58 U/L / x     / 2.2 ng/mL    c                            8.3    8.09  )-----------( 287      ( 13 Apr 2022 05:30 )             27.0     04-13    139  |  102  |  13  ----------------------------<  120<H>  4.6   |  29  |  0.83    Ca    8.5      13 Apr 2022 05:30    TPro  5.9<L>  /  Alb  2.8<L>  /  TBili  0.1<L>  /  DBili  x   /  AST  29  /  ALT  26  /  AlkPhos  61  04-13    proBNP: Serum Pro-Brain Natriuretic Peptide: 1462 pg/mL (04-12 @ 10:58)    Lipid Profile:   HgA1c:   TSH: Thyroid Stimulating Hormone, Serum: 1.00 uIU/mL (04-12 @ 15:28)      TELEMETRY: 	  SR  ECG:  	  RADIOLOGY:   DIAGNOSTIC TESTING:  [ ] Echocardiogram:  [ ]  Catheterization:  [ ] Stress Test:    OTHER:

## 2022-04-13 NOTE — PROGRESS NOTE ADULT - PROBLEM SELECTOR PLAN 1
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 peña consulted  Plan for [facility] likely tomorrow   Follow up Bcx results

## 2022-04-13 NOTE — PROGRESS NOTE ADULT - SUBJECTIVE AND OBJECTIVE BOX
VITAL SIGNS    Telemetry:    Vital Signs Last 24 Hrs  T(C): 37 (22 @ 04:42), Max: 37 (22 @ 04:42)  T(F): 98.6 (22 @ 04:42), Max: 98.6 (22 @ 04:42)  HR: 66 (22 @ 04:42) (66 - 93)  BP: 107/64 (22 @ 04:42) (107/64 - 143/84)  RR: 18 (22 @ 04:42) (18 - 20)  SpO2: 97% (22 @ 04:42) (95% - 98%)             @ 07:01  -   @ 07:00  --------------------------------------------------------  IN: 600 mL / OUT: 700 mL / NET: -100 mL     @ 07:01  -   @ 09:00  --------------------------------------------------------  IN: 177 mL / OUT: 0 mL / NET: 177 mL       Daily     Daily Weight in k.6 (2022 08:15)  Admit Wt: Drug Dosing Weight  Height (cm): 152.4 (2022 17:02)  Weight (kg): 85.5 (2022 02:14)  BMI (kg/m2): 36.8 (2022 02:14)  BSA (m2): 1.82 (2022 02:14)    Bilirubin Total, Serum: 0.1 mg/dL ( @ 05:30)  Bilirubin Total, Serum: 0.3 mg/dL ( @ 10:58)    CAPILLARY BLOOD GLUCOSE      POCT Blood Glucose.: 128 mg/dL (2022 07:42)          ALBUTerol    90 MICROgram(s) HFA Inhaler 2 Puff(s) Inhalation every 6 hours PRN  aluminum hydroxide/magnesium hydroxide/simethicone Suspension 30 milliLiter(s) Oral every 4 hours PRN  aspirin enteric coated 81 milliGRAM(s) Oral daily  atorvastatin 40 milliGRAM(s) Oral at bedtime  benzocaine 15 mG/menthol 3.6 mG Lozenge 1 Lozenge Oral daily  bisacodyl 5 milliGRAM(s) Oral at bedtime  cefTRIAXone   IVPB 1000 milliGRAM(s) IV Intermittent every 24 hours  cholecalciferol 1000 Unit(s) Oral daily  insulin lispro (ADMELOG) corrective regimen sliding scale   SubCutaneous three times a day before meals  pantoprazole  Injectable 40 milliGRAM(s) IV Push daily  senna 1 Tablet(s) Oral daily      PHYSICAL EXAM    Subjective: "Hi.   Neurology: alert and oriented x 3, nonfocal, no gross deficits  CV : tele:  RSR  Sternal Wound :  CDI with dressing , Stable  Lungs: clear. RR easy, unlabored   Abdomen: soft, nontender, nondistended, positive bowel sounds, bowel movement   Neg N/V/D   :  pt voiding without difficulty   Extremities:   SERVIN; edema, neg calf tenderness.   PPP bilaterally      PW:  Chest tubes:                 VITAL SIGNS    Telemetry:  rsr 60-90   Vital Signs Last 24 Hrs  T(C): 36.7 (22 @ 10:42), Max: 37 (22 @ 04:42)  T(F): 98.1 (22 @ 10:42), Max: 98.6 (22 @ 04:42)  HR: 76 (22 @ 10:42) (66 - 93)  BP: 103/61 (22 @ 10:42) (103/61 - 129/77)  RR: 18 (22 @ 10:42) (18 - 19)  SpO2: 96% (22 @ 10:42) (95% - 98%)             @ 07:01  -   @ 07:00  --------------------------------------------------------  IN: 600 mL / OUT: 700 mL / NET: -100 mL     @ 07:01  -   @ 11:34  --------------------------------------------------------  IN: 177 mL / OUT: 0 mL / NET: 177 mL       Daily     Daily Weight in k.6 (2022 08:15)  Admit Wt: Drug Dosing Weight  Height (cm): 152.4 (2022 17:02)  Weight (kg): 85.5 (2022 02:14)  BMI (kg/m2): 36.8 (2022 02:14)  BSA (m2): 1.82 (2022 02:14)    Bilirubin Total, Serum: 0.1 mg/dL ( @ 05:30)    CAPILLARY BLOOD GLUCOSE      POCT Blood Glucose.: 128 mg/dL (2022 07:42)          ALBUTerol    90 MICROgram(s) HFA Inhaler 2 Puff(s) Inhalation every 6 hours PRN  aluminum hydroxide/magnesium hydroxide/simethicone Suspension 30 milliLiter(s) Oral every 4 hours PRN  aspirin enteric coated 81 milliGRAM(s) Oral daily  atorvastatin 40 milliGRAM(s) Oral at bedtime  benzocaine 15 mG/menthol 3.6 mG Lozenge 1 Lozenge Oral daily  bisacodyl 5 milliGRAM(s) Oral at bedtime  cefTRIAXone   IVPB 1000 milliGRAM(s) IV Intermittent every 24 hours  cholecalciferol 1000 Unit(s) Oral daily  insulin lispro (ADMELOG) corrective regimen sliding scale   SubCutaneous three times a day before meals  pantoprazole    Tablet 40 milliGRAM(s) Oral before breakfast  senna 1 Tablet(s) Oral daily      PHYSICAL EXAM    Subjective: "What am I having done today?"   Neurology: alert and oriented x 3, nonfocal, no gross deficits  CV : tele:  RSR 60-90  + scab noted left sternum   Lungs: clear. RR easy, unlabored   Abdomen: soft, nontender, nondistended, positive bowel sounds, + bowel movement   Neg N/V/D   :  pt voiding without difficulty   Extremities:   SERVIN; neg LE edema, neg calf tenderness.   PPP bilaterally                    VITAL SIGNS    Telemetry:  rsr 60-90   Vital Signs Last 24 Hrs  T(C): 36.7 (22 @ 10:42), Max: 37 (22 @ 04:42)  T(F): 98.1 (22 @ 10:42), Max: 98.6 (22 @ 04:42)  HR: 76 (22 @ 10:42) (66 - 93)  BP: 103/61 (22 @ 10:42) (103/61 - 129/77)  RR: 18 (22 @ 10:42) (18 - 19)  SpO2: 96% (22 @ 10:42) (95% - 98%)             @ 07:01  -   @ 07:00  --------------------------------------------------------  IN: 600 mL / OUT: 700 mL / NET: -100 mL     @ 07:01  -   @ 11:34  --------------------------------------------------------  IN: 177 mL / OUT: 0 mL / NET: 177 mL       Daily     Daily Weight in k.6 (2022 08:15)  Admit Wt: Drug Dosing Weight  Height (cm): 152.4 (2022 17:02)  Weight (kg): 85.5 (2022 02:14)  BMI (kg/m2): 36.8 (2022 02:14)  BSA (m2): 1.82 (2022 02:14)    Bilirubin Total, Serum: 0.1 mg/dL ( @ 05:30)    CAPILLARY BLOOD GLUCOSE      POCT Blood Glucose.: 128 mg/dL (2022 07:42)          ALBUTerol    90 MICROgram(s) HFA Inhaler 2 Puff(s) Inhalation every 6 hours PRN  aluminum hydroxide/magnesium hydroxide/simethicone Suspension 30 milliLiter(s) Oral every 4 hours PRN  aspirin enteric coated 81 milliGRAM(s) Oral daily  atorvastatin 40 milliGRAM(s) Oral at bedtime  benzocaine 15 mG/menthol 3.6 mG Lozenge 1 Lozenge Oral daily  bisacodyl 5 milliGRAM(s) Oral at bedtime  cefTRIAXone   IVPB 1000 milliGRAM(s) IV Intermittent every 24 hours  cholecalciferol 1000 Unit(s) Oral daily  insulin lispro (ADMELOG) corrective regimen sliding scale   SubCutaneous three times a day before meals  pantoprazole    Tablet 40 milliGRAM(s) Oral before breakfast  senna 1 Tablet(s) Oral daily      PHYSICAL EXAM    Subjective: "What am I having done today?"   Neurology: alert and oriented x 3, nonfocal, no gross deficits  CV : tele:  RSR 60-90  + scab noted left sternum   Lungs: clear. RR easy, unlabored   Abdomen: soft, nontender, nondistended, positive bowel sounds, + bowel movement   Neg N/V/D   :  pt voiding without difficulty   Extremities:   SERVIN; neg LE edema, neg calf tenderness.  PPP bilaterally

## 2022-04-13 NOTE — PROGRESS NOTE ADULT - ASSESSMENT
This is a 71F RH w/ AS w/ prior open heart aortic valve replacement 2019, HTN, BETSEY, former smoker, Emphysema/ chronic bronchitis, GERD s/p laparoscopic vertical sleeve gastrectomy presents with acute onset speech disturbance, vision change and gait imbalance. Per EMS, LKN: 4/9/22 at 10am per family. Pt's family reported blurry vision, gait imbalance, and speech disturbance (mild loss in fluency). She still endorses blurry vision but denies any weakness, numbness/tingling. Pt takes a baby aspirin 81mg daily. NIHSS: 3, preMRS: 0. No tpa or MT. Neuro exam notable for LHH, possible 4+/5 L hemiparesis. CTH w/   Impression  Core infarct in the distribution of the right PCA.    4/11 Underwent ECHO-. A large round mass measuring 3.8cm by 3cm,  is seen in the left atrium suggestive of atrial myxoma.  It appears to be attached to the atrial septum although no stalk is visualized.  4/12 CT surgery consult called  patient seen and examined in  no acute distress  family  at bedside  amenable to cardiac surgery workup  and surgery. Discussed with Stroke team benefits > risk of cardiac surgery    Of note patient febrile overnight  -tmax  101.8 on cefTRIAXone   IVPB 1000 milliGRAM(s) IV Intermittent every 24 hours for positive UA.  Blood cultures-  Cardiac cath scheduled for Wednesday 4/13. Tentative OR date for Friday April 15.  Plan of care d/w Dr Jay      This is a 71F RH w/ AS w/ prior open heart aortic valve replacement 2019, HTN, BETSEY, former smoker, Emphysema/ chronic bronchitis, GERD s/p laparoscopic vertical sleeve gastrectomy presents with acute onset speech disturbance, vision change and gait imbalance. Per EMS, LKN: 4/9/22 at 10am per family. Pt's family reported blurry vision, gait imbalance, and speech disturbance (mild loss in fluency). She still endorses blurry vision but denies any weakness, numbness/tingling. Pt takes a baby aspirin 81mg daily. NIHSS: 3, preMRS: 0. No tpa or MT. Neuro exam notable for LHH, possible 4+/5 L hemiparesis. CTH w/   Impression  Core infarct in the distribution of the right PCA.    4/11 Underwent ECHO-. A large round mass measuring 3.8cm by 3cm,  is seen in the left atrium suggestive of atrial myxoma.  It appears to be attached to the atrial septum although no stalk is visualized.  4/12 CT surgery consult called  patient seen and examined in  no acute distress  family  at bedside  amenable to cardiac surgery workup  and surgery. Discussed with Stroke team benefits > risk of cardiac surgery    Of note patient febrile overnight  -tmax  101.8 on cefTRIAXone   IVPB 1000 milliGRAM(s) IV Intermittent every 24 hours for positive UA.  Blood cultures-  Cardiac cath scheduled for Wednesday 4/13. Tentative OR date for Friday April 15.  4/13 VSS; cath done; wbc 8 pt afebrile; + ceftriaxone for UTI; repeat UA neg 4//12- BC testing- ID not clearing the patient for OR for am 4/14  as per neuro- pt cleared for OHS      This is a 71F RH w/ AS w/ prior open heart aortic valve replacement 2019, HTN, BETSEY, former smoker, Emphysema/ chronic bronchitis, GERD s/p laparoscopic vertical sleeve gastrectomy presents with acute onset speech disturbance, vision change and gait imbalance. Per EMS, LKN: 4/9/22 at 10am per family. Pt's family reported blurry vision, gait imbalance, and speech disturbance (mild loss in fluency). She still endorses blurry vision but denies any weakness, numbness/tingling. Pt takes a baby aspirin 81mg daily. NIHSS: 3, preMRS: 0. No tpa or MT. Neuro exam notable for LHH, possible 4+/5 L hemiparesis. CTH w/   Impression  Core infarct in the distribution of the right PCA.    4/11 Underwent ECHO-. A large round mass measuring 3.8cm by 3cm,  is seen in the left atrium suggestive of atrial myxoma.  It appears to be attached to the atrial septum although no stalk is visualized.  4/12 CT surgery consult called  patient seen and examined in  no acute distress  family  at bedside  amenable to cardiac surgery workup  and surgery. Discussed with Stroke team benefits > risk of cardiac surgery    Of note patient febrile overnight  -tmax  101.8 on cefTRIAXone   IVPB 1000 milliGRAM(s) IV Intermittent every 24 hours for positive UA.  Blood cultures-  Cardiac cath scheduled for Wednesday 4/13. Tentative OR date for Friday April 15.  4/13 VSS; cath today; wbc 8 pt afebrile; + ceftriaxone for UTI; repeat UA neg 4//12- BC testing- ID not clearing the patient for OR for am 4/14  as per neuro- pt cleared for OHS  ?re-scheduled OR date- when cleared by ID

## 2022-04-13 NOTE — PROGRESS NOTE ADULT - SUBJECTIVE AND OBJECTIVE BOX
infectious diseases progress note:    Patient is a 71y old  Female who presents with a chief complaint of concern for stroke (2022 16:43)        Cerebral infarction               Allergies    No Known Allergies    Intolerances        ANTIBIOTICS/RELEVANT:  antimicrobials  cefTRIAXone   IVPB 1000 milliGRAM(s) IV Intermittent every 24 hours    immunologic:    OTHER:  ALBUTerol    90 MICROgram(s) HFA Inhaler 2 Puff(s) Inhalation every 6 hours PRN  aluminum hydroxide/magnesium hydroxide/simethicone Suspension 30 milliLiter(s) Oral every 4 hours PRN  aspirin enteric coated 81 milliGRAM(s) Oral daily  atorvastatin 40 milliGRAM(s) Oral at bedtime  benzocaine 15 mG/menthol 3.6 mG Lozenge 1 Lozenge Oral daily  bisacodyl 5 milliGRAM(s) Oral at bedtime  cholecalciferol 1000 Unit(s) Oral daily  insulin lispro (ADMELOG) corrective regimen sliding scale   SubCutaneous three times a day before meals  pantoprazole  Injectable 40 milliGRAM(s) IV Push daily  senna 1 Tablet(s) Oral daily      Objective:  Vital Signs Last 24 Hrs  T(C): 37 (2022 04:42), Max: 37 (2022 04:42)  T(F): 98.6 (2022 04:42), Max: 98.6 (2022 04:42)  HR: 66 (2022 04:42) (66 - 93)  BP: 107/64 (2022 04:42) (107/64 - 143/84)  BP(mean): 94 (2022 21:45) (94 - 94)  RR: 18 (2022 04:42) (18 - 20)  SpO2: 97% (2022 04:42) (95% - 98%)       Eyes:ABDULLAHI, EOMI  Ear/Nose/Throat: no oral lesion, no sinus tenderness on percussion	  Neck:no JVD, no lymphadenopathy, supple  Respiratory: CTA maribel  Cardiovascular: S1S2 RRR, no murmurs  Gastrointestinal:soft, (+) BS, no HSM  Extremities:no e/e/c        LABS:                        8.3    8.09  )-----------( 287      ( 2022 05:30 )             27.0     04-13    139  |  102  |  13  ----------------------------<  120<H>  4.6   |  29  |  0.83    Ca    8.5      2022 05:30    TPro  5.9<L>  /  Alb  2.8<L>  /  TBili  0.1<L>  /  DBili  x   /  AST  29  /  ALT  26  /  AlkPhos  61  04-13    PT/INR - ( 2022 10:59 )   PT: 15.0 sec;   INR: 1.30 ratio         PTT - ( 2022 10:59 )  PTT:30.5 sec  Urinalysis Basic - ( 2022 13:44 )    Color: Yellow / Appearance: Clear / S.027 / pH: x  Gluc: x / Ketone: Negative  / Bili: Negative / Urobili: Negative   Blood: x / Protein: Trace / Nitrite: Negative   Leuk Esterase: Negative / RBC: x / WBC x   Sq Epi: x / Non Sq Epi: x / Bacteria: x          MICROBIOLOGY:    RECENT CULTURES:        RESPIRATORY CULTURES:              RADIOLOGY & ADDITIONAL STUDIES:        Pager [phone]  After 5 pm/weekends or if no response :[phone]

## 2022-04-13 NOTE — PROGRESS NOTE ADULT - SUBJECTIVE AND OBJECTIVE BOX
Neurology Progress note;     HPI:  71F RH w/ AS w/ prior open heart aortic valve replacement 2019, HTN, BETSEY, former smoker, Emphysema/ chronic bronchitis, GERD s/p laparoscopic vertical sleeve gastrectomy presented with acute onset speech disturbance, vision change and gait imbalance. Per EMS, LKN: 4/9/22 at 10am per family. Pt's family reported blurry vision, gait imbalance, and speech disturbance (mild loss in fluency). Pt unable to provide full hx because she was confused on timing. She  endorsed blurry vision but denied any weakness, numbness/tingling. Pt takes a baby aspirin 81mg daily. Pt ambulates without assistance or assistive devices at baseline.  LKN: 4/9/22 at 10am  NIHSS: 3  preMRS: 0  Pt was not a candidate for tpa due to outside tpa window   Pt was not a candidate for mechanical thrombectomy due to no large vessel occlusion on CTA    SUBJECTIVE: Fever noted overnight.  No new neurologic complaints.  ROS reported negative unless otherwise noted.    Medications:    MEDICATIONS  (STANDING):  aspirin enteric coated 81 milliGRAM(s) Oral daily  atorvastatin 40 milliGRAM(s) Oral at bedtime  benzocaine 15 mG/menthol 3.6 mG Lozenge 1 Lozenge Oral daily  bisacodyl 5 milliGRAM(s) Oral at bedtime  cefTRIAXone   IVPB 1000 milliGRAM(s) IV Intermittent every 24 hours  cholecalciferol 1000 Unit(s) Oral daily  insulin lispro (ADMELOG) corrective regimen sliding scale   SubCutaneous three times a day before meals  pantoprazole  Injectable 40 milliGRAM(s) IV Push daily  senna 1 Tablet(s) Oral daily    MEDICATIONS  (PRN):  ALBUTerol    90 MICROgram(s) HFA Inhaler 2 Puff(s) Inhalation every 6 hours PRN Shortness of Breath and/or Wheezing  aluminum hydroxide/magnesium hydroxide/simethicone Suspension 30 milliLiter(s) Oral every 4 hours PRN Dyspepsia        PHYSICAL EXAM:   Vital Signs Last 24 Hrs  T(C): 37 (04-13-22 @ 04:42), Max: 37 (04-13-22 @ 04:42)  T(F): 98.6 (04-13-22 @ 04:42), Max: 98.6 (04-13-22 @ 04:42)  HR: 66 (04-13-22 @ 04:42) (66 - 93)  BP: 107/64 (04-13-22 @ 04:42) (107/64 - 129/77)  BP(mean): 94 (04-12-22 @ 21:45) (94 - 94)  RR: 18 (04-13-22 @ 04:42) (18 - 19)  SpO2: 97% (04-13-22 @ 04:42) (95% - 98%)        General: No acute distress  HEENT: EOM intact, LHH to counting (is aware)  Abdomen: Soft, nontender, nondistended   Extremities: No edema    NEUROLOGICAL EXAM:  Mental status: Awake, alert, oriented x3, no aphasia, no neglect, normal memory   Cranial Nerves: No facial asymmetry, LHHA to counting, no nystagmus, no dysarthria,  tongue midline  Motor exam: Normal tone, no drift, 5/5 RUE, 5/5 RLE, 5/5 LUE, 5/5 LLE, normal fine finger movements.  Sensation: Intact to light touch   Coordination/ Gait: No dysmetria, AMILCAR intact and symmetric bilaterally      LABS:             CBC Full  -  ( 13 Apr 2022 05:30 )  WBC Count : 8.09 K/uL  RBC Count : 2.90 M/uL  Hemoglobin : 8.3 g/dL  Hematocrit : 27.0 %  Platelet Count - Automated : 287 K/uL  Mean Cell Volume : 93.1 fl  Mean Cell Hemoglobin : 28.6 pg  Mean Cell Hemoglobin Concentration : 30.7 gm/dL  Auto Neutrophil # : x  Auto Lymphocyte # : x  Auto Monocyte # : x  Auto Eosinophil # : x  Auto Basophil # : x  Auto Neutrophil % : x  Auto Lymphocyte % : x  Auto Monocyte % : x  Auto Eosinophil % : x  Auto Basophil % : x    04-13    139  |  102  |  13  ----------------------------<  120<H>  4.6   |  29  |  0.83    Ca    8.5      13 Apr 2022 05:30    TPro  5.9<L>  /  Alb  2.8<L>  /  TBili  0.1<L>  /  DBili  x   /  AST  29  /  ALT  26  /  AlkPhos  61  04-13      IMAGING: Reviewed by me.   MRI HEAD 04/10/22: Acute right occipital lobe infarct in a right PCA vascular distribution   with an additional punctate acute infarct involving the left cerebellum   and right splenium of the corpus callosum.    04/09/22:  Brain CT: Acute right occipital lobe infarct in the distribution of the   right PCA. No evidence for hemorrhage.  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 No   hemodynamically significant stenosis within the anterior circulation.  Brain CTA: No large vessel occlusion or stenosis. 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  Perfusion maps: Core infarct in the distribution of the right PCA.   Questionable areas of brain at risk in the right PCA as well as the   bilateral temporal and right frontal region.

## 2022-04-13 NOTE — PROGRESS NOTE ADULT - PROBLEM SELECTOR PLAN 2
continue preop workup   ck cath today with Dr. Lomax  ck covid pcr after cath  shower qd  OR when cleared by ID

## 2022-04-13 NOTE — PROGRESS NOTE ADULT - PROBLEM SELECTOR PLAN 3
VSS; cath done; wbc 8 pt afebrile; + ceftriaxone for UTI;   repeat UA neg 4//12- BC testing  ID not clearing the patient for OR for am 4/14

## 2022-04-13 NOTE — PROGRESS NOTE ADULT - ASSESSMENT
71F RH w/ AS w/ prior open heart aortic valve replacement 2019, HTN, BETSEY, former smoker, Emphysema/ chronic bronchitis, GERD s/p laparoscopic vertical sleeve gastrectomy presented with acute onset speech disturbance, vision change and gait imbalance. Per EMS, LKN: 4/9/22 at 10am per family. Pt's family reported blurry vision, gait imbalance, and speech disturbance (mild loss in fluency). She still endorses blurry vision but denies any weakness, numbness/tingling. Pt takes a baby aspirin 81mg daily. NIHSS: 3, preMRS: 0. No tpa or MT.    o/e with [facility]HA   Impression:  Right PCA infarct etiology likely cardioembolic given findings of atrial mass, also incidental 6 mm ACOMM aneurysm.     NEURO: neurologically without acute chagne, continue close monitoring for neurologic deterioration, found to have an incidental aneurysm will follow with Dr. Contreras as outpatient for further evaluation and possible treatment, LDL 62: continue home dose statin  MRI Brain w/o noted above,  Physical therapy/OT: AR    ANTITHROMBOTIC THERAPY: ASA    PULMONARY:  protecting airway, saturating well     CARDIOVASCULAR:  TTE: ef 45%, mild-moderate MR, mild mitral stenosis, left atrial mass with increased gradient, bioprosthetic AV, mild AR, moderately dilated LA, left atrial mass suggestive of myxoma,  cardiac monitoring, S/P aortic valve replacement cardiology consulted, Troponin elevated. ILR placed to rule out A. Fib as possible source.   CT surgery following. Anticipaet cardiac cath and tentative OR pending clinical course. no objection for cath at this time.  (held to f/u cx's was febrile yesterday)                             SBP goal: normotension being tolerated- avoid SBP > 160mmHg in setting of unsecured itnracranial aneurysm.     GASTROINTESTINAL:  dysphagia screen- passed, tolerating diet       Diet: Regular    RENAL: BUN/Cr without acute change, good urine output , maintain adequate hydration      Na Goal: Greater than 135     Sharma: N    HEMATOLOGY: H/H   anemia , no acute change, no active bleeding Platelets 320, she should have all age and risk appropriate      DVT ppx: Heparin s.c [] LMWH [x]     ID: febrile, no leukocytosis, repeat UA negative for UTI, was on ctx prior for UTI, follow up sensitivities , blood cx pending, infectious workup in progress ; OR date on hold pending cx's     OTHER: Plan of care discussed with patient and sister at bedside.     DISPOSITION: AR once stable and workup is complete, pending CT sx service transfer for further care, accepte to Dr. Jay service ; I will continue to follow for stroke management       CORE MEASURES:        Admission NIHSS: 3     TPA: [] YES [x] NO      LDL/HDL: 62/32     Depression Screen: 0     Statin Therapy: Y     Dysphagia Screen: [x] PASS [] FAIL     Smoking [] YES [x] NO      Afib [] YES [x] NO     Stroke Education [x] YES [] NO    Zenon Vela MD  Vascular Neurology

## 2022-04-14 ENCOUNTER — APPOINTMENT (OUTPATIENT)
Dept: CARDIOTHORACIC SURGERY | Facility: HOSPITAL | Age: 72
End: 2022-04-14

## 2022-04-14 LAB
ANION GAP SERPL CALC-SCNC: 10 MMOL/L — SIGNIFICANT CHANGE UP (ref 5–17)
BUN SERPL-MCNC: 13 MG/DL — SIGNIFICANT CHANGE UP (ref 7–23)
CALCIUM SERPL-MCNC: 9 MG/DL — SIGNIFICANT CHANGE UP (ref 8.4–10.5)
CHLORIDE SERPL-SCNC: 100 MMOL/L — SIGNIFICANT CHANGE UP (ref 96–108)
CO2 SERPL-SCNC: 28 MMOL/L — SIGNIFICANT CHANGE UP (ref 22–31)
CREAT SERPL-MCNC: 0.64 MG/DL — SIGNIFICANT CHANGE UP (ref 0.5–1.3)
EGFR: 94 ML/MIN/1.73M2 — SIGNIFICANT CHANGE UP
GLUCOSE BLDC GLUCOMTR-MCNC: 129 MG/DL — HIGH (ref 70–99)
GLUCOSE BLDC GLUCOMTR-MCNC: 152 MG/DL — HIGH (ref 70–99)
GLUCOSE BLDC GLUCOMTR-MCNC: 188 MG/DL — HIGH (ref 70–99)
GLUCOSE SERPL-MCNC: 117 MG/DL — HIGH (ref 70–99)
HCT VFR BLD CALC: 28.7 % — LOW (ref 34.5–45)
HGB BLD-MCNC: 8.8 G/DL — LOW (ref 11.5–15.5)
MCHC RBC-ENTMCNC: 28.6 PG — SIGNIFICANT CHANGE UP (ref 27–34)
MCHC RBC-ENTMCNC: 30.7 GM/DL — LOW (ref 32–36)
MCV RBC AUTO: 93.2 FL — SIGNIFICANT CHANGE UP (ref 80–100)
NRBC # BLD: 0 /100 WBCS — SIGNIFICANT CHANGE UP (ref 0–0)
PLATELET # BLD AUTO: 322 K/UL — SIGNIFICANT CHANGE UP (ref 150–400)
POTASSIUM SERPL-MCNC: 4.7 MMOL/L — SIGNIFICANT CHANGE UP (ref 3.5–5.3)
POTASSIUM SERPL-SCNC: 4.7 MMOL/L — SIGNIFICANT CHANGE UP (ref 3.5–5.3)
RBC # BLD: 3.08 M/UL — LOW (ref 3.8–5.2)
RBC # FLD: 13.4 % — SIGNIFICANT CHANGE UP (ref 10.3–14.5)
SARS-COV-2 RNA SPEC QL NAA+PROBE: SIGNIFICANT CHANGE UP
SODIUM SERPL-SCNC: 138 MMOL/L — SIGNIFICANT CHANGE UP (ref 135–145)
WBC # BLD: 8.26 K/UL — SIGNIFICANT CHANGE UP (ref 3.8–10.5)
WBC # FLD AUTO: 8.26 K/UL — SIGNIFICANT CHANGE UP (ref 3.8–10.5)

## 2022-04-14 PROCEDURE — 99232 SBSQ HOSP IP/OBS MODERATE 35: CPT

## 2022-04-14 PROCEDURE — 99231 SBSQ HOSP IP/OBS SF/LOW 25: CPT

## 2022-04-14 RX ORDER — LANOLIN ALCOHOL/MO/W.PET/CERES
3 CREAM (GRAM) TOPICAL AT BEDTIME
Refills: 0 | Status: DISCONTINUED | OUTPATIENT
Start: 2022-04-14 | End: 2022-04-18

## 2022-04-14 RX ORDER — ALPRAZOLAM 0.25 MG
0.25 TABLET ORAL ONCE
Refills: 0 | Status: DISCONTINUED | OUTPATIENT
Start: 2022-04-14 | End: 2022-04-14

## 2022-04-14 RX ADMIN — BENZOCAINE AND MENTHOL 1 LOZENGE: 5; 1 LIQUID ORAL at 13:18

## 2022-04-14 RX ADMIN — Medication 3 MILLIGRAM(S): at 23:56

## 2022-04-14 RX ADMIN — Medication 1000 UNIT(S): at 13:18

## 2022-04-14 RX ADMIN — ATORVASTATIN CALCIUM 40 MILLIGRAM(S): 80 TABLET, FILM COATED ORAL at 21:21

## 2022-04-14 RX ADMIN — Medication 1: at 11:44

## 2022-04-14 RX ADMIN — Medication 0.25 MILLIGRAM(S): at 22:43

## 2022-04-14 RX ADMIN — Medication 81 MILLIGRAM(S): at 13:18

## 2022-04-14 RX ADMIN — Medication 1: at 16:31

## 2022-04-14 RX ADMIN — PANTOPRAZOLE SODIUM 40 MILLIGRAM(S): 20 TABLET, DELAYED RELEASE ORAL at 05:36

## 2022-04-14 RX ADMIN — Medication 5 MILLIGRAM(S): at 21:21

## 2022-04-14 RX ADMIN — SENNA PLUS 1 TABLET(S): 8.6 TABLET ORAL at 13:18

## 2022-04-14 RX ADMIN — CEFTRIAXONE 100 MILLIGRAM(S): 500 INJECTION, POWDER, FOR SOLUTION INTRAMUSCULAR; INTRAVENOUS at 02:20

## 2022-04-14 NOTE — PROGRESS NOTE ADULT - ASSESSMENT
This is a 71F RH w/ AS w/ prior open heart aortic valve replacement 2019, HTN, BETSEY, former smoker, Emphysema/ chronic bronchitis, GERD s/p laparoscopic vertical sleeve gastrectomy presents with acute onset speech disturbance, vision change and gait imbalance. Per EMS, LKN: 4/9/22 at 10am per family. Pt's family reported blurry vision, gait imbalance, and speech disturbance (mild loss in fluency). She still endorses blurry vision but denies any weakness, numbness/tingling. Pt takes a baby aspirin 81mg daily. NIHSS: 3, preMRS: 0. No tpa or MT. Neuro exam notable for LHH, possible 4+/5 L hemiparesis. CTH w/   Impression  Core infarct in the distribution of the right PCA.    4/11 Underwent ECHO-. A large round mass measuring 3.8cm by 3cm,  is seen in the left atrium suggestive of atrial myxoma.  It appears to be attached to the atrial septum although no stalk is visualized.  4/12 CT surgery consult called  patient seen and examined in  no acute distress  family  at bedside  amenable to cardiac surgery workup  and surgery. Discussed with Stroke team benefits > risk of cardiac surgery    Of note patient febrile overnight  -tmax  101.8 on cefTRIAXone   IVPB 1000 milliGRAM(s) IV Intermittent every 24 hours for positive UA.  Blood cultures-  Cardiac cath scheduled for Wednesday 4/13. Tentative OR date for Friday April 15.  4/13 VSS; cath today; wbc 8 pt afebrile; + ceftriaxone for UTI; repeat UA neg 4//12- BC testing- ID not clearing the patient for OR for am 4/14  as per neuro- pt cleared for [facility]  ?re-scheduled OR date- when cleared by ID   4/14

## 2022-04-14 NOTE — PROGRESS NOTE ADULT - SUBJECTIVE AND OBJECTIVE BOX
infectious diseases progress note:    Patient is a 71y old  Female who presents with a chief complaint of concern for stroke (2022 07:31)        Cerebral infarction             Allergies    No Known Allergies    Intolerances        ANTIBIOTICS/RELEVANT:  antimicrobials    immunologic:    OTHER:  ALBUTerol    90 MICROgram(s) HFA Inhaler 2 Puff(s) Inhalation every 6 hours PRN  aluminum hydroxide/magnesium hydroxide/simethicone Suspension 30 milliLiter(s) Oral every 4 hours PRN  aspirin enteric coated 81 milliGRAM(s) Oral daily  atorvastatin 40 milliGRAM(s) Oral at bedtime  benzocaine 15 mG/menthol 3.6 mG Lozenge 1 Lozenge Oral daily  bisacodyl 5 milliGRAM(s) Oral at bedtime  cholecalciferol 1000 Unit(s) Oral daily  insulin lispro (ADMELOG) corrective regimen sliding scale   SubCutaneous three times a day before meals  pantoprazole    Tablet 40 milliGRAM(s) Oral before breakfast  senna 1 Tablet(s) Oral daily      Objective:  Vital Signs Last 24 Hrs  T(C): 37.3 (2022 04:04), Max: 37.3 (2022 04:04)  T(F): 99.1 (2022 04:04), Max: 99.1 (2022 04:04)  HR: 78 (2022 08:02) (68 - 80)  BP: 112/61 (2022 04:04) (103/61 - 130/63)  BP(mean): 78 (2022 04:04) (78 - 80)  RR: 18 (2022 04:04) (18 - 19)  SpO2: 95% (2022 04:04) (94% - 97%)       Eyes:ABDULLAHI, EOMI  Ear/Nose/Throat: no oral lesion, no sinus tenderness on percussion	  Neck:no JVD, no lymphadenopathy, supple  Respiratory: CTA maribel  Cardiovascular: S1S2 RRR, no murmurs  Gastrointestinal:soft, (+) BS, no HSM  Extremities:no e/e/c        LABS:                        8.8    8.26  )-----------( 322      ( 2022 05:37 )             28.7     04-14    138  |  100  |  13  ----------------------------<  117<H>  4.7   |  28  |  0.64    Ca    9.0      2022 05:37    TPro  5.9<L>  /  Alb  2.8<L>  /  TBili  0.1<L>  /  DBili  x   /  AST  29  /  ALT  26  /  AlkPhos  61  04-13    PT/INR - ( 2022 10:59 )   PT: 15.0 sec;   INR: 1.30 ratio         PTT - ( 2022 10:59 )  PTT:30.5 sec  Urinalysis Basic - ( 2022 13:44 )    Color: Yellow / Appearance: Clear / S.027 / pH: x  Gluc: x / Ketone: Negative  / Bili: Negative / Urobili: Negative   Blood: x / Protein: Trace / Nitrite: Negative   Leuk Esterase: Negative / RBC: x / WBC x   Sq Epi: x / Non Sq Epi: x / Bacteria: x          MICROBIOLOGY:    RECENT CULTURES:   @ 14:38 .Blood Blood-Peripheral                No growth to date.     @ 14:36 .Blood Blood-Peripheral                No growth to date.          RESPIRATORY CULTURES:              RADIOLOGY & ADDITIONAL STUDIES:        Pager [phone]  After 5 pm/weekends or if no response :[phone]

## 2022-04-14 NOTE — PROGRESS NOTE ADULT - SUBJECTIVE AND OBJECTIVE BOX
Neurology Progress note;     HPI:  71F RH w/ AS w/ prior open heart aortic valve replacement 2019, HTN, BETSEY, former smoker, Emphysema/ chronic bronchitis, GERD s/p laparoscopic vertical sleeve gastrectomy presented with acute onset speech disturbance, vision change and gait imbalance. Per EMS, LKN: 4/9/22 at 10am per family. Pt's family reported blurry vision, gait imbalance, and speech disturbance (mild loss in fluency). Pt unable to provide full hx because she was confused on timing. She  endorsed blurry vision but denied any weakness, numbness/tingling. Pt takes a baby aspirin 81mg daily. Pt ambulates without assistance or assistive devices at baseline.  LKN: 4/9/22 at 10am  NIHSS: 3  preMRS: 0  Pt was not a candidate for tpa due to outside tpa window   Pt was not a candidate for mechanical thrombectomy due to no large vessel occlusion on CTA    SUBJECTIVE: Fever noted overnight.  No new neurologic complaints.  ROS reported negative unless otherwise noted.    Medications:      MEDICATIONS  (STANDING):  aspirin enteric coated 81 milliGRAM(s) Oral daily  atorvastatin 40 milliGRAM(s) Oral at bedtime  benzocaine 15 mG/menthol 3.6 mG Lozenge 1 Lozenge Oral daily  bisacodyl 5 milliGRAM(s) Oral at bedtime  cholecalciferol 1000 Unit(s) Oral daily  insulin lispro (ADMELOG) corrective regimen sliding scale   SubCutaneous three times a day before meals  pantoprazole    Tablet 40 milliGRAM(s) Oral before breakfast  senna 1 Tablet(s) Oral daily    MEDICATIONS  (PRN):  ALBUTerol    90 MICROgram(s) HFA Inhaler 2 Puff(s) Inhalation every 6 hours PRN Shortness of Breath and/or Wheezing  aluminum hydroxide/magnesium hydroxide/simethicone Suspension 30 milliLiter(s) Oral every 4 hours PRN Dyspepsia      PHYSICAL EXAM:     Vital Signs Last 24 Hrs  T(C): 37.3 (04-14-22 @ 04:04), Max: 37.3 (04-14-22 @ 04:04)  T(F): 99.1 (04-14-22 @ 04:04), Max: 99.1 (04-14-22 @ 04:04)  HR: 78 (04-14-22 @ 08:02) (68 - 80)  BP: 112/61 (04-14-22 @ 04:04) (103/61 - 130/63)  BP(mean): 78 (04-14-22 @ 04:04) (78 - 80)  RR: 18 (04-14-22 @ 04:04) (18 - 19)  SpO2: 95% (04-14-22 @ 04:04) (94% - 97%)        General: No acute distress  HEENT: EOM intact, LHH to counting (is aware)  Abdomen: Soft, nontender, nondistended   Extremities: No edema    NEUROLOGICAL EXAM:  Mental status: Awake, alert, oriented x3, no aphasia, no neglect, normal memory   Cranial Nerves: No facial asymmetry, LHHA to counting, no nystagmus, no dysarthria,  tongue midline  Motor exam: Normal tone, no drift, 5/5 RUE, 5/5 RLE, 5/5 LUE, 5/5 LLE, normal fine finger movements.  Sensation: Intact to light touch   Coordination/ Gait: No dysmetria, AMILCAR intact and symmetric bilaterally      LABS:             CBC Full  -  ( 14 Apr 2022 05:37 )  WBC Count : 8.26 K/uL  RBC Count : 3.08 M/uL  Hemoglobin : 8.8 g/dL  Hematocrit : 28.7 %  Platelet Count - Automated : 322 K/uL  Mean Cell Volume : 93.2 fl  Mean Cell Hemoglobin : 28.6 pg  Mean Cell Hemoglobin Concentration : 30.7 gm/dL  Auto Neutrophil # : x  Auto Lymphocyte # : x  Auto Monocyte # : x  Auto Eosinophil # : x  Auto Basophil # : x  Auto Neutrophil % : x  Auto Lymphocyte % : x  Auto Monocyte % : x  Auto Eosinophil % : x  Auto Basophil % : x    04-14    138  |  100  |  13  ----------------------------<  117<H>  4.7   |  28  |  0.64    Ca    9.0      14 Apr 2022 05:37    TPro  5.9<L>  /  Alb  2.8<L>  /  TBili  0.1<L>  /  DBili  x   /  AST  29  /  ALT  26  /  AlkPhos  61  04-13      IMAGING: Reviewed by me.   MRI HEAD 04/10/22: Acute right occipital lobe infarct in a right PCA vascular distribution   with an additional punctate acute infarct involving the left cerebellum   and right splenium of the corpus callosum.    04/09/22:  Brain CT: Acute right occipital lobe infarct in the distribution of the   right PCA. No evidence for hemorrhage.  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 No   hemodynamically significant stenosis within the anterior circulation.  Brain CTA: No large vessel occlusion or stenosis. 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  Perfusion maps: Core infarct in the distribution of the right PCA.   Questionable areas of brain at risk in the right PCA as well as the   bilateral temporal and right frontal region.

## 2022-04-14 NOTE — PROGRESS NOTE ADULT - SUBJECTIVE AND OBJECTIVE BOX
PULMONARY PROGRESS NOTE    ILENE ORTIZ  [id number]    Patient is a 71y old  Female who presents with a chief complaint of concern for stroke (14 Apr 2022 09:10)      HPI:  -on room air  -anxious about surgery  - no cough  has not needed to use her rescue    ROS:   -    ACTIVE MEDICATION LIST:  MEDICATIONS  (STANDING):  aspirin enteric coated 81 milliGRAM(s) Oral daily  atorvastatin 40 milliGRAM(s) Oral at bedtime  benzocaine 15 mG/menthol 3.6 mG Lozenge 1 Lozenge Oral daily  bisacodyl 5 milliGRAM(s) Oral at bedtime  cholecalciferol 1000 Unit(s) Oral daily  insulin lispro (ADMELOG) corrective regimen sliding scale   SubCutaneous three times a day before meals  pantoprazole    Tablet 40 milliGRAM(s) Oral before breakfast  senna 1 Tablet(s) Oral daily    MEDICATIONS  (PRN):  ALBUTerol    90 MICROgram(s) HFA Inhaler 2 Puff(s) Inhalation every 6 hours PRN Shortness of Breath and/or Wheezing  aluminum hydroxide/magnesium hydroxide/simethicone Suspension 30 milliLiter(s) Oral every 4 hours PRN Dyspepsia      EXAM:  Vital Signs Last 24 Hrs  T(C): 37.3 (14 Apr 2022 04:04), Max: 37.3 (14 Apr 2022 04:04)  T(F): 99.1 (14 Apr 2022 04:04), Max: 99.1 (14 Apr 2022 04:04)  HR: 78 (14 Apr 2022 08:02) (68 - 80)  BP: 112/61 (14 Apr 2022 04:04) (103/61 - 130/63)  BP(mean): 78 (14 Apr 2022 04:04) (78 - 80)  RR: 18 (14 Apr 2022 04:04) (18 - 19)  SpO2: 95% (14 Apr 2022 04:04) (94% - 97%)    GENERAL: The patient is awake and alert in no apparent distress.     LUNGS: Clear to auscultation without wheezing, rales or rhonchi; respirations unlabored                               8.8    8.26  )-----------( 322      ( 14 Apr 2022 05:37 )             28.7       04-14    138  |  100  |  13  ----------------------------<  117<H>  4.7   |  28  |  0.64    Ca    9.0      14 Apr 2022 05:37    TPro  5.9<L>  /  Alb  2.8<L>  /  TBili  0.1<L>  /  DBili  x   /  AST  29  /  ALT  26  /  AlkPhos  61  04-13     rad< from: Xray Chest 1 View- PORTABLE-Urgent (Xray Chest 1 View- PORTABLE-Urgent .) (04.12.22 @ 18:46) >    ACC: [id number] EXAM:  XR CHEST PORTABLE URGENT 1V                          PROCEDURE DATE:  04/12/2022          INTERPRETATION:  HISTORY: Fever    TECHNIQUE: A single AP view of the chest was obtained.    COMPARISON: CT scan dated 4/10/2019    FINDINGS: The cardiac silhouette is normal in size. The patient is status   post median sternotomy. There is an overlying loop recorder device. There   are no focal consolidations or pleural effusions.    IMPRESSION: Clear lungs.        --- End of Report ---            FAHAD BRAUN MD; Attending Radiologist  This document has been electronically signed. Apr 13 2022 11:18AM    < end of copied text >  < from: Transthoracic Echocardiogram (04.11.22 @ 10:18) >  Conclusions:  1. Normal mitral valve. Mild-moderate mitral regurgitation.  2. Bioprosthetic aortic valve.  The valve is well seated.  Peak transaortic valve gradient equals 56 mm Hg, mean  transaortic valve gradient equals 38 mm Hg, which is  elevated even in the presence of a bioprosthetic aortic  valve. However the Di is 0.34, the elevated gradients may  be secondary to the hyperdynamic left ventricle.  Mild  aortic regurgitation.  3. Moderately dilated left atrium.  LA volume index = 46  cc/m2. A large round mass measuring 3.8cm by 3cm,  is seen  in the left atrium suggestive of atrial myxoma.  It appears  to be attached to the atrial septum although no stalk is  visualized.  Endocardial visualization enhanced with intravenous  injection of Ultrasonic Enhancing Agent (Definity), the  mass appears to be vascular as it demonstrates uptake of  Definity.  4. Hyperdynamic left ventricular systolic function.  Endocardial visualization enhanced with intravenous  injection of Ultrasonic Enhancing Agent (Definity).  No  left ventricular thrombus.  *** No previous Echo exam.  Images reviewed and results discussed with Dr. Iker Lanza.  ------------------------------------------------------------------------  Confirmed on  4/11/2022 - 15:24:47 by Maco Velasquez M.D.  ------------------------------------------------------------------------    < end of copied text >      PROBLEM LIST:  71y Female with HEALTH ISSUES - PROBLEM Dx:  CVA (cerebrovascular accident)    S/P aortic valve replacement    HTN (hypertension)    Atrial mass    Acute UTI              RECS:   symbicort 180 BID and spiriva qhs (on trelegy outpt)  appreciate CTS& ID  abx as per ID  prn albuterol  close f/u with Dr eLigh  should be assessed for BETSEY again outpatient          Please call with any questions.    Renetta Snyder,   [facility]  [phone]

## 2022-04-14 NOTE — PROGRESS NOTE ADULT - PROBLEM SELECTOR PLAN 1
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 peña following     - Plan for [facility]       - pending ID clearance for OR - 4/12 BCx NGTD

## 2022-04-14 NOTE — PROGRESS NOTE ADULT - ASSESSMENT
71F RH w/ AS w/ prior open heart aortic valve replacement 2019, HTN, BETSEY, former smoker, Emphysema/ chronic bronchitis, GERD s/p laparoscopic vertical sleeve gastrectomy presented with acute onset speech disturbance, vision change and gait imbalance. Per EMS, LKN: 4/9/22 at 10am per family. Pt's family reported blurry vision, gait imbalance, and speech disturbance (mild loss in fluency). She still endorses blurry vision but denies any weakness, numbness/tingling. Pt takes a baby aspirin 81mg daily. NIHSS: 3, preMRS: 0. No tpa or MT.    o/e with [facility]   Impression:  Right PCA infarct etiology likely cardioembolic given findings of atrial mass, also incidental 6 mm ACOMM aneurysm.     NEURO: neurologically without acute chagne, continue close monitoring for neurologic deterioration, found to have an incidental aneurysm will follow with Dr. Contreras as outpatient for further evaluation and possible treatment, LDL 62: continue home dose statin  MRI Brain w/o noted above,  Physical therapy/OT: AR    ANTITHROMBOTIC THERAPY: ASA    PULMONARY:  protecting airway, saturating well     CARDIOVASCULAR:  TTE: ef 45%, mild-moderate MR, mild mitral stenosis, left atrial mass with increased gradient, bioprosthetic AV, mild AR, moderately dilated LA, left atrial mass suggestive of myxoma,  cardiac monitoring, S/P aortic valve replacement cardiology consulted, Troponin elevated. ILR placed to rule out A. Fib as possible source.   CT surgery following. Anticipaet cardiac cath and tentative OR pending clinical course. no objection for cath at this time.  (held to f/u cx's was febrile yesterday)                             SBP goal: normotension being tolerated- avoid SBP > 160mmHg in setting of unsecured itnracranial aneurysm.     GASTROINTESTINAL:  dysphagia screen- passed, tolerating diet       Diet: Regular    RENAL: BUN/Cr without acute change, good urine output , maintain adequate hydration      Na Goal: Greater than 135     Sharma: N    HEMATOLOGY: H/H   anemia , no acute change, no active bleeding Platelets 320, she should have all age and risk appropriate      DVT ppx: Heparin s.c [] LMWH [x]     ID: febrile, no leukocytosis, repeat UA negative for UTI, was on ctx prior for UTI, follow up sensitivities , blood cx pending, infectious workup in progress ; OR date on hold pending cx's ; monitoring now off abx     OTHER: Plan of care discussed with patient and sister at bedside.     DISPOSITION: AR once stable and workup is complete, pending CT sx service transfer for further care, accepte to Dr. Jay service ; I will continue to follow for stroke management       CORE MEASURES:        Admission NIHSS: 3     TPA: [] YES [x] NO      LDL/HDL: 62/32     Depression Screen: 0     Statin Therapy: Y     Dysphagia Screen: [x] PASS [] FAIL     Smoking [] YES [x] NO      Afib [] YES [x] NO     Stroke Education [x] YES [] NO    Zenon Vela MD  Vascular Neurology

## 2022-04-14 NOTE — PROGRESS NOTE ADULT - ASSESSMENT
71 year old with AVR; COPD, GERD, GBP, presented with recent cva.  During workup, an echo revealed an atrial myxoma.  Last 24 she had a fever of 101. Denies -prior fevers, but states she was treated for pna prior to this admission  She is very confused but denies any complaints at present      ; bc negative UA negative  no obvious source of fever, atrial myxomas can cause fever but it is a diagnosis of exclusion .   r chest xray. negative    isolated fever with no ongoing fever  watch off ab and proceed with surgery  discussed with [facility]

## 2022-04-14 NOTE — PROGRESS NOTE ADULT - SUBJECTIVE AND OBJECTIVE BOX
Subjective: Patient seen and examined. No new events except as noted.   Resting comfortably.     REVIEW OF SYSTEMS:    CONSTITUTIONAL: + weakness, fevers or chills  EYES/ENT: No visual changes;  No vertigo or throat pain   NECK: No pain or stiffness  RESPIRATORY: No cough, wheezing, hemoptysis; No shortness of breath  CARDIOVASCULAR: No chest pain or palpitations  GASTROINTESTINAL: No abdominal or epigastric pain. No nausea, vomiting, or hematemesis; No diarrhea or constipation. No melena or hematochezia.  GENITOURINARY: No dysuria, frequency or hematuria  NEUROLOGICAL: No numbness or weakness  SKIN: No itching, burning, rashes, or lesions   All other review of systems is negative unless indicated above.    MEDICATIONS:  MEDICATIONS  (STANDING):  aspirin enteric coated 81 milliGRAM(s) Oral daily  atorvastatin 40 milliGRAM(s) Oral at bedtime  benzocaine 15 mG/menthol 3.6 mG Lozenge 1 Lozenge Oral daily  bisacodyl 5 milliGRAM(s) Oral at bedtime  cholecalciferol 1000 Unit(s) Oral daily  insulin lispro (ADMELOG) corrective regimen sliding scale   SubCutaneous three times a day before meals  pantoprazole    Tablet 40 milliGRAM(s) Oral before breakfast  senna 1 Tablet(s) Oral daily    PHYSICAL EXAM:  T(C): 37.3 (04-14-22 @ 04:04), Max: 37.3 (04-14-22 @ 04:04)  HR: 73 (04-14-22 @ 04:04) (68 - 80)  BP: 112/61 (04-14-22 @ 04:04) (103/61 - 130/63)  RR: 18 (04-14-22 @ 04:04) (18 - 19)  SpO2: 95% (04-14-22 @ 04:04) (94% - 97%)  Wt(kg): --  I&O's Summary    13 Apr 2022 07:01  -  14 Apr 2022 07:00  --------------------------------------------------------  IN: 417 mL / OUT: 850 mL / NET: -433 mL    Appearance: NAD, obese  HEENT: Normal oral mucosa, PERRL, EOMI	  Lymphatic: No lymphadenopathy , no edema  Cardiovascular: Normal S1 S2, No JVD, No murmurs , Peripheral pulses palpable 2+ bilaterally  Respiratory: Lungs clear to auscultation, normal effort 	  Gastrointestinal:  Soft, Non-tender, + BS	  Skin: No rashes, No ecchymoses, No cyanosis, warm to touch  Musculoskeletal: Normal range of motion, normal strength  Psychiatry:  Mood & affect appropriate  Ext: No edema    LABS:    CARDIAC MARKERS:                        8.8    8.26  )-----------( 322      ( 14 Apr 2022 05:37 )             28.7     04-14    138  |  100  |  13  ----------------------------<  117<H>  4.7   |  28  |  0.64    Ca    9.0      14 Apr 2022 05:37    TPro  5.9<L>  /  Alb  2.8<L>  /  TBili  0.1<L>  /  DBili  x   /  AST  29  /  ALT  26  /  AlkPhos  61  04-13    proBNP:   Lipid Profile:   HgA1c:   TSH:     TELEMETRY: 	    ECG:  	  RADIOLOGY:   DIAGNOSTIC TESTING:  [ ] Echocardiogram:  [ ]  Catheterization:  [ ] Stress Test:    OTHER:

## 2022-04-14 NOTE — PROGRESS NOTE ADULT - SUBJECTIVE AND OBJECTIVE BOX
VITAL SIGNS    Telemetry:      Vital Signs Last 24 Hrs  T(C): 36.6 (22 @ 13:05), Max: 37.3 (22 @ 04:04)  T(F): 97.9 (22 @ 13:05), Max: 99.1 (22 @ 04:04)  HR: 88 (22 @ 13:05) (73 - 88)  BP: 129/74 (22 @ 13:05) (106/66 - 129/74)  RR: 18 (22 @ 13:05) (18 - 18)  SpO2: 96% (22 @ 13:05) (94% - 96%)                   Daily     Daily Weight in k (2022 08:02)        CAPILLARY BLOOD GLUCOSE      POCT Blood Glucose.: 188 mg/dL (2022 11:32)  POCT Blood Glucose.: 129 mg/dL (2022 07:45)  POCT Blood Glucose.: 136 mg/dL (2022 21:54)  POCT Blood Glucose.: 160 mg/dL (2022 16                    PHYSICAL EXAM  s"  Neurology: alert and oriented x 3, moves all extremities with no defecits  CV :  RRR  Lungs:   CTA B/L  Abdomen: soft, nontender, nondistended, positive bowel sounds, last bowel movement   Extremities:

## 2022-04-15 LAB
ANION GAP SERPL CALC-SCNC: 14 MMOL/L — SIGNIFICANT CHANGE UP (ref 5–17)
BUN SERPL-MCNC: 15 MG/DL — SIGNIFICANT CHANGE UP (ref 7–23)
CALCIUM SERPL-MCNC: 8.9 MG/DL — SIGNIFICANT CHANGE UP (ref 8.4–10.5)
CHLORIDE SERPL-SCNC: 104 MMOL/L — SIGNIFICANT CHANGE UP (ref 96–108)
CO2 SERPL-SCNC: 24 MMOL/L — SIGNIFICANT CHANGE UP (ref 22–31)
CREAT SERPL-MCNC: 0.69 MG/DL — SIGNIFICANT CHANGE UP (ref 0.5–1.3)
EGFR: 93 ML/MIN/1.73M2 — SIGNIFICANT CHANGE UP
GLUCOSE BLDC GLUCOMTR-MCNC: 124 MG/DL — HIGH (ref 70–99)
GLUCOSE BLDC GLUCOMTR-MCNC: 154 MG/DL — HIGH (ref 70–99)
GLUCOSE BLDC GLUCOMTR-MCNC: 177 MG/DL — HIGH (ref 70–99)
GLUCOSE SERPL-MCNC: 124 MG/DL — HIGH (ref 70–99)
HCT VFR BLD CALC: 28.7 % — LOW (ref 34.5–45)
HGB BLD-MCNC: 8.8 G/DL — LOW (ref 11.5–15.5)
MCHC RBC-ENTMCNC: 28.1 PG — SIGNIFICANT CHANGE UP (ref 27–34)
MCHC RBC-ENTMCNC: 30.7 GM/DL — LOW (ref 32–36)
MCV RBC AUTO: 91.7 FL — SIGNIFICANT CHANGE UP (ref 80–100)
NRBC # BLD: 0 /100 WBCS — SIGNIFICANT CHANGE UP (ref 0–0)
PLATELET # BLD AUTO: 324 K/UL — SIGNIFICANT CHANGE UP (ref 150–400)
POTASSIUM SERPL-MCNC: 4.2 MMOL/L — SIGNIFICANT CHANGE UP (ref 3.5–5.3)
POTASSIUM SERPL-SCNC: 4.2 MMOL/L — SIGNIFICANT CHANGE UP (ref 3.5–5.3)
RBC # BLD: 3.13 M/UL — LOW (ref 3.8–5.2)
RBC # FLD: 13.2 % — SIGNIFICANT CHANGE UP (ref 10.3–14.5)
SODIUM SERPL-SCNC: 142 MMOL/L — SIGNIFICANT CHANGE UP (ref 135–145)
WBC # BLD: 8.02 K/UL — SIGNIFICANT CHANGE UP (ref 3.8–10.5)
WBC # FLD AUTO: 8.02 K/UL — SIGNIFICANT CHANGE UP (ref 3.8–10.5)

## 2022-04-15 PROCEDURE — 99232 SBSQ HOSP IP/OBS MODERATE 35: CPT

## 2022-04-15 PROCEDURE — 99231 SBSQ HOSP IP/OBS SF/LOW 25: CPT

## 2022-04-15 RX ADMIN — Medication 3 MILLIGRAM(S): at 22:10

## 2022-04-15 RX ADMIN — PANTOPRAZOLE SODIUM 40 MILLIGRAM(S): 20 TABLET, DELAYED RELEASE ORAL at 05:18

## 2022-04-15 RX ADMIN — ATORVASTATIN CALCIUM 40 MILLIGRAM(S): 80 TABLET, FILM COATED ORAL at 22:10

## 2022-04-15 RX ADMIN — Medication 1: at 12:11

## 2022-04-15 RX ADMIN — BENZOCAINE AND MENTHOL 1 LOZENGE: 5; 1 LIQUID ORAL at 09:46

## 2022-04-15 RX ADMIN — Medication 1: at 16:48

## 2022-04-15 RX ADMIN — Medication 81 MILLIGRAM(S): at 09:48

## 2022-04-15 RX ADMIN — Medication 1000 UNIT(S): at 09:48

## 2022-04-15 RX ADMIN — Medication 5 MILLIGRAM(S): at 22:09

## 2022-04-15 NOTE — PROGRESS NOTE ADULT - ASSESSMENT
71F RH w/ AS w/ prior open heart aortic valve replacement 2019, HTN, BETSEY, former smoker, Emphysema/ chronic bronchitis, GERD s/p laparoscopic vertical sleeve gastrectomy presented with acute onset speech disturbance, vision change and gait imbalance. Per EMS, LKN: 4/9/22 at 10am per family. Pt's family reported blurry vision, gait imbalance, and speech disturbance (mild loss in fluency). She still endorses blurry vision but denies any weakness, numbness/tingling. Pt takes a baby aspirin 81mg daily. NIHSS: 3, preMRS: 0. No tpa or MT.    o/e with [facility]   Impression:  Right PCA infarct etiology likely cardioembolic given findings of atrial mass, also incidental 6 mm ACOMM aneurysm.     NEURO: neurologically without acute chagne, continue close monitoring for neurologic deterioration, found to have an incidental aneurysm will follow with Dr. Contreras as outpatient for further evaluation and possible treatment, LDL 62: continue home dose statin  MRI Brain w/o noted above,  Physical therapy/OT: AR    ANTITHROMBOTIC THERAPY: ASA    PULMONARY:  protecting airway, saturating well     CARDIOVASCULAR:  TTE: ef 45%, mild-moderate MR, mild mitral stenosis, left atrial mass with increased gradient, bioprosthetic AV, mild AR, moderately dilated LA, left atrial mass suggestive of myxoma,  cardiac monitoring, S/P aortic valve replacement cardiology consulted, Troponin elevated. ILR placed to rule out A. Fib as possible source.   CT surgery following. Anticipaet cardiac cath and tentative OR pending clinical course. no objection for cath at this time.  plan for OR next week monday                     SBP goal: normotension being tolerated- avoid SBP > 160mmHg in setting of unsecured itnracranial aneurysm.     GASTROINTESTINAL:  dysphagia screen- passed, tolerating diet       Diet: Regular    RENAL: BUN/Cr without acute change, good urine output , maintain adequate hydration      Na Goal: Greater than 135     Sharma: N    HEMATOLOGY: H/H   anemia , no acute change, no active bleeding Platelets 320, she should have all age and risk appropriate      DVT ppx: Heparin s.c [] LMWH [x]     ID: febrile, no leukocytosis, repeat UA negative for UTI, was on ctx prior for UTI, follow up sensitivities , blood cx pending, infectious workup in progress ; OR date on hold pending cx's ; monitoring now off abx     OTHER: Plan of care discussed with patient and sister at bedside.     DISPOSITION: AR once stable and workup is complete, pending CT sx service transfer for further care, accepte to Dr. Jay service ; I will continue to follow for stroke management       CORE MEASURES:        Admission NIHSS: 3     TPA: [] YES [x] NO      LDL/HDL: 62/32     Depression Screen: 0     Statin Therapy: Y     Dysphagia Screen: [x] PASS [] FAIL     Smoking [] YES [x] NO      Afib [] YES [x] NO     Stroke Education [x] YES [] NO    Zenon Vela MD  Vascular Neurology

## 2022-04-15 NOTE — PROGRESS NOTE ADULT - PROBLEM SELECTOR PLAN 1
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 peña following     - s/p [facility] 4/13      -  ID cleared for OR

## 2022-04-15 NOTE — PROGRESS NOTE ADULT - SUBJECTIVE AND OBJECTIVE BOX
PULMONARY PROGRESS NOTE    ILENE ORTIZ  MRN-[id number]    Patient is a 71y old  Female who presents with a chief complaint of concern for stroke (15 Apr 2022 11:01)      HPI:  -  - on roomair   chronic cough unchanged      ROS:   -    ACTIVE MEDICATION LIST:  MEDICATIONS  (STANDING):  aspirin enteric coated 81 milliGRAM(s) Oral daily  atorvastatin 40 milliGRAM(s) Oral at bedtime  benzocaine 15 mG/menthol 3.6 mG Lozenge 1 Lozenge Oral daily  bisacodyl 5 milliGRAM(s) Oral at bedtime  cholecalciferol 1000 Unit(s) Oral daily  insulin lispro (ADMELOG) corrective regimen sliding scale   SubCutaneous three times a day before meals  melatonin 3 milliGRAM(s) Oral at bedtime  pantoprazole    Tablet 40 milliGRAM(s) Oral before breakfast  senna 1 Tablet(s) Oral daily    MEDICATIONS  (PRN):  ALBUTerol    90 MICROgram(s) HFA Inhaler 2 Puff(s) Inhalation every 6 hours PRN Shortness of Breath and/or Wheezing  aluminum hydroxide/magnesium hydroxide/simethicone Suspension 30 milliLiter(s) Oral every 4 hours PRN Dyspepsia      EXAM:  Vital Signs Last 24 Hrs  T(C): 36.7 (15 Apr 2022 11:31), Max: 37.3 (15 Apr 2022 04:00)  T(F): 98 (15 Apr 2022 11:31), Max: 99.1 (15 Apr 2022 04:00)  HR: 76 (15 Apr 2022 11:31) (72 - 76)  BP: 120/72 (15 Apr 2022 11:31) (101/65 - 120/72)  BP(mean): 77 (15 Apr 2022 04:00) (77 - 77)  RR: 18 (15 Apr 2022 11:31) (18 - 18)  SpO2: 96% (15 Apr 2022 11:31) (95% - 96%)    GENERAL: The patient is awake and alert in no apparent distress.     LUNGS: Clear to auscultation without wheezing, rales or rhonchi; respirations unlabored                               8.8    8.02  )-----------( 324      ( 15 Apr 2022 05:38 )             28.7       04-15    142  |  104  |  15  ----------------------------<  124<H>  4.2   |  24  |  0.69    Ca    8.9      15 Apr 2022 05:38     rad< from: Xray Chest 1 View- PORTABLE-Urgent (Xray Chest 1 View- PORTABLE-Urgent .) (04.12.22 @ 18:46) >    ACC: [id number] EXAM:  XR CHEST PORTABLE URGENT 1V                          PROCEDURE DATE:  04/12/2022          INTERPRETATION:  HISTORY: Fever    TECHNIQUE: A single AP view of the chest was obtained.    COMPARISON: CT scan dated 4/10/2019    FINDINGS: The cardiac silhouette is normal in size. The patient is status   post median sternotomy. There is an overlying loop recorder device. There   are no focal consolidations or pleural effusions.    IMPRESSION: Clear lungs.        --- End of Report ---            FAHAD BRAUN MD; Attending Radiologist  This document has been electronically signed. Apr 13 2022 11:18AM    < end of copied text >      PROBLEM LIST:  71y Female with HEALTH ISSUES - PROBLEM Dx:  CVA (cerebrovascular accident)    S/P aortic valve replacement    HTN (hypertension)    Atrial mass    Acute UTI         RECS:  on her inhalers  remains on room air  optimized for her CTS         Please call with any questions.    Renetta Snyder, DO  [facility] Pulmonary/Sleep Medicine  [phone]

## 2022-04-15 NOTE — PROGRESS NOTE ADULT - SUBJECTIVE AND OBJECTIVE BOX
Subjective: Patient seen and examined. No new events except as noted.     REVIEW OF SYSTEMS:    CONSTITUTIONAL: + weakness, fevers or chills  EYES/ENT: No visual changes;  No vertigo or throat pain   NECK: No pain or stiffness  RESPIRATORY: No cough, wheezing, hemoptysis; No shortness of breath  CARDIOVASCULAR: No chest pain or palpitations  GASTROINTESTINAL: No abdominal or epigastric pain. No nausea, vomiting, or hematemesis; No diarrhea or constipation. No melena or hematochezia.  GENITOURINARY: No dysuria, frequency or hematuria  NEUROLOGICAL: No numbness or weakness  SKIN: No itching, burning, rashes, or lesions   All other review of systems is negative unless indicated above.    MEDICATIONS:  MEDICATIONS  (STANDING):  aspirin enteric coated 81 milliGRAM(s) Oral daily  atorvastatin 40 milliGRAM(s) Oral at bedtime  benzocaine 15 mG/menthol 3.6 mG Lozenge 1 Lozenge Oral daily  bisacodyl 5 milliGRAM(s) Oral at bedtime  cholecalciferol 1000 Unit(s) Oral daily  insulin lispro (ADMELOG) corrective regimen sliding scale   SubCutaneous three times a day before meals  melatonin 3 milliGRAM(s) Oral at bedtime  pantoprazole    Tablet 40 milliGRAM(s) Oral before breakfast  senna 1 Tablet(s) Oral daily    PHYSICAL EXAM:  T(C): 37.3 (04-15-22 @ 04:00), Max: 37.3 (04-15-22 @ 04:00)  HR: 75 (04-15-22 @ 04:00) (72 - 88)  BP: 101/65 (04-15-22 @ 04:00) (101/65 - 129/74)  RR: 18 (04-15-22 @ 04:00) (18 - 18)  SpO2: 96% (04-15-22 @ 04:00) (95% - 96%)  Wt(kg): --  I&O's Summary    2022 07:01  -  15 Apr 2022 07:00  --------------------------------------------------------  IN: 1250 mL / OUT: 1600 mL / NET: -350 mL    Appearance: NAD	  HEENT: Normal oral mucosa, PERRL, EOMI	  Lymphatic: No lymphadenopathy , no edema  Cardiovascular: Normal S1 S2, No JVD, No murmurs , Peripheral pulses palpable 2+ bilaterally  Respiratory: Lungs clear to auscultation, normal effort 	  Gastrointestinal:  Soft, Non-tender, + BS	  Skin: No rashes, No ecchymoses, No cyanosis, warm to touch  Musculoskeletal: Normal range of motion, normal strength  Psychiatry:  Mood & affect appropriate  Ext: No edema    LABS:     CARDIAC MARKERS:                     8.8    8.02  )-----------( 324      ( 15 Apr 2022 05:38 )             28.7     04-15    142  |  104  |  15  ----------------------------<  124<H>  4.2   |  24  |  0.69    Ca    8.9      15 Apr 2022 05:38    proBNP:   Lipid Profile:   HgA1c:   TSH:     TELEMETRY: SR 1st Degree 60-70s	    ECG:  	  RADIOLOGY:   DIAGNOSTIC TESTING:  [ ] Echocardiogram:  [X] Catheterization: < from: Cardiac Catheterization (22 @ 12:43) >  Study Date:     2022   Name:           ILENE ORTIZ   :            1950   (71 years)   Gender:         female   MR#:            [id number]   MPI#:           [id number]   Patient Class:  Inpatient     Cath Lab Report    Diagnostic Cardiologist:       Dusty Lomax MD   Fellow:                        Giles Fernandez MD   Referring Physician:           Ramez Jay MD     Procedures Performed   Procedures:               1.    Arterial Access - Right Radial     2.    Diagnostic Coronary Angiography   3.    Ultrasound Guided Access     Indications:               PreOp Evaluation     Diagnostic Conclusions:     Mild to moderate CAD.  Proceed with myxoma surgery     Procedure Narrative:   The risks and alternatives of the procedures and conscious sedation  were explained to the patient and informed consent was  obtained. The patient was brought to the cath lab and placed on the  exam table.  Access   Right radial artery:   The puncture site was infiltrated with 1% Lidocaine. Vascular access  was obtained using modified seldinger technique.    Diagnostic Findings:     Coronary Angiography   The coronary circulation is right dominant.      LM   Left main artery: Angiography shows no disease.      LAD   Proximal left anterior descending: There is a 40 % stenosis. Mid left  anterior descending: There is a 60 % stenosis.    CX   Circumflex: Angiography shows mild atherosclerosis.      RCA   Right coronary artery: Angiography shows mild atherosclerosis. First  right posterolateral: Angiography shows moderate  atherosclerosis.      Ramus   Ramus intermedius: Angiography shows mild atherosclerosis.      Patient: ILENE ORTIZ          MRN: [id number]  Study Date: 2022   12:43 PM      Page 1 of 3    History and Risk Factors:   Hypertension:                              Yes     X-Ray:   Diagnostic Flouro time:      4.6 min.                   Exam record  DAP:          3823.10 cGycm2  Interventional Flouro time:                             Exam fluoro  DAP:    988.70 cGycm2  Total Flouro Time:           4.6 min.                   Exam total  DAP:           4811.80  cGycm2    Exam Start Time:   12:43 PM   Exam End Time:     01:14 PM   Exam Duration:     31 min     Contrast:   Description   Dose         Unit       Serial No.   Omnipaque                          30.000   mL     Medication:   Description                  Dose, Unit, Route, Time   midazolam  1 mG/mL           1.0, mg, IV, 12:43:52   Injectable (versed)   fentaNYL 0.05 mG/mL          25.0, mcg, IV, 12:43:56   Injectable  (Fentanyl)   heparin (porcine) 1,000      3000.0, units, IA, 13:02:25   Unit(s)/mL Additive (Heparin)   verapamil 2.5 mG/mL          2.5, mg, IA, 13:02:28   Injectable   fentaNYL 0.05 mG/mL          25.0, mcg, IV, 13:05:03   Injectable  (Fentanyl)     Inventory:   Description                  Quantity     [employer]#        Reference  No.    Serial No.      Lot No.       CDM  CATH PACK                  1.000        [id number]   [id number]  [id number]    030   HEPARIN SALINE 1000 USP    1.000        [id number]   -24  [id number]  Units / 500 mL                          397   HEPARIN SALINE 1000 USP    1.000        [id number]   -25  [id number]  Units / 500 mL       397   .035 X 150 GUIDERIGHT      1.000        [id number]   [id number]  [id number]    594   MEDRAD PROVIS SYRINGE      1.000        [id number]   [id number]  [id number]    797   OMNIPAQUE 350 150ML        1.000        [id number]   Y544  [id number]    368   VASC-BAND REG              1.000        [id number]   3524  [id number]    880     Patient: ILENE ORTIZ          MRN: [id number]  Study Date: 2022   12:43 PM      Page 2 of 3    DXTERITY 5FR ULTRA 4 1.000 [id number]   941     6F RAIN SHEATH             1.000        [id number]       [id number]  [id number]  TRANSRADIAL   6FR PRELUDE KIT            1.000        [id number]  [id number]                                   [id number]    904   .035 X 260 STARTER         1.000        [id number]   [id number]  [id number]    368   Hemodynamic Pressures:   Phase          Location           [mmHg]                [mmHg]  [mmHg]            HR  Baseline      Ao                   s      128                d  67               m       90         71    Conscious sedation was administered and monitored by Cardiology  nursing staff,  under my direct supervision when applicable.     Electronically signed by Dusty Lomax MD on 2022 at 09:13 AM     < end of copied text >    [ ] Stress Test:    OTHER:

## 2022-04-15 NOTE — PROGRESS NOTE ADULT - ASSESSMENT
71 year old with AVR; COPD, GERD, GBP, presented with recent cva.  During workup, an echo revealed an atrial myxoma.  Last 24 she had a fever of 101. Denies -prior fevers, but states she was treated for pna prior to this admission  She is very confused but denies any complaints at present      ; bc negative UA negative  no obvious source of fever, atrial myxomas can cause fever but it is a diagnosis of exclusion .   r chest xray. negative    isolated fever with no ongoing fever  watch off ab and proceed with surgery  discussed with [facility]  no further workup planned at present unless fever returns

## 2022-04-15 NOTE — PROGRESS NOTE ADULT - ASSESSMENT
This is a 71F RH w/ AS w/ prior open heart aortic valve replacement 2019, HTN, BETSEY, former smoker, Emphysema/ chronic bronchitis, GERD s/p laparoscopic vertical sleeve gastrectomy presents with acute onset speech disturbance, vision change and gait imbalance. Per EMS, LKN: 4/9/22 at 10am per family. Pt's family reported blurry vision, gait imbalance, and speech disturbance (mild loss in fluency). She still endorses blurry vision but denies any weakness, numbness/tingling. Pt takes a baby aspirin 81mg daily. NIHSS: 3, preMRS: 0. No tpa or MT. Neuro exam notable for LHH, possible 4+/5 L hemiparesis. CTH w/   Impression  Core infarct in the distribution of the right PCA.    4/11 Underwent ECHO-. A large round mass measuring 3.8cm by 3cm,  is seen in the left atrium suggestive of atrial myxoma.  It appears to be attached to the atrial septum although no stalk is visualized.  4/12 CT surgery consult called  patient seen and examined in  no acute distress  family  at bedside  amenable to cardiac surgery workup  and surgery. Discussed with Stroke team benefits > risk of cardiac surgery    Of note patient febrile overnight  -tmax  101.8 on cefTRIAXone   IVPB 1000 milliGRAM(s) IV Intermittent every 24 hours for positive UA.  Blood cultures-  Cardiac cath scheduled for Wednesday 4/13. Tentative OR date for Friday April 15.  4/13 VSS; cath today; wbc 8 pt afebrile; + ceftriaxone for UTI; repeat UA neg 4//12- BC testing- ID not clearing the patient for OR for am 4/14as per neuro- pt cleared for [facility]HS  ?re-scheduled OR date- when cleared by ID   4/15 VSS Cleared by ID for OR o fnote temp 99.1 x2 overnight Likly OR Mon/Tuesday      This is a 71F RH w/ AS w/ prior open heart aortic valve replacement 2019, HTN, BETSEY, former smoker, Emphysema/ chronic bronchitis, GERD s/p laparoscopic vertical sleeve gastrectomy presents with acute onset speech disturbance, vision change and gait imbalance. Per EMS, LKN: 4/9/22 at 10am per family. Pt's family reported blurry vision, gait imbalance, and speech disturbance (mild loss in fluency). She still endorses blurry vision but denies any weakness, numbness/tingling. Pt takes a baby aspirin 81mg daily. NIHSS: 3, preMRS: 0. No tpa or MT. Neuro exam notable for LHH, possible 4+/5 L hemiparesis. CTH w/   Impression  Core infarct in the distribution of the right PCA.    4/11 Underwent ECHO-. A large round mass measuring 3.8cm by 3cm,  is seen in the left atrium suggestive of atrial myxoma.  It appears to be attached to the atrial septum although no stalk is visualized.  4/12 CT surgery consult called  patient seen and examined in  no acute distress  family  at bedside  amenable to cardiac surgery workup  and surgery. Discussed with Stroke team benefits > risk of cardiac surgery    Of note patient febrile overnight  -tmax  101.8 on cefTRIAXone   IVPB 1000 milliGRAM(s) IV Intermittent every 24 hours for positive UA.  Blood cultures-  Cardiac cath scheduled for Wednesday 4/13. Tentative OR date for Friday April 15.  4/13 VSS; cath today; wbc 8 pt afebrile; + ceftriaxone for UTI; repeat UA neg 4//12- BC testing- ID not clearing the patient for OR for am 4/14as per neuro- pt cleared for [facility]HS  ?re-scheduled OR date- when cleared by ID   4/15 VSS Cleared by ID for OR Likely Mon/Tuesday

## 2022-04-15 NOTE — PROGRESS NOTE ADULT - PROBLEM SELECTOR PLAN 1
neuro following  continue asa and statin   PT/ OT  pt cleared for OHS as per neuro neuro following  cleared by neruology for OR  continue asa and statin   PT/ OT  pt cleared for OHS as per neuro

## 2022-04-15 NOTE — PROGRESS NOTE ADULT - SUBJECTIVE AND OBJECTIVE BOX
Neurology Progress note;     HPI:  71F RH w/ AS w/ prior open heart aortic valve replacement 2019, HTN, BETSEY, former smoker, Emphysema/ chronic bronchitis, GERD s/p laparoscopic vertical sleeve gastrectomy presented with acute onset speech disturbance, vision change and gait imbalance. Per EMS, LKN: 4/9/22 at 10am per family. Pt's family reported blurry vision, gait imbalance, and speech disturbance (mild loss in fluency). Pt unable to provide full hx because she was confused on timing. She  endorsed blurry vision but denied any weakness, numbness/tingling. Pt takes a baby aspirin 81mg daily. Pt ambulates without assistance or assistive devices at baseline.  LKN: 4/9/22 at 10am  NIHSS: 3  preMRS: 0  Pt was not a candidate for tpa due to outside tpa window   Pt was not a candidate for mechanical thrombectomy due to no large vessel occlusion on CTA    SUBJECTIVE: Fever noted overnight.  No new neurologic complaints.  ROS reported negative unless otherwise noted.    Medications:      MEDICATIONS  (STANDING):  aspirin enteric coated 81 milliGRAM(s) Oral daily  atorvastatin 40 milliGRAM(s) Oral at bedtime  benzocaine 15 mG/menthol 3.6 mG Lozenge 1 Lozenge Oral daily  bisacodyl 5 milliGRAM(s) Oral at bedtime  cholecalciferol 1000 Unit(s) Oral daily  insulin lispro (ADMELOG) corrective regimen sliding scale   SubCutaneous three times a day before meals  melatonin 3 milliGRAM(s) Oral at bedtime  pantoprazole    Tablet 40 milliGRAM(s) Oral before breakfast  senna 1 Tablet(s) Oral daily    MEDICATIONS  (PRN):  ALBUTerol    90 MICROgram(s) HFA Inhaler 2 Puff(s) Inhalation every 6 hours PRN Shortness of Breath and/or Wheezing  aluminum hydroxide/magnesium hydroxide/simethicone Suspension 30 milliLiter(s) Oral every 4 hours PRN Dyspepsia      PHYSICAL EXAM:     Vital Signs Last 24 Hrs  T(C): 37.3 (15 Apr 2022 04:00), Max: 37.3 (15 Apr 2022 04:00)  T(F): 99.1 (15 Apr 2022 04:00), Max: 99.1 (15 Apr 2022 04:00)  HR: 75 (15 Apr 2022 04:00) (72 - 88)  BP: 101/65 (15 Apr 2022 04:00) (101/65 - 129/74)  BP(mean): 77 (15 Apr 2022 04:00) (77 - 77)  RR: 18 (15 Apr 2022 04:00) (18 - 18)  SpO2: 96% (15 Apr 2022 04:00) (95% - 96%)    General: No acute distress  HEENT: EOM intact, LHH to counting (is aware)  Abdomen: Soft, nontender, nondistended   Extremities: No edema    NEUROLOGICAL EXAM:  Mental status: Awake, alert, oriented x3, no aphasia, no neglect, normal memory   Cranial Nerves: No facial asymmetry, LHHA to counting, no nystagmus, no dysarthria,  tongue midline  Motor exam: Normal tone, no drift, 5/5 RUE, 5/5 RLE, 5/5 LUE, 5/5 LLE, normal fine finger movements.  Sensation: Intact to light touch   Coordination/ Gait: No dysmetria, AMILCAR intact and symmetric bilaterally      LABS:                CBC Full  -  ( 15 Apr 2022 05:38 )  WBC Count : 8.02 K/uL  RBC Count : 3.13 M/uL  Hemoglobin : 8.8 g/dL  Hematocrit : 28.7 %  Platelet Count - Automated : 324 K/uL  Mean Cell Volume : 91.7 fl  Mean Cell Hemoglobin : 28.1 pg  Mean Cell Hemoglobin Concentration : 30.7 gm/dL  Auto Neutrophil # : x  Auto Lymphocyte # : x  Auto Monocyte # : x  Auto Eosinophil # : x  Auto Basophil # : x  Auto Neutrophil % : x  Auto Lymphocyte % : x  Auto Monocyte % : x  Auto Eosinophil % : x  Auto Basophil % : x    04-15    142  |  104  |  15  ----------------------------<  124<H>  4.2   |  24  |  0.69    Ca    8.9      15 Apr 2022 05:38        IMAGING: Reviewed by me.   MRI HEAD 04/10/22: Acute right occipital lobe infarct in a right PCA vascular distribution   with an additional punctate acute infarct involving the left cerebellum   and right splenium of the corpus callosum.    04/09/22:  Brain CT: Acute right occipital lobe infarct in the distribution of the   right PCA. No evidence for hemorrhage.  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 No   hemodynamically significant stenosis within the anterior circulation.  Brain CTA: No large vessel occlusion or stenosis. 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  Perfusion maps: Core infarct in the distribution of the right PCA.   Questionable areas of brain at risk in the right PCA as well as the   bilateral temporal and right frontal region.

## 2022-04-15 NOTE — PROGRESS NOTE ADULT - PROBLEM SELECTOR PLAN 2
continue preop workup   shower qd  OR  cleared by NATALIYA gomez Monday- tuesda continue preop workup   shower qd  OR  cleared by NATALIYA Valdez Monday- Tuesday

## 2022-04-15 NOTE — PROGRESS NOTE ADULT - SUBJECTIVE AND OBJECTIVE BOX
infectious diseases progress note:    Patient is a 71y old  Female who presents with a chief complaint of concern for stroke (15 Apr 2022 07:28)        Cerebral infarction               Allergies    No Known Allergies    Intolerances        ANTIBIOTICS/RELEVANT:  antimicrobials    immunologic:    OTHER:  ALBUTerol    90 MICROgram(s) HFA Inhaler 2 Puff(s) Inhalation every 6 hours PRN  aluminum hydroxide/magnesium hydroxide/simethicone Suspension 30 milliLiter(s) Oral every 4 hours PRN  aspirin enteric coated 81 milliGRAM(s) Oral daily  atorvastatin 40 milliGRAM(s) Oral at bedtime  benzocaine 15 mG/menthol 3.6 mG Lozenge 1 Lozenge Oral daily  bisacodyl 5 milliGRAM(s) Oral at bedtime  cholecalciferol 1000 Unit(s) Oral daily  insulin lispro (ADMELOG) corrective regimen sliding scale   SubCutaneous three times a day before meals  melatonin 3 milliGRAM(s) Oral at bedtime  pantoprazole    Tablet 40 milliGRAM(s) Oral before breakfast  senna 1 Tablet(s) Oral daily      Objective:  Vital Signs Last 24 Hrs  T(C): 37.3 (15 Apr 2022 04:00), Max: 37.3 (15 Apr 2022 04:00)  T(F): 99.1 (15 Apr 2022 04:00), Max: 99.1 (15 Apr 2022 04:00)  HR: 75 (15 Apr 2022 04:00) (72 - 88)  BP: 101/65 (15 Apr 2022 04:00) (101/65 - 129/74)  BP(mean): 77 (15 Apr 2022 04:00) (77 - 77)  RR: 18 (15 Apr 2022 04:00) (18 - 18)  SpO2: 96% (15 Apr 2022 04:00) (95% - 96%)       Eyes:ABDULLAHI, EOMI  Ear/Nose/Throat: no oral lesion, no sinus tenderness on percussion	  Neck:no JVD, no lymphadenopathy, supple  Respiratory: CTA maribel  Cardiovascular: S1S2 RRR, no murmurs  Gastrointestinal:soft, (+) BS, no HSM  Extremities:no e/e/c        LABS:                        8.8    8.02  )-----------( 324      ( 15 Apr 2022 05:38 )             28.7     04-15    142  |  104  |  15  ----------------------------<  124<H>  4.2   |  24  |  0.69    Ca    8.9      15 Apr 2022 05:38              MICROBIOLOGY:    RECENT CULTURES:  04-12 @ 14:38 .Blood Blood-Peripheral                No growth to date.    04-12 @ 14:36 .Blood Blood-Peripheral                No growth to date.          RESPIRATORY CULTURES:              RADIOLOGY & ADDITIONAL STUDIES:        Pager [phone]  After 5 pm/weekends or if no response :[phone]

## 2022-04-15 NOTE — PROGRESS NOTE ADULT - SUBJECTIVE AND OBJECTIVE BOX
Subjective  " Hello i am ok today"    VITAL SIGNS    Telemetry:  NSR 1 degree     Vital Signs Last 24 Hrs  T(C): 37.3 (04-15-22 @ 04:00), Max: 37.3 (04-15-22 @ 04:00)  T(F): 99.1 (04-15-22 @ 04:00), Max: 99.1 (04-15-22 @ 04:00)  HR: 75 (04-15-22 @ 04:00) (72 - 88)  BP: 101/65 (04-15-22 @ 04:00) (101/65 - 129/74)  RR: 18 (04-15-22 @ 04:00) (18 - 18)  SpO2: 96% (04-15-22 @ 04:00) (95% - 96%)            @ 07:01  -  04-15 @ 07:00  --------------------------------------------------------  IN: 1250 mL / OUT: 1600 mL / NET: -350 mL  Daily Weight in k (2022 08:02)                          8.8    8.02  )-----------( 324      ( 15 Apr 2022 05:38 )             28.7     04-15    142  |  104  |  15  ----------------------------<  124<H>  4.2   |  24  |  0.69    Ca    8.9      15 Apr 2022 05:38      MEDICATIONS  (STANDING):  aspirin enteric coated 81 milliGRAM(s) Oral daily  atorvastatin 40 milliGRAM(s) Oral at bedtime  benzocaine 15 mG/menthol 3.6 mG Lozenge 1 Lozenge Oral daily  bisacodyl 5 milliGRAM(s) Oral at bedtime  cholecalciferol 1000 Unit(s) Oral daily  insulin lispro (ADMELOG) corrective regimen sliding scale   SubCutaneous three times a day before meals  melatonin 3 milliGRAM(s) Oral at bedtime  pantoprazole    Tablet 40 milliGRAM(s) Oral before breakfast  senna 1 Tablet(s) Oral daily    MEDICATIONS  (PRN):  ALBUTerol    90 MICROgram(s) HFA Inhaler 2 Puff(s) Inhalation every 6 hours PRN Shortness of Breath and/or Wheezing  aluminum hydroxide/magnesium hydroxide/simethicone Suspension 30 milliLiter(s) Oral every 4 hours PRN Dyspepsia      CAPILLARY BLOOD GLUCOSE      POCT Blood Glucose.: 152 mg/dL (2022 16:28)  POCT Blood Glucose.: 188 mg/dL (2022 11:32)  POCT Blood Glucose.: 129 mg/dL (2022 07:45)                                  PHYSICAL EXAM        Neurology: alert and oriented x 3, nonfocal, no gross deficits    CV :[id number]  sternal ILR scabbed    Lungs:B/l CTA on roomair    Abdomen: soft, nontender, nondistended, positive bowel sounds, last bowel movement     : Voids              Extremities:  warm wel lperfused equal strength throughout     B/ll e warm + DP no edema                                      Discussed with Cardiothoracic Team at AM rounds.

## 2022-04-16 LAB
ALBUMIN SERPL ELPH-MCNC: 3 G/DL — LOW (ref 3.3–5)
ALP SERPL-CCNC: 67 U/L — SIGNIFICANT CHANGE UP (ref 40–120)
ALT FLD-CCNC: 55 U/L — HIGH (ref 10–45)
ANION GAP SERPL CALC-SCNC: 13 MMOL/L — SIGNIFICANT CHANGE UP (ref 5–17)
AST SERPL-CCNC: 53 U/L — HIGH (ref 10–40)
BILIRUB SERPL-MCNC: 0.3 MG/DL — SIGNIFICANT CHANGE UP (ref 0.2–1.2)
BUN SERPL-MCNC: 11 MG/DL — SIGNIFICANT CHANGE UP (ref 7–23)
CALCIUM SERPL-MCNC: 9 MG/DL — SIGNIFICANT CHANGE UP (ref 8.4–10.5)
CHLORIDE SERPL-SCNC: 101 MMOL/L — SIGNIFICANT CHANGE UP (ref 96–108)
CO2 SERPL-SCNC: 25 MMOL/L — SIGNIFICANT CHANGE UP (ref 22–31)
CREAT SERPL-MCNC: 0.61 MG/DL — SIGNIFICANT CHANGE UP (ref 0.5–1.3)
EGFR: 96 ML/MIN/1.73M2 — SIGNIFICANT CHANGE UP
GLUCOSE BLDC GLUCOMTR-MCNC: 119 MG/DL — HIGH (ref 70–99)
GLUCOSE BLDC GLUCOMTR-MCNC: 136 MG/DL — HIGH (ref 70–99)
GLUCOSE BLDC GLUCOMTR-MCNC: 140 MG/DL — HIGH (ref 70–99)
GLUCOSE BLDC GLUCOMTR-MCNC: 152 MG/DL — HIGH (ref 70–99)
GLUCOSE SERPL-MCNC: 114 MG/DL — HIGH (ref 70–99)
HCT VFR BLD CALC: 27.8 % — LOW (ref 34.5–45)
HGB BLD-MCNC: 8.7 G/DL — LOW (ref 11.5–15.5)
MCHC RBC-ENTMCNC: 29 PG — SIGNIFICANT CHANGE UP (ref 27–34)
MCHC RBC-ENTMCNC: 31.3 GM/DL — LOW (ref 32–36)
MCV RBC AUTO: 92.7 FL — SIGNIFICANT CHANGE UP (ref 80–100)
NRBC # BLD: 0 /100 WBCS — SIGNIFICANT CHANGE UP (ref 0–0)
PLATELET # BLD AUTO: 348 K/UL — SIGNIFICANT CHANGE UP (ref 150–400)
POTASSIUM SERPL-MCNC: 4.4 MMOL/L — SIGNIFICANT CHANGE UP (ref 3.5–5.3)
POTASSIUM SERPL-SCNC: 4.4 MMOL/L — SIGNIFICANT CHANGE UP (ref 3.5–5.3)
PROT SERPL-MCNC: 6.5 G/DL — SIGNIFICANT CHANGE UP (ref 6–8.3)
RBC # BLD: 3 M/UL — LOW (ref 3.8–5.2)
RBC # FLD: 13.2 % — SIGNIFICANT CHANGE UP (ref 10.3–14.5)
SODIUM SERPL-SCNC: 139 MMOL/L — SIGNIFICANT CHANGE UP (ref 135–145)
WBC # BLD: 8.67 K/UL — SIGNIFICANT CHANGE UP (ref 3.8–10.5)
WBC # FLD AUTO: 8.67 K/UL — SIGNIFICANT CHANGE UP (ref 3.8–10.5)

## 2022-04-16 PROCEDURE — 99232 SBSQ HOSP IP/OBS MODERATE 35: CPT

## 2022-04-16 RX ORDER — ENOXAPARIN SODIUM 100 MG/ML
40 INJECTION SUBCUTANEOUS ONCE
Refills: 0 | Status: COMPLETED | OUTPATIENT
Start: 2022-04-16 | End: 2022-04-16

## 2022-04-16 RX ADMIN — Medication 3 MILLIGRAM(S): at 21:41

## 2022-04-16 RX ADMIN — PANTOPRAZOLE SODIUM 40 MILLIGRAM(S): 20 TABLET, DELAYED RELEASE ORAL at 05:45

## 2022-04-16 RX ADMIN — ATORVASTATIN CALCIUM 40 MILLIGRAM(S): 80 TABLET, FILM COATED ORAL at 21:41

## 2022-04-16 RX ADMIN — Medication 81 MILLIGRAM(S): at 11:38

## 2022-04-16 RX ADMIN — ENOXAPARIN SODIUM 40 MILLIGRAM(S): 100 INJECTION SUBCUTANEOUS at 11:39

## 2022-04-16 RX ADMIN — Medication 5 MILLIGRAM(S): at 21:41

## 2022-04-16 RX ADMIN — Medication 1000 UNIT(S): at 11:38

## 2022-04-16 RX ADMIN — Medication 1: at 16:34

## 2022-04-16 RX ADMIN — SENNA PLUS 1 TABLET(S): 8.6 TABLET ORAL at 11:39

## 2022-04-16 RX ADMIN — BENZOCAINE AND MENTHOL 1 LOZENGE: 5; 1 LIQUID ORAL at 11:38

## 2022-04-16 NOTE — PROGRESS NOTE ADULT - PROBLEM SELECTOR PLAN 1
neuro following  cleared by neurology for OR  continue asa and statin   PT/ OT  pt cleared for OHS as per neuro

## 2022-04-16 NOTE — PROGRESS NOTE ADULT - PROBLEM SELECTOR PLAN 1
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 peña following     - s/p [facility] 4/13      -  ID cleared for OR - likely Monday/Tuesday

## 2022-04-16 NOTE — PROGRESS NOTE ADULT - ASSESSMENT
This is a 71F RH w/ AS w/ prior open heart aortic valve replacement 2019, HTN, BETSEY, former smoker, Emphysema/ chronic bronchitis, GERD s/p laparoscopic vertical sleeve gastrectomy presents with acute onset speech disturbance, vision change and gait imbalance. Per EMS, LKN: 4/9/22 at 10am per family. Pt's family reported blurry vision, gait imbalance, and speech disturbance (mild loss in fluency). She still endorses blurry vision but denies any weakness, numbness/tingling. Pt takes a baby aspirin 81mg daily. NIHSS: 3, preMRS: 0. No tpa or MT. Neuro exam notable for LHH, possible 4+/5 L hemiparesis. CTH w/   Impression  Core infarct in the distribution of the right PCA.    4/11 Underwent ECHO-. A large round mass measuring 3.8cm by 3cm,  is seen in the left atrium suggestive of atrial myxoma.  It appears to be attached to the atrial septum although no stalk is visualized.  4/12 CT surgery consult called  patient seen and examined in  no acute distress  family  at bedside  amenable to cardiac surgery workup  and surgery. Discussed with Stroke team benefits > risk of cardiac surgery    Of note patient febrile overnight  -tmax  101.8 on cefTRIAXone   IVPB 1000 milliGRAM(s) IV Intermittent every 24 hours for positive UA.  Blood cultures-  Cardiac cath scheduled for Wednesday 4/13. Tentative OR date for Friday April 15.  4/13 VSS; cath today; wbc 8 pt afebrile; + ceftriaxone for UTI; repeat UA neg 4//12- BC testing- ID not clearing the patient for OR for am 4/14as per neuro- pt cleared for OHS  ?re-scheduled OR date- when cleared by ID   4/15 VSS Cleared by ID for OR Likely Mon/Tuesday 4/16 Preop workup in progress  OR Monday

## 2022-04-16 NOTE — PROGRESS NOTE ADULT - SUBJECTIVE AND OBJECTIVE BOX
Subjective: "Hello"  OOB chair Questions answered regarding surgery Monday    Tele:      SR  70s  PAT  100 brief 10 sec                          T(C): 36.8 (04-16-22 @ 10:22), Max: 37 (04-16-22 @ 04:29)  HR: 74 (04-16-22 @ 10:22) (74 - 79)  BP: 119/60 (04-16-22 @ 10:22) (119/60 - 136/83)  RR: 18 (04-16-22 @ 10:22) (18 - 18)  SpO2: 94% (04-16-22 @ 10:22) (94% - 96%)  :       04-16    139  |  101  |  11  ----------------------------<  114<H>  4.4   |  25  |  0.61    Ca    9.0      16 Apr 2022 06:26    TPro  6.5  /  Alb  3.0<L>  /  TBili  0.3  /  DBili  x   /  AST  53<H>  /  ALT  55<H>  /  AlkPhos  67  04-16                               8.7    8.67  )-----------( 348      ( 16 Apr 2022 06:27 )             27.8            CAPILLARY BLOOD GLUCOSE      POCT Blood Glucose.: 140 mg/dL (16 Apr 2022 11:17)  POCT Blood Glucose.: 136 mg/dL (16 Apr 2022 07:47)  POCT Blood Glucose.: 177 mg/dL (15 Apr 2022 16:34)  POCT Blood Glucose.: 154 mg/dL (15 Apr 2022 11:39)           Assessment    Neurology: alert and oriented x 3, nonfocal, no gross deficits    CV :[id number]  sternal ILR scabbed    Lungs: B/l CTA on roomair    Abdomen: soft, nontender, nondistended, positive bowel sounds, last bowel movement this am    : Voids              Extremities:  warm well  perfused equal strength throughout     B/ll e warm + DP no edema        MEDICATIONS  (STANDING):  aspirin enteric coated 81 milliGRAM(s) Oral daily  atorvastatin 40 milliGRAM(s) Oral at bedtime  benzocaine 15 mG/menthol 3.6 mG Lozenge 1 Lozenge Oral daily  bisacodyl 5 milliGRAM(s) Oral at bedtime  cholecalciferol 1000 Unit(s) Oral daily  enoxaparin Injectable 40 milliGRAM(s) SubCutaneous once  insulin lispro (ADMELOG) corrective regimen sliding scale   SubCutaneous three times a day before meals  melatonin 3 milliGRAM(s) Oral at bedtime  pantoprazole    Tablet 40 milliGRAM(s) Oral before breakfast  senna 1 Tablet(s) Oral daily       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

## 2022-04-16 NOTE — PROGRESS NOTE ADULT - SUBJECTIVE AND OBJECTIVE BOX
Subjective: Patient seen and examined. No new events except as noted.   Resting comfortably.    REVIEW OF SYSTEMS:    CONSTITUTIONAL: + weakness, fevers or chills  EYES/ENT: No visual changes;  No vertigo or throat pain   NECK: No pain or stiffness  RESPIRATORY: No cough, wheezing, hemoptysis; No shortness of breath  CARDIOVASCULAR: No chest pain or palpitations  GASTROINTESTINAL: No abdominal or epigastric pain. No nausea, vomiting, or hematemesis; No diarrhea or constipation. No melena or hematochezia.  GENITOURINARY: No dysuria, frequency or hematuria  NEUROLOGICAL: No numbness or weakness  SKIN: No itching, burning, rashes, or lesions   All other review of systems is negative unless indicated above.    MEDICATIONS:  MEDICATIONS  (STANDING):  aspirin enteric coated 81 milliGRAM(s) Oral daily  atorvastatin 40 milliGRAM(s) Oral at bedtime  benzocaine 15 mG/menthol 3.6 mG Lozenge 1 Lozenge Oral daily  bisacodyl 5 milliGRAM(s) Oral at bedtime  cholecalciferol 1000 Unit(s) Oral daily  insulin lispro (ADMELOG) corrective regimen sliding scale   SubCutaneous three times a day before meals  melatonin 3 milliGRAM(s) Oral at bedtime  pantoprazole    Tablet 40 milliGRAM(s) Oral before breakfast  senna 1 Tablet(s) Oral daily      PHYSICAL EXAM:  T(C): 37 (04-16-22 @ 04:29), Max: 37 (04-16-22 @ 04:29)  HR: 79 (04-16-22 @ 04:29) (75 - 79)  BP: 132/77 (04-16-22 @ 04:29) (120/72 - 136/83)  RR: 18 (04-16-22 @ 04:29) (18 - 18)  SpO2: 95% (04-16-22 @ 04:29) (95% - 96%)  Wt(kg): --  I&O's Summary    15 Apr 2022 07:01  -  16 Apr 2022 07:00  --------------------------------------------------------  IN: 1220 mL / OUT: 1720 mL / NET: -500 mL    Appearance: NAD	  HEENT: Normal oral mucosa, PERRL, EOMI	  Lymphatic: No lymphadenopathy , no edema  Cardiovascular: Normal S1 S2, No JVD, No murmurs , Peripheral pulses palpable 2+ bilaterally  Respiratory: Lungs clear to auscultation, normal effort 	  Gastrointestinal:  Soft, Non-tender, + BS	  Skin: No rashes, No ecchymoses, No cyanosis, warm to touch  Musculoskeletal: Normal range of motion, normal strength  Psychiatry:  Mood & affect appropriate  Ext: No edema    LABS:    CARDIAC MARKERS:                       8.7    8.67  )-----------( 348      ( 16 Apr 2022 06:27 )             27.8     04-16    139  |  101  |  11  ----------------------------<  114<H>  4.4   |  25  |  0.61    Ca    9.0      16 Apr 2022 06:26    TPro  6.5  /  Alb  3.0<L>  /  TBili  0.3  /  DBili  x   /  AST  53<H>  /  ALT  55<H>  /  AlkPhos  67  04-16    proBNP:   Lipid Profile:   HgA1c:   TSH:     TELEMETRY: SR	    ECG:  	  RADIOLOGY:   DIAGNOSTIC TESTING:  [ ] Echocardiogram:  [ ]  Catheterization:  [ ] Stress Test:    OTHER: 	         Subjective: Patient seen and examined. No new events except as noted.   Resting comfortably.    REVIEW OF SYSTEMS:    CONSTITUTIONAL: + weakness, fevers or chills  EYES/ENT: No visual changes;  No vertigo or throat pain   NECK: No pain or stiffness  RESPIRATORY: No cough, wheezing, hemoptysis; No shortness of breath  CARDIOVASCULAR: No chest pain or palpitations  GASTROINTESTINAL: No abdominal or epigastric pain. No nausea, vomiting, or hematemesis; No diarrhea or constipation. No melena or hematochezia.  GENITOURINARY: No dysuria, frequency or hematuria  NEUROLOGICAL: No numbness or weakness  SKIN: No itching, burning, rashes, or lesions   All other review of systems is negative unless indicated above.    MEDICATIONS:  MEDICATIONS  (STANDING):  aspirin enteric coated 81 milliGRAM(s) Oral daily  atorvastatin 40 milliGRAM(s) Oral at bedtime  benzocaine 15 mG/menthol 3.6 mG Lozenge 1 Lozenge Oral daily  bisacodyl 5 milliGRAM(s) Oral at bedtime  cholecalciferol 1000 Unit(s) Oral daily  insulin lispro (ADMELOG) corrective regimen sliding scale   SubCutaneous three times a day before meals  melatonin 3 milliGRAM(s) Oral at bedtime  pantoprazole    Tablet 40 milliGRAM(s) Oral before breakfast  senna 1 Tablet(s) Oral daily      PHYSICAL EXAM:  T(C): 37 (04-16-22 @ 04:29), Max: 37 (04-16-22 @ 04:29)  HR: 79 (04-16-22 @ 04:29) (75 - 79)  BP: 132/77 (04-16-22 @ 04:29) (120/72 - 136/83)  RR: 18 (04-16-22 @ 04:29) (18 - 18)  SpO2: 95% (04-16-22 @ 04:29) (95% - 96%)  Wt(kg): --  I&O's Summary    15 Apr 2022 07:01  -  16 Apr 2022 07:00  --------------------------------------------------------  IN: 1220 mL / OUT: 1720 mL / NET: -500 mL    Appearance: NAD	  HEENT: Normal oral mucosa, PERRL, EOMI	  Lymphatic: No lymphadenopathy , no edema  Cardiovascular: Normal S1 S2, No JVD, No murmurs , Peripheral pulses palpable 2+ bilaterally  Respiratory: Lungs clear to auscultation, normal effort 	  Gastrointestinal:  Soft, Non-tender, + BS	  Skin: No rashes, No ecchymoses, No cyanosis, warm to touch  Musculoskeletal: Normal range of motion, normal strength  Psychiatry:  Mood & affect appropriate  Ext: No edema    LABS:    CARDIAC MARKERS:                       8.7    8.67  )-----------( 348      ( 16 Apr 2022 06:27 )             27.8     04-16    139  |  101  |  11  ----------------------------<  114<H>  4.4   |  25  |  0.61    Ca    9.0      16 Apr 2022 06:26    TPro  6.5  /  Alb  3.0<L>  /  TBili  0.3  /  DBili  x   /  AST  53<H>  /  ALT  55<H>  /  AlkPhos  67  04-16    proBNP:   Lipid Profile:   HgA1c:   TSH:     TELEMETRY: SR 1st Degree  ECG:  	  RADIOLOGY:   DIAGNOSTIC TESTING:  [ ] Echocardiogram:  [ ]  Catheterization:  [ ] Stress Test:    OTHER:

## 2022-04-17 LAB
ALBUMIN SERPL ELPH-MCNC: 3 G/DL — LOW (ref 3.3–5)
ALP SERPL-CCNC: 65 U/L — SIGNIFICANT CHANGE UP (ref 40–120)
ALT FLD-CCNC: 46 U/L — HIGH (ref 10–45)
ANION GAP SERPL CALC-SCNC: 11 MMOL/L — SIGNIFICANT CHANGE UP (ref 5–17)
AST SERPL-CCNC: 34 U/L — SIGNIFICANT CHANGE UP (ref 10–40)
BASOPHILS # BLD AUTO: 0.04 K/UL — SIGNIFICANT CHANGE UP (ref 0–0.2)
BASOPHILS NFR BLD AUTO: 0.5 % — SIGNIFICANT CHANGE UP (ref 0–2)
BILIRUB SERPL-MCNC: 0.3 MG/DL — SIGNIFICANT CHANGE UP (ref 0.2–1.2)
BLD GP AB SCN SERPL QL: NEGATIVE — SIGNIFICANT CHANGE UP
BUN SERPL-MCNC: 14 MG/DL — SIGNIFICANT CHANGE UP (ref 7–23)
CALCIUM SERPL-MCNC: 8.7 MG/DL — SIGNIFICANT CHANGE UP (ref 8.4–10.5)
CHLORIDE SERPL-SCNC: 102 MMOL/L — SIGNIFICANT CHANGE UP (ref 96–108)
CO2 SERPL-SCNC: 26 MMOL/L — SIGNIFICANT CHANGE UP (ref 22–31)
CREAT SERPL-MCNC: 0.62 MG/DL — SIGNIFICANT CHANGE UP (ref 0.5–1.3)
CULTURE RESULTS: SIGNIFICANT CHANGE UP
CULTURE RESULTS: SIGNIFICANT CHANGE UP
EGFR: 95 ML/MIN/1.73M2 — SIGNIFICANT CHANGE UP
EOSINOPHIL # BLD AUTO: 0.1 K/UL — SIGNIFICANT CHANGE UP (ref 0–0.5)
EOSINOPHIL NFR BLD AUTO: 1.2 % — SIGNIFICANT CHANGE UP (ref 0–6)
GLUCOSE BLDC GLUCOMTR-MCNC: 124 MG/DL — HIGH (ref 70–99)
GLUCOSE BLDC GLUCOMTR-MCNC: 125 MG/DL — HIGH (ref 70–99)
GLUCOSE BLDC GLUCOMTR-MCNC: 126 MG/DL — HIGH (ref 70–99)
GLUCOSE BLDC GLUCOMTR-MCNC: 167 MG/DL — HIGH (ref 70–99)
GLUCOSE SERPL-MCNC: 116 MG/DL — HIGH (ref 70–99)
HCT VFR BLD CALC: 27.3 % — LOW (ref 34.5–45)
HGB BLD-MCNC: 8.7 G/DL — LOW (ref 11.5–15.5)
IMM GRANULOCYTES NFR BLD AUTO: 1 % — SIGNIFICANT CHANGE UP (ref 0–1.5)
LYMPHOCYTES # BLD AUTO: 1.88 K/UL — SIGNIFICANT CHANGE UP (ref 1–3.3)
LYMPHOCYTES # BLD AUTO: 23.4 % — SIGNIFICANT CHANGE UP (ref 13–44)
MCHC RBC-ENTMCNC: 28.7 PG — SIGNIFICANT CHANGE UP (ref 27–34)
MCHC RBC-ENTMCNC: 31.9 GM/DL — LOW (ref 32–36)
MCV RBC AUTO: 90.1 FL — SIGNIFICANT CHANGE UP (ref 80–100)
MONOCYTES # BLD AUTO: 0.77 K/UL — SIGNIFICANT CHANGE UP (ref 0–0.9)
MONOCYTES NFR BLD AUTO: 9.6 % — SIGNIFICANT CHANGE UP (ref 2–14)
NEUTROPHILS # BLD AUTO: 5.16 K/UL — SIGNIFICANT CHANGE UP (ref 1.8–7.4)
NEUTROPHILS NFR BLD AUTO: 64.3 % — SIGNIFICANT CHANGE UP (ref 43–77)
NRBC # BLD: 0 /100 WBCS — SIGNIFICANT CHANGE UP (ref 0–0)
PLATELET # BLD AUTO: 343 K/UL — SIGNIFICANT CHANGE UP (ref 150–400)
POTASSIUM SERPL-MCNC: 4.3 MMOL/L — SIGNIFICANT CHANGE UP (ref 3.5–5.3)
POTASSIUM SERPL-SCNC: 4.3 MMOL/L — SIGNIFICANT CHANGE UP (ref 3.5–5.3)
PROT SERPL-MCNC: 6.3 G/DL — SIGNIFICANT CHANGE UP (ref 6–8.3)
RBC # BLD: 3.03 M/UL — LOW (ref 3.8–5.2)
RBC # FLD: 13.3 % — SIGNIFICANT CHANGE UP (ref 10.3–14.5)
RH IG SCN BLD-IMP: POSITIVE — SIGNIFICANT CHANGE UP
SARS-COV-2 RNA SPEC QL NAA+PROBE: SIGNIFICANT CHANGE UP
SODIUM SERPL-SCNC: 139 MMOL/L — SIGNIFICANT CHANGE UP (ref 135–145)
SPECIMEN SOURCE: SIGNIFICANT CHANGE UP
SPECIMEN SOURCE: SIGNIFICANT CHANGE UP
WBC # BLD: 8.03 K/UL — SIGNIFICANT CHANGE UP (ref 3.8–10.5)
WBC # FLD AUTO: 8.03 K/UL — SIGNIFICANT CHANGE UP (ref 3.8–10.5)

## 2022-04-17 PROCEDURE — 71250 CT THORAX DX C-: CPT | Mod: 26

## 2022-04-17 RX ORDER — ASCORBIC ACID 60 MG
2000 TABLET,CHEWABLE ORAL ONCE
Refills: 0 | Status: COMPLETED | OUTPATIENT
Start: 2022-04-17 | End: 2022-04-17

## 2022-04-17 RX ORDER — CEFUROXIME AXETIL 250 MG
1500 TABLET ORAL ONCE
Refills: 0 | Status: DISCONTINUED | OUTPATIENT
Start: 2022-04-17 | End: 2022-04-18

## 2022-04-17 RX ORDER — CHLORHEXIDINE GLUCONATE 213 G/1000ML
15 SOLUTION TOPICAL
Refills: 0 | Status: DISCONTINUED | OUTPATIENT
Start: 2022-04-17 | End: 2022-04-18

## 2022-04-17 RX ORDER — ALPRAZOLAM 0.25 MG
0.25 TABLET ORAL ONCE
Refills: 0 | Status: DISCONTINUED | OUTPATIENT
Start: 2022-04-17 | End: 2022-04-17

## 2022-04-17 RX ORDER — CHLORHEXIDINE GLUCONATE 213 G/1000ML
1 SOLUTION TOPICAL ONCE
Refills: 0 | Status: COMPLETED | OUTPATIENT
Start: 2022-04-17 | End: 2022-04-17

## 2022-04-17 RX ORDER — GABAPENTIN 400 MG/1
300 CAPSULE ORAL ONCE
Refills: 0 | Status: COMPLETED | OUTPATIENT
Start: 2022-04-17 | End: 2022-04-18

## 2022-04-17 RX ORDER — ACETAMINOPHEN 500 MG
1000 TABLET ORAL ONCE
Refills: 0 | Status: COMPLETED | OUTPATIENT
Start: 2022-04-17 | End: 2022-04-18

## 2022-04-17 RX ADMIN — Medication 1000 UNIT(S): at 11:28

## 2022-04-17 RX ADMIN — Medication 0.25 MILLIGRAM(S): at 21:07

## 2022-04-17 RX ADMIN — Medication 81 MILLIGRAM(S): at 11:27

## 2022-04-17 RX ADMIN — PANTOPRAZOLE SODIUM 40 MILLIGRAM(S): 20 TABLET, DELAYED RELEASE ORAL at 05:02

## 2022-04-17 RX ADMIN — Medication 3 MILLIGRAM(S): at 21:09

## 2022-04-17 RX ADMIN — Medication 2000 MILLIGRAM(S): at 21:08

## 2022-04-17 RX ADMIN — CHLORHEXIDINE GLUCONATE 1 APPLICATION(S): 213 SOLUTION TOPICAL at 21:08

## 2022-04-17 RX ADMIN — CHLORHEXIDINE GLUCONATE 15 MILLILITER(S): 213 SOLUTION TOPICAL at 21:08

## 2022-04-17 RX ADMIN — Medication 1: at 16:58

## 2022-04-17 RX ADMIN — ATORVASTATIN CALCIUM 40 MILLIGRAM(S): 80 TABLET, FILM COATED ORAL at 21:08

## 2022-04-17 RX ADMIN — BENZOCAINE AND MENTHOL 1 LOZENGE: 5; 1 LIQUID ORAL at 11:28

## 2022-04-17 NOTE — PROGRESS NOTE ADULT - SUBJECTIVE AND OBJECTIVE BOX
Cardiac Surgery Pre-op Note:    CC: Patient is a 71y old  Female who presents with a chief complaint of concern for stroke (17 Apr 2022 09:19)                                                                                                             Surgeon:    Procedure:     Allergies    No Known Allergies    Intolerances        HPI:  71F RH w/ AS w/ prior open heart aortic valve replacement 2019, HTN, BETSEY, former smoker, Emphysema/ chronic bronchitis, GERD s/p laparoscopic vertical sleeve gastrectomy presents with acute onset speech disturbance, vision change and gait imbalance. Per EMS, LKN: 4/9/22 at 10am per family. Pt's family reported blurry vision, gait imbalance, and speech disturbance (mild loss in fluency). Pt unable to provide full hx because she was confused on timing. She still endorses blurry vision but denies any weakness, numbness/tingling. Pt takes a baby aspirin 81mg daily. Pt ambulates without assistance or assistive devices at baseline.    (Stroke only)  LKN: 4/9/22 at 10am  NIHSS: 3  preMRS: 0  Pt is not a candidate for tpa due to outside tpa window   Pt is not a candidate for mechanical thrombectomy due to no large vessel occlusion on CTA     (09 Apr 2022 17:56)      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        MEDICATIONS  (STANDING):  aspirin enteric coated 81 milliGRAM(s) Oral daily  atorvastatin 40 milliGRAM(s) Oral at bedtime  benzocaine 15 mG/menthol 3.6 mG Lozenge 1 Lozenge Oral daily  bisacodyl 5 milliGRAM(s) Oral at bedtime  cholecalciferol 1000 Unit(s) Oral daily  insulin lispro (ADMELOG) corrective regimen sliding scale   SubCutaneous three times a day before meals  melatonin 3 milliGRAM(s) Oral at bedtime  pantoprazole    Tablet 40 milliGRAM(s) Oral before breakfast  senna 1 Tablet(s) Oral daily    MEDICATIONS  (PRN):  ALBUTerol    90 MICROgram(s) HFA Inhaler 2 Puff(s) Inhalation every 6 hours PRN Shortness of Breath and/or Wheezing  aluminum hydroxide/magnesium hydroxide/simethicone Suspension 30 milliLiter(s) Oral every 4 hours PRN Dyspepsia        Labs:                        8.7    8.03  )-----------( 343      ( 17 Apr 2022 06:26 )             27.3     04-17    139  |  102  |  14  ----------------------------<  116<H>  4.3   |  26  |  0.62    Ca    8.7      17 Apr 2022 06:26    TPro  6.3  /  Alb  3.0<L>  /  TBili  0.3  /  DBili  x   /  AST  34  /  ALT  46<H>  /  AlkPhos  65  04-17        Blood Type: ABO Interpretation: A (04-17 @ 07:30)    HGB A1C:   A1C with Estimated Average Glucose (04.10.22 @ 08:38)    A1C with Estimated Average Glucose Result: 6.3: Method: Immunoassay        Pro-BNP: Serum Pro-Brain Natriuretic Peptide (04.12.22 @ 10:58)    Serum Pro-Brain Natriuretic Peptide: 1462 pg/mL       Thyroid Panel: 04-12 @ 15:28/1.00  --/7.0/75    MRSA:  / MSSA: MRSA/MSSA PCR (04.12.22 @ 12:26)    MRSA PCR Result.: NotDetec: MRSA/MSSA PCR .    Staph Aureus PCR Result: NotDetec      COVID COVID-19 PCR . (04.17.22 @ 07:58)    COVID-19 PCR: NotDetec:       Type Type + Screen (04.17.22 @ 07:30)    ABO Interpretation: A    Rh Interpretation: Positive    Antibody Screen: Negative        CXR: < from: Xray Chest 1 View- PORTABLE-Urgent (Xray Chest 1 View- PORTABLE-Urgent .) (04.12.22 @ 18:46) >  FINDINGS: The cardiac silhouette is normal in size. The patient is status   post median sternotomy. There is an overlying loop recorder device. There   are no focal consolidations or pleural effusions.    < end of copied text >      EKG:    < from: 12 Lead ECG (04.09.22 @ 17:55) >  Diagnosis Line SINUS RHYTHM [id number] DEGREE A-V BLOCK  INCOMPLETE LEFT BUNDLE BRANCH BLOCK    < end of copied text >      PFT's:    Echocardiogram: < from: Transthoracic Echocardiogram (04.11.22 @ 10:18) >  Conclusions:  1. Normal mitral valve. Mild-moderate mitral regurgitation.  2. Bioprosthetic aortic valve.  The valve is well seated.  Peak transaortic valve gradient equals 56 mm Hg, mean  transaortic valve gradient equals 38 mm Hg, which is  elevated even in the presence of a bioprosthetic aortic  valve. However the Di is 0.34, the elevated gradients may  be secondary to the hyperdynamic left ventricle.  Mild  aortic regurgitation.  3. Moderately dilated left atrium.  LA volume index = 46  cc/m2. A large round mass measuring 3.8cm by 3cm,  is seen  in the left atrium suggestive of atrial myxoma.  It appears  to be attached to the atrial septum although no stalk is  visualized.  Endocardial visualization enhanced with intravenous  injection of Ultrasonic Enhancing Agent (Definity), the  mass appears to be vascular as it demonstrates uptake of  Definity.  4. Hyperdynamic left ventricular systolic function.  Endocardial visualization enhanced with intravenous  injection of Ultrasonic Enhancing Agent (Definity).  No  left ventricular thrombus.    < end of copied text >      Cardiac catheterization:   < from: Cardiac Catheterization (04.13.22 @ 12:43) >  The coronary circulation is right dominant.      LM   Left main artery: Angiography shows no disease.      LAD   Proximal left anterior descending: There is a 40 % stenosis. Mid left  anterior descending: There is a 60 % stenosis.    CX   Circumflex: Angiography shows mild atherosclerosis.      RCA   Right coronary artery: Angiography shows mild atherosclerosis. First  right posterolateral: Angiography shows moderate  atherosclerosis.      Ramus   Ramus intermedius: Angiography shows mild atherosclerosis.     < end of copied text >        Gen: WN/WD NAD  Neuro: AAOx3, nonfocal  Pulm: CTA B/L  CV: RRR, S1S2  Abd: Soft, NT, ND +BS  Ext: No edema, + peripheral pulses      Pt has AICD/PPM [ ] Yes  [ ] No             Brand Name:  Pre-op Beta Blocker ordered within 24 hrs of surgery?  [ ] Yes  [ ] No  If not, Why?  Type & Cross  [ ] Yes  [ ] No  NPO after Midnight [ ] Yes  [ ] No  Pre-op ABX ordered, to be taped on chart:  [ ] Yes  [ ] No     Hibiclens/Peridex ordered [ ] Yes  [ ] No  Intraop on Hold: PRBCs, CXR, FERMIN [ ]   Consent obtained  [ ] Yes  [ ] No

## 2022-04-17 NOTE — PROGRESS NOTE ADULT - PROBLEM SELECTOR PLAN 1
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 peña following     - s/p [facility] 4/13      -  ID cleared for OR - planned for tomorrow 4/18

## 2022-04-17 NOTE — PROGRESS NOTE ADULT - SUBJECTIVE AND OBJECTIVE BOX
Subjective: Patient seen and examined. No new events except as noted.   OR tomorrow.     REVIEW OF SYSTEMS:    CONSTITUTIONAL: + weakness, fevers or chills  EYES/ENT: No visual changes;  No vertigo or throat pain   NECK: No pain or stiffness  RESPIRATORY: No cough, wheezing, hemoptysis; No shortness of breath  CARDIOVASCULAR: No chest pain or palpitations  GASTROINTESTINAL: No abdominal or epigastric pain. No nausea, vomiting, or hematemesis; No diarrhea or constipation. No melena or hematochezia.  GENITOURINARY: No dysuria, frequency or hematuria  NEUROLOGICAL: No numbness or weakness  SKIN: No itching, burning, rashes, or lesions   All other review of systems is negative unless indicated above.    MEDICATIONS:  MEDICATIONS  (STANDING):  aspirin enteric coated 81 milliGRAM(s) Oral daily  atorvastatin 40 milliGRAM(s) Oral at bedtime  benzocaine 15 mG/menthol 3.6 mG Lozenge 1 Lozenge Oral daily  bisacodyl 5 milliGRAM(s) Oral at bedtime  cholecalciferol 1000 Unit(s) Oral daily  insulin lispro (ADMELOG) corrective regimen sliding scale   SubCutaneous three times a day before meals  melatonin 3 milliGRAM(s) Oral at bedtime  pantoprazole    Tablet 40 milliGRAM(s) Oral before breakfast  senna 1 Tablet(s) Oral daily    PHYSICAL EXAM:  T(C): 36.7 (04-16-22 @ 20:41), Max: 36.8 (04-16-22 @ 10:22)  HR: 75 (04-16-22 @ 20:41) (74 - 88)  BP: 102/67 (04-16-22 @ 20:41) (102/67 - 135/95)  RR: 18 (04-16-22 @ 20:41) (18 - 18)  SpO2: 94% (04-16-22 @ 20:41) (94% - 96%)  Wt(kg): --  I&O's Summary    15 Apr 2022 07:01  -  16 Apr 2022 07:00  --------------------------------------------------------  IN: 1220 mL / OUT: 1720 mL / NET: -500 mL    16 Apr 2022 07:01  -  17 Apr 2022 05:05  --------------------------------------------------------  IN: 1830 mL / OUT: 1800 mL / NET: 30 mL    Appearance: NAD  HEENT:   Normal oral mucosa, PERRL, EOMI	  Lymphatic: No lymphadenopathy , no edema  Cardiovascular: Normal S1 S2, No JVD, No murmurs , Peripheral pulses palpable 2+ bilaterally  Respiratory: Lungs clear to auscultation, normal effort 	  Gastrointestinal:  Soft, Non-tender, + BS	  Skin: No rashes, No ecchymoses, No cyanosis, warm to touch  Musculoskeletal: Normal range of motion, normal strength  Psychiatry:  Mood & affect appropriate  Ext: No edema    LABS:    CARDIAC MARKERS:                        8.7    8.67  )-----------( 348      ( 16 Apr 2022 06:27 )             27.8     04-16    139  |  101  |  11  ----------------------------<  114<H>  4.4   |  25  |  0.61    Ca    9.0      16 Apr 2022 06:26    TPro  6.5  /  Alb  3.0<L>  /  TBili  0.3  /  DBili  x   /  AST  53<H>  /  ALT  55<H>  /  AlkPhos  67  04-16    proBNP:   Lipid Profile:   HgA1c:   TSH:     TELEMETRY: SR 1st degree 60-70    ECG:  	  RADIOLOGY:   DIAGNOSTIC TESTING:  [ ] Echocardiogram:  [ ]  Catheterization:  [ ] Stress Test:    OTHER:

## 2022-04-17 NOTE — PROGRESS NOTE ADULT - SUBJECTIVE AND OBJECTIVE BOX
Neurology Progress note;     HPI:  71F RH w/ AS w/ prior open heart aortic valve replacement 2019, HTN, BETSEY, former smoker, Emphysema/ chronic bronchitis, GERD s/p laparoscopic vertical sleeve gastrectomy presented with acute onset speech disturbance, vision change and gait imbalance. Per EMS, LKN: 4/9/22 at 10am per family. Pt's family reported blurry vision, gait imbalance, and speech disturbance (mild loss in fluency). Pt unable to provide full hx because she was confused on timing. She  endorsed blurry vision but denied any weakness, numbness/tingling. Pt takes a baby aspirin 81mg daily. Pt ambulates without assistance or assistive devices at baseline.  LKN: 4/9/22 at 10am  NIHSS: 3  preMRS: 0  Pt was not a candidate for tpa due to outside tpa window   Pt was not a candidate for mechanical thrombectomy due to no large vessel occlusion on CTA    SUBJECTIVE:  No new neurologic complaints.  ROS reported negative unless otherwise noted.    Medications:      MEDICATIONS  (STANDING):  aspirin enteric coated 81 milliGRAM(s) Oral daily  atorvastatin 40 milliGRAM(s) Oral at bedtime  benzocaine 15 mG/menthol 3.6 mG Lozenge 1 Lozenge Oral daily  bisacodyl 5 milliGRAM(s) Oral at bedtime  cholecalciferol 1000 Unit(s) Oral daily  insulin lispro (ADMELOG) corrective regimen sliding scale   SubCutaneous three times a day before meals  melatonin 3 milliGRAM(s) Oral at bedtime  pantoprazole    Tablet 40 milliGRAM(s) Oral before breakfast  senna 1 Tablet(s) Oral daily    MEDICATIONS  (PRN):  ALBUTerol    90 MICROgram(s) HFA Inhaler 2 Puff(s) Inhalation every 6 hours PRN Shortness of Breath and/or Wheezing  aluminum hydroxide/magnesium hydroxide/simethicone Suspension 30 milliLiter(s) Oral every 4 hours PRN Dyspepsia      PHYSICAL EXAM:     Vital Signs Last 24 Hrs  T(C): 36.6 (17 Apr 2022 04:50), Max: 36.7 (16 Apr 2022 20:41)  T(F): 97.9 (17 Apr 2022 04:50), Max: 98.1 (16 Apr 2022 20:41)  HR: 69 (17 Apr 2022 04:50) (69 - 88)  BP: 118/78 (17 Apr 2022 04:50) (102/67 - 135/95)  BP(mean): 79 (16 Apr 2022 20:41) (79 - 108)  RR: 18 (17 Apr 2022 04:50) (18 - 18)  SpO2: 95% (17 Apr 2022 04:50) (94% - 96%)    General: No acute distress  HEENT: EOM intact, LHH to counting (is aware)  Abdomen: Soft, nontender, nondistended   Extremities: No edema    NEUROLOGICAL EXAM:  Mental status: Awake, alert, oriented x3, no aphasia, no neglect, normal memory   Cranial Nerves: No facial asymmetry, LHHA to counting, no nystagmus, no dysarthria,  tongue midline  Motor exam: Normal tone, no drift, 5/5 RUE, 5/5 RLE, 5/5 LUE, 5/5 LLE, normal fine finger movements.  Sensation: Intact to light touch   Coordination/ Gait: No dysmetria, AMILCAR intact and symmetric bilaterally      LABS:                CBC Full  -  ( 17 Apr 2022 06:26 )  WBC Count : 8.03 K/uL  RBC Count : 3.03 M/uL  Hemoglobin : 8.7 g/dL  Hematocrit : 27.3 %  Platelet Count - Automated : 343 K/uL  Mean Cell Volume : 90.1 fl  Mean Cell Hemoglobin : 28.7 pg  Mean Cell Hemoglobin Concentration : 31.9 gm/dL  Auto Neutrophil # : 5.16 K/uL  Auto Lymphocyte # : 1.88 K/uL  Auto Monocyte # : 0.77 K/uL  Auto Eosinophil # : 0.10 K/uL  Auto Basophil # : 0.04 K/uL  Auto Neutrophil % : 64.3 %  Auto Lymphocyte % : 23.4 %  Auto Monocyte % : 9.6 %  Auto Eosinophil % : 1.2 %  Auto Basophil % : 0.5 %    04-17    139  |  102  |  14  ----------------------------<  116<H>  4.3   |  26  |  0.62    Ca    8.7      17 Apr 2022 06:26    TPro  6.3  /  Alb  3.0<L>  /  TBili  0.3  /  DBili  x   /  AST  34  /  ALT  46<H>  /  AlkPhos  65  04-17      IMAGING: Reviewed by me.   MRI HEAD 04/10/22: Acute right occipital lobe infarct in a right PCA vascular distribution   with an additional punctate acute infarct involving the left cerebellum   and right splenium of the corpus callosum.    04/09/22:  Brain CT: Acute right occipital lobe infarct in the distribution of the   right PCA. No evidence for hemorrhage.  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 No   hemodynamically significant stenosis within the anterior circulation.  Brain CTA: No large vessel occlusion or stenosis. 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  Perfusion maps: Core infarct in the distribution of the right PCA.   Questionable areas of brain at risk in the right PCA as well as the   bilateral temporal and right frontal region.

## 2022-04-17 NOTE — PROGRESS NOTE ADULT - ASSESSMENT
71F RH w/ AS w/ prior open heart aortic valve replacement 2019, HTN, BETSEY, former smoker, Emphysema/ chronic bronchitis, GERD s/p laparoscopic vertical sleeve gastrectomy presented with acute onset speech disturbance, vision change and gait imbalance. Per EMS, LKN: 4/9/22 at 10am per family. Pt's family reported blurry vision, gait imbalance, and speech disturbance (mild loss in fluency). She still endorses blurry vision but denies any weakness, numbness/tingling. Pt takes a baby aspirin 81mg daily. NIHSS: 3, preMRS: 0. No tpa or MT.    o/e with [facility]   Impression:  Right PCA infarct etiology likely cardioembolic given findings of atrial mass, also incidental 6 mm ACOMM aneurysm.     NEURO: neurologically without acute chagne, continue close monitoring for neurologic deterioration, found to have an incidental aneurysm will follow with Dr. Contreras as outpatient for further evaluation and possible treatment, LDL 62: continue home dose statin  MRI Brain w/o noted above,  Physical therapy/OT: AR    ANTITHROMBOTIC THERAPY: ASA    PULMONARY:  protecting airway, saturating well     CARDIOVASCULAR:  TTE: ef 45%, mild-moderate MR, mild mitral stenosis, left atrial mass with increased gradient, bioprosthetic AV, mild AR, moderately dilated LA, left atrial mass suggestive of myxoma,  cardiac monitoring, S/P aortic valve replacement cardiology consulted, Troponin elevated. ILR placed to rule out A. Fib as possible source.   CT surgery following. Anticipaet cardiac cath and tentative OR pending clinical course. no objection for cath at this time.  plan for OR monday                     SBP goal: normotension being tolerated- avoid SBP > 160mmHg in setting of unsecured itnracranial aneurysm.     GASTROINTESTINAL:  dysphagia screen- passed, tolerating diet       Diet: Regular    RENAL: BUN/Cr without acute change, good urine output , maintain adequate hydration      Na Goal: Greater than 135     Sharma: N    HEMATOLOGY: H/H   anemia , no acute change, no active bleeding Platelets 320, she should have all age and risk appropriate      DVT ppx: Heparin s.c [] LMWH [x]     ID: febrile, no leukocytosis, repeat UA negative for UTI, was on ctx prior for UTI, follow up sensitivities , blood cx taken, infectious workup done ;   ; monitoring now off abx     OTHER: Plan of care discussed with patient and sister at bedside.     DISPOSITION: AR once stable and workup is complete, pending CT sx service transfer for further care, accepte to Dr. Jay service ; I will continue to follow for stroke management       CORE MEASURES:        Admission NIHSS: 3     TPA: [] YES [x] NO      LDL/HDL: 62/32     Depression Screen: 0     Statin Therapy: Y     Dysphagia Screen: [x] PASS [] FAIL     Smoking [] YES [x] NO      Afib [] YES [x] NO     Stroke Education [x] YES [] NO    Zenon Vela MD  Vascular Neurology

## 2022-04-18 ENCOUNTER — APPOINTMENT (OUTPATIENT)
Dept: CARDIOTHORACIC SURGERY | Facility: HOSPITAL | Age: 72
End: 2022-04-18

## 2022-04-18 LAB
ALBUMIN SERPL ELPH-MCNC: 3.3 G/DL — SIGNIFICANT CHANGE UP (ref 3.3–5)
ALP SERPL-CCNC: 33 U/L — LOW (ref 40–120)
ALT FLD-CCNC: 17 U/L — SIGNIFICANT CHANGE UP (ref 10–45)
ANION GAP SERPL CALC-SCNC: 12 MMOL/L — SIGNIFICANT CHANGE UP (ref 5–17)
APTT BLD: 30.7 SEC — SIGNIFICANT CHANGE UP (ref 27.5–35.5)
APTT BLD: 45.9 SEC — HIGH (ref 27.5–35.5)
AST SERPL-CCNC: 41 U/L — HIGH (ref 10–40)
BASE EXCESS BLDV CALC-SCNC: -0.3 MMOL/L — SIGNIFICANT CHANGE UP (ref -2–2)
BASE EXCESS BLDV CALC-SCNC: -1.8 MMOL/L — SIGNIFICANT CHANGE UP (ref -2–2)
BASE EXCESS BLDV CALC-SCNC: -2 MMOL/L — SIGNIFICANT CHANGE UP (ref -2–2)
BASE EXCESS BLDV CALC-SCNC: 0.5 MMOL/L — SIGNIFICANT CHANGE UP (ref -2–2)
BASE EXCESS BLDV CALC-SCNC: 0.6 MMOL/L — SIGNIFICANT CHANGE UP (ref -2–2)
BASE EXCESS BLDV CALC-SCNC: 0.6 MMOL/L — SIGNIFICANT CHANGE UP (ref -2–2)
BASE EXCESS BLDV CALC-SCNC: 1.1 MMOL/L — SIGNIFICANT CHANGE UP (ref -2–2)
BASE EXCESS BLDV CALC-SCNC: 1.1 MMOL/L — SIGNIFICANT CHANGE UP (ref -2–2)
BASE EXCESS BLDV CALC-SCNC: 1.3 MMOL/L — SIGNIFICANT CHANGE UP (ref -2–2)
BASE EXCESS BLDV CALC-SCNC: 3.6 MMOL/L — HIGH (ref -2–2)
BASOPHILS # BLD AUTO: 0.02 K/UL — SIGNIFICANT CHANGE UP (ref 0–0.2)
BASOPHILS # BLD AUTO: 0.16 K/UL — SIGNIFICANT CHANGE UP (ref 0–0.2)
BASOPHILS NFR BLD AUTO: 0.1 % — SIGNIFICANT CHANGE UP (ref 0–2)
BASOPHILS NFR BLD AUTO: 0.9 % — SIGNIFICANT CHANGE UP (ref 0–2)
BILIRUB SERPL-MCNC: 2.4 MG/DL — HIGH (ref 0.2–1.2)
BLOOD GAS VENOUS - CREATININE: SIGNIFICANT CHANGE UP MG/DL (ref 0.5–1.3)
BUN SERPL-MCNC: 13 MG/DL — SIGNIFICANT CHANGE UP (ref 7–23)
CA-I SERPL-SCNC: 0.75 MMOL/L — LOW (ref 1.15–1.33)
CA-I SERPL-SCNC: 0.85 MMOL/L — LOW (ref 1.15–1.33)
CA-I SERPL-SCNC: 0.86 MMOL/L — LOW (ref 1.15–1.33)
CA-I SERPL-SCNC: 0.86 MMOL/L — LOW (ref 1.15–1.33)
CA-I SERPL-SCNC: 0.89 MMOL/L — LOW (ref 1.15–1.33)
CA-I SERPL-SCNC: 0.9 MMOL/L — LOW (ref 1.15–1.33)
CA-I SERPL-SCNC: 0.92 MMOL/L — LOW (ref 1.15–1.33)
CA-I SERPL-SCNC: 0.93 MMOL/L — LOW (ref 1.15–1.33)
CA-I SERPL-SCNC: 1.02 MMOL/L — LOW (ref 1.15–1.33)
CA-I SERPL-SCNC: 1.1 MMOL/L — LOW (ref 1.15–1.33)
CALCIUM SERPL-MCNC: 7.9 MG/DL — LOW (ref 8.4–10.5)
CHLORIDE BLDV-SCNC: 105 MMOL/L — SIGNIFICANT CHANGE UP (ref 96–108)
CHLORIDE BLDV-SCNC: 106 MMOL/L — SIGNIFICANT CHANGE UP (ref 96–108)
CHLORIDE BLDV-SCNC: 107 MMOL/L — SIGNIFICANT CHANGE UP (ref 96–108)
CHLORIDE BLDV-SCNC: 108 MMOL/L — SIGNIFICANT CHANGE UP (ref 96–108)
CHLORIDE BLDV-SCNC: 110 MMOL/L — HIGH (ref 96–108)
CHLORIDE SERPL-SCNC: 110 MMOL/L — HIGH (ref 96–108)
CK MB BLD-MCNC: 10.5 % — HIGH (ref 0–3.5)
CK MB CFR SERPL CALC: 31.7 NG/ML — HIGH (ref 0–3.8)
CK SERPL-CCNC: 301 U/L — HIGH (ref 25–170)
CO2 BLDV-SCNC: 27 MMOL/L — HIGH (ref 22–26)
CO2 BLDV-SCNC: 28 MMOL/L — HIGH (ref 22–26)
CO2 BLDV-SCNC: 30 MMOL/L — HIGH (ref 22–26)
CO2 SERPL-SCNC: 21 MMOL/L — LOW (ref 22–31)
CREAT SERPL-MCNC: 0.56 MG/DL — SIGNIFICANT CHANGE UP (ref 0.5–1.3)
EGFR: 98 ML/MIN/1.73M2 — SIGNIFICANT CHANGE UP
EOSINOPHIL # BLD AUTO: 0.02 K/UL — SIGNIFICANT CHANGE UP (ref 0–0.5)
EOSINOPHIL # BLD AUTO: 0.3 K/UL — SIGNIFICANT CHANGE UP (ref 0–0.5)
EOSINOPHIL NFR BLD AUTO: 0.1 % — SIGNIFICANT CHANGE UP (ref 0–6)
EOSINOPHIL NFR BLD AUTO: 1.7 % — SIGNIFICANT CHANGE UP (ref 0–6)
FIBRINOGEN PPP-MCNC: 404 MG/DL — SIGNIFICANT CHANGE UP (ref 330–520)
FIBRINOGEN PPP-MCNC: 447 MG/DL — SIGNIFICANT CHANGE UP (ref 330–520)
GAS PNL BLDA: SIGNIFICANT CHANGE UP
GAS PNL BLDV: 135 MMOL/L — LOW (ref 136–145)
GAS PNL BLDV: 136 MMOL/L — SIGNIFICANT CHANGE UP (ref 136–145)
GAS PNL BLDV: 137 MMOL/L — SIGNIFICANT CHANGE UP (ref 136–145)
GAS PNL BLDV: 137 MMOL/L — SIGNIFICANT CHANGE UP (ref 136–145)
GAS PNL BLDV: 138 MMOL/L — SIGNIFICANT CHANGE UP (ref 136–145)
GAS PNL BLDV: 138 MMOL/L — SIGNIFICANT CHANGE UP (ref 136–145)
GAS PNL BLDV: 139 MMOL/L — SIGNIFICANT CHANGE UP (ref 136–145)
GAS PNL BLDV: 139 MMOL/L — SIGNIFICANT CHANGE UP (ref 136–145)
GAS PNL BLDV: SIGNIFICANT CHANGE UP
GLUCOSE BLDC GLUCOMTR-MCNC: 247 MG/DL — HIGH (ref 70–99)
GLUCOSE BLDV-MCNC: 118 MG/DL — HIGH (ref 70–99)
GLUCOSE BLDV-MCNC: 127 MG/DL — HIGH (ref 70–99)
GLUCOSE BLDV-MCNC: 133 MG/DL — HIGH (ref 70–99)
GLUCOSE BLDV-MCNC: 135 MG/DL — HIGH (ref 70–99)
GLUCOSE BLDV-MCNC: 137 MG/DL — HIGH (ref 70–99)
GLUCOSE BLDV-MCNC: 137 MG/DL — HIGH (ref 70–99)
GLUCOSE BLDV-MCNC: 143 MG/DL — HIGH (ref 70–99)
GLUCOSE BLDV-MCNC: 147 MG/DL — HIGH (ref 70–99)
GLUCOSE BLDV-MCNC: 150 MG/DL — HIGH (ref 70–99)
GLUCOSE BLDV-MCNC: 151 MG/DL — HIGH (ref 70–99)
GLUCOSE SERPL-MCNC: 165 MG/DL — HIGH (ref 70–99)
HCO3 BLDV-SCNC: 25 MMOL/L — SIGNIFICANT CHANGE UP (ref 22–29)
HCO3 BLDV-SCNC: 26 MMOL/L — SIGNIFICANT CHANGE UP (ref 22–29)
HCO3 BLDV-SCNC: 27 MMOL/L — SIGNIFICANT CHANGE UP (ref 22–29)
HCO3 BLDV-SCNC: 28 MMOL/L — SIGNIFICANT CHANGE UP (ref 22–29)
HCT VFR BLD CALC: 20.3 % — CRITICAL LOW (ref 34.5–45)
HCT VFR BLD CALC: 26.9 % — LOW (ref 34.5–45)
HCT VFR BLDA CALC: 22 % — LOW (ref 34.5–46.5)
HCT VFR BLDA CALC: 23 % — LOW (ref 34.5–46.5)
HCT VFR BLDA CALC: 23 % — LOW (ref 34.5–46.5)
HCT VFR BLDA CALC: 24 % — LOW (ref 34.5–46.5)
HCT VFR BLDA CALC: 24 % — LOW (ref 34.5–46.5)
HCT VFR BLDA CALC: 25 % — LOW (ref 34.5–46.5)
HCT VFR BLDA CALC: 26 % — LOW (ref 34.5–46.5)
HEPARINASE TEG R TIME: 16.5 MIN (ref 4.3–8.3)
HGB BLD CALC-MCNC: 7.2 G/DL — LOW (ref 11.7–16.1)
HGB BLD CALC-MCNC: 7.7 G/DL — LOW (ref 11.7–16.1)
HGB BLD CALC-MCNC: 7.8 G/DL — LOW (ref 11.7–16.1)
HGB BLD CALC-MCNC: 8 G/DL — LOW (ref 11.7–16.1)
HGB BLD CALC-MCNC: 8 G/DL — LOW (ref 11.7–16.1)
HGB BLD CALC-MCNC: 8.2 G/DL — LOW (ref 11.7–16.1)
HGB BLD CALC-MCNC: 8.2 G/DL — LOW (ref 11.7–16.1)
HGB BLD CALC-MCNC: 8.4 G/DL — LOW (ref 11.7–16.1)
HGB BLD CALC-MCNC: 8.4 G/DL — LOW (ref 11.7–16.1)
HGB BLD CALC-MCNC: 8.5 G/DL — LOW (ref 11.7–16.1)
HGB BLD-MCNC: 6.6 G/DL — CRITICAL LOW (ref 11.5–15.5)
HGB BLD-MCNC: 9 G/DL — LOW (ref 11.5–15.5)
IMM GRANULOCYTES NFR BLD AUTO: 1.8 % — HIGH (ref 0–1.5)
INR BLD: 1.25 RATIO — HIGH (ref 0.88–1.16)
INR BLD: 1.45 RATIO — HIGH (ref 0.88–1.16)
LACTATE BLDV-MCNC: 0.6 MMOL/L — LOW (ref 0.7–2)
LACTATE BLDV-MCNC: 0.8 MMOL/L — SIGNIFICANT CHANGE UP (ref 0.7–2)
LACTATE BLDV-MCNC: 1 MMOL/L — SIGNIFICANT CHANGE UP (ref 0.7–2)
LACTATE BLDV-MCNC: 1.4 MMOL/L — SIGNIFICANT CHANGE UP (ref 0.7–2)
LYMPHOCYTES # BLD AUTO: 0.99 K/UL — LOW (ref 1–3.3)
LYMPHOCYTES # BLD AUTO: 1.53 K/UL — SIGNIFICANT CHANGE UP (ref 1–3.3)
LYMPHOCYTES # BLD AUTO: 6.3 % — LOW (ref 13–44)
LYMPHOCYTES # BLD AUTO: 8.6 % — LOW (ref 13–44)
MAGNESIUM SERPL-MCNC: 3.4 MG/DL — HIGH (ref 1.6–2.6)
MANUAL SMEAR VERIFICATION: SIGNIFICANT CHANGE UP
MCHC RBC-ENTMCNC: 29.4 PG — SIGNIFICANT CHANGE UP (ref 27–34)
MCHC RBC-ENTMCNC: 29.7 PG — SIGNIFICANT CHANGE UP (ref 27–34)
MCHC RBC-ENTMCNC: 32.5 GM/DL — SIGNIFICANT CHANGE UP (ref 32–36)
MCHC RBC-ENTMCNC: 33.5 GM/DL — SIGNIFICANT CHANGE UP (ref 32–36)
MCV RBC AUTO: 87.9 FL — SIGNIFICANT CHANGE UP (ref 80–100)
MCV RBC AUTO: 91.4 FL — SIGNIFICANT CHANGE UP (ref 80–100)
MONOCYTES # BLD AUTO: 0.92 K/UL — HIGH (ref 0–0.9)
MONOCYTES # BLD AUTO: 0.92 K/UL — HIGH (ref 0–0.9)
MONOCYTES NFR BLD AUTO: 5.2 % — SIGNIFICANT CHANGE UP (ref 2–14)
MONOCYTES NFR BLD AUTO: 5.8 % — SIGNIFICANT CHANGE UP (ref 2–14)
NEUTROPHILS # BLD AUTO: 13.51 K/UL — HIGH (ref 1.8–7.4)
NEUTROPHILS # BLD AUTO: 14.86 K/UL — HIGH (ref 1.8–7.4)
NEUTROPHILS NFR BLD AUTO: 76.7 % — SIGNIFICANT CHANGE UP (ref 43–77)
NEUTROPHILS NFR BLD AUTO: 85.9 % — HIGH (ref 43–77)
NEUTS BAND # BLD: 6.9 % — SIGNIFICANT CHANGE UP (ref 0–8)
NRBC # BLD: 0 /100 WBCS — SIGNIFICANT CHANGE UP (ref 0–0)
PCO2 BLDV: 41 MMHG — SIGNIFICANT CHANGE UP (ref 39–42)
PCO2 BLDV: 42 MMHG — SIGNIFICANT CHANGE UP (ref 39–42)
PCO2 BLDV: 42 MMHG — SIGNIFICANT CHANGE UP (ref 39–42)
PCO2 BLDV: 44 MMHG — HIGH (ref 39–42)
PCO2 BLDV: 45 MMHG — HIGH (ref 39–42)
PCO2 BLDV: 48 MMHG — HIGH (ref 39–42)
PCO2 BLDV: 48 MMHG — HIGH (ref 39–42)
PCO2 BLDV: 49 MMHG — HIGH (ref 39–42)
PCO2 BLDV: 64 MMHG — HIGH (ref 39–42)
PCO2 BLDV: 65 MMHG — HIGH (ref 39–42)
PH BLDV: 7.21 — LOW (ref 7.32–7.43)
PH BLDV: 7.22 — LOW (ref 7.32–7.43)
PH BLDV: 7.33 — SIGNIFICANT CHANGE UP (ref 7.32–7.43)
PH BLDV: 7.35 — SIGNIFICANT CHANGE UP (ref 7.32–7.43)
PH BLDV: 7.35 — SIGNIFICANT CHANGE UP (ref 7.32–7.43)
PH BLDV: 7.38 — SIGNIFICANT CHANGE UP (ref 7.32–7.43)
PH BLDV: 7.4 — SIGNIFICANT CHANGE UP (ref 7.32–7.43)
PH BLDV: 7.42 — SIGNIFICANT CHANGE UP (ref 7.32–7.43)
PHOSPHATE SERPL-MCNC: 3.9 MG/DL — SIGNIFICANT CHANGE UP (ref 2.5–4.5)
PLAT MORPH BLD: NORMAL — SIGNIFICANT CHANGE UP
PLATELET # BLD AUTO: 147 K/UL — LOW (ref 150–400)
PLATELET # BLD AUTO: 184 K/UL — SIGNIFICANT CHANGE UP (ref 150–400)
PO2 BLDV: 100 MMHG — HIGH (ref 25–45)
PO2 BLDV: 102 MMHG — HIGH (ref 25–45)
PO2 BLDV: 119 MMHG — HIGH (ref 25–45)
PO2 BLDV: 429 MMHG — HIGH (ref 25–45)
PO2 BLDV: 51 MMHG — HIGH (ref 25–45)
PO2 BLDV: 72 MMHG — HIGH (ref 25–45)
PO2 BLDV: 82 MMHG — HIGH (ref 25–45)
PO2 BLDV: 86 MMHG — HIGH (ref 25–45)
PO2 BLDV: 89 MMHG — HIGH (ref 25–45)
PO2 BLDV: 91 MMHG — HIGH (ref 25–45)
POTASSIUM BLDV-SCNC: 3.6 MMOL/L — SIGNIFICANT CHANGE UP (ref 3.5–5.1)
POTASSIUM BLDV-SCNC: 4.1 MMOL/L — SIGNIFICANT CHANGE UP (ref 3.5–5.1)
POTASSIUM BLDV-SCNC: 5 MMOL/L — SIGNIFICANT CHANGE UP (ref 3.5–5.1)
POTASSIUM BLDV-SCNC: 5.3 MMOL/L — HIGH (ref 3.5–5.1)
POTASSIUM BLDV-SCNC: 5.7 MMOL/L — HIGH (ref 3.5–5.1)
POTASSIUM BLDV-SCNC: 5.7 MMOL/L — HIGH (ref 3.5–5.1)
POTASSIUM BLDV-SCNC: 5.9 MMOL/L — HIGH (ref 3.5–5.1)
POTASSIUM BLDV-SCNC: 6.2 MMOL/L — CRITICAL HIGH (ref 3.5–5.1)
POTASSIUM BLDV-SCNC: 6.3 MMOL/L — CRITICAL HIGH (ref 3.5–5.1)
POTASSIUM BLDV-SCNC: 6.5 MMOL/L — CRITICAL HIGH (ref 3.5–5.1)
POTASSIUM SERPL-MCNC: 5.8 MMOL/L — HIGH (ref 3.5–5.3)
POTASSIUM SERPL-SCNC: 5.8 MMOL/L — HIGH (ref 3.5–5.3)
PROT SERPL-MCNC: 4.7 G/DL — LOW (ref 6–8.3)
PROTHROM AB SERPL-ACNC: 14.4 SEC — HIGH (ref 10.5–13.4)
PROTHROM AB SERPL-ACNC: 16.7 SEC — HIGH (ref 10.5–13.4)
RAPIDTEG MAXIMUM AMPLITUDE: 65.8 MM — SIGNIFICANT CHANGE UP (ref 52–70)
RBC # BLD: 2.22 M/UL — LOW (ref 3.8–5.2)
RBC # BLD: 3.06 M/UL — LOW (ref 3.8–5.2)
RBC # FLD: 14 % — SIGNIFICANT CHANGE UP (ref 10.3–14.5)
RBC # FLD: 14 % — SIGNIFICANT CHANGE UP (ref 10.3–14.5)
RBC BLD AUTO: SIGNIFICANT CHANGE UP
SAO2 % BLDV: 84.5 % — SIGNIFICANT CHANGE UP (ref 67–88)
SAO2 % BLDV: 97 % — HIGH (ref 67–88)
SAO2 % BLDV: 98.1 % — HIGH (ref 67–88)
SAO2 % BLDV: 98.4 % — HIGH (ref 67–88)
SAO2 % BLDV: 98.5 % — HIGH (ref 67–88)
SAO2 % BLDV: 98.6 % — HIGH (ref 67–88)
SAO2 % BLDV: 98.7 % — HIGH (ref 67–88)
SAO2 % BLDV: 98.8 % — HIGH (ref 67–88)
SAO2 % BLDV: 99.2 % — HIGH (ref 67–88)
SAO2 % BLDV: 99.5 % — HIGH (ref 67–88)
SODIUM SERPL-SCNC: 143 MMOL/L — SIGNIFICANT CHANGE UP (ref 135–145)
TEG FUNCTIONAL FIBRINOGEN: 32.3 MM (ref 15–32)
TEG MAXIMUM AMPLITUDE: 64.7 MM — SIGNIFICANT CHANGE UP (ref 52–69)
TEG REACTION TIME: >17 MIN (ref 4.6–9.1)
TROPONIN T, HIGH SENSITIVITY RESULT: 810 NG/L — HIGH (ref 0–51)
WBC # BLD: 15.74 K/UL — HIGH (ref 3.8–10.5)
WBC # BLD: 17.78 K/UL — HIGH (ref 3.8–10.5)
WBC # FLD AUTO: 15.74 K/UL — HIGH (ref 3.8–10.5)
WBC # FLD AUTO: 17.78 K/UL — HIGH (ref 3.8–10.5)

## 2022-04-18 PROCEDURE — 33863 ASCENDING AORTIC GRAFT: CPT

## 2022-04-18 PROCEDURE — 71045 X-RAY EXAM CHEST 1 VIEW: CPT | Mod: 26

## 2022-04-18 PROCEDURE — 33530 CORONARY ARTERY BYPASS/REOP: CPT

## 2022-04-18 PROCEDURE — 99291 CRITICAL CARE FIRST HOUR: CPT

## 2022-04-18 DEVICE — PERI GUARD PERICARDIUM 4X4: Type: IMPLANTABLE DEVICE | Status: FUNCTIONAL

## 2022-04-18 DEVICE — CATH VASC EXPO MULTIPURPOSE A CURVE MPA1 5FR X 100CMM: Type: IMPLANTABLE DEVICE | Status: FUNCTIONAL

## 2022-04-18 DEVICE — GUIDEWIRE AMPLATZ SUPER-STIFF STRAIGHT .035" X 180CM: Type: IMPLANTABLE DEVICE | Status: FUNCTIONAL

## 2022-04-18 DEVICE — CANNULA RCSP 15FR X 12".5" MANUAL-INFLATE CUFF WITH GUIDEWIRE STYLET: Type: IMPLANTABLE DEVICE | Status: FUNCTIONAL

## 2022-04-18 DEVICE — CANNULA AORTIC ROOT 14G X 14CM FLANGED: Type: IMPLANTABLE DEVICE | Status: FUNCTIONAL

## 2022-04-18 DEVICE — CATH VENT LEFT HEART SILICONE 20FR NON-VENTED: Type: IMPLANTABLE DEVICE | Status: FUNCTIONAL

## 2022-04-18 DEVICE — BIOGLUE 10ML SYR: Type: IMPLANTABLE DEVICE | Status: FUNCTIONAL

## 2022-04-18 DEVICE — SHEATH INTRODUCER TERUMO PINNACLE CORONARY 5FR X 10CM X 0.038" MINI WIRE: Type: IMPLANTABLE DEVICE | Status: FUNCTIONAL

## 2022-04-18 DEVICE — PATCH PERI-GUARD RPR 6 CM X 8 CM: Type: IMPLANTABLE DEVICE | Status: FUNCTIONAL

## 2022-04-18 DEVICE — FELT PTFE 6 X 6": Type: IMPLANTABLE DEVICE | Status: FUNCTIONAL

## 2022-04-18 DEVICE — CANNULA VENOUS 2 STAGE THIN FLEX 36/46FR X 1/2" WITH RETURN DIAL: Type: IMPLANTABLE DEVICE | Status: FUNCTIONAL

## 2022-04-18 DEVICE — CHEST DRAIN THORACIC ARGYLE PVC 36FR STRAIGHT: Type: IMPLANTABLE DEVICE | Status: FUNCTIONAL

## 2022-04-18 DEVICE — SURGICEL FIBRILLAR 2 X 4": Type: IMPLANTABLE DEVICE | Status: FUNCTIONAL

## 2022-04-18 DEVICE — IMPLANTABLE DEVICE: Type: IMPLANTABLE DEVICE | Status: FUNCTIONAL

## 2022-04-18 DEVICE — LIGATING CLIPS WECK HORIZON MEDIUM (BLUE) 24: Type: IMPLANTABLE DEVICE | Status: FUNCTIONAL

## 2022-04-18 DEVICE — CHEST DRAIN THORACIC ARGYLE PVC 32FR RIGHT ANGLE: Type: IMPLANTABLE DEVICE | Status: FUNCTIONAL

## 2022-04-18 DEVICE — PACING WIRE BLUE DARK 2-0 24" SH SKS-3: Type: IMPLANTABLE DEVICE | Status: FUNCTIONAL

## 2022-04-18 DEVICE — CANNULA ARTERIAL STRAIGHT 20FR X 3/8" VENTED: Type: IMPLANTABLE DEVICE | Status: FUNCTIONAL

## 2022-04-18 DEVICE — CANNULA VESSEL 3MM BLUNT TIP CLEAR 1-WAY VALVE: Type: IMPLANTABLE DEVICE | Status: FUNCTIONAL

## 2022-04-18 DEVICE — LIGATING CLIPS WECK HORIZON SMALL-WIDE (RED) 24: Type: IMPLANTABLE DEVICE | Status: FUNCTIONAL

## 2022-04-18 DEVICE — CANNULA PERFUSION  BALLOON 6MM: Type: IMPLANTABLE DEVICE | Status: FUNCTIONAL

## 2022-04-18 DEVICE — CANNULA VENOUS 1 STAGE RIGHT ANGLE METAL TIP 24FR X 3/8": Type: IMPLANTABLE DEVICE | Status: FUNCTIONAL

## 2022-04-18 DEVICE — GUIDEWIRE AMPLATZ SUPER-STIFF STRAIGHT .035" X 260CM: Type: IMPLANTABLE DEVICE | Status: FUNCTIONAL

## 2022-04-18 DEVICE — KIT A-LINE 1LUM 20G X 12CM SAFE KIT: Type: IMPLANTABLE DEVICE | Status: FUNCTIONAL

## 2022-04-18 DEVICE — CANNULA VENOUS 1 STAGE STRAIGHT 32FR X 3/8": Type: IMPLANTABLE DEVICE | Status: FUNCTIONAL

## 2022-04-18 RX ORDER — SODIUM CHLORIDE 9 MG/ML
500 INJECTION, SOLUTION INTRAVENOUS ONCE
Refills: 0 | Status: COMPLETED | OUTPATIENT
Start: 2022-04-18 | End: 2022-04-18

## 2022-04-18 RX ORDER — DEXTROSE 50 % IN WATER 50 %
50 SYRINGE (ML) INTRAVENOUS ONCE
Refills: 0 | Status: COMPLETED | OUTPATIENT
Start: 2022-04-18 | End: 2022-04-18

## 2022-04-18 RX ORDER — POTASSIUM CHLORIDE 20 MEQ
10 PACKET (EA) ORAL
Refills: 0 | Status: DISCONTINUED | OUTPATIENT
Start: 2022-04-18 | End: 2022-04-20

## 2022-04-18 RX ORDER — INSULIN HUMAN 100 [IU]/ML
10 INJECTION, SOLUTION SUBCUTANEOUS ONCE
Refills: 0 | Status: COMPLETED | OUTPATIENT
Start: 2022-04-18 | End: 2022-04-18

## 2022-04-18 RX ORDER — CHLORHEXIDINE GLUCONATE 213 G/1000ML
15 SOLUTION TOPICAL EVERY 12 HOURS
Refills: 0 | Status: DISCONTINUED | OUTPATIENT
Start: 2022-04-18 | End: 2022-04-19

## 2022-04-18 RX ORDER — POLYETHYLENE GLYCOL 3350 17 G/17G
17 POWDER, FOR SOLUTION ORAL DAILY
Refills: 0 | Status: DISCONTINUED | OUTPATIENT
Start: 2022-04-19 | End: 2022-04-21

## 2022-04-18 RX ORDER — SENNA PLUS 8.6 MG/1
2 TABLET ORAL AT BEDTIME
Refills: 0 | Status: DISCONTINUED | OUTPATIENT
Start: 2022-04-19 | End: 2022-04-27

## 2022-04-18 RX ORDER — CHLORHEXIDINE GLUCONATE 213 G/1000ML
5 SOLUTION TOPICAL
Refills: 0 | Status: DISCONTINUED | OUTPATIENT
Start: 2022-04-18 | End: 2022-04-27

## 2022-04-18 RX ORDER — ASCORBIC ACID 60 MG
500 TABLET,CHEWABLE ORAL
Refills: 0 | Status: COMPLETED | OUTPATIENT
Start: 2022-04-18 | End: 2022-04-23

## 2022-04-18 RX ORDER — GABAPENTIN 400 MG/1
100 CAPSULE ORAL EVERY 8 HOURS
Refills: 0 | Status: COMPLETED | OUTPATIENT
Start: 2022-04-18 | End: 2022-04-23

## 2022-04-18 RX ORDER — MEPERIDINE HYDROCHLORIDE 50 MG/ML
25 INJECTION INTRAMUSCULAR; INTRAVENOUS; SUBCUTANEOUS ONCE
Refills: 0 | Status: DISCONTINUED | OUTPATIENT
Start: 2022-04-18 | End: 2022-04-19

## 2022-04-18 RX ORDER — CEFUROXIME AXETIL 250 MG
1500 TABLET ORAL EVERY 8 HOURS
Refills: 0 | Status: DISCONTINUED | OUTPATIENT
Start: 2022-04-18 | End: 2022-04-19

## 2022-04-18 RX ORDER — NICARDIPINE HYDROCHLORIDE 30 MG/1
5 CAPSULE, EXTENDED RELEASE ORAL
Qty: 40 | Refills: 0 | Status: DISCONTINUED | OUTPATIENT
Start: 2022-04-18 | End: 2022-04-19

## 2022-04-18 RX ORDER — VANCOMYCIN HCL 1 G
1000 VIAL (EA) INTRAVENOUS EVERY 12 HOURS
Refills: 0 | Status: COMPLETED | OUTPATIENT
Start: 2022-04-18 | End: 2022-04-25

## 2022-04-18 RX ORDER — NOREPINEPHRINE BITARTRATE/D5W 8 MG/250ML
0.05 PLASTIC BAG, INJECTION (ML) INTRAVENOUS
Qty: 8 | Refills: 0 | Status: DISCONTINUED | OUTPATIENT
Start: 2022-04-18 | End: 2022-04-19

## 2022-04-18 RX ORDER — OXYCODONE HYDROCHLORIDE 5 MG/1
10 TABLET ORAL EVERY 4 HOURS
Refills: 0 | Status: DISCONTINUED | OUTPATIENT
Start: 2022-04-18 | End: 2022-04-25

## 2022-04-18 RX ORDER — CALCIUM GLUCONATE 100 MG/ML
1 VIAL (ML) INTRAVENOUS ONCE
Refills: 0 | Status: COMPLETED | OUTPATIENT
Start: 2022-04-18 | End: 2022-04-18

## 2022-04-18 RX ORDER — CHLORHEXIDINE GLUCONATE 213 G/1000ML
1 SOLUTION TOPICAL DAILY
Refills: 0 | Status: DISCONTINUED | OUTPATIENT
Start: 2022-04-18 | End: 2022-04-27

## 2022-04-18 RX ORDER — DEXTROSE 50 % IN WATER 50 %
50 SYRINGE (ML) INTRAVENOUS
Refills: 0 | Status: DISCONTINUED | OUTPATIENT
Start: 2022-04-18 | End: 2022-04-27

## 2022-04-18 RX ORDER — HYDROMORPHONE HYDROCHLORIDE 2 MG/ML
0.5 INJECTION INTRAMUSCULAR; INTRAVENOUS; SUBCUTANEOUS EVERY 6 HOURS
Refills: 0 | Status: DISCONTINUED | OUTPATIENT
Start: 2022-04-18 | End: 2022-04-20

## 2022-04-18 RX ORDER — DOBUTAMINE HCL 250MG/20ML
1 VIAL (ML) INTRAVENOUS
Qty: 500 | Refills: 0 | Status: DISCONTINUED | OUTPATIENT
Start: 2022-04-18 | End: 2022-04-21

## 2022-04-18 RX ORDER — SODIUM CHLORIDE 9 MG/ML
1000 INJECTION INTRAMUSCULAR; INTRAVENOUS; SUBCUTANEOUS
Refills: 0 | Status: DISCONTINUED | OUTPATIENT
Start: 2022-04-18 | End: 2022-04-27

## 2022-04-18 RX ORDER — DEXTROSE 50 % IN WATER 50 %
25 SYRINGE (ML) INTRAVENOUS
Refills: 0 | Status: DISCONTINUED | OUTPATIENT
Start: 2022-04-18 | End: 2022-04-27

## 2022-04-18 RX ORDER — ACETAMINOPHEN 500 MG
650 TABLET ORAL EVERY 6 HOURS
Refills: 0 | Status: DISCONTINUED | OUTPATIENT
Start: 2022-04-21 | End: 2022-04-27

## 2022-04-18 RX ORDER — OXYCODONE HYDROCHLORIDE 5 MG/1
5 TABLET ORAL EVERY 4 HOURS
Refills: 0 | Status: DISCONTINUED | OUTPATIENT
Start: 2022-04-18 | End: 2022-04-24

## 2022-04-18 RX ORDER — AMIODARONE HYDROCHLORIDE 400 MG/1
400 TABLET ORAL
Refills: 0 | Status: DISCONTINUED | OUTPATIENT
Start: 2022-04-18 | End: 2022-04-19

## 2022-04-18 RX ORDER — POLYETHYLENE GLYCOL 3350 17 G/17G
17 POWDER, FOR SOLUTION ORAL DAILY
Refills: 0 | Status: DISCONTINUED | OUTPATIENT
Start: 2022-04-18 | End: 2022-04-27

## 2022-04-18 RX ORDER — PANTOPRAZOLE SODIUM 20 MG/1
40 TABLET, DELAYED RELEASE ORAL DAILY
Refills: 0 | Status: DISCONTINUED | OUTPATIENT
Start: 2022-04-18 | End: 2022-04-19

## 2022-04-18 RX ORDER — INSULIN HUMAN 100 [IU]/ML
3 INJECTION, SOLUTION SUBCUTANEOUS
Qty: 100 | Refills: 0 | Status: DISCONTINUED | OUTPATIENT
Start: 2022-04-18 | End: 2022-04-21

## 2022-04-18 RX ORDER — ACETAMINOPHEN 500 MG
650 TABLET ORAL EVERY 6 HOURS
Refills: 0 | Status: COMPLETED | OUTPATIENT
Start: 2022-04-18 | End: 2022-04-21

## 2022-04-18 RX ADMIN — SODIUM CHLORIDE 2000 MILLILITER(S): 9 INJECTION, SOLUTION INTRAVENOUS at 22:00

## 2022-04-18 RX ADMIN — Medication 250 MILLIGRAM(S): at 19:40

## 2022-04-18 RX ADMIN — INSULIN HUMAN 10 UNIT(S): 100 INJECTION, SOLUTION SUBCUTANEOUS at 20:00

## 2022-04-18 RX ADMIN — GABAPENTIN 300 MILLIGRAM(S): 400 CAPSULE ORAL at 05:09

## 2022-04-18 RX ADMIN — Medication 100 GRAM(S): at 20:20

## 2022-04-18 RX ADMIN — NICARDIPINE HYDROCHLORIDE 25 MG/HR: 30 CAPSULE, EXTENDED RELEASE ORAL at 19:39

## 2022-04-18 RX ADMIN — Medication 1000 MILLIGRAM(S): at 06:09

## 2022-04-18 RX ADMIN — INSULIN HUMAN 3 UNIT(S)/HR: 100 INJECTION, SOLUTION SUBCUTANEOUS at 18:58

## 2022-04-18 RX ADMIN — Medication 50 MILLILITER(S): at 20:00

## 2022-04-18 RX ADMIN — Medication 1000 MILLIGRAM(S): at 05:09

## 2022-04-18 RX ADMIN — Medication 8.02 MICROGRAM(S)/KG/MIN: at 20:45

## 2022-04-18 RX ADMIN — CHLORHEXIDINE GLUCONATE 15 MILLILITER(S): 213 SOLUTION TOPICAL at 05:09

## 2022-04-18 RX ADMIN — SODIUM CHLORIDE 10 MILLILITER(S): 9 INJECTION INTRAMUSCULAR; INTRAVENOUS; SUBCUTANEOUS at 18:56

## 2022-04-18 RX ADMIN — Medication 5.13 MICROGRAM(S)/KG/MIN: at 22:47

## 2022-04-18 RX ADMIN — Medication 100 MILLIGRAM(S): at 19:35

## 2022-04-18 NOTE — BRIEF OPERATIVE NOTE - IV INFUSIONS - BLOOD PRODUCTS
4u prbcs, 2u platelets, 2u FFP, 2 cryo, 1000 FEIBA 5u prbcs, 2u platelets, 2u FFP, 2 cryo, 1000 FEIBA

## 2022-04-18 NOTE — PROGRESS NOTE ADULT - SUBJECTIVE AND OBJECTIVE BOX
ILENE ORTIZ  [id number]  Patient is a 71y old  Female who presents with a chief complaint of Preop Reop  AVR (17 Apr 2022 13:37)    HPI:  71F RH w/ AS w/ prior open heart aortic valve replacement 2019, HTN, BETSEY, former smoker, Emphysema/ chronic bronchitis, GERD s/p laparoscopic vertical sleeve gastrectomy presents with acute onset speech disturbance, vision change and gait imbalance. Per EMS, LKN: 4/9/22 at 10am per family. Pt's family reported blurry vision, gait imbalance, and speech disturbance (mild loss in fluency). Pt unable to provide full hx because she was confused on timing. She still endorses blurry vision but denies any weakness, numbness/tingling. Pt takes a baby aspirin 81mg daily. Pt ambulates without assistance or assistive devices at baseline.    (Stroke only)  LKN: 4/9/22 at 10am  NIHSS: 3  preMRS: 0  Pt is not a candidate for tpa due to outside tpa window   Pt is not a candidate for mechanical thrombectomy due to no large vessel occlusion on CTA     (09 Apr 2022 17:56)      Surgery/Hospital course:  4/18 AVR    Today:  Pt underwent an AVR procedure for endocarditis of prosthetic aortic valve. Pt received 5u prbcs, 2u platelets, 2u FFP, 2 cryo, and 1000 FEIBA. Titrate pressor support with IV Levo.    REVIEW OF SYSTEMS:  Unable to obtain, pt is intubated.     Vital Signs Last 24 Hrs  T(C): 37 (18 Apr 2022 07:06), Max: 37 (18 Apr 2022 05:26)  T(F): 98.6 (18 Apr 2022 05:26), Max: 98.6 (18 Apr 2022 05:26)  HR: 80 (18 Apr 2022 18:55) (66 - 80)  BP: 115/77 (18 Apr 2022 07:06) (115/77 - 133/66)  BP(mean): 90 (18 Apr 2022 07:06) (88 - 90)  RR: 18 (18 Apr 2022 07:06) (18 - 18)  SpO2: 100% (18 Apr 2022 18:55) (96% - 100%)    Physical Exam:  Gen:  Intubated   CNS: weaning sedation to off  Neck: + ETT midline, no JVD  RES : clear , no wheezing                       CVS: Regular  rhythm. Normal S1/S2  Abd: Soft, non-distended. Bowel sounds present.  Skin: No rash.  Ext:  no edema, A Line  ============================I/O===========================   I&O's Detail    17 Apr 2022 07:01  -  18 Apr 2022 07:00  --------------------------------------------------------  IN:    Oral Fluid: 930 mL  Total IN: 930 mL    OUT:    Voided (mL): 700 mL  Total OUT: 700 mL    Total NET: 230 mL        ============================ LABS =========================                        9.0    15.74 )-----------( 147      ( 18 Apr 2022 19:23 )             26.9     04-17    139  |  102  |  14  ----------------------------<  116<H>  4.3   |  26  |  0.62    Ca    8.7      17 Apr 2022 06:26    TPro  6.3  /  Alb  3.0<L>  /  TBili  0.3  /  DBili  x   /  AST  34  /  ALT  46<H>  /  AlkPhos  65  04-17    LIVER FUNCTIONS - ( 17 Apr 2022 06:26 )  Alb: 3.0 g/dL / Pro: 6.3 g/dL / ALK PHOS: 65 U/L / ALT: 46 U/L / AST: 34 U/L / GGT: x           PT/INR - ( 18 Apr 2022 16:36 )   PT: 16.7 sec;   INR: 1.45 ratio         PTT - ( 18 Apr 2022 16:36 )  PTT:45.9 sec  ABG - ( 18 Apr 2022 19:17 )  pH, Arterial: 7.38  pH, Blood: x     /  pCO2: 39    /  pO2: 342   / HCO3: 23    / Base Excess: -1.8  /  SaO2: 99.2                ======================Micro/Rad/Cardio=================  Culture: Reviewed   CXR: Reviewed  Echo:Reviewed  ======================================================  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      ====================ASSESSMENT ==============  Endocarditis of prosthetic aortic valve S/P Homograft aortic valve replacement w/ epicardial lead placement on 4/18/22   Hemodynamic instability   Hypovolemia  COPD  Acute blood loss anemia S/P multiple products  Thrombocytopenia   Leukocytosis   Stress Hyperglycemia   Obesity / BETSEY    Plan:  ====================== NEUROLOGY=====================  Weaned off sedation, continue to monitor neuro status per ICU protocol.   Tylenol, Gabapentin, Meperidine, PRN Oxycodone, and PRN Dilaudid for pain management.     acetaminophen     Tablet .. 650 milliGRAM(s) Oral every 6 hours  gabapentin 100 milliGRAM(s) Oral every 8 hours  HYDROmorphone  Injectable 0.5 milliGRAM(s) IV Push every 6 hours PRN Severe Pain (7 - 10)  meperidine     Injectable 25 milliGRAM(s) IV Push once  oxyCODONE    IR 5 milliGRAM(s) Oral every 4 hours PRN Moderate Pain (4 - 6)  oxyCODONE    IR 10 milliGRAM(s) Oral every 4 hours PRN Severe Pain (7 - 10)    ==================== RESPIRATORY======================  Ex smoker, hx of BETSEY and COPD. On full vent support, requiring close monitoring of respiratory rate, breathing pattern, pulse oximetry monitoring, and intermittent blood gas analysis.       Mechanical Ventilation:  Mode: AC/ CMV (Assist Control/ Continuous Mandatory Ventilation)  RR (machine): 12  TV (machine): 600  FiO2: 100  PEEP: 5  ITime: 1  MAP: 10  PIP: 34      ====================CARDIOVASCULAR==================  Endocarditis of prosthetic aortic valve S/P Homograft aortic valve replacement w/ epicardial lead placement on 4/18/22   Invasive hemodynamic monitoring, assess perfusion indices.   TTE on 4/11/22 revealed EF of 68%, moderately dilated left atrium, hyperdynamic left ventricular systolic function, and mild aortic regurgitation.   Titrate pressor support with IV Levophed.  Continue with Amiodarone for atrial fibrillation prophylaxis.   IV Nicardipine for blood pressure support.     aMIOdarone    Tablet 400 milliGRAM(s) Oral two times a day  niCARdipine Infusion 5 mG/Hr (25 mL/Hr) IV Continuous <Continuous>  norepinephrine Infusion 0.05 MICROgram(s)/kG/Min (8.02 mL/Hr) IV Continuous <Continuous>    ===================HEMATOLOGIC/ONC ===================  Anemia and thrombocytopenia due to acute blood loss S/P 5u prbcs, 2u platelets, 2u FFP, 2 cryo, and 1000 FEIBA.  Continue to monitor hemoglobin/hematocrit levels and platelets.     ===================== RENAL =========================  Post op DONTE  Sharma for monitoring urine output  Continue monitoring I&Os and BUN/Cr  Replete lytes PRN. Keep K> 4 and Mg >2    ==================== GASTROINTESTINAL===================  NPO after recent procedure, advance diet as tolerated.   Continue Protonix for stress ulcer prophylaxis.   Bowel regimen with Miralax.     ascorbic acid 500 milliGRAM(s) Oral two times a day  pantoprazole  Injectable 40 milliGRAM(s) IV Push daily  polyethylene glycol 3350 17 Gram(s) Oral daily  potassium chloride  10 mEq/50 mL IVPB 10 milliEquivalent(s) IV Intermittent every 1 hour  potassium chloride  10 mEq/50 mL IVPB 10 milliEquivalent(s) IV Intermittent every 1 hour  potassium chloride  10 mEq/50 mL IVPB 10 milliEquivalent(s) IV Intermittent every 1 hour  sodium chloride 0.9%. 1000 milliLiter(s) (10 mL/Hr) IV Continuous <Continuous>    =======================    ENDOCRINE  =====================  Stress Hyperglycemia, requiring glucose control with insulin gtt, titrate as per insulin drip protocol along with hourly glucose checks.     dextrose 50% Injectable 50 milliLiter(s) IV Push every 15 minutes  dextrose 50% Injectable 25 milliLiter(s) IV Push every 15 minutes  insulin regular Infusion 3 Unit(s)/Hr (3 mL/Hr) IV Continuous <Continuous>    ========================INFECTIOUS DISEASE================  Afebrile, but with white blood cells elevated to 15.74.   Monitor temperature and trend white blood cells.   Bcx on 4/12/22 NGTD  Continue Cefuroxime for perioperative antibiotic coverage and Vancomycin for Endocarditis.     cefuroxime  IVPB 1500 milliGRAM(s) IV Intermittent every 8 hours  vancomycin  IVPB 1000 milliGRAM(s) IV Intermittent every 12 hours      Patient requires continuous monitoring with bedside rhythm monitoring, arterial line, pulse oximetry, ventilator monitoring; intermittent blood gas analysis. Care plan discussed with ICU care team. Patient remains critical; required more than usual ICU care.     By signing my name below, I, Leah Armenta, attest that this documentation has been prepared under the direction and in the presence of Thao Kent MD.  Electronically signed: Christopher Martinez, 04-18-22 @ 19:32    I, Thao Kent, personally performed the services described in this documentation. all medical record entries made by the janiceibe were at my direction and in my presence. I have reviewed the chart and agree that the record reflects my personal performance and is accurate and complete  Electronically signed: Thao Kent, 04-18-22 @ 19:32       ILENE ORTIZ  MRN-[id number]  Patient is a 71y old  Female who presents with a chief complaint of Preop Reop  AVR (17 Apr 2022 13:37)    HPI:  71F RH w/ AS w/ prior open heart aortic valve replacement 2019, HTN, BETSEY, former smoker, Emphysema/ chronic bronchitis, GERD s/p laparoscopic vertical sleeve gastrectomy presents with acute onset speech disturbance, vision change and gait imbalance. Per EMS, LKN: 4/9/22 at 10am per family. Pt's family reported blurry vision, gait imbalance, and speech disturbance (mild loss in fluency). Pt unable to provide full hx because she was confused on timing. She still endorses blurry vision but denies any weakness, numbness/tingling. Pt takes a baby aspirin 81mg daily. Pt ambulates without assistance or assistive devices at baseline.    (Stroke only)  LKN: 4/9/22 at 10am  NIHSS: 3  preMRS: 0  Pt is not a candidate for tpa due to outside tpa window   Pt is not a candidate for mechanical thrombectomy due to no large vessel occlusion on CTA     (09 Apr 2022 17:56)      Surgery/Hospital course:  4/18 AVR   during surgery patient was found to have endocarditis, and root abscess    Today:  Pt underwent an AVR procedure for endocarditis of prosthetic aortic valve. Pt received 5u prbcs, 2u platelets, 2u FFP, 2 cryo, and 1000 FEIBA. Titrate pressor support with IV Levo.    REVIEW OF SYSTEMS:  Unable to obtain, pt is intubated.     Vital Signs Last 24 Hrs  T(C): 37 (18 Apr 2022 07:06), Max: 37 (18 Apr 2022 05:26)  T(F): 98.6 (18 Apr 2022 05:26), Max: 98.6 (18 Apr 2022 05:26)  HR: 80 (18 Apr 2022 18:55) (66 - 80)  BP: 115/77 (18 Apr 2022 07:06) (115/77 - 133/66)  BP(mean): 90 (18 Apr 2022 07:06) (88 - 90)  RR: 18 (18 Apr 2022 07:06) (18 - 18)  SpO2: 100% (18 Apr 2022 18:55) (96% - 100%)    Physical Exam:  Gen:  Intubated   CNS: weaning sedation to off  Neck: + ETT midline, no JVD  RES : clear , no wheezing                       CVS:  paced  rhythm. Normal S1/S2  Abd: Soft, non-distended. Bowel sounds present.  Skin: No rash.  Ext:  no edema, A Line  ============================I/O===========================   I&O's Detail    17 Apr 2022 07:01  -  18 Apr 2022 07:00  --------------------------------------------------------  IN:    Oral Fluid: 930 mL  Total IN: 930 mL    OUT:    Voided (mL): 700 mL  Total OUT: 700 mL    Total NET: 230 mL        ============================ LABS =========================                        9.0    15.74 )-----------( 147      ( 18 Apr 2022 19:23 )             26.9     04-17    139  |  102  |  14  ----------------------------<  116<H>  4.3   |  26  |  0.62    Ca    8.7      17 Apr 2022 06:26    TPro  6.3  /  Alb  3.0<L>  /  TBili  0.3  /  DBili  x   /  AST  34  /  ALT  46<H>  /  AlkPhos  65  04-17    LIVER FUNCTIONS - ( 17 Apr 2022 06:26 )  Alb: 3.0 g/dL / Pro: 6.3 g/dL / ALK PHOS: 65 U/L / ALT: 46 U/L / AST: 34 U/L / GGT: x           PT/INR - ( 18 Apr 2022 16:36 )   PT: 16.7 sec;   INR: 1.45 ratio         PTT - ( 18 Apr 2022 16:36 )  PTT:45.9 sec  ABG - ( 18 Apr 2022 19:17 )  pH, Arterial: 7.38  pH, Blood: x     /  pCO2: 39    /  pO2: 342   / HCO3: 23    / Base Excess: -1.8  /  SaO2: 99.2      ======================Micro/Rad/Cardio=================  Culture: Reviewed   CXR: Reviewed  Echo:Reviewed  ======================================================  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      ====================ASSESSMENT ==============  Endocarditis of prosthetic aortic valve S/P Homograft aortic valve replacement w/ epicardial lead placement on 4/18/22    encounter pacing via epicardial wires rate 80  acute systolic CHF  Hemodynamic instability   Hypovolemia  COPD  Acute blood loss anemia S/P multiple products  Stress Hyperglycemia   Obesity / BETSEY    Plan:  ====================== NEUROLOGY=====================  Weaned off sedation, continue to monitor neuro status per ICU protocol.   Tylenol, Gabapentin, Meperidine, PRN Oxycodone, and PRN Dilaudid for pain management.     acetaminophen     Tablet .. 650 milliGRAM(s) Oral every 6 hours  gabapentin 100 milliGRAM(s) Oral every 8 hours  HYDROmorphone  Injectable 0.5 milliGRAM(s) IV Push every 6 hours PRN Severe Pain (7 - 10)  meperidine     Injectable 25 milliGRAM(s) IV Push once  oxyCODONE    IR 5 milliGRAM(s) Oral every 4 hours PRN Moderate Pain (4 - 6)  oxyCODONE    IR 10 milliGRAM(s) Oral every 4 hours PRN Severe Pain (7 - 10)    ==================== RESPIRATORY======================  Ex smoker, hx of BETSEY and COPD. On full vent support, requiring close monitoring of respiratory rate, breathing pattern, pulse oximetry monitoring, and intermittent blood gas analysis.       Mechanical Ventilation:  Mode: AC/ CMV (Assist Control/ Continuous Mandatory Ventilation)  RR (machine): 12  TV (machine): 600  FiO2: 100  PEEP: 5  ITime: 1  MAP: 10  PIP: 34    weaning trials  ====================CARDIOVASCULAR==================  Endocarditis of prosthetic aortic valve S/P Homograft aortic valve replacement w/ epicardial lead placement on 4/18/22   Invasive hemodynamic monitoring, assess perfusion indices.   TTE on 4/11/22 revealed EF of 68%, moderately dilated left atrium, hyperdynamic left ventricular systolic function, and mild aortic regurgitation.   Titrate pressor support with IV Levophed.  Continue with Amiodarone for atrial fibrillation prophylaxis.   IV Nicardipine for blood pressure support.     aMIOdarone    Tablet 400 milliGRAM(s) Oral two times a day  niCARdipine Infusion 5 mG/Hr (25 mL/Hr) IV Continuous <Continuous>  norepinephrine Infusion 0.05 MICROgram(s)/kG/Min (8.02 mL/Hr) IV Continuous <Continuous>    ===================HEMATOLOGIC/ONC ===================  Anemia and thrombocytopenia due to acute blood loss S/P 5u prbcs, 2u platelets, 2u FFP, 2 cryo, and 1000 FEIBA.  Continue to monitor hemoglobin/hematocrit levels and platelets.     ===================== RENAL =========================  Post op DONTE  Sharma for monitoring urine output  Continue monitoring I&Os and BUN/Cr  Replete lytes PRN. Keep K> 4 and Mg >2    ==================== GASTROINTESTINAL===================  NPO after recent procedure, advance diet as tolerated.   Continue Protonix for stress ulcer prophylaxis.   Bowel regimen with Miralax.     ascorbic acid 500 milliGRAM(s) Oral two times a day  pantoprazole  Injectable 40 milliGRAM(s) IV Push daily  polyethylene glycol 3350 17 Gram(s) Oral daily  potassium chloride  10 mEq/50 mL IVPB 10 milliEquivalent(s) IV Intermittent every 1 hour  potassium chloride  10 mEq/50 mL IVPB 10 milliEquivalent(s) IV Intermittent every 1 hour  potassium chloride  10 mEq/50 mL IVPB 10 milliEquivalent(s) IV Intermittent every 1 hour  sodium chloride 0.9%. 1000 milliLiter(s) (10 mL/Hr) IV Continuous <Continuous>    =======================    ENDOCRINE  =====================  Stress Hyperglycemia, requiring glucose control with insulin gtt, titrate as per insulin drip protocol along with hourly glucose checks.     dextrose 50% Injectable 50 milliLiter(s) IV Push every 15 minutes  dextrose 50% Injectable 25 milliLiter(s) IV Push every 15 minutes  insulin regular Infusion 3 Unit(s)/Hr (3 mL/Hr) IV Continuous <Continuous>    ========================INFECTIOUS DISEASE================  Afebrile, but with white blood cells elevated to 15.74.   Monitor temperature and trend white blood cells.   Bcx on 4/12/22 NGTD  Continue Cefuroxime for perioperative antibiotic coverage and Vancomycin for Endocarditis.     cefuroxime  IVPB 1500 milliGRAM(s) IV Intermittent every 8 hours  vancomycin  IVPB 1000 milliGRAM(s) IV Intermittent every 12 hours      Patient requires continuous monitoring with bedside rhythm monitoring, arterial line, pulse oximetry, ventilator monitoring; intermittent blood gas analysis. Care plan discussed with ICU care team. Patient remains critical; required more than usual ICU care. time spent 35 min    By signing my name below, I, Leah Armenta, attest that this documentation has been prepared under the direction and in the presence of Thao Kent MD.  Electronically signed: Christopher Martinez, 04-18-22 @ 19:32    I, Thao Kent, personally performed the services described in this documentation. all medical record entries made by the janiceibe were at my direction and in my presence. I have reviewed the chart and agree that the record reflects my personal performance and is accurate and complete  Electronically signed: Thao Kent, 04-18-22 @ 19:32

## 2022-04-18 NOTE — BRIEF OPERATIVE NOTE - OPERATION/FINDINGS
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 Explant of prosthetic valve. Debridement of aortic root and abscess with sat in the non-coronary sinus and above the anterior leaflet of the mitral valve. Reconstruction using a 21mm aortic homograft, with reimplantation of right and left main coronary buttons. There was a weakening from the infection of the right atrium adjacent to the abscess cavity which was reinforced with a bovine pericardial patch.

## 2022-04-18 NOTE — BRIEF OPERATIVE NOTE - COMMENTS
Total aortic cross clamp time: 247 minutes Total aortic cross clamp time: 247 minutes  *EBL not applicable due to use of cell saver

## 2022-04-18 NOTE — PRE-ANESTHESIA EVALUATION ADULT - NSRADCARDRESULTSFT_GEN_ALL_CORE
echo< from: Transthoracic Echocardiogram (04.11.22 @ 10:18) >    LA:     3.7    2.0 - 4.0 cm  Ao:     2.6    2.0 - 3.8 cm  SEPTUM: 1.1    0.6 - 1.2 cm  PWT:    1.4    0.6 - 1.1 cm  LVIDd:  4.2    3.0 - 5.6 cm  LVIDs:  2.3    1.8 - 4.0 cm  Derived variables:  LVMI: 105 g/m2  RWT: 0.66  Fractional short: 45 %  EF (Babcock Rule): 68 %Doppler Peak Velocity (m/sec):  MV=1.4 AoV=3.7  ------------------------------------------------------------------------  Observations:  Mitral Valve: Normal mitral valve. Mild-moderate mitral  regurgitation. Peak mitral valve gradient equals 8 mm Hg,  mean transmitral valve gradient equals 4 mm Hg, consistent  with mildmitral stenosis.  The increased gradient may be  secondary to obstruction by the left atrial mass.  Gradients measured at a HR of 73bpm.  Aortic Valve/Aorta: Bioprosthetic aortic valve.  The valve  is well seated.  Peak transaortic valve gradient equals 56  mm Hg, mean transaortic valve gradient equals 38 mm Hg,  which is elevated even in the presence of a bioprosthetic  aortic valve. However the Di is 0.34, the elevated  gradients may be secondary to the hyperdynamic left  ventricle.  Mild aortic regurgitation.  Peak left  ventricular outflow tract gradient equals 8 mm Hg, mean  gradient is equal to 4 mm Hg, LVOT velocity time integral  equals 28 cm.  Aortic Root: 2.6 cm.  LVOT diameter: 1.9 cm.  Left Atrium: Moderately dilated left atrium.LA volume  index = 46 cc/m2. A large round mass measuring 3.8cm by  3cm,  is seen in the left atrium suggestive of atrial  myxoma.  It appears to be attached to the atrial septum  although no stalk is visualized.  Endocardial visualization enhanced with intravenous  injection of Ultrasonic Enhancing Agent (Definity), the  mass appears to be vascular as it demonstrates uptake of  Definity.  Left Ventricle: Hyperdynamic left ventricular systolic  function. Endocardial visualization enhanced with  intravenous injection of Ultrasonic Enhancing Agent  (Definity).  No left ventricular thrombus. Mild concentric  left ventricular hypertrophy. Normal diastolic function  Right Heart: Normal right atrium. Normal right ventricular  size and function. Normal tricuspid valve. No tricuspid  regurgitation. Normal pulmonic valve. No pulmonic  regurgitation.  Pericardium/Pleura: Normal pericardium with no pericardial  effusion.  Hemodynamic: Color Doppler demonstrates no evidence of a  patent foramen ovale.  ------------------------------------------------------------------------  Conclusions:  1. Normal mitral valve. Mild-moderate mitral regurgitation.  2. Bioprosthetic aortic valve.  The valve is well seated.  Peak transaortic valve gradient equals 56 mm Hg, mean  transaortic valve gradient equals 38 mm Hg, which is  elevated even in the presence of a bioprosthetic aortic  valve. However the Di is 0.34, the elevated gradients may  be secondary to the hyperdynamic left ventricle.  Mild  aortic regurgitation.  3. Moderately dilated left atrium.  LA volume index = 46  cc/m2. A large round mass measuring 3.8cm by 3cm,  is seen  in the left atrium suggestive of atrial myxoma.  It appears  to be attached to the atrial septum although no stalk is  visualized.  Endocardial visualization enhanced with intravenous  injection of Ultrasonic Enhancing Agent (Definity), the  mass appears to be vascular as it demonstrates uptake of  Definity.  4. Hyperdynamic left ventricular systolic function.  Endocardial visualization enhanced with intravenous  injection of Ultrasonic Enhancing Agent (Definity).  No  left ventricular thrombus.  *** No previous Echo exam.  Images reviewed and results discussed with Dr. Iker Lanza.  ------------------------------------------------------------------------    < end of copied text >

## 2022-04-18 NOTE — BRIEF OPERATIVE NOTE - NSICDXBRIEFPROCEDURE_GEN_ALL_CORE_FT
PROCEDURES:  Homograft aortic valve replacement 18-Apr-2022 16:32:49  Santiago Horton   PROCEDURES:  Homograft aortic valve replacement 18-Apr-2022 16:32:49 epicardial lead placement    Santiago Horton

## 2022-04-19 LAB
ALBUMIN SERPL ELPH-MCNC: 3.7 G/DL — SIGNIFICANT CHANGE UP (ref 3.3–5)
ALP SERPL-CCNC: 38 U/L — LOW (ref 40–120)
ALT FLD-CCNC: 22 U/L — SIGNIFICANT CHANGE UP (ref 10–45)
ANION GAP SERPL CALC-SCNC: 13 MMOL/L — SIGNIFICANT CHANGE UP (ref 5–17)
APTT BLD: 28.8 SEC — SIGNIFICANT CHANGE UP (ref 27.5–35.5)
AST SERPL-CCNC: 55 U/L — HIGH (ref 10–40)
BASE EXCESS BLDV CALC-SCNC: -2.4 MMOL/L — LOW (ref -2–2)
BASE EXCESS BLDV CALC-SCNC: 1 MMOL/L — SIGNIFICANT CHANGE UP (ref -2–2)
BILIRUB SERPL-MCNC: 3.1 MG/DL — HIGH (ref 0.2–1.2)
BUN SERPL-MCNC: 12 MG/DL — SIGNIFICANT CHANGE UP (ref 7–23)
CALCIUM SERPL-MCNC: 8.3 MG/DL — LOW (ref 8.4–10.5)
CHLORIDE SERPL-SCNC: 110 MMOL/L — HIGH (ref 96–108)
CO2 BLDV-SCNC: 25 MMOL/L — SIGNIFICANT CHANGE UP (ref 22–26)
CO2 BLDV-SCNC: 27 MMOL/L — HIGH (ref 22–26)
CO2 SERPL-SCNC: 21 MMOL/L — LOW (ref 22–31)
CREAT SERPL-MCNC: 0.62 MG/DL — SIGNIFICANT CHANGE UP (ref 0.5–1.3)
EGFR: 95 ML/MIN/1.73M2 — SIGNIFICANT CHANGE UP
GAS PNL BLDA: SIGNIFICANT CHANGE UP
GAS PNL BLDV: SIGNIFICANT CHANGE UP
GAS PNL BLDV: SIGNIFICANT CHANGE UP
GLUCOSE BLDC GLUCOMTR-MCNC: 101 MG/DL — HIGH (ref 70–99)
GLUCOSE BLDC GLUCOMTR-MCNC: 110 MG/DL — HIGH (ref 70–99)
GLUCOSE BLDC GLUCOMTR-MCNC: 113 MG/DL — HIGH (ref 70–99)
GLUCOSE BLDC GLUCOMTR-MCNC: 128 MG/DL — HIGH (ref 70–99)
GLUCOSE BLDC GLUCOMTR-MCNC: 129 MG/DL — HIGH (ref 70–99)
GLUCOSE BLDC GLUCOMTR-MCNC: 129 MG/DL — HIGH (ref 70–99)
GLUCOSE BLDC GLUCOMTR-MCNC: 130 MG/DL — HIGH (ref 70–99)
GLUCOSE BLDC GLUCOMTR-MCNC: 130 MG/DL — HIGH (ref 70–99)
GLUCOSE BLDC GLUCOMTR-MCNC: 133 MG/DL — HIGH (ref 70–99)
GLUCOSE BLDC GLUCOMTR-MCNC: 135 MG/DL — HIGH (ref 70–99)
GLUCOSE BLDC GLUCOMTR-MCNC: 135 MG/DL — HIGH (ref 70–99)
GLUCOSE BLDC GLUCOMTR-MCNC: 143 MG/DL — HIGH (ref 70–99)
GLUCOSE BLDC GLUCOMTR-MCNC: 145 MG/DL — HIGH (ref 70–99)
GLUCOSE BLDC GLUCOMTR-MCNC: 146 MG/DL — HIGH (ref 70–99)
GLUCOSE BLDC GLUCOMTR-MCNC: 150 MG/DL — HIGH (ref 70–99)
GLUCOSE BLDC GLUCOMTR-MCNC: 150 MG/DL — HIGH (ref 70–99)
GLUCOSE BLDC GLUCOMTR-MCNC: 154 MG/DL — HIGH (ref 70–99)
GLUCOSE BLDC GLUCOMTR-MCNC: 155 MG/DL — HIGH (ref 70–99)
GLUCOSE BLDC GLUCOMTR-MCNC: 155 MG/DL — HIGH (ref 70–99)
GLUCOSE BLDC GLUCOMTR-MCNC: 157 MG/DL — HIGH (ref 70–99)
GLUCOSE BLDC GLUCOMTR-MCNC: 158 MG/DL — HIGH (ref 70–99)
GLUCOSE BLDC GLUCOMTR-MCNC: 164 MG/DL — HIGH (ref 70–99)
GLUCOSE BLDC GLUCOMTR-MCNC: 174 MG/DL — HIGH (ref 70–99)
GLUCOSE SERPL-MCNC: 170 MG/DL — HIGH (ref 70–99)
GRAM STN FLD: SIGNIFICANT CHANGE UP
HCO3 BLDV-SCNC: 24 MMOL/L — SIGNIFICANT CHANGE UP (ref 22–29)
HCO3 BLDV-SCNC: 26 MMOL/L — SIGNIFICANT CHANGE UP (ref 22–29)
HCT VFR BLD CALC: 28.7 % — LOW (ref 34.5–45)
HGB BLD-MCNC: 9.9 G/DL — LOW (ref 11.5–15.5)
HOROWITZ INDEX BLDV+IHG-RTO: 40 — SIGNIFICANT CHANGE UP
HOROWITZ INDEX BLDV+IHG-RTO: 40 — SIGNIFICANT CHANGE UP
INR BLD: 1.17 RATIO — HIGH (ref 0.88–1.16)
MAGNESIUM SERPL-MCNC: 2.8 MG/DL — HIGH (ref 1.6–2.6)
MCHC RBC-ENTMCNC: 29.8 PG — SIGNIFICANT CHANGE UP (ref 27–34)
MCHC RBC-ENTMCNC: 34.5 GM/DL — SIGNIFICANT CHANGE UP (ref 32–36)
MCV RBC AUTO: 86.4 FL — SIGNIFICANT CHANGE UP (ref 80–100)
NRBC # BLD: 0 /100 WBCS — SIGNIFICANT CHANGE UP (ref 0–0)
PCO2 BLDV: 42 MMHG — SIGNIFICANT CHANGE UP (ref 39–42)
PCO2 BLDV: 45 MMHG — HIGH (ref 39–42)
PH BLDV: 7.33 — SIGNIFICANT CHANGE UP (ref 7.32–7.43)
PH BLDV: 7.4 — SIGNIFICANT CHANGE UP (ref 7.32–7.43)
PHOSPHATE SERPL-MCNC: 3.7 MG/DL — SIGNIFICANT CHANGE UP (ref 2.5–4.5)
PLATELET # BLD AUTO: 198 K/UL — SIGNIFICANT CHANGE UP (ref 150–400)
PO2 BLDV: 35 MMHG — SIGNIFICANT CHANGE UP (ref 25–45)
PO2 BLDV: 39 MMHG — SIGNIFICANT CHANGE UP (ref 25–45)
POTASSIUM SERPL-MCNC: 4.6 MMOL/L — SIGNIFICANT CHANGE UP (ref 3.5–5.3)
POTASSIUM SERPL-SCNC: 4.6 MMOL/L — SIGNIFICANT CHANGE UP (ref 3.5–5.3)
PROT SERPL-MCNC: 5.4 G/DL — LOW (ref 6–8.3)
PROTHROM AB SERPL-ACNC: 13.5 SEC — HIGH (ref 10.5–13.4)
RBC # BLD: 3.32 M/UL — LOW (ref 3.8–5.2)
RBC # FLD: 14.5 % — SIGNIFICANT CHANGE UP (ref 10.3–14.5)
SAO2 % BLDV: 64.5 % — LOW (ref 67–88)
SAO2 % BLDV: 72.8 % — SIGNIFICANT CHANGE UP (ref 67–88)
SODIUM SERPL-SCNC: 144 MMOL/L — SIGNIFICANT CHANGE UP (ref 135–145)
SPECIMEN SOURCE: SIGNIFICANT CHANGE UP
VANCOMYCIN TROUGH SERPL-MCNC: 12.8 UG/ML — SIGNIFICANT CHANGE UP (ref 10–20)
WBC # BLD: 16.55 K/UL — HIGH (ref 3.8–10.5)
WBC # FLD AUTO: 16.55 K/UL — HIGH (ref 3.8–10.5)

## 2022-04-19 PROCEDURE — 99291 CRITICAL CARE FIRST HOUR: CPT | Mod: 25

## 2022-04-19 PROCEDURE — 36620 INSERTION CATHETER ARTERY: CPT | Mod: 58

## 2022-04-19 PROCEDURE — 71045 X-RAY EXAM CHEST 1 VIEW: CPT | Mod: 26

## 2022-04-19 PROCEDURE — 99233 SBSQ HOSP IP/OBS HIGH 50: CPT

## 2022-04-19 PROCEDURE — 99292 CRITICAL CARE ADDL 30 MIN: CPT | Mod: 25

## 2022-04-19 RX ORDER — SPIRONOLACTONE 25 MG/1
25 TABLET, FILM COATED ORAL EVERY 12 HOURS
Refills: 0 | Status: DISCONTINUED | OUTPATIENT
Start: 2022-04-19 | End: 2022-04-27

## 2022-04-19 RX ORDER — NICARDIPINE HYDROCHLORIDE 30 MG/1
5 CAPSULE, EXTENDED RELEASE ORAL
Qty: 40 | Refills: 0 | Status: DISCONTINUED | OUTPATIENT
Start: 2022-04-19 | End: 2022-04-21

## 2022-04-19 RX ORDER — FUROSEMIDE 40 MG
40 TABLET ORAL DAILY
Refills: 0 | Status: DISCONTINUED | OUTPATIENT
Start: 2022-04-20 | End: 2022-04-27

## 2022-04-19 RX ORDER — PANTOPRAZOLE SODIUM 20 MG/1
40 TABLET, DELAYED RELEASE ORAL
Refills: 0 | Status: DISCONTINUED | OUTPATIENT
Start: 2022-04-19 | End: 2022-04-27

## 2022-04-19 RX ORDER — DEXMEDETOMIDINE HYDROCHLORIDE IN 0.9% SODIUM CHLORIDE 4 UG/ML
0.5 INJECTION INTRAVENOUS
Qty: 200 | Refills: 0 | Status: DISCONTINUED | OUTPATIENT
Start: 2022-04-19 | End: 2022-04-20

## 2022-04-19 RX ORDER — ENOXAPARIN SODIUM 100 MG/ML
40 INJECTION SUBCUTANEOUS EVERY 24 HOURS
Refills: 0 | Status: DISCONTINUED | OUTPATIENT
Start: 2022-04-19 | End: 2022-04-20

## 2022-04-19 RX ORDER — ALBUMIN HUMAN 25 %
250 VIAL (ML) INTRAVENOUS ONCE
Refills: 0 | Status: COMPLETED | OUTPATIENT
Start: 2022-04-19 | End: 2022-04-19

## 2022-04-19 RX ORDER — ASPIRIN/CALCIUM CARB/MAGNESIUM 324 MG
81 TABLET ORAL DAILY
Refills: 0 | Status: DISCONTINUED | OUTPATIENT
Start: 2022-04-19 | End: 2022-04-27

## 2022-04-19 RX ORDER — CEFTRIAXONE 500 MG/1
INJECTION, POWDER, FOR SOLUTION INTRAMUSCULAR; INTRAVENOUS
Refills: 0 | Status: DISCONTINUED | OUTPATIENT
Start: 2022-04-19 | End: 2022-04-27

## 2022-04-19 RX ORDER — IPRATROPIUM BROMIDE 0.2 MG/ML
500 SOLUTION, NON-ORAL INHALATION EVERY 6 HOURS
Refills: 0 | Status: DISCONTINUED | OUTPATIENT
Start: 2022-04-19 | End: 2022-04-19

## 2022-04-19 RX ORDER — CEFTRIAXONE 500 MG/1
2000 INJECTION, POWDER, FOR SOLUTION INTRAMUSCULAR; INTRAVENOUS EVERY 24 HOURS
Refills: 0 | Status: DISCONTINUED | OUTPATIENT
Start: 2022-04-20 | End: 2022-04-27

## 2022-04-19 RX ORDER — FUROSEMIDE 40 MG
20 TABLET ORAL ONCE
Refills: 0 | Status: COMPLETED | OUTPATIENT
Start: 2022-04-19 | End: 2022-04-19

## 2022-04-19 RX ORDER — IPRATROPIUM/ALBUTEROL SULFATE 18-103MCG
3 AEROSOL WITH ADAPTER (GRAM) INHALATION EVERY 6 HOURS
Refills: 0 | Status: DISCONTINUED | OUTPATIENT
Start: 2022-04-19 | End: 2022-04-27

## 2022-04-19 RX ORDER — CEFTRIAXONE 500 MG/1
2000 INJECTION, POWDER, FOR SOLUTION INTRAMUSCULAR; INTRAVENOUS ONCE
Refills: 0 | Status: COMPLETED | OUTPATIENT
Start: 2022-04-19 | End: 2022-04-19

## 2022-04-19 RX ADMIN — POLYETHYLENE GLYCOL 3350 17 GRAM(S): 17 POWDER, FOR SOLUTION ORAL at 12:14

## 2022-04-19 RX ADMIN — ENOXAPARIN SODIUM 40 MILLIGRAM(S): 100 INJECTION SUBCUTANEOUS at 12:05

## 2022-04-19 RX ADMIN — Medication 250 MILLIGRAM(S): at 06:38

## 2022-04-19 RX ADMIN — Medication 650 MILLIGRAM(S): at 23:24

## 2022-04-19 RX ADMIN — OXYCODONE HYDROCHLORIDE 10 MILLIGRAM(S): 5 TABLET ORAL at 20:23

## 2022-04-19 RX ADMIN — Medication 650 MILLIGRAM(S): at 17:02

## 2022-04-19 RX ADMIN — Medication 2.57 MICROGRAM(S)/KG/MIN: at 09:31

## 2022-04-19 RX ADMIN — HYDROMORPHONE HYDROCHLORIDE 0.5 MILLIGRAM(S): 2 INJECTION INTRAMUSCULAR; INTRAVENOUS; SUBCUTANEOUS at 22:18

## 2022-04-19 RX ADMIN — INSULIN HUMAN 3 UNIT(S)/HR: 100 INJECTION, SOLUTION SUBCUTANEOUS at 09:31

## 2022-04-19 RX ADMIN — OXYCODONE HYDROCHLORIDE 10 MILLIGRAM(S): 5 TABLET ORAL at 19:53

## 2022-04-19 RX ADMIN — SENNA PLUS 2 TABLET(S): 8.6 TABLET ORAL at 22:11

## 2022-04-19 RX ADMIN — Medication 650 MILLIGRAM(S): at 12:43

## 2022-04-19 RX ADMIN — Medication 81 MILLIGRAM(S): at 12:05

## 2022-04-19 RX ADMIN — CEFTRIAXONE 100 MILLIGRAM(S): 500 INJECTION, POWDER, FOR SOLUTION INTRAMUSCULAR; INTRAVENOUS at 09:31

## 2022-04-19 RX ADMIN — NICARDIPINE HYDROCHLORIDE 25 MG/HR: 30 CAPSULE, EXTENDED RELEASE ORAL at 09:31

## 2022-04-19 RX ADMIN — HYDROMORPHONE HYDROCHLORIDE 0.5 MILLIGRAM(S): 2 INJECTION INTRAMUSCULAR; INTRAVENOUS; SUBCUTANEOUS at 11:15

## 2022-04-19 RX ADMIN — CHLORHEXIDINE GLUCONATE 5 MILLILITER(S): 213 SOLUTION TOPICAL at 05:20

## 2022-04-19 RX ADMIN — GABAPENTIN 100 MILLIGRAM(S): 400 CAPSULE ORAL at 22:11

## 2022-04-19 RX ADMIN — Medication 125 MILLILITER(S): at 00:43

## 2022-04-19 RX ADMIN — Medication 650 MILLIGRAM(S): at 23:54

## 2022-04-19 RX ADMIN — Medication 3 MILLILITER(S): at 23:10

## 2022-04-19 RX ADMIN — Medication 125 MILLILITER(S): at 05:17

## 2022-04-19 RX ADMIN — Medication 650 MILLIGRAM(S): at 12:05

## 2022-04-19 RX ADMIN — GABAPENTIN 100 MILLIGRAM(S): 400 CAPSULE ORAL at 05:19

## 2022-04-19 RX ADMIN — Medication 100 MILLIGRAM(S): at 05:22

## 2022-04-19 RX ADMIN — Medication 250 MILLIGRAM(S): at 18:00

## 2022-04-19 RX ADMIN — DEXMEDETOMIDINE HYDROCHLORIDE IN 0.9% SODIUM CHLORIDE 10.7 MICROGRAM(S)/KG/HR: 4 INJECTION INTRAVENOUS at 05:18

## 2022-04-19 RX ADMIN — SPIRONOLACTONE 25 MILLIGRAM(S): 25 TABLET, FILM COATED ORAL at 18:53

## 2022-04-19 RX ADMIN — Medication 650 MILLIGRAM(S): at 05:19

## 2022-04-19 RX ADMIN — OXYCODONE HYDROCHLORIDE 10 MILLIGRAM(S): 5 TABLET ORAL at 15:05

## 2022-04-19 RX ADMIN — Medication 125 MILLILITER(S): at 05:25

## 2022-04-19 RX ADMIN — HYDROMORPHONE HYDROCHLORIDE 0.5 MILLIGRAM(S): 2 INJECTION INTRAMUSCULAR; INTRAVENOUS; SUBCUTANEOUS at 22:48

## 2022-04-19 RX ADMIN — OXYCODONE HYDROCHLORIDE 10 MILLIGRAM(S): 5 TABLET ORAL at 15:37

## 2022-04-19 RX ADMIN — Medication 500 MILLIGRAM(S): at 17:02

## 2022-04-19 RX ADMIN — Medication 500 MILLIGRAM(S): at 05:19

## 2022-04-19 RX ADMIN — GABAPENTIN 100 MILLIGRAM(S): 400 CAPSULE ORAL at 14:43

## 2022-04-19 RX ADMIN — HYDROMORPHONE HYDROCHLORIDE 0.5 MILLIGRAM(S): 2 INJECTION INTRAMUSCULAR; INTRAVENOUS; SUBCUTANEOUS at 10:53

## 2022-04-19 RX ADMIN — Medication 20 MILLIGRAM(S): at 18:52

## 2022-04-19 RX ADMIN — Medication 3 MILLILITER(S): at 17:08

## 2022-04-19 NOTE — AIRWAY REMOVAL NOTE  ADULT & PEDS - ARTIFICAL AIRWAY REMOVAL COMMENTS
Written order for extubation verified. The patient was identified by full name and birth date compared to the identification band. Present during the procedure was SHANICE Rodriguez.  Pt tolerated well, will continue to monitor [email or web address]

## 2022-04-19 NOTE — PROGRESS NOTE ADULT - ASSESSMENT
71F RH w/ AS w/ prior open heart aortic valve replacement 2019, HTN, BETSEY, former smoker, Emphysema/ chronic bronchitis, GERD s/p laparoscopic vertical sleeve gastrectomy presented with acute onset speech disturbance, vision change and gait imbalance. Per EMS, LKN: 4/9/22 at 10am per family. Pt's family reported blurry vision, gait imbalance, and speech disturbance (mild loss in fluency). She still endorses blurry vision but denies any weakness, numbness/tingling. Pt takes a baby aspirin 81mg daily. NIHSS: 3, preMRS: 0. No tpa or MT.    o/e with [facility]   s/p CTS on 4/18 4/19 intubated on precedex, SERVIN, plan for CPAP    Impression:  Right PCA infarct etiology likely cardioembolic given findings of atrial mass, also incidental 6 mm ACOMM aneurysm.     NEURO: neurologically without acute chagne, continue close monitoring for neurologic deterioration, found to have an incidental aneurysm will follow with Dr. Contreras as outpatient for further evaluation and possible treatment, LDL 62: continue home dose statin  MRI Brain w/o noted above,  Physical therapy/OT: AR ;wean sedation as toelrated, on precedex     ANTITHROMBOTIC THERAPY: resume ASA when able     PULMONARY:  intubated in ICU.  CPAP trials     CARDIOVASCULAR:  TTE: ef 45%, mild-moderate MR, mild mitral stenosis, left atrial mass with increased gradient, bioprosthetic AV, mild AR, moderately dilated LA, left atrial mass suggestive of myxoma,  cardiac monitoring, S/P aortic valve replacement cardiology consulted, Troponin elevated. ILR placed to rule out A. Fib as possible source.   CT surgery following. Anticipaet cardiac cath and tentative OR pending clinical course. no objection for cath at this time.  s/p CTS 4/19         SBP goal: normotension being tolerated- avoid SBP > 160mmHg in setting of unsecured itnracranial aneurysm.  now on cardene drip in ICU     GASTROINTESTINAL:  dysphagia screen- passed, tolerating diet       Diet: Regular    RENAL: BUN/Cr without acute change, good urine output , maintain adequate hydration      Na Goal: Greater than 135     Sharma: N    HEMATOLOGY: H/H   anemia , no acute change, no active bleeding Platelets 320, she should have all age and risk appropriate      DVT ppx: Heparin s.c [] LMWH [x]     ID: febrile, no leukocytosis, repeat UA negative for UTI, was on ctx prior for UTI, follow up sensitivities , blood cx taken, infectious workup done ;   ; back on vanco and cefttiaxone pending cultures       Other: remaining per CTICU team     DISPOSITION: AR once stable and workup is complete      CORE MEASURES:        Admission NIHSS: 3     TPA: [] YES [x] NO      LDL/HDL: 62/32     Depression Screen: 0     Statin Therapy: Y     Dysphagia Screen: [x] PASS [] FAIL     Smoking [] YES [x] NO      Afib [] YES [x] NO     Stroke Education [x] YES [] NO    Zenon Vela MD  Vascular Neurology

## 2022-04-19 NOTE — PROVIDER CONTACT NOTE (CHANGE IN STATUS NOTIFICATION) - ASSESSMENT
pt with 4 midwires, set to DDD HR 80 atrial setting 5mA/0.5 mV, ventricular setting 5mA, 0.8 mV, was pacing 100% at 80. now not capturing, HR in 40's. MAP dropped from 80's to 60's.

## 2022-04-19 NOTE — PROGRESS NOTE ADULT - SUBJECTIVE AND OBJECTIVE BOX
Neurology Progress note;     HPI:  71F RH w/ AS w/ prior open heart aortic valve replacement 2019, HTN, BETSEY, former smoker, Emphysema/ chronic bronchitis, GERD s/p laparoscopic vertical sleeve gastrectomy presented with acute onset speech disturbance, vision change and gait imbalance. Per EMS, LKN: 4/9/22 at 10am per family. Pt's family reported blurry vision, gait imbalance, and speech disturbance (mild loss in fluency). Pt unable to provide full hx because she was confused on timing. She  endorsed blurry vision but denied any weakness, numbness/tingling. Pt takes a baby aspirin 81mg daily. Pt ambulates without assistance or assistive devices at baseline.  LKN: 4/9/22 at 10am  NIHSS: 3  preMRS: 0  Pt was not a candidate for tpa due to outside tpa window   Pt was not a candidate for mechanical thrombectomy due to no large vessel occlusion on CTA    SUBJECTIVE: patient seen and examined. now in CTICU. intubated POD1    Medications:    MEDICATIONS  (STANDING):  acetaminophen     Tablet .. 650 milliGRAM(s) Oral every 6 hours  aMIOdarone    Tablet 400 milliGRAM(s) Oral two times a day  ascorbic acid 500 milliGRAM(s) Oral two times a day  cefTRIAXone   IVPB      cefTRIAXone   IVPB 2000 milliGRAM(s) IV Intermittent once  chlorhexidine 0.12% Liquid 5 milliLiter(s) Oral Mucosa two times a day  chlorhexidine 0.12% Liquid 15 milliLiter(s) Oral Mucosa every 12 hours  chlorhexidine 2% Cloths 1 Application(s) Topical daily  dexMEDEtomidine Infusion 0.5 MICROgram(s)/kG/Hr (10.7 mL/Hr) IV Continuous <Continuous>  dextrose 50% Injectable 50 milliLiter(s) IV Push every 15 minutes  dextrose 50% Injectable 25 milliLiter(s) IV Push every 15 minutes  DOBUTamine Infusion 1 MICROgram(s)/kG/Min (2.57 mL/Hr) IV Continuous <Continuous>  gabapentin 100 milliGRAM(s) Oral every 8 hours  insulin regular Infusion 3 Unit(s)/Hr (3 mL/Hr) IV Continuous <Continuous>  meperidine     Injectable 25 milliGRAM(s) IV Push once  niCARdipine Infusion 5 mG/Hr (25 mL/Hr) IV Continuous <Continuous>  norepinephrine Infusion 0.05 MICROgram(s)/kG/Min (8.02 mL/Hr) IV Continuous <Continuous>  pantoprazole  Injectable 40 milliGRAM(s) IV Push daily  polyethylene glycol 3350 17 Gram(s) Oral daily  polyethylene glycol 3350 17 Gram(s) Oral daily  potassium chloride  10 mEq/50 mL IVPB 10 milliEquivalent(s) IV Intermittent every 1 hour  potassium chloride  10 mEq/50 mL IVPB 10 milliEquivalent(s) IV Intermittent every 1 hour  potassium chloride  10 mEq/50 mL IVPB 10 milliEquivalent(s) IV Intermittent every 1 hour  senna 2 Tablet(s) Oral at bedtime  sodium chloride 0.9%. 1000 milliLiter(s) (10 mL/Hr) IV Continuous <Continuous>  vancomycin  IVPB 1000 milliGRAM(s) IV Intermittent every 12 hours    MEDICATIONS  (PRN):  HYDROmorphone  Injectable 0.5 milliGRAM(s) IV Push every 6 hours PRN Severe Pain (7 - 10)  oxyCODONE    IR 5 milliGRAM(s) Oral every 4 hours PRN Moderate Pain (4 - 6)  oxyCODONE    IR 10 milliGRAM(s) Oral every 4 hours PRN Severe Pain (7 - 10)    PHYSICAL EXAM:     ICU Vital Signs Last 24 Hrs  T(C): 36.8 (19 Apr 2022 07:30), Max: 37.4 (19 Apr 2022 04:00)  T(F): 98.2 (19 Apr 2022 07:30), Max: 99.3 (19 Apr 2022 04:00)  HR: 80 (19 Apr 2022 08:52) (80 - 80)  BP: --  BP(mean): --  ABP: 108/64 (19 Apr 2022 08:45) (98/63 - 173/75)  ABP(mean): 81 (19 Apr 2022 08:45) (70 - 112)  RR: 13 (19 Apr 2022 08:45) (3 - 25)  SpO2: 98% (19 Apr 2022 08:52) (96% - 100%)      General: No acute distress  HEENT: EOM intact, LHH to counting (is aware)  Abdomen: Soft, nontender, nondistended   Extremities: No edema    NEUROLOGICAL EXAM: (sedated on some precedex)   Mental status: Awake, alert, oriented x1-2, no aphasia, intubated   Cranial Nerves: No facial asymmetry, LHHA to counting, no nystagmus, no dysarthria,  tongue midline  Motor exam: Normal tone, no drift, 5/5 RUE, 5/5 RLE, 5/5 LUE, 5/5 LLE, normal fine finger movements.  Sensation: Intact to light touch   Coordination/ Gait: No dysmetria, gait unable in ICU       LABS:                CBC Full  -  ( 19 Apr 2022 00:35 )  WBC Count : 16.55 K/uL  RBC Count : 3.32 M/uL  Hemoglobin : 9.9 g/dL  Hematocrit : 28.7 %  Platelet Count - Automated : 198 K/uL  Mean Cell Volume : 86.4 fl  Mean Cell Hemoglobin : 29.8 pg  Mean Cell Hemoglobin Concentration : 34.5 gm/dL  Auto Neutrophil # : x  Auto Lymphocyte # : x  Auto Monocyte # : x  Auto Eosinophil # : x  Auto Basophil # : x  Auto Neutrophil % : x  Auto Lymphocyte % : x  Auto Monocyte % : x  Auto Eosinophil % : x  Auto Basophil % : x      04-19    144  |  110<H>  |  12  ----------------------------<  170<H>  4.6   |  21<L>  |  0.62    Ca    8.3<L>      19 Apr 2022 00:35  Phos  3.7     04-19  Mg     2.8     04-19    TPro  5.4<L>  /  Alb  3.7  /  TBili  3.1<H>  /  DBili  x   /  AST  55<H>  /  ALT  22  /  AlkPhos  38<L>  04-19        IMAGING: Reviewed by me.   MRI HEAD 04/10/22: Acute right occipital lobe infarct in a right PCA vascular distribution   with an additional punctate acute infarct involving the left cerebellum   and right splenium of the corpus callosum.    04/09/22:  Brain CT: Acute right occipital lobe infarct in the distribution of the   right PCA. No evidence for hemorrhage.  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 No   hemodynamically significant stenosis within the anterior circulation.  Brain CTA: No large vessel occlusion or stenosis. 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  Perfusion maps: Core infarct in the distribution of the right PCA.   Questionable areas of brain at risk in the right PCA as well as the   bilateral temporal and right frontal region.

## 2022-04-19 NOTE — PROGRESS NOTE ADULT - ASSESSMENT
71 year old with AVR; COPD, GERD, GBP, presented with recent cva.  During workup, an echo revealed an atrial myxoma.  Last 24 she had a fever of 101. Denies -prior fevers, but states she was treated for pna prior to this admission  She is very confused but denies any complaints at present      ; bc negative UA negative   went for surgery yesterday and found to have endocarditis   and root abscess/ .   BC negative  will request PCR.  vanco and ceftriaxone pending cultures

## 2022-04-19 NOTE — PROVIDER CONTACT NOTE (CHANGE IN STATUS NOTIFICATION) - BACKGROUND
pt s/p [facility] Procedure 4/18    [facility] admitted with CVA and endocarditis with aortic root abscess, AVR 2019,

## 2022-04-19 NOTE — PROGRESS NOTE ADULT - SUBJECTIVE AND OBJECTIVE BOX
Patient seen and examined at the bedside.    Remained critically ill on continuous ICU monitoring.    HPI:  71F RH w/ AS w/ prior open heart aortic valve replacement 2019, HTN, BETSEY, former smoker, Emphysema/ chronic bronchitis, GERD s/p laparoscopic vertical sleeve gastrectomy presents with acute onset speech disturbance, vision change and gait imbalance. Per EMS, LKN: 4/9/22 at 10am per family. Pt's family reported blurry vision, gait imbalance, and speech disturbance (mild loss in fluency). Pt unable to provide full hx because she was confused on timing. She still endorses blurry vision but denies any weakness, numbness/tingling. Pt takes a baby aspirin 81mg daily. Pt ambulates without assistance or assistive devices at baseline.    (Stroke only)  LKN: 4/9/22 at 10am  NIHSS: 3  preMRS: 0  Pt is not a candidate for tpa due to outside tpa window   Pt is not a candidate for mechanical thrombectomy due to no large vessel occlusion on CTA     (09 Apr 2022 17:56)      =================== LABS =========================                        9.9    16.55 )-----------( 198      ( 19 Apr 2022 00:35 )             28.7     04-19    144  |  110<H>  |  12  ----------------------------<  170<H>  4.6   |  21<L>  |  0.62    Ca    8.3<L>      19 Apr 2022 00:35  Phos  3.7     04-19  Mg     2.8     04-19    TPro  5.4<L>  /  Alb  3.7  /  TBili  3.1<H>  /  DBili  x   /  AST  55<H>  /  ALT  22  /  AlkPhos  38<L>  04-19    LIVER FUNCTIONS - ( 19 Apr 2022 00:35 )  Alb: 3.7 g/dL / Pro: 5.4 g/dL / ALK PHOS: 38 U/L / ALT: 22 U/L / AST: 55 U/L / GGT: x           PT/INR - ( 19 Apr 2022 00:37 )   PT: 13.5 sec;   INR: 1.17 ratio         PTT - ( 19 Apr 2022 00:37 )  PTT:28.8 sec  ABG - ( 19 Apr 2022 04:05 )  pH, Arterial: 7.47  pH, Blood: x     /  pCO2: 34    /  pO2: 136   / HCO3: 25    / Base Excess: 1.2   /  SaO2: 99.1                  ==================================================    OBJECTIVE:  Vital Signs Last 24 Hrs  T(C): 36.8 (19 Apr 2022 07:30), Max: 37.4 (19 Apr 2022 04:00)  T(F): 98.2 (19 Apr 2022 07:30), Max: 99.3 (19 Apr 2022 04:00)  HR: 80 (19 Apr 2022 08:52) (80 - 80)  BP: --  BP(mean): --  RR: 13 (19 Apr 2022 08:45) (3 - 25)  SpO2: 98% (19 Apr 2022 08:52) (96% - 100%)    REVIEW OF SYSTEMS:  [x ] N/A, unable to obtain, pt intubated and sedated     Physical Exam:  General: intubated    Neurology: sedated   Eyes: bilateral pupils equal and reactive   ENT/Neck: +ETT midline, Neck supple, trachea midline, No JVD   Respiratory: Rales noted bilaterally   CV: RRR, S1S2, no murmurs        [x] Sternal dressing, [x] Mediastinal CT, [x] Pleural CT         [x] Paced [x] Temporary pacing - VVI 80   Abdominal: Soft, NT, ND +BS,   Extremities: 1-2+ pedal edema noted, + peripheral pulses   Skin: No Rashes, Hematoma, Ecchymosis                           Assessment:  71F RH w/ AS w/ prior open heart aortic valve replacement 2019, HTN, BETSEY, former smoker, Emphysema/ chronic bronchitis, GERD s/p laparoscopic vertical sleeve gastrectomy    Endocarditis of prosthetic aortic valve S/P redo sternotomy, prosthetic valve endocarditis, 21mm homograft, placement of epicardial lead on 4/19   Hemodynamic instability   Hypovolemia  Post op respiratory insufficiency   Acute blood loss anemia  Stress hyperglycemia  Obesity OHS / BETSEY   Right PCA infarct     Plan:   ***Neuro***  CTH on 4/9 with Right PCA infarct, MRI on 4/10 Acute R occipital lobe infarct in a R PCA vascular distribution with an additional punctate acute infarct involving the L cerebellum and R splenium of the corpus callosum   [x] Sedated with [x] Precedex    Post operative neuro assessment     ***Cardiovascular***  Invasive hemodynamic monitoring, assess perfusion indices   Paced / CVP 7 / MAP 78 / Hct 28.7%/ Lactate 1.2   [x] Dobutamine 1mcg [x] Cardene 3mcg    Volume: [x] Albumin x2 [x] LR x1 overnight    Reassessment of hemodynamics post resuscitation   Afib prophylaxis with Amiodarone   Monitor chest tube outputs   Serial EKG and cardiac enzymes     ***Pulmonary***  Post op vent management / PS vent weaning as tolerated   Titration of FiO2 and PEEP, follow SpO2, CXR, blood gasses     Mode: CPAP with PS  FiO2: 40  PEEP: 5  PS: 10            ***GI***  [x] NPO  [x] Protonix    Bowel regimen with Miralax and Senna     ***Renal***  GFR 95    Continue to monitor I/Os, BUN/Creatinine.   Replete lytes PRN  Edith present     ***ID***  Endocarditis, c/w Ceftriaxone and Vancomycin   Tissue Cx on 4/19 NG, pending fungal cx 4/19    ***Endocrine***  [x] Hyperglycemia : HbA1c 6.3 %                - [x] Insulin gtt               - Need tight glycemic control to prevent wound infection.          Patient requires continuous monitoring with bedside rhythm monitoring, pulse oximetry monitoring, and continuous central venous and arterial pressure monitoring; and intermittent blood gas analysis. Care plan discussed with the ICU care team.   Patient remained critical, at risk for life threatening decompensation.    By signing my name below, I, Jacquelyn Reddy, attest that this documentation has been prepared under the direction and in the presence of Jeff Sanders NP   Electronically signed: Rashmi Horowitz, 04-19-22 @ 09:02    IJeff, personally performed the services described in this documentation. all medical record entries made by the rashmi were at my direction and in my presence. I have reviewed the chart and agree that the record reflects my personal performance and is accurate and complete  Electronically signed: Jeff Sanders NP  Patient seen and examined at the bedside.    Remained critically ill on continuous ICU monitoring.    HPI:  71F RH w/ AS w/ prior open heart aortic valve replacement 2019, HTN, BETSEY, former smoker, Emphysema/ chronic bronchitis, GERD s/p laparoscopic vertical sleeve gastrectomy presents with acute onset speech disturbance, vision change and gait imbalance. Per EMS, LKN: 4/9/22 at 10am per family. Pt's family reported blurry vision, gait imbalance, and speech disturbance (mild loss in fluency). Pt unable to provide full hx because she was confused on timing. She still endorses blurry vision but denies any weakness, numbness/tingling. Pt takes a baby aspirin 81mg daily. Pt ambulates without assistance or assistive devices at baseline.    (Stroke only)  LKN: 4/9/22 at 10am  NIHSS: 3  preMRS: 0  Pt is not a candidate for tpa due to outside tpa window   Pt is not a candidate for mechanical thrombectomy due to no large vessel occlusion on CTA     (09 Apr 2022 17:56)      =================== LABS =========================                        9.9    16.55 )-----------( 198      ( 19 Apr 2022 00:35 )             28.7     04-19    144  |  110<H>  |  12  ----------------------------<  170<H>  4.6   |  21<L>  |  0.62    Ca    8.3<L>      19 Apr 2022 00:35  Phos  3.7     04-19  Mg     2.8     04-19    TPro  5.4<L>  /  Alb  3.7  /  TBili  3.1<H>  /  DBili  x   /  AST  55<H>  /  ALT  22  /  AlkPhos  38<L>  04-19    LIVER FUNCTIONS - ( 19 Apr 2022 00:35 )  Alb: 3.7 g/dL / Pro: 5.4 g/dL / ALK PHOS: 38 U/L / ALT: 22 U/L / AST: 55 U/L / GGT: x           PT/INR - ( 19 Apr 2022 00:37 )   PT: 13.5 sec;   INR: 1.17 ratio         PTT - ( 19 Apr 2022 00:37 )  PTT:28.8 sec  ABG - ( 19 Apr 2022 04:05 )  pH, Arterial: 7.47  pH, Blood: x     /  pCO2: 34    /  pO2: 136   / HCO3: 25    / Base Excess: 1.2   /  SaO2: 99.1                  ==================================================    OBJECTIVE:  Vital Signs Last 24 Hrs  T(C): 36.8 (19 Apr 2022 07:30), Max: 37.4 (19 Apr 2022 04:00)  T(F): 98.2 (19 Apr 2022 07:30), Max: 99.3 (19 Apr 2022 04:00)  HR: 80 (19 Apr 2022 08:52) (80 - 80)  BP: --  BP(mean): --  RR: 13 (19 Apr 2022 08:45) (3 - 25)  SpO2: 98% (19 Apr 2022 08:52) (96% - 100%)    REVIEW OF SYSTEMS:  [x ] N/A, unable to obtain, pt intubated and sedated     Physical Exam:  General: intubated    Neurology: sedated   Eyes: bilateral pupils equal and reactive   ENT/Neck: +ETT midline, Neck supple, trachea midline, No JVD   Respiratory: Rales noted bilaterally   CV: RRR, S1S2, no murmurs        [x] Sternal dressing, [x] Mediastinal CT, [x] Pleural CT         [x] Paced [x] Temporary pacing - VVI 80   Abdominal: Soft, NT, ND +BS,   Extremities: 1-2+ pedal edema noted, + peripheral pulses   Skin: No Rashes, Hematoma, Ecchymosis                           Assessment:  71F RH w/ AS w/ prior open heart aortic valve replacement 2019, HTN, BETSEY, former smoker, Emphysema/ chronic bronchitis, GERD s/p laparoscopic vertical sleeve gastrectomy    Endocarditis of prosthetic aortic valve S/P redo sternotomy, prosthetic valve endocarditis, 21mm homograft, placement of epicardial lead on 4/19   Hemodynamic instability   Hypovolemia  Post op respiratory insufficiency   Acute blood loss anemia  Stress hyperglycemia  Obesity OHS / BETSEY   Right PCA infarct     Plan:   ***Neuro***  CTH on 4/9 with Right PCA infarct, MRI on 4/10 Acute R occipital lobe infarct in a R PCA vascular distribution with an additional punctate acute infarct involving the L cerebellum and R splenium of the corpus callosum   [x] Sedated with [x] Precedex    Post operative neuro assessment     ***Cardiovascular***  Invasive hemodynamic monitoring, assess perfusion indices   Paced / CVP 7 / MAP 78 / Hct 28.7%/ Lactate 1.2   [x] Dobutamine 1mcg [x] Cardene 3mcg    Volume: [x] Albumin x2 [x] LR x1 overnight    Reassessment of hemodynamics post resuscitation   Afib prophylaxis with Amiodarone   Monitor chest tube outputs   Serial EKG and cardiac enzymes     ***Pulmonary***  Post op vent management / PS vent weaning as tolerated for anticipated extubation today   Titration of FiO2 and PEEP, follow SpO2, CXR, blood gasses     Mode: CPAP with PS  FiO2: 40  PEEP: 5  PS: 10            ***GI***  [x] NPO  [x] Protonix    Bowel regimen with Miralax and Senna     ***Renal***  GFR 95    Continue to monitor I/Os, BUN/Creatinine.   Replete lytes PRN  Edith present     ***ID***  Endocarditis, c/w Ceftriaxone and Vancomycin   Tissue Cx on 4/19 NG, pending fungal cx 4/19    ***Endocrine***  [x] Hyperglycemia : HbA1c 6.3 %                - [x] Insulin gtt               - Need tight glycemic control to prevent wound infection.          Patient requires continuous monitoring with bedside rhythm monitoring, pulse oximetry monitoring, and continuous central venous and arterial pressure monitoring; and intermittent blood gas analysis. Care plan discussed with the ICU care team.   Patient remained critical, at risk for life threatening decompensation.    By signing my name below, I, Jacquelyn Reddy, attest that this documentation has been prepared under the direction and in the presence of Jeff Sanders NP   Electronically signed: Rashmi Horowitz, 04-19-22 @ 09:02    IJeff, personally performed the services described in this documentation. all medical record entries made by the rashmi were at my direction and in my presence. I have reviewed the chart and agree that the record reflects my personal performance and is accurate and complete  Electronically signed: Jeff Sanders NP  Patient seen and examined at the bedside.    Remained critically ill on continuous ICU monitoring.    HPI:  71F RH w/ AS w/ prior open heart aortic valve replacement 2019, HTN, BETSEY, former smoker, Emphysema/ chronic bronchitis, GERD s/p laparoscopic vertical sleeve gastrectomy presents with acute onset speech disturbance, vision change and gait imbalance. Per EMS, LKN: 4/9/22 at 10am per family. Pt's family reported blurry vision, gait imbalance, and speech disturbance (mild loss in fluency). Pt unable to provide full hx because she was confused on timing. She still endorses blurry vision but denies any weakness, numbness/tingling. Pt takes a baby aspirin 81mg daily. Pt ambulates without assistance or assistive devices at baseline.    (Stroke only)  LKN: 4/9/22 at 10am  NIHSS: 3  preMRS: 0  Pt is not a candidate for tpa due to outside tpa window   Pt is not a candidate for mechanical thrombectomy due to no large vessel occlusion on CTA     (09 Apr 2022 17:56)      =================== LABS =========================                        9.9    16.55 )-----------( 198      ( 19 Apr 2022 00:35 )             28.7     04-19    144  |  110<H>  |  12  ----------------------------<  170<H>  4.6   |  21<L>  |  0.62    Ca    8.3<L>      19 Apr 2022 00:35  Phos  3.7     04-19  Mg     2.8     04-19    TPro  5.4<L>  /  Alb  3.7  /  TBili  3.1<H>  /  DBili  x   /  AST  55<H>  /  ALT  22  /  AlkPhos  38<L>  04-19    LIVER FUNCTIONS - ( 19 Apr 2022 00:35 )  Alb: 3.7 g/dL / Pro: 5.4 g/dL / ALK PHOS: 38 U/L / ALT: 22 U/L / AST: 55 U/L / GGT: x           PT/INR - ( 19 Apr 2022 00:37 )   PT: 13.5 sec;   INR: 1.17 ratio         PTT - ( 19 Apr 2022 00:37 )  PTT:28.8 sec  ABG - ( 19 Apr 2022 04:05 )  pH, Arterial: 7.47  pH, Blood: x     /  pCO2: 34    /  pO2: 136   / HCO3: 25    / Base Excess: 1.2   /  SaO2: 99.1                  ==================================================    OBJECTIVE:  Vital Signs Last 24 Hrs  T(C): 36.8 (19 Apr 2022 07:30), Max: 37.4 (19 Apr 2022 04:00)  T(F): 98.2 (19 Apr 2022 07:30), Max: 99.3 (19 Apr 2022 04:00)  HR: 80 (19 Apr 2022 08:52) (80 - 80)  BP: --  BP(mean): --  RR: 13 (19 Apr 2022 08:45) (3 - 25)  SpO2: 98% (19 Apr 2022 08:52) (96% - 100%)    REVIEW OF SYSTEMS:  [x ] N/A, unable to obtain, pt intubated and sedated     Physical Exam:  General: intubated    Neurology: sedated   Eyes: bilateral pupils equal and reactive   ENT/Neck: +ETT midline, Neck supple, trachea midline, No JVD   Respiratory: Rales noted bilaterally   CV: RRR, S1S2, no murmurs        [x] Sternal dressing, [x] Mediastinal CT, [x] Pleural CT         [x] Paced [x] Temporary pacing - AV paced at 80   Abdominal: Soft, NT, ND +BS,   Extremities: 1-2+ pedal edema noted, + peripheral pulses   Skin: No Rashes, Hematoma, Ecchymosis                           Assessment:  71F RH w/ AS w/ prior open heart aortic valve replacement 2019, HTN, BETSEY, former smoker, Emphysema/ chronic bronchitis, GERD s/p laparoscopic vertical sleeve gastrectomy    Endocarditis of prosthetic aortic valve S/P redo sternotomy, prosthetic valve endocarditis, 21mm homograft, placement of epicardial lead POD #1  Hemodynamic instability   Hypovolemia  Post op respiratory insufficiency   Acute blood loss anemia  Stress hyperglycemia  Obesity OHS / BETSEY   Right PCA infarct     Plan:   ***Neuro***  CTH on 4/9 with Right PCA infarct, MRI on 4/10 Acute R occipital lobe infarct in a R PCA vascular distribution with an additional punctate acute infarct involving the L cerebellum and R splenium of the corpus callosum   [x] Sedated with [x] Precedex, weaned to off this AM   Post operative neuro assessment     ***Cardiovascular***  Invasive hemodynamic monitoring, assess perfusion indices - replaced A line this AM   Paced / CVP 10 / MAP 78 / Hct 28.7%/ Lactate 1.2   [x] Dobutamine 1mcg [x] Cardene off this AM   Volume: [x] Albumin x2 [x] LR x1 overnight    Reassessment of hemodynamics post resuscitation   Afib prophylaxis with Amiodarone   Monitor chest tube outputs   VTE prophylaxis with [x] Lovenox to be started later today   Plan to start [x] ASA this AM    Serial EKG and cardiac enzymes     ***Pulmonary***  Post op vent management / PS vent weaning as tolerated for extubation to Aerosol Mask today, will assess if BiPAP indicated s/p extubation   Titration of FiO2 and PEEP, follow SpO2, CXR, blood gasses   Will restart home duonebs for hx of BETSEY     Mode: CPAP with PS  FiO2: 40  PEEP: 5  PS: 10            ***GI***  [x] NPO  [x] Protonix    Bowel regimen with Miralax and Senna     ***Renal***  GFR 95    Continue to monitor I/Os, BUN/Creatinine.   Replete lytes PRN  Diuresis PRN   Sharma present     ***ID***  Low grade fever overnight   Endocarditis, c/w Ceftriaxone and Vancomycin   Pending Tissue Cx and Fungal Cx sent on 4/19    ***Endocrine***  [x] Hyperglycemia : HbA1c 6.3 %                - [x] Insulin gtt               - Need tight glycemic control to prevent wound infection.          Patient requires continuous monitoring with bedside rhythm monitoring, pulse oximetry monitoring, and continuous central venous and arterial pressure monitoring; and intermittent blood gas analysis. Care plan discussed with the ICU care team.   Patient remained critical, at risk for life threatening decompensation.    By signing my name below, Jacquelyn ROE, attest that this documentation has been prepared under the direction and in the presence of Jeff Sanders NP   Electronically signed: Christopher Horowitz, 04-19-22 @ 09:02    Jeff ROE, personally performed the services described in this documentation. all medical record entries made by the scribe were at my direction and in my presence. I have reviewed the chart and agree that the record reflects my personal performance and is accurate and complete  Electronically signed: Jeff Sanders, NP  Patient seen and examined at the bedside.    Remained critically ill on continuous ICU monitoring.    HPI:  71F RH w/ AS w/ prior open heart aortic valve replacement 2019, HTN, BETSEY, former smoker, Emphysema/ chronic bronchitis, GERD s/p laparoscopic vertical sleeve gastrectomy presents with acute onset speech disturbance, vision change and gait imbalance. Per EMS, LKN: 4/9/22 at 10am per family. Pt's family reported blurry vision, gait imbalance, and speech disturbance (mild loss in fluency). Pt unable to provide full hx because she was confused on timing. She still endorses blurry vision but denies any weakness, numbness/tingling. Pt takes a baby aspirin 81mg daily. Pt ambulates without assistance or assistive devices at baseline.    (Stroke only)  LKN: 4/9/22 at 10am  NIHSS: 3  preMRS: 0  Pt is not a candidate for tpa due to outside tpa window   Pt is not a candidate for mechanical thrombectomy due to no large vessel occlusion on CTA     (09 Apr 2022 17:56)      =================== LABS =========================                        9.9    16.55 )-----------( 198      ( 19 Apr 2022 00:35 )             28.7     04-19    144  |  110<H>  |  12  ----------------------------<  170<H>  4.6   |  21<L>  |  0.62    Ca    8.3<L>      19 Apr 2022 00:35  Phos  3.7     04-19  Mg     2.8     04-19    TPro  5.4<L>  /  Alb  3.7  /  TBili  3.1<H>  /  DBili  x   /  AST  55<H>  /  ALT  22  /  AlkPhos  38<L>  04-19    LIVER FUNCTIONS - ( 19 Apr 2022 00:35 )  Alb: 3.7 g/dL / Pro: 5.4 g/dL / ALK PHOS: 38 U/L / ALT: 22 U/L / AST: 55 U/L / GGT: x           PT/INR - ( 19 Apr 2022 00:37 )   PT: 13.5 sec;   INR: 1.17 ratio         PTT - ( 19 Apr 2022 00:37 )  PTT:28.8 sec  ABG - ( 19 Apr 2022 04:05 )  pH, Arterial: 7.47  pH, Blood: x     /  pCO2: 34    /  pO2: 136   / HCO3: 25    / Base Excess: 1.2   /  SaO2: 99.1                  ==================================================    OBJECTIVE:  Vital Signs Last 24 Hrs  T(C): 36.8 (19 Apr 2022 07:30), Max: 37.4 (19 Apr 2022 04:00)  T(F): 98.2 (19 Apr 2022 07:30), Max: 99.3 (19 Apr 2022 04:00)  HR: 80 (19 Apr 2022 08:52) (80 - 80)  BP: --  BP(mean): --  RR: 13 (19 Apr 2022 08:45) (3 - 25)  SpO2: 98% (19 Apr 2022 08:52) (96% - 100%)    REVIEW OF SYSTEMS:  [x ] N/A, unable to obtain, pt intubated and sedated     Physical Exam:  General: intubated    Neurology: sedated   Eyes: bilateral pupils equal and reactive   ENT/Neck: +ETT midline, Neck supple, trachea midline, No JVD   Respiratory: Rales noted bilaterally   CV: RRR, S1S2, no murmurs        [x] Sternal dressing, [x] Mediastinal CT, [x] Pleural CT         [x] Paced [x] Temporary pacing - AV paced at 80   Abdominal: Soft, NT, ND +BS,   Extremities: 1-2+ pedal edema noted, + peripheral pulses   Skin: No Rashes, Hematoma, Ecchymosis                           Assessment:  71F RH w/ AS w/ prior open heart aortic valve replacement 2019, HTN, BETSEY, former smoker, Emphysema/ chronic bronchitis, GERD s/p laparoscopic vertical sleeve gastrectomy    Endocarditis of prosthetic aortic valve S/P redo sternotomy, prosthetic valve endocarditis, 21mm homograft, placement of epicardial lead POD #1  Hemodynamic instability   Hypovolemia  Post op respiratory insufficiency   Acute blood loss anemia  Stress hyperglycemia  Obesity OHS / BETSEY   Right PCA infarct     Plan:   ***Neuro***  CTH on 4/9 with Right PCA infarct, MRI on 4/10 Acute R occipital lobe infarct in a R PCA vascular distribution with an additional punctate acute infarct involving the L cerebellum and R splenium of the corpus callosum   [x] Sedated with [x] Precedex, weaned to off this AM   Post operative neuro assessment     ***Cardiovascular***  Invasive hemodynamic monitoring, assess perfusion indices - replaced A line this AM   Paced / CVP 10 / MAP 78 / Hct 28.7%/ Lactate 1.2   [x] Dobutamine 1mcg [x] Cardene off this AM   Volume: [x] Albumin x2 [x] LR x1 overnight    Reassessment of hemodynamics post resuscitation   Afib prophylaxis with Amiodarone   Monitor chest tube outputs   VTE prophylaxis with [x] Lovenox to be started later today   Plan to start [x] ASA this AM    Serial EKG and cardiac enzymes     ***Pulmonary***  Post op vent management / PS vent weaning as tolerated for extubation to Aerosol Mask today, will assess if BiPAP indicated s/p extubation   Titration of FiO2 and PEEP, follow SpO2, CXR, blood gasses   Will restart home duonebs for hx of BETSEY     Mode: CPAP with PS  FiO2: 40  PEEP: 5  PS: 10            ***GI***  Diet-consistent carb diet  [x] Protonix    Bowel regimen with Miralax and Senna     ***Renal***  GFR 95    Continue to monitor I/Os, BUN/Creatinine.   Replete lytes PRN  Diuresis PRN   Sharma present     ***ID***  Low grade fever overnight   Endocarditis, c/w Ceftriaxone and Vancomycin   Pending Tissue Cx and Fungal Cx sent on 4/19    ***Endocrine***  [x] Hyperglycemia : HbA1c 6.3 %                - [x] Insulin gtt               - Need tight glycemic control to prevent wound infection.          Patient requires continuous monitoring with bedside rhythm monitoring, pulse oximetry monitoring, and continuous central venous and arterial pressure monitoring; and intermittent blood gas analysis. Care plan discussed with the ICU care team.   Patient remained critical, at risk for life threatening decompensation.    By signing my name below, Jacquelyn ROE, attest that this documentation has been prepared under the direction and in the presence of Jeff Sanders NP   Electronically signed: Christopher Horowitz, 04-19-22 @ 09:02    Jeff ROE, personally performed the services described in this documentation. all medical record entries made by the scribe were at my direction and in my presence. I have reviewed the chart and agree that the record reflects my personal performance and is accurate and complete  Electronically signed: Jeff Sanders, NP

## 2022-04-19 NOTE — PROGRESS NOTE ADULT - SUBJECTIVE AND OBJECTIVE BOX
Cardiovascular Disease Progress Note: Covering for Dr. Lanza    Overnight events: No acute events overnight.  Pt extubated this am. S/p Bentall procedure . Pt is alert and in mild distress with episodes of confusion.   Otherwise review of systems negative    Objective Findings:  T(C): 36.9 (22 @ 11:00), Max: 37.4 (22 @ 04:00)  HR: 80 (22 @ 12:15) (44 - 80)  BP: --  RR: 30 (22 @ 12:15) (3 - 30)  SpO2: 95% (22 @ 12:15) (94% - 100%)  Wt(kg): --  Daily     Daily Weight in k.9 (2022 00:00)      Physical Exam:  Gen: NAD; Patient resting comfortably  HEENT: EOMI, Normocephalic/ atraumatic  CV: RRR, normal S1 + S2, no m/r/g. Dressing c/d/i  Lungs:  Normal respiratory effort; clear to auscultation bilaterally  Abd: soft, non-tender; bowel sounds present  Ext: No edema; warm and well perfused    Telemetry: AV pacing.     Laboratory Data:                        9.9    16.55 )-----------( 198      ( 2022 00:35 )             28.7     04-19    144  |  110<H>  |  12  ----------------------------<  170<H>  4.6   |  21<L>  |  0.62    Ca    8.3<L>      2022 00:35  Phos  3.7     -  Mg     2.8     -    TPro  5.4<L>  /  Alb  3.7  /  TBili  3.1<H>  /  DBili  x   /  AST  55<H>  /  ALT  22  /  AlkPhos  38<L>  -19    PT/INR - ( 2022 00:37 )   PT: 13.5 sec;   INR: 1.17 ratio         PTT - ( 2022 00:37 )  PTT:28.8 sec  CARDIAC MARKERS ( 2022 19:23 )  x     / x     / 301 U/L / x     / 31.7 ng/mL          Inpatient Medications:  MEDICATIONS  (STANDING):  acetaminophen     Tablet .. 650 milliGRAM(s) Oral every 6 hours  albuterol/ipratropium for Nebulization 3 milliLiter(s) Nebulizer every 6 hours  aMIOdarone    Tablet 400 milliGRAM(s) Oral two times a day  ascorbic acid 500 milliGRAM(s) Oral two times a day  aspirin  chewable 81 milliGRAM(s) Oral daily  cefTRIAXone   IVPB      chlorhexidine 0.12% Liquid 5 milliLiter(s) Oral Mucosa two times a day  chlorhexidine 2% Cloths 1 Application(s) Topical daily  dexMEDEtomidine Infusion 0.5 MICROgram(s)/kG/Hr (10.7 mL/Hr) IV Continuous <Continuous>  dextrose 50% Injectable 50 milliLiter(s) IV Push every 15 minutes  dextrose 50% Injectable 25 milliLiter(s) IV Push every 15 minutes  DOBUTamine Infusion 1 MICROgram(s)/kG/Min (2.57 mL/Hr) IV Continuous <Continuous>  enoxaparin Injectable 40 milliGRAM(s) SubCutaneous every 24 hours  gabapentin 100 milliGRAM(s) Oral every 8 hours  insulin regular Infusion 3 Unit(s)/Hr (3 mL/Hr) IV Continuous <Continuous>  meperidine     Injectable 25 milliGRAM(s) IV Push once  niCARdipine Infusion 5 mG/Hr (25 mL/Hr) IV Continuous <Continuous>  pantoprazole    Tablet 40 milliGRAM(s) Oral before breakfast  polyethylene glycol 3350 17 Gram(s) Oral daily  polyethylene glycol 3350 17 Gram(s) Oral daily  potassium chloride  10 mEq/50 mL IVPB 10 milliEquivalent(s) IV Intermittent every 1 hour  potassium chloride  10 mEq/50 mL IVPB 10 milliEquivalent(s) IV Intermittent every 1 hour  potassium chloride  10 mEq/50 mL IVPB 10 milliEquivalent(s) IV Intermittent every 1 hour  senna 2 Tablet(s) Oral at bedtime  sodium chloride 0.9%. 1000 milliLiter(s) (10 mL/Hr) IV Continuous <Continuous>  vancomycin  IVPB 1000 milliGRAM(s) IV Intermittent every 12 hours      Assessment:  71F RH w/ AS w/ prior open heart aortic valve replacement 2019, HTN, BETSEY, former smoker, Emphysema/ chronic bronchitis, GERD s/p laparoscopic vertical sleeve gastrectomy presents with acute onset speech disturbance, vision change and gait imbalance.     Plan of Care:    #Endocarditis  - Pt s/p Bentall procedure   - Tolerated procedure well.  - Ms. Lal displays no evidence of post-op coronary ischemia  - Continue management as per CTU and CTS    #Acute CVA  - Cardioembolic source. Imaging shows atrial mass  - Refer to plan above  - Pt with expressive aphasia  - Neuro input appreciated.     #Cardiogenic shock  - S/p surgery  - On pressors  - Wean as tolerated      CTU management appreciated          Over 25 minutes spent on total encounter; more than 50% of the visit was spent counseling and/or coordinating care by the attending physician.      Abhay Oglesby D.O.   Cardiovascular Disease  [phone]

## 2022-04-19 NOTE — PROGRESS NOTE ADULT - SUBJECTIVE AND OBJECTIVE BOX
infectious diseases progress note:    Patient is a 71y old  Female who presents with a chief complaint of concern for stroke (18 Apr 2022 19:32)        Cerebral infarction               Allergies    No Known Allergies    Intolerances        ANTIBIOTICS/RELEVANT:  antimicrobials  cefuroxime  IVPB 1500 milliGRAM(s) IV Intermittent every 8 hours  vancomycin  IVPB 1000 milliGRAM(s) IV Intermittent every 12 hours    immunologic:    OTHER:  acetaminophen     Tablet .. 650 milliGRAM(s) Oral every 6 hours  aMIOdarone    Tablet 400 milliGRAM(s) Oral two times a day  ascorbic acid 500 milliGRAM(s) Oral two times a day  chlorhexidine 0.12% Liquid 5 milliLiter(s) Oral Mucosa two times a day  chlorhexidine 0.12% Liquid 15 milliLiter(s) Oral Mucosa every 12 hours  chlorhexidine 2% Cloths 1 Application(s) Topical daily  dexMEDEtomidine Infusion 0.5 MICROgram(s)/kG/Hr IV Continuous <Continuous>  dextrose 50% Injectable 50 milliLiter(s) IV Push every 15 minutes  dextrose 50% Injectable 25 milliLiter(s) IV Push every 15 minutes  DOBUTamine Infusion 1 MICROgram(s)/kG/Min IV Continuous <Continuous>  gabapentin 100 milliGRAM(s) Oral every 8 hours  HYDROmorphone  Injectable 0.5 milliGRAM(s) IV Push every 6 hours PRN  insulin regular Infusion 3 Unit(s)/Hr IV Continuous <Continuous>  meperidine     Injectable 25 milliGRAM(s) IV Push once  niCARdipine Infusion 5 mG/Hr IV Continuous <Continuous>  norepinephrine Infusion 0.05 MICROgram(s)/kG/Min IV Continuous <Continuous>  oxyCODONE    IR 5 milliGRAM(s) Oral every 4 hours PRN  oxyCODONE    IR 10 milliGRAM(s) Oral every 4 hours PRN  pantoprazole  Injectable 40 milliGRAM(s) IV Push daily  polyethylene glycol 3350 17 Gram(s) Oral daily  polyethylene glycol 3350 17 Gram(s) Oral daily  potassium chloride  10 mEq/50 mL IVPB 10 milliEquivalent(s) IV Intermittent every 1 hour  potassium chloride  10 mEq/50 mL IVPB 10 milliEquivalent(s) IV Intermittent every 1 hour  potassium chloride  10 mEq/50 mL IVPB 10 milliEquivalent(s) IV Intermittent every 1 hour  senna 2 Tablet(s) Oral at bedtime  sodium chloride 0.9%. 1000 milliLiter(s) IV Continuous <Continuous>      Objective:  Vital Signs Last 24 Hrs  T(C): 36.8 (19 Apr 2022 07:30), Max: 37.4 (19 Apr 2022 04:00)  T(F): 98.2 (19 Apr 2022 07:30), Max: 99.3 (19 Apr 2022 04:00)  HR: 80 (19 Apr 2022 08:00) (80 - 80)  BP: --  BP(mean): --  RR: 14 (19 Apr 2022 08:00) (3 - 25)  SpO2: 98% (19 Apr 2022 08:00) (96% - 100%)       Eyes:ABDULLAHI, EOMI  Ear/Nose/Throat: no oral lesion, no sinus tenderness on percussion	  Neck:no JVD, no lymphadenopathy, supple  Respiratory: CTA maribel  Cardiovascular: S1S2 RRR, no murmurs  Gastrointestinal:soft, (+) BS, no HSM  Extremities:no e/e/c        LABS:                        9.9    16.55 )-----------( 198      ( 19 Apr 2022 00:35 )             28.7     04-19    144  |  110<H>  |  12  ----------------------------<  170<H>  4.6   |  21<L>  |  0.62    Ca    8.3<L>      19 Apr 2022 00:35  Phos  3.7     04-19  Mg     2.8     04-19    TPro  5.4<L>  /  Alb  3.7  /  TBili  3.1<H>  /  DBili  x   /  AST  55<H>  /  ALT  22  /  AlkPhos  38<L>  04-19    PT/INR - ( 19 Apr 2022 00:37 )   PT: 13.5 sec;   INR: 1.17 ratio         PTT - ( 19 Apr 2022 00:37 )  PTT:28.8 sec        MICROBIOLOGY:    RECENT CULTURES:  04-19 @ 02:18 .Tissue Other       Few polymorphonuclear leukocytes seen per low power field  No organisms seen per oil power field           Testing in progress    04-12 @ 14:38 .Blood Blood-Peripheral                No Growth Final    04-12 @ 14:36 .Blood Blood-Peripheral                No Growth Final          RESPIRATORY CULTURES:              RADIOLOGY & ADDITIONAL STUDIES:        Pager [phone]  After 5 pm/weekends or if no response :[phone]

## 2022-04-19 NOTE — PROGRESS NOTE ADULT - SUBJECTIVE AND OBJECTIVE BOX
Patient seen and examined at the bedside.    Remained critically ill on continuous ICU monitoring.    OBJECTIVE:  Vital Signs Last 24 Hrs  T(C): 37.3 (19 Apr 2022 20:00), Max: 37.4 (19 Apr 2022 04:00)  T(F): 99.1 (19 Apr 2022 20:00), Max: 99.3 (19 Apr 2022 04:00)  HR: 81 (19 Apr 2022 19:30) (44 - 81)  BP: --  BP(mean): --  RR: 23 (19 Apr 2022 19:30) (4 - 30)  SpO2: 93% (19 Apr 2022 19:30) (89% - 100%)      Physical Exam:   General: Extubated recently to NC  Neurology: sedated   Eyes: bilateral pupils equal and reactive   ENT/Neck: Neck supple, trachea midline, No JVD   Respiratory: Rales noted bilaterally   CV: RRR, S1S2, no murmurs        [x] Sternal dressing, [x] Mediastinal CT x2, [x] Pleural CT x2        [x] Sinus bradycardia [x] Temporary pacing - VVI 80  Abdominal: Soft, NT, ND +BS,   Extremities: 1-2+ pedal edema noted, + peripheral pulses   Skin: No Rashes, Hematoma, Ecchymosis                           Assessment:  71F RH w/ AS w/ prior open heart aortic valve replacement 2019, HTN, BETSEY, former smoker, Emphysema/ chronic bronchitis, GERD s/p laparoscopic vertical sleeve gastrectomy    Endocarditis of prosthetic aortic valve S/P redo sternotomy, prosthetic valve endocarditis, 21mm homograft, placement of epicardial lead POD #1  Hemodynamic instability   Hypovolemia  Post op respiratory insufficiency   Acute blood loss anemia  Stress hyperglycemia  Obesity OHS / BETSEY   Right PCA infarct         Plan:   ***Neuro***  CTH on 4/9 with Right PCA infarct, MRI on 4/10 Acute R occipital lobe infarct in a R PCA vascular distribution with an additional punctate acute infarct involving the L cerebellum and R splenium of the corpus callosum   [x] Sedated with [x] Precedex, weaned to off this AM   Post operative neuro assessment       ***Cardiovascular***  Invasive hemodynamic monitoring, assess perfusion indices   SB / CVP 5 / MAP 71 / Hct 28.7 / Lactate 0.9   [x] Dobutamine 1mcg [x] Cardene off this AM   Volume:  [x] Albumin 750  [x] LR x1 overnight    Reassessment of hemodynamics post resuscitation  Afib prophylaxis with Amiodarone   Monitor chest tube outputs   VTE prophylaxis with [x] Lovenox  [x] ASA   Serial EKG and cardiac enzymes     ***Pulmonary***  [x] Extubated to NC  Continue to monitor RR, breathing pattern, pulse ox, and ABG's.  Encourage incentive spirometry.   Continue with bronchodilator    Mode: standby              ***GI***  [x]  Diet: PO consistent carb diet.   [x] Protonix    Bowel regimen with Miralax and Senna       ***Renal***  Continue to monitor I/Os, BUN/Creatinine.   Replete lytes PRN  Sharma present  Continue with PO Aldactone for diuresing     ***ID***  Low grade fever overnight   Endocarditis, c/w Ceftriaxone and Vancomycin   Pending Tissue Cx and Fungal Cx sent on 4/19      ***Endocrine***  [x] Hyperglycemia : HbA1c 6.3 %                - [x] Insulin gtt               - Need tight glycemic control to prevent wound infection.        Patient requires continuous monitoring with bedside rhythm monitoring, pulse oximetry monitoring, and continuous central venous and arterial pressure monitoring; and intermittent blood gas analysis. Care plan discussed with the ICU care team.   Patient remained critical, at risk for life threatening decompensation.    I have spent 30 minutes providing critical care management to this patient.    By signing my name below, I, Leah Armenta, attest that this documentation has been prepared under the direction and in the presence of LICO Steward  Electronically signed: Rashmi Martinez, 04-19-22 @ 20:36    I, LICO Steward, personally performed the services described in this documentation. all medical record entries made by the rashmi were at my direction and in my presence. I have reviewed the chart and agree that the record reflects my personal performance and is accurate and complete  Electronically signed: LICO Steward  Patient seen and examined at the bedside.    Remained critically ill on continuous ICU monitoring.    OBJECTIVE:  Vital Signs Last 24 Hrs  T(C): 37.3 (19 Apr 2022 20:00), Max: 37.4 (19 Apr 2022 04:00)  T(F): 99.1 (19 Apr 2022 20:00), Max: 99.3 (19 Apr 2022 04:00)  HR: 81 (19 Apr 2022 19:30) (44 - 81)  BP: --  BP(mean): --  RR: 23 (19 Apr 2022 19:30) (4 - 30)  SpO2: 93% (19 Apr 2022 19:30) (89% - 100%)      Physical Exam:   General: Extubated recently to NC  Neurology: AAOx3, no focal deficits, strength equal x4   Eyes: bilateral pupils equal and reactive   ENT/Neck: Neck supple, trachea midline, No JVD   Respiratory: Rales noted bilaterally   CV: RRR, S1S2, no murmurs        [x] Sternal dressing, [x] Mediastinal CT x2, [x] Pleural CT x2        [x] Sinus bradycardia underlying [x] Temporary pacing - VVI 80  Abdominal: Soft, NT, ND +BS,   Extremities: 1-2+ pedal edema noted, + peripheral pulses   Skin: No Rashes, Hematoma, Ecchymosis                           Assessment:  71F RH w/ AS w/ prior open heart aortic valve replacement 2019, HTN, BETSEY, former smoker, Emphysema/ chronic bronchitis, GERD s/p laparoscopic vertical sleeve gastrectomy    Endocarditis of prosthetic aortic valve S/P redo sternotomy, prosthetic valve endocarditis, 21mm homograft, placement of epicardial lead POD #1  Hemodynamic instability   Hypovolemia  Post op respiratory insufficiency   Acute blood loss anemia  Stress hyperglycemia  Obesity OHS / BETSEY   Right PCA infarct         Plan:   ***Neuro***  CTH on 4/9 with Right PCA infarct, MRI on 4/10 Acute R occipital lobe infarct in a R PCA vascular distribution with an additional punctate acute infarct involving the L cerebellum and R splenium of the corpus callosum   [x] Sedated with [x] Precedex, weaned to off this AM   Post operative neuro assessment       ***Cardiovascular***  Invasive hemodynamic monitoring, assess perfusion indices   SB / CVP 5 / MAP 71 / Hct 28.7 / Lactate 0.9   [x] Dobutamine 1mcg [x] Cardene off this AM   Volume:  [x] Albumin 750  [x] LR x1 overnight    Reassessment of hemodynamics post resuscitation  Afib prophylaxis with Amiodarone   Monitor chest tube outputs   VTE prophylaxis with [x] Lovenox  [x] ASA   Serial EKG and cardiac enzymes     ***Pulmonary***  [x] Extubated to NC  Continue to monitor RR, breathing pattern, pulse ox, and ABG's.  Encourage incentive spirometry.   Continue with bronchodilator    Mode: standby              ***GI***  [x]  Diet: PO consistent carb diet.   [x] Protonix    Bowel regimen with Miralax and Senna       ***Renal***  Continue to monitor I/Os, BUN/Creatinine.   Replete lytes PRN  Sharma present  Continue with PO Aldactone for diuresing     ***ID***  Low grade fever overnight   Endocarditis, c/w Ceftriaxone and Vancomycin   Pending Tissue Cx and Fungal Cx sent on 4/19      ***Endocrine***  [x] Hyperglycemia : HbA1c 6.3 %                - [x] Insulin gtt               - Need tight glycemic control to prevent wound infection.        Patient requires continuous monitoring with bedside rhythm monitoring, pulse oximetry monitoring, and continuous central venous and arterial pressure monitoring; and intermittent blood gas analysis. Care plan discussed with the ICU care team.   Patient remained critical, at risk for life threatening decompensation.    I have spent 30 minutes providing critical care management to this patient.    By signing my name below, I, Leah Armenta, attest that this documentation has been prepared under the direction and in the presence of LICO Steward  Electronically signed: Christopher Martinez, 04-19-22 @ 20:36    I, LICO Steward, personally performed the services described in this documentation. all medical record entries made by the janiceibamanda were at my direction and in my presence. I have reviewed the chart and agree that the record reflects my personal performance and is accurate and complete  Electronically signed: LICO Steward  Patient seen and examined at the bedside.    Remained critically ill on continuous ICU monitoring.    OBJECTIVE:  Vital Signs Last 24 Hrs  T(C): 37.3 (19 Apr 2022 20:00), Max: 37.4 (19 Apr 2022 04:00)  T(F): 99.1 (19 Apr 2022 20:00), Max: 99.3 (19 Apr 2022 04:00)  HR: 81 (19 Apr 2022 19:30) (44 - 81)  BP: --  BP(mean): --  RR: 23 (19 Apr 2022 19:30) (4 - 30)  SpO2: 93% (19 Apr 2022 19:30) (89% - 100%)      Physical Exam:   General: Extubated recently to NC  Neurology: AAOx3, no focal deficits, strength equal x4   Eyes: bilateral pupils equal and reactive   ENT/Neck: Neck supple, trachea midline, No JVD   Respiratory: Rales noted bilaterally   CV: RRR, S1S2, no murmurs        [x] Sternal dressing, [x] Mediastinal CT x2, [x] Pleural CT x2        [x] Sinus bradycardia underlying [x] Temporary pacing - VVI 80  Abdominal: Soft, NT, ND +BS,   Extremities: 1-2+ pedal edema noted, + peripheral pulses   Skin: No Rashes, Hematoma, Ecchymosis                           Assessment:  71F RH w/ AS w/ prior open heart aortic valve replacement 2019, HTN, BETSEY, former smoker, Emphysema/ chronic bronchitis, GERD s/p laparoscopic vertical sleeve gastrectomy    Endocarditis of prosthetic aortic valve S/P redo sternotomy, prosthetic valve endocarditis, 21mm homograft, placement of epicardial lead POD #1  Hemodynamic instability   Hypovolemia  Post op respiratory insufficiency   Acute blood loss anemia  Stress hyperglycemia  Obesity OHS / BETSEY   Right PCA infarct         Plan:   ***Neuro***  CTH on 4/9 with Right PCA infarct, MRI on 4/10 Acute R occipital lobe infarct in a R PCA vascular distribution with an additional punctate acute infarct involving the L cerebellum and R splenium of the corpus callosum   [x] Precedex, weaned to off this AM   pain ctrl prn  Post operative neuro assessment       ***Cardiovascular***  Invasive hemodynamic monitoring, assess perfusion indices   SB / CVP 5 / MAP 71 / Hct 28.7 / Lactate 0.9   [x] Dobutamine 1mcg [x] Cardene on/off  Volume:  [x] Albumin 750  [x] LR x1 overnight    Reassessment of hemodynamics post resuscitation  Afib prophylaxis with Amiodarone   Monitor chest tube outputs   VTE prophylaxis with [x] Lovenox  [x] ASA   [x] paced via temporary wires  Serial EKG and cardiac enzymes     ***Pulmonary***  [x] Extubated to NC  Continue to monitor RR, breathing pattern, pulse ox, and ABG's.  Encourage incentive spirometry.   Continue with bronchodilator prn    Mode: standby              ***GI***  [x]  Diet: PO consistent carb diet.   [x] Protonix    Bowel regimen with Miralax and Senna       ***Renal***  Continue to monitor I/Os, BUN/Creatinine.   Replete lytes PRN  Sharma present  prn diuretics     ***ID***  Low grade fever overnight   Endocarditis, c/w Ceftriaxone and Vancomycin   Pending Tissue Cx and Fungal Cx sent on 4/19      ***Endocrine***  [x] Hyperglycemia : HbA1c 6.3 %                - [x] Insulin gtt               - Need tight glycemic control to prevent wound infection.        Patient requires continuous monitoring with bedside rhythm monitoring, pulse oximetry monitoring, and continuous central venous and arterial pressure monitoring; and intermittent blood gas analysis. Care plan discussed with the ICU care team.   Patient remained critical, at risk for life threatening decompensation.    I have spent 30 minutes providing critical care management to this patient.    By signing my name below, I, Leah Armenta, attest that this documentation has been prepared under the direction and in the presence of LICO Steward  Electronically signed: Christopher Martinez, 04-19-22 @ 20:36    I, LICO Steward, personally performed the services described in this documentation. all medical record entries made by the janiceibamanda were at my direction and in my presence. I have reviewed the chart and agree that the record reflects my personal performance and is accurate and complete  Electronically signed: LICO Steward

## 2022-04-19 NOTE — PROVIDER CONTACT NOTE (CHANGE IN STATUS NOTIFICATION) - ACTION/TREATMENT ORDERED:
LICO Felix came to bedside and assessed pacing wire and pacing settings. New setting DDD HR 80 atrial 5mA, 0.5 mV.  Ventricular 10mA, 0.8 mV. MAP's increased from 60's to 80's.

## 2022-04-20 LAB
ALBUMIN SERPL ELPH-MCNC: 3.8 G/DL — SIGNIFICANT CHANGE UP (ref 3.3–5)
ALP SERPL-CCNC: 44 U/L — SIGNIFICANT CHANGE UP (ref 40–120)
ALT FLD-CCNC: 26 U/L — SIGNIFICANT CHANGE UP (ref 10–45)
ANION GAP SERPL CALC-SCNC: 13 MMOL/L — SIGNIFICANT CHANGE UP (ref 5–17)
AST SERPL-CCNC: 51 U/L — HIGH (ref 10–40)
BASE EXCESS BLDV CALC-SCNC: 1.1 MMOL/L — SIGNIFICANT CHANGE UP (ref -2–2)
BILIRUB SERPL-MCNC: 1 MG/DL — SIGNIFICANT CHANGE UP (ref 0.2–1.2)
BLD GP AB SCN SERPL QL: NEGATIVE — SIGNIFICANT CHANGE UP
BUN SERPL-MCNC: 12 MG/DL — SIGNIFICANT CHANGE UP (ref 7–23)
CA-I SERPL-SCNC: 1.16 MMOL/L — SIGNIFICANT CHANGE UP (ref 1.15–1.33)
CALCIUM SERPL-MCNC: 8.4 MG/DL — SIGNIFICANT CHANGE UP (ref 8.4–10.5)
CHLORIDE BLDV-SCNC: 105 MMOL/L — SIGNIFICANT CHANGE UP (ref 96–108)
CHLORIDE SERPL-SCNC: 103 MMOL/L — SIGNIFICANT CHANGE UP (ref 96–108)
CO2 BLDV-SCNC: 28 MMOL/L — HIGH (ref 22–26)
CO2 SERPL-SCNC: 23 MMOL/L — SIGNIFICANT CHANGE UP (ref 22–31)
CREAT SERPL-MCNC: 0.61 MG/DL — SIGNIFICANT CHANGE UP (ref 0.5–1.3)
EGFR: 96 ML/MIN/1.73M2 — SIGNIFICANT CHANGE UP
GAS PNL BLDA: SIGNIFICANT CHANGE UP
GAS PNL BLDV: 137 MMOL/L — SIGNIFICANT CHANGE UP (ref 136–145)
GAS PNL BLDV: SIGNIFICANT CHANGE UP
GAS PNL BLDV: SIGNIFICANT CHANGE UP
GLUCOSE BLDC GLUCOMTR-MCNC: 112 MG/DL — HIGH (ref 70–99)
GLUCOSE BLDC GLUCOMTR-MCNC: 119 MG/DL — HIGH (ref 70–99)
GLUCOSE BLDC GLUCOMTR-MCNC: 125 MG/DL — HIGH (ref 70–99)
GLUCOSE BLDC GLUCOMTR-MCNC: 138 MG/DL — HIGH (ref 70–99)
GLUCOSE BLDC GLUCOMTR-MCNC: 138 MG/DL — HIGH (ref 70–99)
GLUCOSE BLDC GLUCOMTR-MCNC: 143 MG/DL — HIGH (ref 70–99)
GLUCOSE BLDC GLUCOMTR-MCNC: 144 MG/DL — HIGH (ref 70–99)
GLUCOSE BLDC GLUCOMTR-MCNC: 145 MG/DL — HIGH (ref 70–99)
GLUCOSE BLDC GLUCOMTR-MCNC: 146 MG/DL — HIGH (ref 70–99)
GLUCOSE BLDC GLUCOMTR-MCNC: 150 MG/DL — HIGH (ref 70–99)
GLUCOSE BLDC GLUCOMTR-MCNC: 152 MG/DL — HIGH (ref 70–99)
GLUCOSE BLDC GLUCOMTR-MCNC: 153 MG/DL — HIGH (ref 70–99)
GLUCOSE BLDC GLUCOMTR-MCNC: 160 MG/DL — HIGH (ref 70–99)
GLUCOSE BLDC GLUCOMTR-MCNC: 160 MG/DL — HIGH (ref 70–99)
GLUCOSE BLDC GLUCOMTR-MCNC: 161 MG/DL — HIGH (ref 70–99)
GLUCOSE BLDC GLUCOMTR-MCNC: 163 MG/DL — HIGH (ref 70–99)
GLUCOSE BLDC GLUCOMTR-MCNC: 166 MG/DL — HIGH (ref 70–99)
GLUCOSE BLDC GLUCOMTR-MCNC: 173 MG/DL — HIGH (ref 70–99)
GLUCOSE BLDV-MCNC: 129 MG/DL — HIGH (ref 70–99)
GLUCOSE SERPL-MCNC: 148 MG/DL — HIGH (ref 70–99)
GRAM STN FLD: SIGNIFICANT CHANGE UP
HCO3 BLDV-SCNC: 27 MMOL/L — SIGNIFICANT CHANGE UP (ref 22–29)
HCT VFR BLD CALC: 23.9 % — LOW (ref 34.5–45)
HCT VFR BLDA CALC: 25 % — LOW (ref 34.5–46.5)
HGB BLD CALC-MCNC: 8.2 G/DL — LOW (ref 11.7–16.1)
HGB BLD-MCNC: 8.1 G/DL — LOW (ref 11.5–15.5)
HOROWITZ INDEX BLDV+IHG-RTO: 40 — SIGNIFICANT CHANGE UP
LACTATE BLDV-MCNC: 1.6 MMOL/L — SIGNIFICANT CHANGE UP (ref 0.7–2)
MAGNESIUM SERPL-MCNC: 2.2 MG/DL — SIGNIFICANT CHANGE UP (ref 1.6–2.6)
MCHC RBC-ENTMCNC: 29.7 PG — SIGNIFICANT CHANGE UP (ref 27–34)
MCHC RBC-ENTMCNC: 33.9 GM/DL — SIGNIFICANT CHANGE UP (ref 32–36)
MCV RBC AUTO: 87.5 FL — SIGNIFICANT CHANGE UP (ref 80–100)
METHOD TYPE: SIGNIFICANT CHANGE UP
NRBC # BLD: 0 /100 WBCS — SIGNIFICANT CHANGE UP (ref 0–0)
PCO2 BLDV: 46 MMHG — HIGH (ref 39–42)
PH BLDV: 7.37 — SIGNIFICANT CHANGE UP (ref 7.32–7.43)
PHOSPHATE SERPL-MCNC: 3.2 MG/DL — SIGNIFICANT CHANGE UP (ref 2.5–4.5)
PLATELET # BLD AUTO: 161 K/UL — SIGNIFICANT CHANGE UP (ref 150–400)
PO2 BLDV: 32 MMHG — SIGNIFICANT CHANGE UP (ref 25–45)
POTASSIUM BLDV-SCNC: 3.9 MMOL/L — SIGNIFICANT CHANGE UP (ref 3.5–5.1)
POTASSIUM SERPL-MCNC: 3.9 MMOL/L — SIGNIFICANT CHANGE UP (ref 3.5–5.3)
POTASSIUM SERPL-SCNC: 3.9 MMOL/L — SIGNIFICANT CHANGE UP (ref 3.5–5.3)
PROT SERPL-MCNC: 5.8 G/DL — LOW (ref 6–8.3)
RBC # BLD: 2.73 M/UL — LOW (ref 3.8–5.2)
RBC # FLD: 15 % — HIGH (ref 10.3–14.5)
RH IG SCN BLD-IMP: POSITIVE — SIGNIFICANT CHANGE UP
S LUGDUNENSIS DNA SNV QL NAA+NON-PROBE: SIGNIFICANT CHANGE UP
SAO2 % BLDV: 54.6 % — LOW (ref 67–88)
SARS-COV-2 RNA SPEC QL NAA+PROBE: SIGNIFICANT CHANGE UP
SODIUM SERPL-SCNC: 139 MMOL/L — SIGNIFICANT CHANGE UP (ref 135–145)
VANCOMYCIN TROUGH SERPL-MCNC: 12.7 UG/ML — SIGNIFICANT CHANGE UP (ref 10–20)
WBC # BLD: 19.04 K/UL — HIGH (ref 3.8–10.5)
WBC # FLD AUTO: 19.04 K/UL — HIGH (ref 3.8–10.5)

## 2022-04-20 PROCEDURE — 71045 X-RAY EXAM CHEST 1 VIEW: CPT | Mod: 26,77

## 2022-04-20 PROCEDURE — 71045 X-RAY EXAM CHEST 1 VIEW: CPT | Mod: 26

## 2022-04-20 PROCEDURE — 99291 CRITICAL CARE FIRST HOUR: CPT | Mod: 24

## 2022-04-20 PROCEDURE — 99292 CRITICAL CARE ADDL 30 MIN: CPT | Mod: 24

## 2022-04-20 PROCEDURE — 99232 SBSQ HOSP IP/OBS MODERATE 35: CPT

## 2022-04-20 RX ORDER — LANOLIN ALCOHOL/MO/W.PET/CERES
5 CREAM (GRAM) TOPICAL AT BEDTIME
Refills: 0 | Status: DISCONTINUED | OUTPATIENT
Start: 2022-04-20 | End: 2022-04-27

## 2022-04-20 RX ORDER — POTASSIUM CHLORIDE 20 MEQ
10 PACKET (EA) ORAL ONCE
Refills: 0 | Status: COMPLETED | OUTPATIENT
Start: 2022-04-20 | End: 2022-04-20

## 2022-04-20 RX ORDER — NYSTATIN CREAM 100000 [USP'U]/G
1 CREAM TOPICAL
Refills: 0 | Status: DISCONTINUED | OUTPATIENT
Start: 2022-04-20 | End: 2022-04-27

## 2022-04-20 RX ADMIN — ENOXAPARIN SODIUM 40 MILLIGRAM(S): 100 INJECTION SUBCUTANEOUS at 12:34

## 2022-04-20 RX ADMIN — Medication 3 MILLILITER(S): at 05:27

## 2022-04-20 RX ADMIN — OXYCODONE HYDROCHLORIDE 10 MILLIGRAM(S): 5 TABLET ORAL at 22:06

## 2022-04-20 RX ADMIN — CEFTRIAXONE 100 MILLIGRAM(S): 500 INJECTION, POWDER, FOR SOLUTION INTRAMUSCULAR; INTRAVENOUS at 09:56

## 2022-04-20 RX ADMIN — Medication 3 MILLILITER(S): at 13:03

## 2022-04-20 RX ADMIN — Medication 650 MILLIGRAM(S): at 20:00

## 2022-04-20 RX ADMIN — CHLORHEXIDINE GLUCONATE 1 APPLICATION(S): 213 SOLUTION TOPICAL at 14:07

## 2022-04-20 RX ADMIN — GABAPENTIN 100 MILLIGRAM(S): 400 CAPSULE ORAL at 22:06

## 2022-04-20 RX ADMIN — Medication 500 MILLIGRAM(S): at 18:41

## 2022-04-20 RX ADMIN — GABAPENTIN 100 MILLIGRAM(S): 400 CAPSULE ORAL at 05:42

## 2022-04-20 RX ADMIN — SPIRONOLACTONE 25 MILLIGRAM(S): 25 TABLET, FILM COATED ORAL at 18:37

## 2022-04-20 RX ADMIN — OXYCODONE HYDROCHLORIDE 10 MILLIGRAM(S): 5 TABLET ORAL at 12:34

## 2022-04-20 RX ADMIN — Medication 250 MILLIGRAM(S): at 18:37

## 2022-04-20 RX ADMIN — Medication 3 MILLILITER(S): at 23:18

## 2022-04-20 RX ADMIN — OXYCODONE HYDROCHLORIDE 10 MILLIGRAM(S): 5 TABLET ORAL at 13:00

## 2022-04-20 RX ADMIN — Medication 650 MILLIGRAM(S): at 12:33

## 2022-04-20 RX ADMIN — OXYCODONE HYDROCHLORIDE 10 MILLIGRAM(S): 5 TABLET ORAL at 06:13

## 2022-04-20 RX ADMIN — SENNA PLUS 2 TABLET(S): 8.6 TABLET ORAL at 22:06

## 2022-04-20 RX ADMIN — Medication 3 MILLILITER(S): at 17:42

## 2022-04-20 RX ADMIN — CHLORHEXIDINE GLUCONATE 5 MILLILITER(S): 213 SOLUTION TOPICAL at 05:42

## 2022-04-20 RX ADMIN — Medication 250 MILLIGRAM(S): at 05:43

## 2022-04-20 RX ADMIN — Medication 5 MILLIGRAM(S): at 22:06

## 2022-04-20 RX ADMIN — CHLORHEXIDINE GLUCONATE 5 MILLILITER(S): 213 SOLUTION TOPICAL at 18:38

## 2022-04-20 RX ADMIN — POLYETHYLENE GLYCOL 3350 17 GRAM(S): 17 POWDER, FOR SOLUTION ORAL at 12:34

## 2022-04-20 RX ADMIN — GABAPENTIN 100 MILLIGRAM(S): 400 CAPSULE ORAL at 14:06

## 2022-04-20 RX ADMIN — Medication 650 MILLIGRAM(S): at 18:37

## 2022-04-20 RX ADMIN — HYDROMORPHONE HYDROCHLORIDE 0.5 MILLIGRAM(S): 2 INJECTION INTRAMUSCULAR; INTRAVENOUS; SUBCUTANEOUS at 16:08

## 2022-04-20 RX ADMIN — Medication 81 MILLIGRAM(S): at 12:34

## 2022-04-20 RX ADMIN — SPIRONOLACTONE 25 MILLIGRAM(S): 25 TABLET, FILM COATED ORAL at 05:42

## 2022-04-20 RX ADMIN — Medication 650 MILLIGRAM(S): at 03:13

## 2022-04-20 RX ADMIN — OXYCODONE HYDROCHLORIDE 10 MILLIGRAM(S): 5 TABLET ORAL at 05:43

## 2022-04-20 RX ADMIN — Medication 650 MILLIGRAM(S): at 13:52

## 2022-04-20 RX ADMIN — Medication 40 MILLIGRAM(S): at 05:42

## 2022-04-20 RX ADMIN — Medication 500 MILLIGRAM(S): at 05:43

## 2022-04-20 RX ADMIN — Medication 650 MILLIGRAM(S): at 05:43

## 2022-04-20 RX ADMIN — NICARDIPINE HYDROCHLORIDE 25 MG/HR: 30 CAPSULE, EXTENDED RELEASE ORAL at 12:33

## 2022-04-20 RX ADMIN — PANTOPRAZOLE SODIUM 40 MILLIGRAM(S): 20 TABLET, DELAYED RELEASE ORAL at 05:43

## 2022-04-20 RX ADMIN — OXYCODONE HYDROCHLORIDE 10 MILLIGRAM(S): 5 TABLET ORAL at 22:36

## 2022-04-20 RX ADMIN — Medication 2.57 MICROGRAM(S)/KG/MIN: at 12:33

## 2022-04-20 RX ADMIN — Medication 50 MILLIEQUIVALENT(S): at 03:24

## 2022-04-20 RX ADMIN — Medication 5 MILLIGRAM(S): at 01:12

## 2022-04-20 RX ADMIN — HYDROMORPHONE HYDROCHLORIDE 0.5 MILLIGRAM(S): 2 INJECTION INTRAMUSCULAR; INTRAVENOUS; SUBCUTANEOUS at 20:26

## 2022-04-20 NOTE — DIETITIAN INITIAL EVALUATION ADULT - ORAL INTAKE PTA/DIET HISTORY
Limited information obtained at this time, pt s/p PT session with c/o pain, beginning to eat meal at time of RD visit. PTA reports eating "normally," states she was consuming lean protein and vegetables. Cooks for herself.

## 2022-04-20 NOTE — DIETITIAN INITIAL EVALUATION ADULT - NSFNSGIIOFT_GEN_A_CORE
Pt with CT x 4.    04-19-22 @ 07:01  -  04-20-22 @ 07:00  --------------------------------------------------------  OUT:    Chest Tube (mL): 80 mL    Chest Tube (mL): 180 mL    Chest Tube (mL): 70 mL    Chest Tube (mL): 90 mL  Total OUT: 420 mL    Total NET: -420 mL      04-20-22 @ 07:01  -  04-20-22 @ 12:03  --------------------------------------------------------  OUT:    Chest Tube (mL): 0 mL    Chest Tube (mL): 0 mL    Chest Tube (mL): 0 mL    Chest Tube (mL): 10 mL  Total OUT: 10 mL    Total NET: -10 mL    Pt denies N/V at this time. Last BM 4/17 per EMR. On bowel regimen.

## 2022-04-20 NOTE — PROGRESS NOTE ADULT - SUBJECTIVE AND OBJECTIVE BOX
infectious diseases progress note:    Patient is a 71y old  Female who presents with a chief complaint of concern for stroke (19 Apr 2022 20:35)        Cerebral infarction             Allergies    No Known Allergies    Intolerances        ANTIBIOTICS/RELEVANT:  antimicrobials  cefTRIAXone   IVPB      cefTRIAXone   IVPB 2000 milliGRAM(s) IV Intermittent every 24 hours  vancomycin  IVPB 1000 milliGRAM(s) IV Intermittent every 12 hours    immunologic:    OTHER:  acetaminophen     Tablet .. 650 milliGRAM(s) Oral every 6 hours  albuterol/ipratropium for Nebulization 3 milliLiter(s) Nebulizer every 6 hours  ascorbic acid 500 milliGRAM(s) Oral two times a day  aspirin  chewable 81 milliGRAM(s) Oral daily  bisacodyl Suppository 10 milliGRAM(s) Rectal once  chlorhexidine 0.12% Liquid 5 milliLiter(s) Oral Mucosa two times a day  chlorhexidine 2% Cloths 1 Application(s) Topical daily  dexMEDEtomidine Infusion 0.5 MICROgram(s)/kG/Hr IV Continuous <Continuous>  dextrose 50% Injectable 50 milliLiter(s) IV Push every 15 minutes  dextrose 50% Injectable 25 milliLiter(s) IV Push every 15 minutes  DOBUTamine Infusion 1 MICROgram(s)/kG/Min IV Continuous <Continuous>  enoxaparin Injectable 40 milliGRAM(s) SubCutaneous every 24 hours  furosemide    Tablet 40 milliGRAM(s) Oral daily  gabapentin 100 milliGRAM(s) Oral every 8 hours  HYDROmorphone  Injectable 0.5 milliGRAM(s) IV Push every 6 hours PRN  insulin regular Infusion 3 Unit(s)/Hr IV Continuous <Continuous>  melatonin 5 milliGRAM(s) Oral at bedtime  niCARdipine Infusion 5 mG/Hr IV Continuous <Continuous>  oxyCODONE    IR 5 milliGRAM(s) Oral every 4 hours PRN  oxyCODONE    IR 10 milliGRAM(s) Oral every 4 hours PRN  pantoprazole    Tablet 40 milliGRAM(s) Oral before breakfast  polyethylene glycol 3350 17 Gram(s) Oral daily  polyethylene glycol 3350 17 Gram(s) Oral daily  potassium chloride  10 mEq/50 mL IVPB 10 milliEquivalent(s) IV Intermittent every 1 hour  potassium chloride  10 mEq/50 mL IVPB 10 milliEquivalent(s) IV Intermittent every 1 hour  potassium chloride  10 mEq/50 mL IVPB 10 milliEquivalent(s) IV Intermittent every 1 hour  senna 2 Tablet(s) Oral at bedtime  sodium chloride 0.9%. 1000 milliLiter(s) IV Continuous <Continuous>  spironolactone 25 milliGRAM(s) Oral every 12 hours      Objective:  Vital Signs Last 24 Hrs  T(C): 37.2 (20 Apr 2022 08:00), Max: 37.4 (19 Apr 2022 21:00)  T(F): 99 (20 Apr 2022 08:00), Max: 99.3 (19 Apr 2022 21:00)  HR: 81 (20 Apr 2022 07:00) (44 - 87)  BP: --  BP(mean): --  RR: 23 (20 Apr 2022 07:00) (12 - 34)  SpO2: 95% (20 Apr 2022 07:00) (87% - 100%)       Eyes:ABDULLAHI, EOMI  Ear/Nose/Throat: no oral lesion, no sinus tenderness on percussion	  Neck:no JVD, no lymphadenopathy, supple  Respiratory: CTA maribel  Cardiovascular: S1S2 RRR, no murmurs  Gastrointestinal:soft, (+) BS, no HSM  Extremities:no e/e/c        LABS:                        8.1    19.04 )-----------( 161      ( 20 Apr 2022 00:46 )             23.9     04-20    139  |  103  |  12  ----------------------------<  148<H>  3.9   |  23  |  0.61    Ca    8.4      20 Apr 2022 00:46  Phos  3.2     04-20  Mg     2.2     04-20    TPro  5.8<L>  /  Alb  3.8  /  TBili  1.0  /  DBili  x   /  AST  51<H>  /  ALT  26  /  AlkPhos  44  04-20    PT/INR - ( 19 Apr 2022 00:37 )   PT: 13.5 sec;   INR: 1.17 ratio         PTT - ( 19 Apr 2022 00:37 )  PTT:28.8 sec        MICROBIOLOGY:    RECENT CULTURES:  04-19 @ 02:18 .Tissue Other       Few polymorphonuclear leukocytes seen per low power field  No organisms seen per oil power field           No growth    04-19 @ 02:15 .Surgical Swab AORTIC ANULAR ABSCESS                No growth          RESPIRATORY CULTURES:              RADIOLOGY & ADDITIONAL STUDIES:        Pager [phone]  After 5 pm/weekends or if no response :[phone]

## 2022-04-20 NOTE — PHYSICAL THERAPY INITIAL EVALUATION ADULT - ACTIVE RANGE OF MOTION EXAMINATION, REHAB EVAL
no Active ROM deficits were identified
sternal precautions maintained/no Active ROM deficits were identified/bilateral upper extremity Active ROM was WFL (within functional limits)/bilateral  lower extremity Active ROM was WFL (within functional limits)

## 2022-04-20 NOTE — CONSULT NOTE ADULT - SUBJECTIVE AND OBJECTIVE BOX
CHIEF COMPLAINT: CHB s/p CTS     HISTORY OF PRESENT ILLNESS:   70 y/o female PMH hypertension, hyperlipidemia, GERD s/p laparoscopic vertical sleeve gastrectomy, and non-obstructive CAD, bio-AVR 2019. She was now admitted on 4/9/22 with stroke type symptoms and ILR was implanted on 4/11/22 screening for AF. 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 She underwent explant of her bio-AVR, debridement of her aortic root and abscess, Bentall procedure, and implantation of an epicardial RV lead. Post operatively now has both sick sinus syndrome as well as complete heart block on POD 2.  She is currently AS- on telemetry via temporary epicardial wires, threshold on RV wire is 13. She is currently set at VVI 80 with mA 20, remains on Dobutamine and Cardene. She is OOB to chair. She denies dizziness, lightheadedness, syncope/near syncope, CP, palpitations, SOB.       Allergies    No Known Allergies    Intolerances    	    MEDICATIONS:  aspirin  chewable 81 milliGRAM(s) Oral daily  DOBUTamine Infusion 1 MICROgram(s)/kG/Min IV Continuous <Continuous>  enoxaparin Injectable 40 milliGRAM(s) SubCutaneous every 24 hours  furosemide    Tablet 40 milliGRAM(s) Oral daily  niCARdipine Infusion 5 mG/Hr IV Continuous <Continuous>  spironolactone 25 milliGRAM(s) Oral every 12 hours    cefTRIAXone   IVPB      cefTRIAXone   IVPB 2000 milliGRAM(s) IV Intermittent every 24 hours  vancomycin  IVPB 1000 milliGRAM(s) IV Intermittent every 12 hours    albuterol/ipratropium for Nebulization 3 milliLiter(s) Nebulizer every 6 hours    acetaminophen     Tablet .. 650 milliGRAM(s) Oral every 6 hours  gabapentin 100 milliGRAM(s) Oral every 8 hours  melatonin 5 milliGRAM(s) Oral at bedtime  oxyCODONE    IR 5 milliGRAM(s) Oral every 4 hours PRN  oxyCODONE    IR 10 milliGRAM(s) Oral every 4 hours PRN    bisacodyl Suppository 10 milliGRAM(s) Rectal once  pantoprazole    Tablet 40 milliGRAM(s) Oral before breakfast  polyethylene glycol 3350 17 Gram(s) Oral daily  polyethylene glycol 3350 17 Gram(s) Oral daily  senna 2 Tablet(s) Oral at bedtime    dextrose 50% Injectable 50 milliLiter(s) IV Push every 15 minutes  dextrose 50% Injectable 25 milliLiter(s) IV Push every 15 minutes  insulin regular Infusion 3 Unit(s)/Hr IV Continuous <Continuous>    ascorbic acid 500 milliGRAM(s) Oral two times a day  chlorhexidine 0.12% Liquid 5 milliLiter(s) Oral Mucosa two times a day  chlorhexidine 2% Cloths 1 Application(s) Topical daily  sodium chloride 0.9%. 1000 milliLiter(s) IV Continuous <Continuous>      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        FAMILY HISTORY:    REVIEW OF SYSTEMS:  See HPI. Otherwise, 10 point ROS done and otherwise negative.    PHYSICAL EXAM:  T(C): 36.9 (04-20-22 @ 16:00), Max: 37.4 (04-19-22 @ 21:00)  HR: 80 (04-20-22 @ 16:30) (51 - 103)  BP: --  RR: 22 (04-20-22 @ 16:30) (3 - 34)  SpO2: 92% (04-20-22 @ 16:30) (87% - 100%)  Wt(kg): --  I&O's Summary    19 Apr 2022 07:01  -  20 Apr 2022 07:00  --------------------------------------------------------  IN: 2000.4 mL / OUT: 3495 mL / NET: -1494.6 mL    20 Apr 2022 07:01  -  20 Apr 2022 17:21  --------------------------------------------------------  IN: 963.4 mL / OUT: 930 mL / NET: 33.4 mL        Appearance: Alert. NAD	  Cardiovascular: +S1S2 RRR no m/g/r  Respiratory: diminished otherwise CTA 	  Gastrointestinal:  Soft, NT.ND., + BS	  Neurologic: Non-focal  Extremities: No edema BLE    LABS:	 	    CBC Full  -  ( 20 Apr 2022 00:46 )  WBC Count : 19.04 K/uL  Hemoglobin : 8.1 g/dL  Hematocrit : 23.9 %  Platelet Count - Automated : 161 K/uL  Mean Cell Volume : 87.5 fl  Mean Cell Hemoglobin : 29.7 pg  Mean Cell Hemoglobin Concentration : 33.9 gm/dL  Auto Neutrophil # : x  Auto Lymphocyte # : x  Auto Monocyte # : x  Auto Eosinophil # : x  Auto Basophil # : x  Auto Neutrophil % : x  Auto Lymphocyte % : x  Auto Monocyte % : x  Auto Eosinophil % : x  Auto Basophil % : x    04-20    139  |  103  |  12  ----------------------------<  148<H>  3.9   |  23  |  0.61  04-19    144  |  110<H>  |  12  ----------------------------<  170<H>  4.6   |  21<L>  |  0.62    Ca    8.4      20 Apr 2022 00:46  Ca    8.3<L>      19 Apr 2022 00:35  Phos  3.2     04-20  Phos  3.7     04-19  Mg     2.2     04-20  Mg     2.8     04-19    TPro  5.8<L>  /  Alb  3.8  /  TBili  1.0  /  DBili  x   /  AST  51<H>  /  ALT  26  /  AlkPhos  44  04-20  TPro  5.4<L>  /  Alb  3.7  /  TBili  3.1<H>  /  DBili  x   /  AST  55<H>  /  ALT  22  /  AlkPhos  38<L>  04-19    TSH: Thyroid Stimulating Hormone, Serum: 1.00 uIU/mL (04.12.22 @ 15:28)    TELEMETRY: 	  AS- 80     ECG:  	< from: 12 Lead ECG (04.09.22 @ 17:55) >  Ventricular Rate 76 BPM    Atrial Rate 76 BPM    P-R Interval 344 ms    QRS Duration 116 ms    Q-T Interval 418 ms    QTC Calculation(Bazett) 470 ms    P Axis 75 degrees    R Axis -6 degrees    T Axis 122 degrees    Diagnosis Line SINUS RHYTHM [id number] DEGREE A-V BLOCK  INCOMPLETE LEFT BUNDLE BRANCH BLOCK  MINIMAL VOLTAGE CRITERIA FOR LVH, MAY BE NORMAL VARIANT ( [place] product )  WHEN COMPARED WITH ECG OF 16-NOV-2017 02:51,  SIGNIFICANT CHANGES HAVE OCCURRED    < end of copied text >    RADIOLOGY: < from: Xray Chest 1 View- PORTABLE-Routine (04.20.22 @ 02:28) >  IMPRESSION:    Interval removal of endotracheal and enteric tubes. Remaining tubes and   lines are as above.  Worsening left lower lung opacitywhich is obscuring the costophrenic   angle. This may be secondary to worsening pleural effusion and   atelectasis versus new consolidation.  Trace new left chest subcutaneous emphysema, correlate with recent   procedure/manipulation.    < end of copied text >    OTHER: 	    PREVIOUS DIAGNOSTIC TESTING:    Echocardiogram: < from: Transthoracic Echocardiogram (04.11.22 @ 10:18) >  Dimensions:    Normal Values:  LA:     3.7    2.0 - 4.0 cm  Ao:     2.6    2.0 - 3.8 cm  SEPTUM: 1.1    0.6 - 1.2 cm  PWT:    1.4    0.6 - 1.1 cm  LVIDd:  4.2    3.0 - 5.6 cm  LVIDs:  2.3    1.8 - 4.0 cm  Derived variables:  LVMI: 105 g/m2  RWT: 0.66  Fractional short: 45 %  EF (Babcock Rule): 68 %Doppler Peak Velocity (m/sec):  MV=1.4 AoV=3.7  ------------------------------------------------------------------------  Observations:  Mitral Valve: Normal mitral valve. Mild-moderate mitral  regurgitation. Peak mitral valve gradient equals 8 mm Hg,  mean transmitral valve gradient equals 4 mm Hg, consistent  with mildmitral stenosis.  The increased gradient may be  secondary to obstruction by the left atrial mass.  Gradients measured at a HR of 73bpm.  Aortic Valve/Aorta: Bioprosthetic aortic valve.  The valve  is well seated.  Peak transaortic valve gradient equals 56  mm Hg, mean transaortic valve gradient equals 38 mm Hg,  which is elevated even in the presence of a bioprosthetic  aortic valve. However the Di is 0.34, the elevated  gradients may be secondary to the hyperdynamic left  ventricle.  Mild aortic regurgitation.  Peak left  ventricular outflow tract gradient equals 8 mm Hg, mean  gradient is equal to 4 mm Hg, LVOT velocity time integral  equals 28 cm.  Aortic Root: 2.6 cm.  LVOT diameter: 1.9 cm.  Left Atrium: Moderately dilated left atrium.LA volume  index = 46 cc/m2. A large round mass measuring 3.8cm by  3cm,  is seen in the left atrium suggestive of atrial  myxoma.  It appears to be attached to the atrial septum  although no stalk is visualized.  Endocardial visualization enhanced with intravenous  injection of Ultrasonic Enhancing Agent (Definity), the  mass appears to be vascular as it demonstrates uptake of  Definity.  Left Ventricle: Hyperdynamic left ventricular systolic  function. Endocardial visualization enhanced with  intravenous injection of Ultrasonic Enhancing Agent  (Definity).  No left ventricular thrombus. Mild concentric  left ventricular hypertrophy. Normal diastolic function  Right Heart: Normal right atrium. Normal right ventricular  size and function. Normal tricuspid valve. No tricuspid  regurgitation. Normal pulmonic valve. No pulmonic  regurgitation.  Pericardium/Pleura: Normal pericardium with no pericardial  effusion.  Hemodynamic: Color Doppler demonstrates no evidence of a  patent foramen ovale.  ------------------------------------------------------------------------  Conclusions:  1. Normal mitral valve. Mild-moderate mitral regurgitation.  2. Bioprosthetic aortic valve.  The valve is well seated.  Peak transaortic valve gradient equals 56 mm Hg, mean  transaortic valve gradient equals 38 mm Hg, which is  elevated even in the presence of a bioprosthetic aortic  valve. However the Di is 0.34, the elevated gradients may  be secondary to the hyperdynamic left ventricle.  Mild  aortic regurgitation.  3. Moderately dilated left atrium.  LA volume index = 46  cc/m2. A large round mass measuring 3.8cm by 3cm,  is seen  in the left atrium suggestive of atrial myxoma.  It appears  to be attached to the atrial septum although no stalk is  visualized.  Endocardial visualization enhanced with intravenous  injection of Ultrasonic Enhancing Agent (Definity), the  mass appears to be vascular as it demonstrates uptake of  Definity.  4. Hyperdynamic left ventricular systolic function.  Endocardial visualization enhanced with intravenous  injection of Ultrasonic Enhancing Agent (Definity).  No  left ventricular thrombus.    < end of copied text >

## 2022-04-20 NOTE — PHYSICAL THERAPY INITIAL EVALUATION ADULT - MANUAL MUSCLE TESTING RESULTS, REHAB EVAL
strength grossly at least 3/5 throughout/grossly assessed due to
4-/5 globally and all 4 extremities/grossly assessed due to

## 2022-04-20 NOTE — DIETITIAN INITIAL EVALUATION ADULT - ENERGY INTAKE
Fair (50-75%) Per flow sheets pt with fair to good PO intake prior to procedure (51-75%). Post-op pt with decreased PO intake 26-50% of meal documented. Pt NPO after MN tonight for PPM placement tomorrow.

## 2022-04-20 NOTE — PHYSICAL THERAPY INITIAL EVALUATION ADULT - DIAGNOSIS, PT EVAL
Deconditioning
pt has decreased activity tolerance, decreased functional mobility capacity, decreased strength, impaired balance

## 2022-04-20 NOTE — PROGRESS NOTE ADULT - ASSESSMENT
71F RH w/ AS w/ prior open heart aortic valve replacement 2019, HTN, BETSEY, former smoker, Emphysema/ chronic bronchitis, GERD s/p laparoscopic vertical sleeve gastrectomy presented with acute onset speech disturbance, vision change and gait imbalance. Per EMS, LKN: 4/9/22 at 10am per family. Pt's family reported blurry vision, gait imbalance, and speech disturbance (mild loss in fluency). She still endorses blurry vision but denies any weakness, numbness/tingling. Pt takes a baby aspirin 81mg daily. NIHSS: 3, preMRS: 0. No tpa or MT.    o/e with HA   s/p CTS on 4/18 4/19 intubated on precedex, MALATHI, plan for CPAP  extubated, MALATHI AAOx2-3     Impression:  Right PCA infarct etiology likely cardioembolic given findings of atrial mass, also incidental 6 mm ACOMM aneurysm.     NEURO: neurologically without acute chagne, continue close monitoring for neurologic deterioration, found to have an incidental aneurysm will follow with Dr. Contreras as outpatient for further evaluation and possible treatment, LDL 62: continue home dose statin  MRI Brain w/o noted above,  Physical therapy/OT: AR ;wean sedation as toelrated, on precedex     ANTITHROMBOTIC THERAPY: resume ASA when able , back on      PULMONARY: extubated. monitor airway in CTICU.     CARDIOVASCULAR:  TTE: ef 45%, mild-moderate MR, mild mitral stenosis, left atrial mass with increased gradient, bioprosthetic AV, mild AR, moderately dilated LA, left atrial mass suggestive of myxoma,  cardiac monitoring, S/P aortic valve replacement cardiology consulted, Troponin elevated. ILR placed to rule out A. Fib as possible source.   CT surgery following. Anticipaet cardiac cath and tentative OR pending clinical course. no objection for cath at this time.  s/p CTS 4/19         SBP goal: normotension being tolerated- avoid SBP > 160mmHg in setting of unsecured itnracranial aneurysm.  now on cardene drip in ICU     GASTROINTESTINAL:  dysphagia screen- passed, tolerating diet       Diet: Regular    RENAL: BUN/Cr without acute change, good urine output , maintain adequate hydration      Na Goal: Greater than 135     Sharma: N    HEMATOLOGY: H/H   anemia , no acute change, no active bleeding Platelets 320, she should have all age and risk appropriate      DVT ppx: Heparin s.c [] LMWH [x]     ID: febrile, no leukocytosis, repeat UA negative for UTI, was on ctx prior for UTI, follow up sensitivities , blood cx taken, infectious workup done ;   ; back on vanco and cefttiaxone pending cultures       Other: remaining per CTICU team     DISPOSITION: AR once stable and workup is complete      CORE MEASURES:        Admission NIHSS: 3     TPA: [] YES [x] NO      LDL/HDL: 62/32     Depression Screen: 0     Statin Therapy: Y     Dysphagia Screen: [x] PASS [] FAIL     Smoking [] YES [x] NO      Afib [] YES [x] NO     Stroke Education [x] YES [] NO    Zenon Vela MD  Vascular Neurology

## 2022-04-20 NOTE — PHYSICAL THERAPY INITIAL EVALUATION ADULT - PRECAUTIONS/LIMITATIONS, REHAB EVAL
numbness/tingling. Pt takes a baby aspirin 81mg daily. NIHSS: 3, preMRS: 0. No tpa or MT. Neuro exam notable for LHH, possible 4+/5 L hemiparesis. CTH w/ Impression: LHH w/ possible L hemiparesis possibly 2/2 R brain dysfunction, possibly 2/2 R PCA infarct seen on CTH vs. R MCA territory infarct./cardiac precautions/fall precautions/sternal precautions/surgical precautions

## 2022-04-20 NOTE — PHYSICAL THERAPY INITIAL EVALUATION ADULT - ADDITIONAL COMMENTS
Pt lives alone in [facility] and has a temporary roommate (friend who will be leaving for [place]). Pt was independent prior to admission, was private caretaker for elderly adults.
Pt lives alone in [facility] and has a temporary roommate (friend who will be leaving for [place]). Pt was independent prior to admission, was private caretaker for elderly adults.

## 2022-04-20 NOTE — PROGRESS NOTE ADULT - SUBJECTIVE AND OBJECTIVE BOX
Patient seen and examined at the bedside.    Remained critically ill on continuous ICU monitoring.    OBJECTIVE:  Vital Signs Last 24 Hrs  T(C): 37.2 (20 Apr 2022 08:00), Max: 37.4 (19 Apr 2022 21:00)  T(F): 99 (20 Apr 2022 08:00), Max: 99.3 (19 Apr 2022 21:00)  HR: 81 (20 Apr 2022 09:00) (44 - 87)  BP: --  BP(mean): --  RR: 32 (20 Apr 2022 09:00) (12 - 34)  SpO2: 95% (20 Apr 2022 09:00) (87% - 100%)      Physical Exam:   General: NAD on NC   Neurology: AAOx3, no focal deficits, strength equal x4   Eyes: bilateral pupils equal and reactive   ENT/Neck: Neck supple, trachea midline, No JVD   Respiratory: Rales noted bilaterally   CV: RRR, S1S2, no murmurs        [x] Sternal dressing, [x] Mediastinal CT, [x] Pleural CT          [x] Sinus rhythm [x] Temporary pacing - DDD 82   Abdominal: Soft, NT, ND +BS,   Extremities: 1-2+ pedal edema noted, + peripheral pulses   Skin: No Rashes, Hematoma, Ecchymosis     Assessment:  71F RH w/ AS w/ prior open heart aortic valve replacement 2019, HTN, BETSEY, former smoker, Emphysema/ chronic bronchitis, GERD s/p laparoscopic vertical sleeve gastrectomy    Endocarditis of prosthetic aortic valve S/P redo sternotomy, prosthetic valve endocarditis, 21mm homograft, placement of epicardial lead on 4/19   Hemodynamic instability   Hypovolemia  Post op respiratory insufficiency   Acute blood loss anemia  Stress hyperglycemia  Obesity OHS / BETSEY   Right PCA infarct     Plan:   ***Neuro***  CTH on 4/9 with Right PCA infarct, MRI on 4/10 Acute R occipital lobe infarct in a R PCA vascular distribution with an additional punctate acute infarct involving the L cerebellum and R splenium of the corpus callosum   [x] Sedated with [x] Precedex   Post operative neuro assessment   Sleep regimen with Melatonin     ***Cardiovascular***  Invasive hemodynamic monitoring, assess perfusion indices   SR / CVP 13 / MAP 76 / Hct 23.9%  / Lactate 1.6   [x] Dobutamine 1mcg  [x] Cardene 5mcg   Continuos reassessment of hemodynamics   Monitor chest tube outputs   VTE prophylaxis with [x] Lovenox  [x] ASA   Serial EKG and cardiac enzymes     ***Pulmonary***  [x] 5L NC   Continue to monitor RR, breathing pattern, pulse ox, and ABG's.  Encourage incentive spirometry.   Continue with bronchodilator     ***GI***  [x]  Diet: PO low sodium consistent carb diet.   [x] Protonix    Bowel regimen with Miralax and Senna       ***Renal***  Continue to monitor I/Os, BUN/Creatinine.   Replete lytes PRN  Edith present  Diuresis with Lasix and Aldactone     ***ID***  Endocarditis, c/w Ceftriaxone and Vancomycin   Tissue Cx on 4/19 NG, pending Fungal Cx, BCx on 4/19 NGTD     ***Endocrine***  [x] Hyperglycemia : HbA1c 6.3 %                - [x] Insulin gtt               - Need tight glycemic control to prevent wound infection.          Patient requires continuous monitoring with bedside rhythm monitoring, pulse oximetry monitoring, and continuous central venous and arterial pressure monitoring; and intermittent blood gas analysis. Care plan discussed with the ICU care team.   Patient remained critical, at risk for life threatening decompensation.    I have spent 30 minutes providing critical care management to this patient.    By signing my name below, I, Jacquelyn Reddy, attest that this documentation has been prepared under the direction and in the presence of Tanya Patel NP   Electronically signed: Christopher Horowitz, 04-20-22 @ 09:49    I, Tanya Patel,, personally performed the services described in this documentation. all medical record entries made by the janiceibamanda were at my direction and in my presence. I have reviewed the chart and agree that the record reflects my personal performance and is accurate and complete  Electronically signed: Tanya Patel NP  Patient seen and examined at the bedside.    Remained critically ill on continuous ICU monitoring.    OBJECTIVE:  Vital Signs Last 24 Hrs  T(C): 37.2 (20 Apr 2022 08:00), Max: 37.4 (19 Apr 2022 21:00)  T(F): 99 (20 Apr 2022 08:00), Max: 99.3 (19 Apr 2022 21:00)  HR: 81 (20 Apr 2022 09:00) (44 - 87)  BP: --  BP(mean): --  RR: 32 (20 Apr 2022 09:00) (12 - 34)  SpO2: 95% (20 Apr 2022 09:00) (87% - 100%)      Physical Exam:   General: NAD on NC   Neurology: AAOx3, no focal deficits, strength equal x4   Eyes: bilateral pupils equal and reactive   ENT/Neck: Neck supple, trachea midline, No JVD   Respiratory: Rales noted bilaterally   CV: RRR, S1S2, no murmurs        [x] Sternal dressing, [x] Mediastinal CT, [x] Pleural CT          [x] Paced [x] Temporary pacing - DDD 82   Abdominal: Soft, NT, ND +BS,   Extremities: 1-2+ pedal edema noted, + peripheral pulses   Skin: No Rashes, Hematoma, Ecchymosis     Assessment:  71F RH w/ AS w/ prior open heart aortic valve replacement 2019, HTN, BETSEY, former smoker, Emphysema/ chronic bronchitis, GERD s/p laparoscopic vertical sleeve gastrectomy    Endocarditis of prosthetic aortic valve S/P redo sternotomy, prosthetic valve endocarditis, 21mm homograft, placement of epicardial lead on 4/19   Hemodynamic instability   CHB / Sick sinus syndrome   Hypovolemia  Post op respiratory insufficiency   Acute blood loss anemia  Stress hyperglycemia  Obesity OHS / BETSEY   Right PCA infarct     Plan:   ***Neuro***  CTH on 4/9 with Right PCA infarct, MRI on 4/10 Acute R occipital lobe infarct in a R PCA vascular distribution with an additional punctate acute infarct involving the L cerebellum and R splenium of the corpus callosum   [x] Nonfocal   Post operative neuro assessment   Sleep regimen with Melatonin   Ambulate as tolerated     ***Cardiovascular***  Invasive hemodynamic monitoring, assess perfusion indices   Paced / CVP 13 / MAP 76 / Hct 23.9%  / Lactate 1.6   [x] Dobutamine 1mcg  [x] Cardene 5mcg   Continuos reassessment of hemodynamics   EP following for SSS / CHB - tentatively planned for PPM tomorrow as per EP recs   Monitor chest tube outputs   VTE prophylaxis with [x] Lovenox  [x] ASA   Serial EKG and cardiac enzymes   Plan to d/c CTs today     ***Pulmonary***  [x] 5L NC   Continue to monitor RR, breathing pattern, pulse ox, and ABG's.  Encourage incentive spirometry.   Continue with bronchodilator     ***GI***  [x]  Diet: PO low sodium consistent carb diet. / NPO after midnight   [x] Protonix    Bowel regimen with Miralax and Senna       ***Renal***  Continue to monitor I/Os, BUN/Creatinine.   Replete lytes PRN  Sharma present - may d/c later today   Diuresis with Lasix and Aldactone     ***ID***  ID following, appreciate recommendations   Endocarditis, c/w Ceftriaxone and Vancomycin   Tissue Cx on 4/19 NG, pending Fungal Cx, BCx on 4/19 NGTD     ***Endocrine***  [x] Hyperglycemia : HbA1c 6.3 %                - [x] Insulin gtt               - Need tight glycemic control to prevent wound infection.          Patient requires continuous monitoring with bedside rhythm monitoring, pulse oximetry monitoring, and continuous central venous and arterial pressure monitoring; and intermittent blood gas analysis. Care plan discussed with the ICU care team.   Patient remained critical, at risk for life threatening decompensation.    I have spent 30 minutes providing critical care management to this patient.    By signing my name below, I, Jacquelyn Reddy, attest that this documentation has been prepared under the direction and in the presence of Tanya Patel NP   Electronically signed: Rashmi Horowitz, 04-20-22 @ 09:49    I, Tanya Patel,, personally performed the services described in this documentation. all medical record entries made by the rashmi were at my direction and in my presence. I have reviewed the chart and agree that the record reflects my personal performance and is accurate and complete  Electronically signed: Tanya Patel NP  Patient seen and examined at the bedside.    Remained critically ill on continuous ICU monitoring.    OBJECTIVE:  Vital Signs Last 24 Hrs  T(C): 37.2 (20 Apr 2022 08:00), Max: 37.4 (19 Apr 2022 21:00)  T(F): 99 (20 Apr 2022 08:00), Max: 99.3 (19 Apr 2022 21:00)  HR: 81 (20 Apr 2022 09:00) (44 - 87)  BP: --  BP(mean): --  RR: 32 (20 Apr 2022 09:00) (12 - 34)  SpO2: 95% (20 Apr 2022 09:00) (87% - 100%)      Physical Exam:   General: NAD on NC   Neurology: AAOx3, no focal deficits, strength equal x4   Eyes: bilateral pupils equal and reactive   ENT/Neck: Neck supple, trachea midline, No JVD   Respiratory: Rales noted bilaterally   CV: RRR, S1S2, no murmurs        [x] Sternal dressing, [x] Mediastinal CT, [x] Pleural CT          [x] Paced [x] Temporary pacing - DDD 82   Abdominal: Soft, NT, ND +BS,   Extremities: 1-2+ pedal edema noted, + peripheral pulses   Skin: No Rashes, Hematoma, Ecchymosis     Assessment:  71F RH w/ AS w/ prior open heart aortic valve replacement 2019, HTN, BETSEY, former smoker, Emphysema/ chronic bronchitis, GERD s/p laparoscopic vertical sleeve gastrectomy    Endocarditis of prosthetic aortic valve S/P redo sternotomy, prosthetic valve endocarditis, 21mm homograft, placement of epicardial lead on 4/19   Hemodynamic instability   CHB / Sick sinus syndrome   Hypovolemia  Post op respiratory insufficiency   Acute blood loss anemia  Stress hyperglycemia  Obesity OHS / BETSEY   Right PCA infarct     Plan:   ***Neuro***  CTH on 4/9 with Right PCA infarct, MRI on 4/10 Acute R occipital lobe infarct in a R PCA vascular distribution with an additional punctate acute infarct involving the L cerebellum and R splenium of the corpus callosum   [x] Nonfocal   Post operative neuro assessment   Sleep regimen with Melatonin   Ambulate as tolerated     ***Cardiovascular***  Invasive hemodynamic monitoring, assess perfusion indices   Paced / CVP 13 / MAP 76 / Hct 23.9%  / Lactate 1.6   [x] Dobutamine 1mcg  [x] Cardene 5mcg   Continuos reassessment of hemodynamics   EP following for SSS / CHB - tentatively planned for PPM tomorrow as per EP recs   Monitor chest tube outputs  VTE prophylaxis with [x] Lovenox  [x] ASA   Serial EKG and cardiac enzymes   Plan to d/c CTs today     ***Pulmonary***  [x] 5L NC, wean as tolerated  Continue to monitor RR, breathing pattern, pulse ox, and ABG's.  Encourage incentive spirometry.   Continue with bronchodilator     ***GI***  [x]  Diet: PO low sodium consistent carb diet. / NPO after midnight   [x] Protonix    Bowel regimen with Miralax and Senna       ***Renal***  Continue to monitor I/Os, BUN/Creatinine.   Replete lytes PRN  Sharma present - may d/c later today   Diuresis with Lasix and Aldactone     ***ID***  ID following, appreciate recommendations   Endocarditis, c/w Ceftriaxone and Vancomycin   Tissue Cx on 4/19 NG, pending Fungal Cx, BCx on 4/19 NGTD     ***Endocrine***  [x] Hyperglycemia : HbA1c 6.3 %                - [x] Insulin gtt               - Need tight glycemic control to prevent wound infection.          Patient requires continuous monitoring with bedside rhythm monitoring, pulse oximetry monitoring, and continuous central venous and arterial pressure monitoring; and intermittent blood gas analysis. Care plan discussed with the ICU care team.   Patient remained critical, at risk for life threatening decompensation.    I have spent 35 minutes providing critical care management to this patient.    By signing my name below, I, Jacquelyn Reddy, attest that this documentation has been prepared under the direction and in the presence of Tanya Patel NP   Electronically signed: Rashmi Horowitz, 04-20-22 @ 09:49    I, Tanya Patel,, personally performed the services described in this documentation. all medical record entries made by the rashmi were at my direction and in my presence. I have reviewed the chart and agree that the record reflects my personal performance and is accurate and complete  Electronically signed: Tanya Patel NP

## 2022-04-20 NOTE — DIETITIAN INITIAL EVALUATION ADULT - ADD RECOMMEND
Add multivitamin if not otherwise contraindicated. Provide encouragement with PO intake, menu selections, and assistance with meals as needed. Reinforce nutrition education as needed - RD remains available. Continue to monitor nutritional intake, labs, weights, BM, skin, clinical course.

## 2022-04-20 NOTE — PHYSICAL THERAPY INITIAL EVALUATION ADULT - TRANSFER TRAINING, PT EVAL
GOAL: Pt will perform sit<>stand transfer independently with least restrictive device in 2wks.
GOAL: Pt will transfer independently within 2 weeks

## 2022-04-20 NOTE — PROGRESS NOTE ADULT - SUBJECTIVE AND OBJECTIVE BOX
EP ATTENDING    tele: sinus bradycardia, complete heart block, junctional escape rhythm    she denies palpitations, syncope, nor angina, ROS otherwise -     DATE OF SERVICE - 04-20-22     Review of Systems:   Constitutional: [ ] fevers, [ ] chills.   Skin: [ ] dry skin. [ ] rashes.  Psychiatric: [ ] depression, [ ] anxiety.   Gastrointestinal: [ ] BRBPR, [ ] melena.   Neurological: [ ] confusion. [ ] seizures. [ ] shuffling gait.   Ears,Nose,Mouth and Throat: [ ] ear pain [ ] sore throat.   Eyes: [ ] diplopia.   Respiratory: [ ] hemoptysis. [ ] shortness of breath  Cardiovascular: See HPI above  Hematologic/Lymphatic: [ ] anemia. [ ] painful nodes. [ ] prolonged bleeding.   Genitourinary: [ ] hematuria. [ ] flank pain.   Endocrine: [ ] significant change in weight. [ ] intolerance to heat and cold.     Review of systems [ x] otherwise negative, [ ] otherwise unable to obtain    FH: no family history of sudden cardiac death in first degree relatives    SH: [ ] tobacco, [ ] alcohol, [ ] drugs    acetaminophen     Tablet .. 650 milliGRAM(s) Oral every 6 hours  albuterol/ipratropium for Nebulization 3 milliLiter(s) Nebulizer every 6 hours  ascorbic acid 500 milliGRAM(s) Oral two times a day  aspirin  chewable 81 milliGRAM(s) Oral daily  bisacodyl Suppository 10 milliGRAM(s) Rectal once  cefTRIAXone   IVPB      cefTRIAXone   IVPB 2000 milliGRAM(s) IV Intermittent every 24 hours  chlorhexidine 0.12% Liquid 5 milliLiter(s) Oral Mucosa two times a day  chlorhexidine 2% Cloths 1 Application(s) Topical daily  dextrose 50% Injectable 50 milliLiter(s) IV Push every 15 minutes  dextrose 50% Injectable 25 milliLiter(s) IV Push every 15 minutes  DOBUTamine Infusion 1 MICROgram(s)/kG/Min IV Continuous <Continuous>  enoxaparin Injectable 40 milliGRAM(s) SubCutaneous every 24 hours  furosemide    Tablet 40 milliGRAM(s) Oral daily  gabapentin 100 milliGRAM(s) Oral every 8 hours  HYDROmorphone  Injectable 0.5 milliGRAM(s) IV Push every 6 hours PRN  insulin regular Infusion 3 Unit(s)/Hr IV Continuous <Continuous>  melatonin 5 milliGRAM(s) Oral at bedtime  niCARdipine Infusion 5 mG/Hr IV Continuous <Continuous>  oxyCODONE    IR 5 milliGRAM(s) Oral every 4 hours PRN  oxyCODONE    IR 10 milliGRAM(s) Oral every 4 hours PRN  pantoprazole    Tablet 40 milliGRAM(s) Oral before breakfast  polyethylene glycol 3350 17 Gram(s) Oral daily  polyethylene glycol 3350 17 Gram(s) Oral daily  senna 2 Tablet(s) Oral at bedtime  sodium chloride 0.9%. 1000 milliLiter(s) IV Continuous <Continuous>  spironolactone 25 milliGRAM(s) Oral every 12 hours  vancomycin  IVPB 1000 milliGRAM(s) IV Intermittent every 12 hours                            8.1    19.04 )-----------( 161      ( 20 Apr 2022 00:46 )             23.9       04-20    139  |  103  |  12  ----------------------------<  148<H>  3.9   |  23  |  0.61    Ca    8.4      20 Apr 2022 00:46  Phos  3.2     04-20  Mg     2.2     04-20    TPro  5.8<L>  /  Alb  3.8  /  TBili  1.0  /  DBili  x   /  AST  51<H>  /  ALT  26  /  AlkPhos  44  04-20      CARDIAC MARKERS ( 18 Apr 2022 19:23 )  x     / x     / 301 U/L / x     / 31.7 ng/mL        T(C): 37.2 (04-20-22 @ 08:00), Max: 37.4 (04-19-22 @ 21:00)  HR: 81 (04-20-22 @ 10:30) (76 - 87)  BP: --  RR: 3 (04-20-22 @ 10:30) (3 - 34)  SpO2: 97% (04-20-22 @ 10:30) (87% - 100%)  Wt(kg): --    I&O's Summary    19 Apr 2022 07:01  -  20 Apr 2022 07:00  --------------------------------------------------------  IN: 2000.4 mL / OUT: 3495 mL / NET: -1494.6 mL    20 Apr 2022 07:01  -  20 Apr 2022 11:18  --------------------------------------------------------  IN: 367.8 mL / OUT: 320 mL / NET: 47.8 mL        General: Well nourished in no acute distress. Alert and Oriented * 3.   Head: Normocephalic and atraumatic.   Neck: No JVD. No bruits. Supple. Does not appear to be enlarged.   Cardiovascular: + S1,S2 ; RRR Soft systolic murmur at the left lower sternal border. No rubs noted.    Lungs: CTA b/l. No rhonchi, rales or wheezes.   Abdomen: + BS, soft. Non tender. Non distended. No rebound. No guarding.   Extremities: No clubbing/cyanosis/edema.   Neurologic: Moves all four extremities. Full range of motion.   Skin: Warm and moist. The patient's skin has normal elasticity and good skin turgor.   Psychiatric: Appropriate mood and affect.  Musculoskeletal: Normal range of motion, normal strength      A/P) 70 y/o female PMH hypertension, hyperlipidemia, GERD s/p laparoscopic vertical sleeve gastrectomy, and non-obstructive CAD who underwent a bio-AVR 2019. She was now admitted on 4/9/22 with stroke type symptoms and a loop recorder was implanted on 4/11/22 screening for AF. 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 She underwent explant of her bio-AVR, debridement of her Ao root and abscess, Bentall procedure, and implantation of an epicardial RV lead. She now has both sick sinus syndrome as well as complete heart block on POD 2. It is unclear to me if her myxoma was resected.    -antibiotics as per ID  -supportive care as per CT surgery  -NPO after midnight for VDD Micra PPM tomorrow  -instead of implanting a VVI device to her RV epicardial lead, I would instead implant a VDD Micra leadless pacemaker to track her atrium  -I would also leave the ILR in place screening for AF  -outpatient cardiologist is Heber Nolan at [facility]  -I will discuss the case with Dr Jay to assure no epicardial RA lead was implanted (doesn't appear so on cxr)        Haley Ortega M.D., [facility]  Cardiac Electrophysiology  [facility]  [address] [place] [address], [place] [address]  [email or web address]    office [phone]  pager [phone]     EP ATTENDING    tele: sinus bradycardia, complete heart block, junctional escape rhythm    she denies palpitations, syncope, nor angina, ROS otherwise -     DATE OF SERVICE - 04-20-22     Review of Systems:   Constitutional: [ ] fevers, [ ] chills.   Skin: [ ] dry skin. [ ] rashes.  Psychiatric: [ ] depression, [ ] anxiety.   Gastrointestinal: [ ] BRBPR, [ ] melena.   Neurological: [ ] confusion. [ ] seizures. [ ] shuffling gait.   Ears,Nose,Mouth and Throat: [ ] ear pain [ ] sore throat.   Eyes: [ ] diplopia.   Respiratory: [ ] hemoptysis. [ ] shortness of breath  Cardiovascular: See HPI above  Hematologic/Lymphatic: [ ] anemia. [ ] painful nodes. [ ] prolonged bleeding.   Genitourinary: [ ] hematuria. [ ] flank pain.   Endocrine: [ ] significant change in weight. [ ] intolerance to heat and cold.     Review of systems [ x] otherwise negative, [ ] otherwise unable to obtain    FH: no family history of sudden cardiac death in first degree relatives    SH: [ ] tobacco, [ ] alcohol, [ ] drugs    acetaminophen     Tablet .. 650 milliGRAM(s) Oral every 6 hours  albuterol/ipratropium for Nebulization 3 milliLiter(s) Nebulizer every 6 hours  ascorbic acid 500 milliGRAM(s) Oral two times a day  aspirin  chewable 81 milliGRAM(s) Oral daily  bisacodyl Suppository 10 milliGRAM(s) Rectal once  cefTRIAXone   IVPB      cefTRIAXone   IVPB 2000 milliGRAM(s) IV Intermittent every 24 hours  chlorhexidine 0.12% Liquid 5 milliLiter(s) Oral Mucosa two times a day  chlorhexidine 2% Cloths 1 Application(s) Topical daily  dextrose 50% Injectable 50 milliLiter(s) IV Push every 15 minutes  dextrose 50% Injectable 25 milliLiter(s) IV Push every 15 minutes  DOBUTamine Infusion 1 MICROgram(s)/kG/Min IV Continuous <Continuous>  enoxaparin Injectable 40 milliGRAM(s) SubCutaneous every 24 hours  furosemide    Tablet 40 milliGRAM(s) Oral daily  gabapentin 100 milliGRAM(s) Oral every 8 hours  HYDROmorphone  Injectable 0.5 milliGRAM(s) IV Push every 6 hours PRN  insulin regular Infusion 3 Unit(s)/Hr IV Continuous <Continuous>  melatonin 5 milliGRAM(s) Oral at bedtime  niCARdipine Infusion 5 mG/Hr IV Continuous <Continuous>  oxyCODONE    IR 5 milliGRAM(s) Oral every 4 hours PRN  oxyCODONE    IR 10 milliGRAM(s) Oral every 4 hours PRN  pantoprazole    Tablet 40 milliGRAM(s) Oral before breakfast  polyethylene glycol 3350 17 Gram(s) Oral daily  polyethylene glycol 3350 17 Gram(s) Oral daily  senna 2 Tablet(s) Oral at bedtime  sodium chloride 0.9%. 1000 milliLiter(s) IV Continuous <Continuous>  spironolactone 25 milliGRAM(s) Oral every 12 hours  vancomycin  IVPB 1000 milliGRAM(s) IV Intermittent every 12 hours                            8.1    19.04 )-----------( 161      ( 20 Apr 2022 00:46 )             23.9       04-20    139  |  103  |  12  ----------------------------<  148<H>  3.9   |  23  |  0.61    Ca    8.4      20 Apr 2022 00:46  Phos  3.2     04-20  Mg     2.2     04-20    TPro  5.8<L>  /  Alb  3.8  /  TBili  1.0  /  DBili  x   /  AST  51<H>  /  ALT  26  /  AlkPhos  44  04-20      CARDIAC MARKERS ( 18 Apr 2022 19:23 )  x     / x     / 301 U/L / x     / 31.7 ng/mL        T(C): 37.2 (04-20-22 @ 08:00), Max: 37.4 (04-19-22 @ 21:00)  HR: 81 (04-20-22 @ 10:30) (76 - 87)  BP: --  RR: 3 (04-20-22 @ 10:30) (3 - 34)  SpO2: 97% (04-20-22 @ 10:30) (87% - 100%)  Wt(kg): --    I&O's Summary    19 Apr 2022 07:01  -  20 Apr 2022 07:00  --------------------------------------------------------  IN: 2000.4 mL / OUT: 3495 mL / NET: -1494.6 mL    20 Apr 2022 07:01  -  20 Apr 2022 11:18  --------------------------------------------------------  IN: 367.8 mL / OUT: 320 mL / NET: 47.8 mL        General: Well nourished in no acute distress. Alert and Oriented * 3.   Head: Normocephalic and atraumatic.   Neck: No JVD. No bruits. Supple. Does not appear to be enlarged.   Cardiovascular: + S1,S2 ; RRR Soft systolic murmur at the left lower sternal border. No rubs noted.    Lungs: CTA b/l. No rhonchi, rales or wheezes.   Abdomen: + BS, soft. Non tender. Non distended. No rebound. No guarding.   Extremities: No clubbing/cyanosis/edema.   Neurologic: Moves all four extremities. Full range of motion.   Skin: Warm and moist. The patient's skin has normal elasticity and good skin turgor.   Psychiatric: Appropriate mood and affect.  Musculoskeletal: Normal range of motion, normal strength      A/P) 72 y/o female PMH hypertension, hyperlipidemia, GERD s/p laparoscopic vertical sleeve gastrectomy, and non-obstructive CAD who underwent a bio-AVR 2019. She was now admitted on 4/9/22 with stroke type symptoms and a loop recorder was implanted on 4/11/22 screening for AF. 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 She underwent explant of her bio-AVR, debridement of her Ao root and abscess, Bentall procedure, and implantation of an epicardial RV lead. She now has both sick sinus syndrome as well as complete heart block on POD 2. It is unclear to me if her myxoma was resected.    -antibiotics as per ID  -supportive care as per CT surgery  -NPO after midnight for VDD Micra PPM tomorrow  -instead of implanting a VVI device to her RV epicardial lead, I would instead implant a VDD Micra leadless pacemaker to track her atrium  -I would also leave the ILR in place screening for AF  -outpatient cardiologist is Heber Nolan at [facility]  -I will discuss the case with Dr Jay to assure no epicardial RA lead was implanted (doesn't appear so on cxr)  -if she ever needs atrial pacing for her sick sinus syndrome (likely) a transvenous dual chamber PPM [or even the St Crow leadless system] can eventually be implanted after she recovers from culture negative endocarditis        Haley Ortega M.D., [facility]  Cardiac Electrophysiology  [facility]  [address] [place] [address], [place] [address]  [email or web address]    office [phone]  pager [phone]     EP ATTENDING    tele: sinus bradycardia, complete heart block, junctional escape rhythm    she denies palpitations, syncope, nor angina, ROS otherwise -     DATE OF SERVICE - 04-20-22     Review of Systems:   Constitutional: [ ] fevers, [ ] chills.   Skin: [ ] dry skin. [ ] rashes.  Psychiatric: [ ] depression, [ ] anxiety.   Gastrointestinal: [ ] BRBPR, [ ] melena.   Neurological: [ ] confusion. [ ] seizures. [ ] shuffling gait.   Ears,Nose,Mouth and Throat: [ ] ear pain [ ] sore throat.   Eyes: [ ] diplopia.   Respiratory: [ ] hemoptysis. [ ] shortness of breath  Cardiovascular: See HPI above  Hematologic/Lymphatic: [ ] anemia. [ ] painful nodes. [ ] prolonged bleeding.   Genitourinary: [ ] hematuria. [ ] flank pain.   Endocrine: [ ] significant change in weight. [ ] intolerance to heat and cold.     Review of systems [ x] otherwise negative, [ ] otherwise unable to obtain    FH: no family history of sudden cardiac death in first degree relatives    SH: [ ] tobacco, [ ] alcohol, [ ] drugs    acetaminophen     Tablet .. 650 milliGRAM(s) Oral every 6 hours  albuterol/ipratropium for Nebulization 3 milliLiter(s) Nebulizer every 6 hours  ascorbic acid 500 milliGRAM(s) Oral two times a day  aspirin  chewable 81 milliGRAM(s) Oral daily  bisacodyl Suppository 10 milliGRAM(s) Rectal once  cefTRIAXone   IVPB      cefTRIAXone   IVPB 2000 milliGRAM(s) IV Intermittent every 24 hours  chlorhexidine 0.12% Liquid 5 milliLiter(s) Oral Mucosa two times a day  chlorhexidine 2% Cloths 1 Application(s) Topical daily  dextrose 50% Injectable 50 milliLiter(s) IV Push every 15 minutes  dextrose 50% Injectable 25 milliLiter(s) IV Push every 15 minutes  DOBUTamine Infusion 1 MICROgram(s)/kG/Min IV Continuous <Continuous>  enoxaparin Injectable 40 milliGRAM(s) SubCutaneous every 24 hours  furosemide    Tablet 40 milliGRAM(s) Oral daily  gabapentin 100 milliGRAM(s) Oral every 8 hours  HYDROmorphone  Injectable 0.5 milliGRAM(s) IV Push every 6 hours PRN  insulin regular Infusion 3 Unit(s)/Hr IV Continuous <Continuous>  melatonin 5 milliGRAM(s) Oral at bedtime  niCARdipine Infusion 5 mG/Hr IV Continuous <Continuous>  oxyCODONE    IR 5 milliGRAM(s) Oral every 4 hours PRN  oxyCODONE    IR 10 milliGRAM(s) Oral every 4 hours PRN  pantoprazole    Tablet 40 milliGRAM(s) Oral before breakfast  polyethylene glycol 3350 17 Gram(s) Oral daily  polyethylene glycol 3350 17 Gram(s) Oral daily  senna 2 Tablet(s) Oral at bedtime  sodium chloride 0.9%. 1000 milliLiter(s) IV Continuous <Continuous>  spironolactone 25 milliGRAM(s) Oral every 12 hours  vancomycin  IVPB 1000 milliGRAM(s) IV Intermittent every 12 hours                            8.1    19.04 )-----------( 161      ( 20 Apr 2022 00:46 )             23.9       04-20    139  |  103  |  12  ----------------------------<  148<H>  3.9   |  23  |  0.61    Ca    8.4      20 Apr 2022 00:46  Phos  3.2     04-20  Mg     2.2     04-20    TPro  5.8<L>  /  Alb  3.8  /  TBili  1.0  /  DBili  x   /  AST  51<H>  /  ALT  26  /  AlkPhos  44  04-20      CARDIAC MARKERS ( 18 Apr 2022 19:23 )  x     / x     / 301 U/L / x     / 31.7 ng/mL        T(C): 37.2 (04-20-22 @ 08:00), Max: 37.4 (04-19-22 @ 21:00)  HR: 81 (04-20-22 @ 10:30) (76 - 87)  BP: --  RR: 3 (04-20-22 @ 10:30) (3 - 34)  SpO2: 97% (04-20-22 @ 10:30) (87% - 100%)  Wt(kg): --    I&O's Summary    19 Apr 2022 07:01  -  20 Apr 2022 07:00  --------------------------------------------------------  IN: 2000.4 mL / OUT: 3495 mL / NET: -1494.6 mL    20 Apr 2022 07:01  -  20 Apr 2022 11:18  --------------------------------------------------------  IN: 367.8 mL / OUT: 320 mL / NET: 47.8 mL        General: Well nourished in no acute distress. Alert and Oriented * 3.   Head: Normocephalic and atraumatic.   Neck: No JVD. No bruits. Supple. Does not appear to be enlarged.   Cardiovascular: + S1,S2 ; RRR Soft systolic murmur at the left lower sternal border. No rubs noted.    Lungs: CTA b/l. No rhonchi, rales or wheezes.   Abdomen: + BS, soft. Non tender. Non distended. No rebound. No guarding.   Extremities: No clubbing/cyanosis/edema.   Neurologic: Moves all four extremities. Full range of motion.   Skin: Warm and moist. The patient's skin has normal elasticity and good skin turgor.   Psychiatric: Appropriate mood and affect.  Musculoskeletal: Normal range of motion, normal strength      A/P) 72 y/o female PMH hypertension, hyperlipidemia, GERD s/p laparoscopic vertical sleeve gastrectomy, and non-obstructive CAD who underwent a bio-AVR 2019. She was now admitted on 4/9/22 with stroke type symptoms and a loop recorder was implanted on 4/11/22 screening for AF. 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 She underwent explant of her bio-AVR, debridement of her Ao root and abscess, Bentall procedure, and implantation of an epicardial RV lead. She now has both sick sinus syndrome as well as complete heart block on POD 2. It is unclear to me if her myxoma was resected.    -antibiotics as per ID  -supportive care as per CT surgery  -NPO after midnight for VDD Micra PPM tomorrow  -instead of implanting a VVI device to her RV epicardial lead, I would instead implant a VDD Micra leadless pacemaker to track her atrium  -I would also leave the ILR in place screening for AF  -outpatient cardiologist is Heber Nolan at [facility]  -I will discuss the case with Dr Jay to assure no epicardial RA lead was implanted (doesn't appear so on cxr)  -if she ever needs atrial pacing for her sick sinus syndrome (likely) a transvenous dual chamber PPM [or even the St Crow leadless system] can eventually be implanted after she recovers from culture negative endocarditis  -d/w Dr Lucero who will implant a VDD Micra on my behalf tomorrow      Haley Ortega M.D., [facility]  Cardiac Electrophysiology  [facility]  [address] [place] [address], [place] [address]  [email or web address]    office [phone]  pager [phone]     EP ATTENDING    tele: sinus bradycardia, complete heart block, junctional escape rhythm    she denies palpitations, syncope, nor angina, ROS otherwise -     DATE OF SERVICE - 04-20-22     Review of Systems:   Constitutional: [ ] fevers, [ ] chills.   Skin: [ ] dry skin. [ ] rashes.  Psychiatric: [ ] depression, [ ] anxiety.   Gastrointestinal: [ ] BRBPR, [ ] melena.   Neurological: [ ] confusion. [ ] seizures. [ ] shuffling gait.   Ears,Nose,Mouth and Throat: [ ] ear pain [ ] sore throat.   Eyes: [ ] diplopia.   Respiratory: [ ] hemoptysis. [ ] shortness of breath  Cardiovascular: See HPI above  Hematologic/Lymphatic: [ ] anemia. [ ] painful nodes. [ ] prolonged bleeding.   Genitourinary: [ ] hematuria. [ ] flank pain.   Endocrine: [ ] significant change in weight. [ ] intolerance to heat and cold.     Review of systems [ x] otherwise negative, [ ] otherwise unable to obtain    FH: no family history of sudden cardiac death in first degree relatives    SH: [ ] tobacco, [ ] alcohol, [ ] drugs    acetaminophen     Tablet .. 650 milliGRAM(s) Oral every 6 hours  albuterol/ipratropium for Nebulization 3 milliLiter(s) Nebulizer every 6 hours  ascorbic acid 500 milliGRAM(s) Oral two times a day  aspirin  chewable 81 milliGRAM(s) Oral daily  bisacodyl Suppository 10 milliGRAM(s) Rectal once  cefTRIAXone   IVPB      cefTRIAXone   IVPB 2000 milliGRAM(s) IV Intermittent every 24 hours  chlorhexidine 0.12% Liquid 5 milliLiter(s) Oral Mucosa two times a day  chlorhexidine 2% Cloths 1 Application(s) Topical daily  dextrose 50% Injectable 50 milliLiter(s) IV Push every 15 minutes  dextrose 50% Injectable 25 milliLiter(s) IV Push every 15 minutes  DOBUTamine Infusion 1 MICROgram(s)/kG/Min IV Continuous <Continuous>  enoxaparin Injectable 40 milliGRAM(s) SubCutaneous every 24 hours  furosemide    Tablet 40 milliGRAM(s) Oral daily  gabapentin 100 milliGRAM(s) Oral every 8 hours  HYDROmorphone  Injectable 0.5 milliGRAM(s) IV Push every 6 hours PRN  insulin regular Infusion 3 Unit(s)/Hr IV Continuous <Continuous>  melatonin 5 milliGRAM(s) Oral at bedtime  niCARdipine Infusion 5 mG/Hr IV Continuous <Continuous>  oxyCODONE    IR 5 milliGRAM(s) Oral every 4 hours PRN  oxyCODONE    IR 10 milliGRAM(s) Oral every 4 hours PRN  pantoprazole    Tablet 40 milliGRAM(s) Oral before breakfast  polyethylene glycol 3350 17 Gram(s) Oral daily  polyethylene glycol 3350 17 Gram(s) Oral daily  senna 2 Tablet(s) Oral at bedtime  sodium chloride 0.9%. 1000 milliLiter(s) IV Continuous <Continuous>  spironolactone 25 milliGRAM(s) Oral every 12 hours  vancomycin  IVPB 1000 milliGRAM(s) IV Intermittent every 12 hours                            8.1    19.04 )-----------( 161      ( 20 Apr 2022 00:46 )             23.9       04-20    139  |  103  |  12  ----------------------------<  148<H>  3.9   |  23  |  0.61    Ca    8.4      20 Apr 2022 00:46  Phos  3.2     04-20  Mg     2.2     04-20    TPro  5.8<L>  /  Alb  3.8  /  TBili  1.0  /  DBili  x   /  AST  51<H>  /  ALT  26  /  AlkPhos  44  04-20      CARDIAC MARKERS ( 18 Apr 2022 19:23 )  x     / x     / 301 U/L / x     / 31.7 ng/mL        T(C): 37.2 (04-20-22 @ 08:00), Max: 37.4 (04-19-22 @ 21:00)  HR: 81 (04-20-22 @ 10:30) (76 - 87)  BP: --  RR: 3 (04-20-22 @ 10:30) (3 - 34)  SpO2: 97% (04-20-22 @ 10:30) (87% - 100%)  Wt(kg): --    I&O's Summary    19 Apr 2022 07:01  -  20 Apr 2022 07:00  --------------------------------------------------------  IN: 2000.4 mL / OUT: 3495 mL / NET: -1494.6 mL    20 Apr 2022 07:01  -  20 Apr 2022 11:18  --------------------------------------------------------  IN: 367.8 mL / OUT: 320 mL / NET: 47.8 mL        General: Well nourished in no acute distress. Alert and Oriented * 3.   Head: Normocephalic and atraumatic.   Neck: No JVD. No bruits. Supple. Does not appear to be enlarged.   Cardiovascular: + S1,S2 ; RRR Soft systolic murmur at the left lower sternal border. No rubs noted.    Lungs: CTA b/l. No rhonchi, rales or wheezes.   Abdomen: + BS, soft. Non tender. Non distended. No rebound. No guarding.   Extremities: No clubbing/cyanosis/edema.   Neurologic: Moves all four extremities. Full range of motion.   Skin: Warm and moist. The patient's skin has normal elasticity and good skin turgor.   Psychiatric: Appropriate mood and affect.  Musculoskeletal: Normal range of motion, normal strength      A/P) 70 y/o female PMH hypertension, hyperlipidemia, GERD s/p laparoscopic vertical sleeve gastrectomy, and non-obstructive CAD who underwent a bio-AVR 2019. She was now admitted on 4/9/22 with stroke type symptoms and a loop recorder was implanted on 4/11/22 screening for AF. 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 She underwent explant of her bio-AVR, debridement of her Ao root and abscess, Bentall procedure, and implantation of an epicardial RV lead. She now has both sick sinus syndrome as well as complete heart block on POD 2. No myxoma was ever found - it was all abscess from culture negative endocarditis.     -antibiotics as per ID  -supportive care as per CT surgery  -NPO after midnight for VDD Micra PPM tomorrow  -instead of implanting a VVI device to her RV epicardial lead, I would instead implant a VDD Micra leadless pacemaker to track her atrium  -I would also leave the ILR in place screening for AF  -outpatient cardiologist is Heber Nolan at [facility]  -I discussed the case with Dr Jay, who assures no epicardial RA lead was implanted   -if she ever needs atrial pacing for her sick sinus syndrome (likely) a transvenous dual chamber PPM [or even the St Crow leadless system] can eventually be implanted after she recovers from culture negative endocarditis  -d/w Dr Lucero who will implant a VDD Micra on my behalf tomorrow      Haley Ortega M.D., [facility]  Cardiac Electrophysiology  [facility]  [address] [place] [address], [place] [address]  [email or web address]    office [phone]  pager [phone]

## 2022-04-20 NOTE — CONSULT NOTE ADULT - PROVIDER SPECIALTY LIST ADULT
Electrophysiology
Rehab Medicine
CT Surgery
Electrophysiology
Infectious Disease
Neurology
Cardiology

## 2022-04-20 NOTE — PHYSICAL THERAPY INITIAL EVALUATION ADULT - GENERAL OBSERVATIONS, REHAB EVAL
pt received seated in chair in NAD +ICU monitoring, a-line, central line, chest tube x2, 6L O2 NC, external pacer, razo catheter, sister at bedside. Sternal precautions reviewed and maintained
Pt received semi-supine in bed in NAD, +IV Lock. Pt AOx4, pleasant and cooperative. Pt with severe B/L L visual field cut

## 2022-04-20 NOTE — PHYSICAL THERAPY INITIAL EVALUATION ADULT - PERTINENT HX OF CURRENT PROBLEM, REHAB EVAL
71F RH w/ AS w/ prior open heart aortic valve replacement 2019, HTN, BETSEY, former smoker, Emphysema/ chronic bronchitis, GERD s/p laparoscopic vertical sleeve gastrectomy presents with acute onset speech disturbance, vision change and gait imbalance. Per EMS, LKN: 4/9/22 at 10am per family. Pt's family reported blurry vision, gait imbalance, and speech disturbance (mild loss in fluency). She still endorses blurry vision but denies any weakness,
71F RH w/ AS w/ prior open heart aortic valve replacement 2019, HTN, BETSEY, former smoker, Emphysema/ chronic bronchitis, GERD s/p laparoscopic vertical sleeve gastrectomy presents with acute onset speech disturbance, vision change and gait imbalance. Per EMS, LKN: 4/9/22 at 10am per family. Pt's family reported blurry vision, gait imbalance, and speech disturbance (mild loss in fluency). She still endorses blurry vision but denies any weakness,

## 2022-04-20 NOTE — CONSULT NOTE ADULT - REASON FOR ADMISSION
concern for stroke

## 2022-04-20 NOTE — DIETITIAN INITIAL EVALUATION ADULT - NS FNS DIET ORDER
recent trauma Diet, NPO after Midnight:      NPO Start Date: 20-Apr-2022,   NPO Start Time: 23:59 (04-20-22 @ 11:32)  Diet, NPO after Midnight:      NPO Start Date: 20-Apr-2022,   NPO Start Time: 23:59 (04-20-22 @ 10:53)  Diet, Consistent Carbohydrate/No Snacks:   Low Sodium (04-19-22 @ 12:03)

## 2022-04-20 NOTE — DIETITIAN INITIAL EVALUATION ADULT - PERTINENT LABORATORY DATA
04-20    139  |  103  |  12  ----------------------------<  148<H>  3.9   |  23  |  0.61    Ca    8.4      20 Apr 2022 00:46  Phos  3.2     04-20  Mg     2.2     04-20    TPro  5.8<L>  /  Alb  3.8  /  TBili  1.0  /  DBili  x   /  AST  51<H>  /  ALT  26  /  AlkPhos  44  04-20  POCT Blood Glucose.: 138 mg/dL (04-20-22 @ 11:52)  A1C with Estimated Average Glucose Result: 6.3 % (04-10-22 @ 08:38)

## 2022-04-20 NOTE — PHYSICAL THERAPY INITIAL EVALUATION ADULT - GAIT TRAINING, PT EVAL
GOAL: Pt will ambulate 150ft independently w/o AD within 2 weeks.
GOAL:Pt will ambulate 250ft with least restrictive device independently in 2wks.

## 2022-04-20 NOTE — PROGRESS NOTE ADULT - ASSESSMENT
71 year old with AVR; COPD, GERD, GBP, presented with recent cva.  During workup, an echo revealed an atrial myxoma.  Last 24 she had a fever of 101. Denies -prior fevers, but states she was treated for pna prior to this admission  She is very confused but denies any complaints at present      ; bc negative UA negative   went for surgery yesterday and found to have endocarditis   and root abscess/ .   BC negative  will request PCR.  vanco and ceftriaxone pending cultures   alert and extubated wbc still elevated  to follow   check vanco level please

## 2022-04-20 NOTE — PHYSICAL THERAPY INITIAL EVALUATION ADULT - GAIT DEVIATIONS NOTED, PT EVAL
decreased elmer/decreased step length/decreased weight-shifting ability
decreased elmer/increased time in double stance/decreased step length

## 2022-04-20 NOTE — DIETITIAN INITIAL EVALUATION ADULT - REASON
Nutrition focused physical exam deferred at this time to allow pt to consume meal. No overt fat or muscle wasting observed.

## 2022-04-20 NOTE — PHYSICAL THERAPY INITIAL EVALUATION ADULT - IMPAIRMENTS FOUND, PT EVAL
vision deficits leading to safety concerns for patient/arousal, attention, and cognition/gait, locomotion, and balance/muscle strength
aerobic capacity/endurance/gait, locomotion, and balance/muscle strength

## 2022-04-20 NOTE — PROGRESS NOTE ADULT - SUBJECTIVE AND OBJECTIVE BOX
Neurology Progress note;     HPI:  71F RH w/ AS w/ prior open heart aortic valve replacement 2019, HTN, BETSEY, former smoker, Emphysema/ chronic bronchitis, GERD s/p laparoscopic vertical sleeve gastrectomy presented with acute onset speech disturbance, vision change and gait imbalance. Per EMS, LKN: 4/9/22 at 10am per family. Pt's family reported blurry vision, gait imbalance, and speech disturbance (mild loss in fluency). Pt unable to provide full hx because she was confused on timing. She  endorsed blurry vision but denied any weakness, numbness/tingling. Pt takes a baby aspirin 81mg daily. Pt ambulates without assistance or assistive devices at baseline.  LKN: 4/9/22 at 10am  NIHSS: 3  preMRS: 0  Pt was not a candidate for tpa due to outside tpa window   Pt was not a candidate for mechanical thrombectomy due to no large vessel occlusion on CTA    SUBJECTIVE: patient seen and examined. now in CTICU. extubated, doing well in chair     Medications:      MEDICATIONS  (STANDING):  acetaminophen     Tablet .. 650 milliGRAM(s) Oral every 6 hours  albuterol/ipratropium for Nebulization 3 milliLiter(s) Nebulizer every 6 hours  ascorbic acid 500 milliGRAM(s) Oral two times a day  aspirin  chewable 81 milliGRAM(s) Oral daily  bisacodyl Suppository 10 milliGRAM(s) Rectal once  cefTRIAXone   IVPB      cefTRIAXone   IVPB 2000 milliGRAM(s) IV Intermittent every 24 hours  chlorhexidine 0.12% Liquid 5 milliLiter(s) Oral Mucosa two times a day  chlorhexidine 2% Cloths 1 Application(s) Topical daily  dexMEDEtomidine Infusion 0.5 MICROgram(s)/kG/Hr (10.7 mL/Hr) IV Continuous <Continuous>  dextrose 50% Injectable 50 milliLiter(s) IV Push every 15 minutes  dextrose 50% Injectable 25 milliLiter(s) IV Push every 15 minutes  DOBUTamine Infusion 1 MICROgram(s)/kG/Min (2.57 mL/Hr) IV Continuous <Continuous>  enoxaparin Injectable 40 milliGRAM(s) SubCutaneous every 24 hours  furosemide    Tablet 40 milliGRAM(s) Oral daily  gabapentin 100 milliGRAM(s) Oral every 8 hours  insulin regular Infusion 3 Unit(s)/Hr (3 mL/Hr) IV Continuous <Continuous>  melatonin 5 milliGRAM(s) Oral at bedtime  niCARdipine Infusion 5 mG/Hr (25 mL/Hr) IV Continuous <Continuous>  pantoprazole    Tablet 40 milliGRAM(s) Oral before breakfast  polyethylene glycol 3350 17 Gram(s) Oral daily  polyethylene glycol 3350 17 Gram(s) Oral daily  potassium chloride  10 mEq/50 mL IVPB 10 milliEquivalent(s) IV Intermittent every 1 hour  potassium chloride  10 mEq/50 mL IVPB 10 milliEquivalent(s) IV Intermittent every 1 hour  potassium chloride  10 mEq/50 mL IVPB 10 milliEquivalent(s) IV Intermittent every 1 hour  senna 2 Tablet(s) Oral at bedtime  sodium chloride 0.9%. 1000 milliLiter(s) (10 mL/Hr) IV Continuous <Continuous>  spironolactone 25 milliGRAM(s) Oral every 12 hours  vancomycin  IVPB 1000 milliGRAM(s) IV Intermittent every 12 hours    MEDICATIONS  (PRN):  HYDROmorphone  Injectable 0.5 milliGRAM(s) IV Push every 6 hours PRN Severe Pain (7 - 10)  oxyCODONE    IR 5 milliGRAM(s) Oral every 4 hours PRN Moderate Pain (4 - 6)  oxyCODONE    IR 10 milliGRAM(s) Oral every 4 hours PRN Severe Pain (7 - 10)      PHYSICAL EXAM:     ICU Vital Signs Last 24 Hrs  T(C): 37.2 (20 Apr 2022 08:00), Max: 37.4 (19 Apr 2022 21:00)  T(F): 99 (20 Apr 2022 08:00), Max: 99.3 (19 Apr 2022 21:00)  HR: 81 (20 Apr 2022 09:00) (44 - 87)  BP: --  BP(mean): --  ABP: 117/56 (20 Apr 2022 09:00) (85/57 - 141/73)  ABP(mean): 76 (20 Apr 2022 09:00) (63 - 101)  RR: 32 (20 Apr 2022 09:00) (12 - 34)  SpO2: 95% (20 Apr 2022 09:00) (87% - 100%)        General: No acute distress  HEENT: EOM intact, LHH to counting (is aware)  Abdomen: Soft, nontender, nondistended   Extremities: No edema    NEUROLOGICAL EXAM:    Mental status: AAOx2- 3, able to follow simple and complex verbal commands. answers questions appropriately, able to name, repeat and recall. no aphasia, no dysarthria   Cranial Nerves: No facial asymmetry, LHHA to counting, no nystagmus, no dysarthria,  tongue midline  Motor exam: Normal tone, no drift, 5/5 RUE, 5/5 RLE, 5/5 LUE, 5/5 LLE, normal fine finger movements.  Sensation: Intact to light touch   Coordination/ Gait: No dysmetria, gait unable in ICU       LABS:                CBC Full  -  ( 20 Apr 2022 00:46 )  WBC Count : 19.04 K/uL  RBC Count : 2.73 M/uL  Hemoglobin : 8.1 g/dL  Hematocrit : 23.9 %  Platelet Count - Automated : 161 K/uL  Mean Cell Volume : 87.5 fl  Mean Cell Hemoglobin : 29.7 pg  Mean Cell Hemoglobin Concentration : 33.9 gm/dL  Auto Neutrophil # : x  Auto Lymphocyte # : x  Auto Monocyte # : x  Auto Eosinophil # : x  Auto Basophil # : x  Auto Neutrophil % : x  Auto Lymphocyte % : x  Auto Monocyte % : x  Auto Eosinophil % : x  Auto Basophil % : x      04-20    139  |  103  |  12  ----------------------------<  148<H>  3.9   |  23  |  0.61    Ca    8.4      20 Apr 2022 00:46  Phos  3.2     04-20  Mg     2.2     04-20    TPro  5.8<L>  /  Alb  3.8  /  TBili  1.0  /  DBili  x   /  AST  51<H>  /  ALT  26  /  AlkPhos  44  04-20      IMAGING: Reviewed by me.   MRI HEAD 04/10/22: Acute right occipital lobe infarct in a right PCA vascular distribution   with an additional punctate acute infarct involving the left cerebellum   and right splenium of the corpus callosum.    04/09/22:  Brain CT: Acute right occipital lobe infarct in the distribution of the   right PCA. No evidence for hemorrhage.  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 No   hemodynamically significant stenosis within the anterior circulation.  Brain CTA: No large vessel occlusion or stenosis. 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  Perfusion maps: Core infarct in the distribution of the right PCA.   Questionable areas of brain at risk in the right PCA as well as the   bilateral temporal and right frontal region.

## 2022-04-20 NOTE — DIETITIAN INITIAL EVALUATION ADULT - PERTINENT MEDS FT
MEDICATIONS  (STANDING):  acetaminophen     Tablet .. 650 milliGRAM(s) Oral every 6 hours  albuterol/ipratropium for Nebulization 3 milliLiter(s) Nebulizer every 6 hours  ascorbic acid 500 milliGRAM(s) Oral two times a day  aspirin  chewable 81 milliGRAM(s) Oral daily  bisacodyl Suppository 10 milliGRAM(s) Rectal once  cefTRIAXone   IVPB      cefTRIAXone   IVPB 2000 milliGRAM(s) IV Intermittent every 24 hours  chlorhexidine 0.12% Liquid 5 milliLiter(s) Oral Mucosa two times a day  chlorhexidine 2% Cloths 1 Application(s) Topical daily  dextrose 50% Injectable 50 milliLiter(s) IV Push every 15 minutes  dextrose 50% Injectable 25 milliLiter(s) IV Push every 15 minutes  DOBUTamine Infusion 1 MICROgram(s)/kG/Min (2.57 mL/Hr) IV Continuous <Continuous>  enoxaparin Injectable 40 milliGRAM(s) SubCutaneous every 24 hours  furosemide    Tablet 40 milliGRAM(s) Oral daily  gabapentin 100 milliGRAM(s) Oral every 8 hours  insulin regular Infusion 3 Unit(s)/Hr (3 mL/Hr) IV Continuous <Continuous>  melatonin 5 milliGRAM(s) Oral at bedtime  niCARdipine Infusion 5 mG/Hr (25 mL/Hr) IV Continuous <Continuous>  pantoprazole    Tablet 40 milliGRAM(s) Oral before breakfast  polyethylene glycol 3350 17 Gram(s) Oral daily  polyethylene glycol 3350 17 Gram(s) Oral daily  senna 2 Tablet(s) Oral at bedtime  sodium chloride 0.9%. 1000 milliLiter(s) (10 mL/Hr) IV Continuous <Continuous>  spironolactone 25 milliGRAM(s) Oral every 12 hours  vancomycin  IVPB 1000 milliGRAM(s) IV Intermittent every 12 hours    MEDICATIONS  (PRN):  HYDROmorphone  Injectable 0.5 milliGRAM(s) IV Push every 6 hours PRN Severe Pain (7 - 10)  oxyCODONE    IR 5 milliGRAM(s) Oral every 4 hours PRN Moderate Pain (4 - 6)  oxyCODONE    IR 10 milliGRAM(s) Oral every 4 hours PRN Severe Pain (7 - 10)

## 2022-04-20 NOTE — DIETITIAN INITIAL EVALUATION ADULT - REASON FOR ADMISSION
Pt is a 71F RH w/ AS w/ prior open heart aortic valve replacement 2019, HTN, BETSEY, former smoker, Emphysema/ chronic bronchitis, GERD s/p laparoscopic vertical sleeve gastrectomy. Endocarditis of prosthetic aortic valve S/P redo sternotomy, prosthetic valve endocarditis, 21mm homograft, placement of epicardial lead on 4/19.

## 2022-04-20 NOTE — DIETITIAN INITIAL EVALUATION ADULT - OTHER INFO
-- Pt unable to specifically state UBW at this time, reports ~160lb. Admission weight (4/13) 166.6lb, lowest wt since admission 162.7lb (4/18). Daily weight today 176.1lb (4/20), suspect weight increase related to nahomy-operative fluid shifts. Pt states PTA she had lost 10lb in unspecified time frame, intentionally by watching what she ate.   -- Pt with hx of gastric sleeve in 2017 - per previous RD note pt 196.9lbs prior to surgery.  -- Pt with no noted hx of DM, HbA1c 6.3%, currently on insulin gtt for tight glycemic control.

## 2022-04-20 NOTE — PHYSICAL THERAPY INITIAL EVALUATION ADULT - BED MOBILITY TRAINING, PT EVAL
GOAL: Pt will be independent with bed mobility in 2wks.
GOAL: Pt will perform bed mobility independently within 2 weeks.

## 2022-04-20 NOTE — DIETITIAN INITIAL EVALUATION ADULT - PERSON TAUGHT/METHOD
Discussed importance of adequate protein intake to promote wound healing s/p surgery, reviewed lean sources of protein. Discussed importance of glycemic control for wound healing, discussed consistent carbohydrate diet and importance of monitoring CHO intake./verbal instruction/teach back - (Patient repeats in own words)/patient instructed

## 2022-04-20 NOTE — CONSULT NOTE ADULT - ASSESSMENT
70 y/o female PMH hypertension, hyperlipidemia, GERD s/p laparoscopic vertical sleeve gastrectomy, and non-obstructive CAD, bio-AVR 2019. She was now admitted on 4/9/22 with stroke type symptoms and ILR was implanted on 4/11/22 screening for AF. 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  Post op course c/b complete heart block requiring pacing via temporary epicardial wire, currently threshold on RV wire is 13. She is currently set at VVI 80 with mA 20, remains on Dobutamine and Cardene otherwise HDS.    1) Endocarditis   2) post op CHB     NPO s/p midnight for VDD Micra 4/21   Hold Lovenox in AM on 4/21   Maintain active type & screen, active COVID PCR   Maintain epicardial pacing and tele monitoring   Continue care per CTU team

## 2022-04-20 NOTE — PROGRESS NOTE ADULT - SUBJECTIVE AND OBJECTIVE BOX
Cardiovascular Disease Progress Note: Covering for Dr. Ny    Overnight events: No acute events overnight.  Pt is in no distress.   Otherwise review of systems negative    Objective Findings:  T(C): 37.4 (22 @ 12:00), Max: 37.4 (22 @ 21:00)  HR: 80 (22 @ 12:45) (51 - 103)  BP: --  RR: 22 (22 @ 10:45) (3 - 34)  SpO2: 97% (22 @ 12:45) (87% - 100%)  Wt(kg): --  Daily     Daily Weight in k.9 (2022 00:00)      Physical Exam:  Gen: NAD; Patient resting comfortably  HEENT: EOMI, Normocephalic/ atraumatic  CV: RRR, normal S1 + S2, no m/r/g  Lungs:  Normal respiratory effort; clear to auscultation bilaterally  Abd: soft, non-tender; bowel sounds present  Ext: No edema; warm and well perfused    Telemetry: Sinus bradycardia with CHB    Laboratory Data:                        8.1    19.04 )-----------( 161      ( 2022 00:46 )             23.9     04-20    139  |  103  |  12  ----------------------------<  148<H>  3.9   |  23  |  0.61    Ca    8.4      2022 00:46  Phos  3.2     04-20  Mg     2.2     04-20    TPro  5.8<L>  /  Alb  3.8  /  TBili  1.0  /  DBili  x   /  AST  51<H>  /  ALT  26  /  AlkPhos  44  04-20    PT/INR - ( 2022 00:37 )   PT: 13.5 sec;   INR: 1.17 ratio         PTT - ( 2022 00:37 )  PTT:28.8 sec  CARDIAC MARKERS ( 2022 19:23 )  x     / x     / 301 U/L / x     / 31.7 ng/mL          Inpatient Medications:  MEDICATIONS  (STANDING):  acetaminophen     Tablet .. 650 milliGRAM(s) Oral every 6 hours  albuterol/ipratropium for Nebulization 3 milliLiter(s) Nebulizer every 6 hours  ascorbic acid 500 milliGRAM(s) Oral two times a day  aspirin  chewable 81 milliGRAM(s) Oral daily  bisacodyl Suppository 10 milliGRAM(s) Rectal once  cefTRIAXone   IVPB      cefTRIAXone   IVPB 2000 milliGRAM(s) IV Intermittent every 24 hours  chlorhexidine 0.12% Liquid 5 milliLiter(s) Oral Mucosa two times a day  chlorhexidine 2% Cloths 1 Application(s) Topical daily  dextrose 50% Injectable 50 milliLiter(s) IV Push every 15 minutes  dextrose 50% Injectable 25 milliLiter(s) IV Push every 15 minutes  DOBUTamine Infusion 1 MICROgram(s)/kG/Min (2.57 mL/Hr) IV Continuous <Continuous>  enoxaparin Injectable 40 milliGRAM(s) SubCutaneous every 24 hours  furosemide    Tablet 40 milliGRAM(s) Oral daily  gabapentin 100 milliGRAM(s) Oral every 8 hours  insulin regular Infusion 3 Unit(s)/Hr (3 mL/Hr) IV Continuous <Continuous>  melatonin 5 milliGRAM(s) Oral at bedtime  niCARdipine Infusion 5 mG/Hr (25 mL/Hr) IV Continuous <Continuous>  pantoprazole    Tablet 40 milliGRAM(s) Oral before breakfast  polyethylene glycol 3350 17 Gram(s) Oral daily  polyethylene glycol 3350 17 Gram(s) Oral daily  senna 2 Tablet(s) Oral at bedtime  sodium chloride 0.9%. 1000 milliLiter(s) (10 mL/Hr) IV Continuous <Continuous>  spironolactone 25 milliGRAM(s) Oral every 12 hours  vancomycin  IVPB 1000 milliGRAM(s) IV Intermittent every 12 hours      Assessment: 72 y/o female PMH hypertension, hyperlipidemia, GERD s/p laparoscopic vertical sleeve gastrectomy, and non-obstructive CAD who underwent a bio-AVR . She was now admitted on 22 with stroke type symptoms and a loop recorder was implanted on 22 screening for AF. 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 She underwent explant of her bio-AVR, debridement of her Ao root and abscess, Bentall procedure, and implantation of an epicardial RV lead. She now has both sick sinus syndrome as well as complete heart block on POD 2. It is unclear to me if her myxoma was resected.    Plan of Care:  71F RH w/ AS w/ prior open heart aortic valve replacement 2019, HTN, BETSEY, former smoker, Emphysema/ chronic bronchitis, GERD s/p laparoscopic vertical sleeve gastrectomy presents with acute onset speech disturbance, vision change and gait imbalance.     Plan of Care:    #Endocarditis  - Pt s/p Bentall procedure   - Tolerated procedure well.  - Ms. Lal displays no evidence of post-op coronary ischemia  - Continue management as per CTU and CTS    #Sick sinus syndrome  - Pt to undergo PPM placement tomorrow  - EPS input appreciated.     #Acute CVA  - Cardioembolic source. Imaging shows atrial mass  - Refer to plan above  - Pt with expressive aphasia  - Neuro input appreciated.     #Cardiogenic shock  - S/p surgery  - On pressors  - Wean as tolerated      CTU management appreciated            Over 25 minutes spent on total encounter; more than 50% of the visit was spent counseling and/or coordinating care by the attending physician.      Abhay Oglesby D.O.   Cardiovascular Disease  [phone]

## 2022-04-20 NOTE — PROGRESS NOTE ADULT - SUBJECTIVE AND OBJECTIVE BOX
Patient seen and examined at the bedside.    Remained critically ill on continuous ICU monitoring.    OBJECTIVE:  Vital Signs Last 24 Hrs  T(C): 36.9 (20 Apr 2022 16:00), Max: 37.4 (19 Apr 2022 21:00)  T(F): 98.4 (20 Apr 2022 16:00), Max: 99.3 (19 Apr 2022 21:00)  HR: 80 (20 Apr 2022 19:00) (51 - 103)  BP: --  BP(mean): --  RR: 27 (20 Apr 2022 19:00) (3 - 35)  SpO2: 92% (20 Apr 2022 19:00) (74% - 100%)      Physical Exam:   General: NAD   Neurology: AAOx3, no focal deficits, strength equal x4   Eyes: bilateral pupils equal and reactive   ENT/Neck: Neck supple, trachea midline, No JVD   Respiratory: Rales noted bilaterally   CV: RRR, S1S2, no murmurs        [x] Sternal dressing, [x] Mediastinal CT, [x] Pleural CT         [x] Sinus rhythm [x] Temporary pacing - DDD 82   Abdominal: Soft, NT, ND +BS,   Extremities: 1-2+ pedal edema noted, + peripheral pulses   Skin: No Rashes, Hematoma, Ecchymosis                       Assessment:  71F RH w/ AS w/ prior open heart aortic valve replacement 2019, HTN, BETSEY, former smoker, Emphysema/ chronic bronchitis, GERD s/p laparoscopic vertical sleeve gastrectomy    Endocarditis of prosthetic aortic valve S/P redo sternotomy, prosthetic valve endocarditis, 21mm homograft, placement of epicardial lead on 4/19   Hemodynamic instability   CHB / Sick sinus syndrome   Hypovolemia  Post op respiratory insufficiency   Acute blood loss anemia  Stress hyperglycemia  Obesity OHS / BETSEY   Right PCA infarct         Plan:   ***Neuro***  CTH on 4/9 with Right PCA infarct, MRI on 4/10 Acute R occipital lobe infarct in a R PCA vascular distribution with an additional punctate acute infarct involving the L cerebellum and R splenium of the corpus callosum   [x] Nonfocal   Post operative neuro assessment   Sleep regimen with Melatonin     ***Cardiovascular***  Invasive hemodynamic monitoring, assess perfusion indices   SR / CVP 12 / MAP 73 / Hct 23.9 / Lactate 1.6  [x] Dobutamine 1mcg  [x] Cardene 5mcg   Continuos reassessment of hemodynamics   EP following for SSS / CHB - tentatively planned for PPM tomorrow as per EP recs   Monitor chest tube outputs  [x] ASA   Serial EKG and cardiac enzymes     ***Pulmonary***  [x] RA  Continue to monitor SpO2 via pulse oximetry  Encourage bedside spirometry   Continue w/ bronchodilator              ***GI***  [x]  Diet: PO low sodium consistent carb diet. / NPO after midnight   [x] Protonix    Bowel regimen with Miralax and Senna         ***Renal***  Continue to monitor I/Os, BUN/Creatinine.   Replete lytes PRN  Sharma present - may d/c later today   Diuresis with Lasix and Aldactone       ***ID***  ID following, appreciate recommendations   Endocarditis, c/w Ceftriaxone and Vancomycin   Tissue Cx on 4/19 NG, pending Fungal Cx, BCx on 4/19 NGTD       ***Endocrine***  [x] Hyperglycemia : HbA1c 6.3 %                - [x] Insulin gtt               - Need tight glycemic control to prevent wound infection.          Patient requires continuous monitoring with bedside rhythm monitoring, pulse oximetry monitoring, and continuous central venous and arterial pressure monitoring; and intermittent blood gas analysis. Care plan discussed with the ICU care team.   Patient remained critical, at risk for life threatening decompensation.    I have spent 30 minutes providing critical care management to this patient.    By signing my name below, I, Leah Armenta, attest that this documentation has been prepared under the direction and in the presence of LICO Steward  Electronically signed: Christopher Martinez, 04-20-22 @ 19:27    I, LICO Steward, personally performed the services described in this documentation. all medical record entries made by the jancieibe were at my direction and in my presence. I have reviewed the chart and agree that the record reflects my personal performance and is accurate and complete  Electronically signed: LICO Steward  Patient seen and examined at the bedside.    Remained critically ill on continuous ICU monitoring.    OBJECTIVE:  Vital Signs Last 24 Hrs  T(C): 36.9 (20 Apr 2022 16:00), Max: 37.4 (19 Apr 2022 21:00)  T(F): 98.4 (20 Apr 2022 16:00), Max: 99.3 (19 Apr 2022 21:00)  HR: 80 (20 Apr 2022 19:00) (51 - 103)  BP: 139/67  BP(mean): 80  RR: 27 (20 Apr 2022 19:00) (3 - 35)  SpO2: 92% (20 Apr 2022 19:00) (74% - 100%)      Physical Exam:   General: NAD   Neurology: AAOx3, no focal deficits, strength equal x4   Eyes: bilateral pupils equal and reactive   ENT/Neck: Neck supple, trachea midline, No JVD   Respiratory: Rales noted bilaterally   CV: RRR, S1S2, no murmurs        [x] Sternal dressing, [x] Pleural CT         [x] Sinus Bradycardia rate 27 [x] Temporary pacing - DDD 82   Abdominal: Soft, NT, ND +BS,   Extremities: 1-2+ pedal edema noted, + peripheral pulses   Skin: maceration/irritation under folds in pannus and under breast folds                      Assessment:  71F RH w/ AS w/ prior open heart aortic valve replacement 2019, HTN, BETSEY, former smoker, Emphysema/ chronic bronchitis, GERD s/p laparoscopic vertical sleeve gastrectomy    Endocarditis of prosthetic aortic valve S/P redo sternotomy, prosthetic valve endocarditis, 21mm homograft, placement of epicardial lead on 4/19   Hemodynamic instability   CHB / Sick sinus syndrome   Hypovolemia  Post op respiratory insufficiency   Acute blood loss anemia  Stress hyperglycemia  Obesity OHS / BETSEY   Right PCA infarct         Plan:   ***Neuro***  CTH on 4/9 with Right PCA infarct, MRI on 4/10 Acute R occipital lobe infarct in a R PCA vascular distribution with an additional punctate acute infarct involving the L cerebellum and R splenium of the corpus callosum   [x] Nonfocal, asymptomatic  Post operative neuro assessment   Sleep regimen with Melatonin     ***Cardiovascular***  Invasive hemodynamic monitoring, assess perfusion indices   SR / CVP 12 / MAP 73 / Hct 23.9 / Lactate 1.6  [x] Dobutamine 1mcg for chronotropy  [x] Cardene 5mcg   Continuos reassessment of hemodynamics   EP following for SSS / CHB - tentatively planned for micra PPM tomorrow as per EP recs   Monitor chest tube outputs  [x] ASA   Serial EKG and cardiac enzymes     ***Pulmonary***  [x] RA  Continue to monitor SpO2 via pulse oximetry  Encourage bedside spirometry   Continue w/ bronchodilator              ***GI***  [x]  Diet: PO low sodium consistent carb diet. / NPO after midnight   [x] Protonix    Bowel regimen with Miralax and Senna         ***Renal***  Continue to monitor I/Os, BUN/Creatinine.   Replete lytes PRN  Sharma d/c'd today, DTV  Diuresis with Lasix and Aldactone       ***ID***  ID following, appreciate recommendations   Endocarditis, c/w Ceftriaxone and Vancomycin   Tissue Cx on 4/19 NG, pending Fungal Cx  New BCx + > continue broad spectrum abx      ***Endocrine***  [x] Hyperglycemia : HbA1c 6.3 %                - [x] Insulin gtt               - Need tight glycemic control to prevent wound infection.          Patient requires continuous monitoring with bedside rhythm monitoring, pulse oximetry monitoring, and continuous central venous and arterial pressure monitoring; and intermittent blood gas analysis. Care plan discussed with the ICU care team.   Patient remained critical, at risk for life threatening decompensation.    I have spent 30 minutes providing critical care management to this patient.    By signing my name below, I, Leah Armenta, attest that this documentation has been prepared under the direction and in the presence of LICO Steward  Electronically signed: Christopher Martinez, 04-20-22 @ 19:27    I, LICO Steward, personally performed the services described in this documentation. all medical record entries made by the scribe were at my direction and in my presence. I have reviewed the chart and agree that the record reflects my personal performance and is accurate and complete  Electronically signed: LICO Steward

## 2022-04-21 LAB
ALBUMIN SERPL ELPH-MCNC: 3.8 G/DL — SIGNIFICANT CHANGE UP (ref 3.3–5)
ALP SERPL-CCNC: 51 U/L — SIGNIFICANT CHANGE UP (ref 40–120)
ALT FLD-CCNC: 26 U/L — SIGNIFICANT CHANGE UP (ref 10–45)
ANION GAP SERPL CALC-SCNC: 12 MMOL/L — SIGNIFICANT CHANGE UP (ref 5–17)
APTT BLD: 25.6 SEC — LOW (ref 27.5–35.5)
AST SERPL-CCNC: 30 U/L — SIGNIFICANT CHANGE UP (ref 10–40)
BASE EXCESS BLDV CALC-SCNC: 2.3 MMOL/L — HIGH (ref -2–2)
BASE EXCESS BLDV CALC-SCNC: 2.5 MMOL/L — HIGH (ref -2–2)
BILIRUB SERPL-MCNC: 0.6 MG/DL — SIGNIFICANT CHANGE UP (ref 0.2–1.2)
BUN SERPL-MCNC: 20 MG/DL — SIGNIFICANT CHANGE UP (ref 7–23)
CA-I SERPL-SCNC: 1.15 MMOL/L — SIGNIFICANT CHANGE UP (ref 1.15–1.33)
CA-I SERPL-SCNC: 1.19 MMOL/L — SIGNIFICANT CHANGE UP (ref 1.15–1.33)
CALCIUM SERPL-MCNC: 8.5 MG/DL — SIGNIFICANT CHANGE UP (ref 8.4–10.5)
CHLORIDE BLDV-SCNC: 108 MMOL/L — SIGNIFICANT CHANGE UP (ref 96–108)
CHLORIDE BLDV-SCNC: 109 MMOL/L — HIGH (ref 96–108)
CHLORIDE SERPL-SCNC: 104 MMOL/L — SIGNIFICANT CHANGE UP (ref 96–108)
CO2 BLDV-SCNC: 29 MMOL/L — HIGH (ref 22–26)
CO2 BLDV-SCNC: 29 MMOL/L — HIGH (ref 22–26)
CO2 SERPL-SCNC: 23 MMOL/L — SIGNIFICANT CHANGE UP (ref 22–31)
CREAT SERPL-MCNC: 0.61 MG/DL — SIGNIFICANT CHANGE UP (ref 0.5–1.3)
EGFR: 96 ML/MIN/1.73M2 — SIGNIFICANT CHANGE UP
GAS PNL BLDA: SIGNIFICANT CHANGE UP
GAS PNL BLDV: 137 MMOL/L — SIGNIFICANT CHANGE UP (ref 136–145)
GAS PNL BLDV: 140 MMOL/L — SIGNIFICANT CHANGE UP (ref 136–145)
GAS PNL BLDV: SIGNIFICANT CHANGE UP
GLUCOSE BLDC GLUCOMTR-MCNC: 104 MG/DL — HIGH (ref 70–99)
GLUCOSE BLDC GLUCOMTR-MCNC: 108 MG/DL — HIGH (ref 70–99)
GLUCOSE BLDC GLUCOMTR-MCNC: 110 MG/DL — HIGH (ref 70–99)
GLUCOSE BLDC GLUCOMTR-MCNC: 119 MG/DL — HIGH (ref 70–99)
GLUCOSE BLDC GLUCOMTR-MCNC: 131 MG/DL — HIGH (ref 70–99)
GLUCOSE BLDC GLUCOMTR-MCNC: 133 MG/DL — HIGH (ref 70–99)
GLUCOSE BLDC GLUCOMTR-MCNC: 135 MG/DL — HIGH (ref 70–99)
GLUCOSE BLDC GLUCOMTR-MCNC: 140 MG/DL — HIGH (ref 70–99)
GLUCOSE BLDC GLUCOMTR-MCNC: 142 MG/DL — HIGH (ref 70–99)
GLUCOSE BLDC GLUCOMTR-MCNC: 149 MG/DL — HIGH (ref 70–99)
GLUCOSE BLDC GLUCOMTR-MCNC: 153 MG/DL — HIGH (ref 70–99)
GLUCOSE BLDC GLUCOMTR-MCNC: 180 MG/DL — HIGH (ref 70–99)
GLUCOSE BLDC GLUCOMTR-MCNC: 95 MG/DL — SIGNIFICANT CHANGE UP (ref 70–99)
GLUCOSE BLDC GLUCOMTR-MCNC: 96 MG/DL — SIGNIFICANT CHANGE UP (ref 70–99)
GLUCOSE BLDV-MCNC: 119 MG/DL — HIGH (ref 70–99)
GLUCOSE BLDV-MCNC: 80 MG/DL — SIGNIFICANT CHANGE UP (ref 70–99)
GLUCOSE SERPL-MCNC: 90 MG/DL — SIGNIFICANT CHANGE UP (ref 70–99)
HCO3 BLDV-SCNC: 28 MMOL/L — SIGNIFICANT CHANGE UP (ref 22–29)
HCO3 BLDV-SCNC: 28 MMOL/L — SIGNIFICANT CHANGE UP (ref 22–29)
HCT VFR BLD CALC: 24.6 % — LOW (ref 34.5–45)
HCT VFR BLDA CALC: 24 % — LOW (ref 34.5–46.5)
HCT VFR BLDA CALC: 26 % — LOW (ref 34.5–46.5)
HGB BLD CALC-MCNC: 8 G/DL — LOW (ref 11.7–16.1)
HGB BLD CALC-MCNC: 8.7 G/DL — LOW (ref 11.7–16.1)
HGB BLD-MCNC: 8 G/DL — LOW (ref 11.5–15.5)
HOROWITZ INDEX BLDV+IHG-RTO: 36 — SIGNIFICANT CHANGE UP
HOROWITZ INDEX BLDV+IHG-RTO: 44 — SIGNIFICANT CHANGE UP
INR BLD: 1.11 RATIO — SIGNIFICANT CHANGE UP (ref 0.88–1.16)
LACTATE BLDV-MCNC: 1 MMOL/L — SIGNIFICANT CHANGE UP (ref 0.7–2)
LACTATE BLDV-MCNC: 1.4 MMOL/L — SIGNIFICANT CHANGE UP (ref 0.7–2)
MAGNESIUM SERPL-MCNC: 2.3 MG/DL — SIGNIFICANT CHANGE UP (ref 1.6–2.6)
MCHC RBC-ENTMCNC: 29.6 PG — SIGNIFICANT CHANGE UP (ref 27–34)
MCHC RBC-ENTMCNC: 32.5 GM/DL — SIGNIFICANT CHANGE UP (ref 32–36)
MCV RBC AUTO: 91.1 FL — SIGNIFICANT CHANGE UP (ref 80–100)
NRBC # BLD: 0 /100 WBCS — SIGNIFICANT CHANGE UP (ref 0–0)
PCO2 BLDV: 46 MMHG — HIGH (ref 39–42)
PCO2 BLDV: 47 MMHG — HIGH (ref 39–42)
PH BLDV: 7.38 — SIGNIFICANT CHANGE UP (ref 7.32–7.43)
PH BLDV: 7.39 — SIGNIFICANT CHANGE UP (ref 7.32–7.43)
PHOSPHATE SERPL-MCNC: 3 MG/DL — SIGNIFICANT CHANGE UP (ref 2.5–4.5)
PLATELET # BLD AUTO: 170 K/UL — SIGNIFICANT CHANGE UP (ref 150–400)
PO2 BLDV: 42 MMHG — SIGNIFICANT CHANGE UP (ref 25–45)
PO2 BLDV: 43 MMHG — SIGNIFICANT CHANGE UP (ref 25–45)
POTASSIUM BLDV-SCNC: 3.8 MMOL/L — SIGNIFICANT CHANGE UP (ref 3.5–5.1)
POTASSIUM BLDV-SCNC: 4.1 MMOL/L — SIGNIFICANT CHANGE UP (ref 3.5–5.1)
POTASSIUM SERPL-MCNC: 3.9 MMOL/L — SIGNIFICANT CHANGE UP (ref 3.5–5.3)
POTASSIUM SERPL-SCNC: 3.9 MMOL/L — SIGNIFICANT CHANGE UP (ref 3.5–5.3)
PROT SERPL-MCNC: 5.9 G/DL — LOW (ref 6–8.3)
PROTHROM AB SERPL-ACNC: 12.8 SEC — SIGNIFICANT CHANGE UP (ref 10.5–13.4)
RBC # BLD: 2.7 M/UL — LOW (ref 3.8–5.2)
RBC # FLD: 15 % — HIGH (ref 10.3–14.5)
SAO2 % BLDV: 70.9 % — SIGNIFICANT CHANGE UP (ref 67–88)
SAO2 % BLDV: 74.3 % — SIGNIFICANT CHANGE UP (ref 67–88)
SODIUM SERPL-SCNC: 139 MMOL/L — SIGNIFICANT CHANGE UP (ref 135–145)
WBC # BLD: 19.08 K/UL — HIGH (ref 3.8–10.5)
WBC # FLD AUTO: 19.08 K/UL — HIGH (ref 3.8–10.5)

## 2022-04-21 PROCEDURE — 33274 TCAT INSJ/RPL PERM LDLS PM: CPT | Mod: Q0

## 2022-04-21 PROCEDURE — 99232 SBSQ HOSP IP/OBS MODERATE 35: CPT

## 2022-04-21 PROCEDURE — 71045 X-RAY EXAM CHEST 1 VIEW: CPT | Mod: 26,77

## 2022-04-21 PROCEDURE — 71045 X-RAY EXAM CHEST 1 VIEW: CPT | Mod: 26

## 2022-04-21 PROCEDURE — 93010 ELECTROCARDIOGRAM REPORT: CPT

## 2022-04-21 PROCEDURE — 99233 SBSQ HOSP IP/OBS HIGH 50: CPT

## 2022-04-21 PROCEDURE — 99291 CRITICAL CARE FIRST HOUR: CPT | Mod: 24

## 2022-04-21 RX ORDER — POTASSIUM CHLORIDE 20 MEQ
10 PACKET (EA) ORAL
Refills: 0 | Status: COMPLETED | OUTPATIENT
Start: 2022-04-21 | End: 2022-04-21

## 2022-04-21 RX ORDER — INSULIN LISPRO 100/ML
VIAL (ML) SUBCUTANEOUS
Refills: 0 | Status: DISCONTINUED | OUTPATIENT
Start: 2022-04-21 | End: 2022-04-27

## 2022-04-21 RX ORDER — POTASSIUM CHLORIDE 20 MEQ
40 PACKET (EA) ORAL ONCE
Refills: 0 | Status: COMPLETED | OUTPATIENT
Start: 2022-04-21 | End: 2022-04-21

## 2022-04-21 RX ADMIN — OXYCODONE HYDROCHLORIDE 5 MILLIGRAM(S): 5 TABLET ORAL at 22:30

## 2022-04-21 RX ADMIN — CHLORHEXIDINE GLUCONATE 1 APPLICATION(S): 213 SOLUTION TOPICAL at 06:39

## 2022-04-21 RX ADMIN — Medication 40 MILLIEQUIVALENT(S): at 14:57

## 2022-04-21 RX ADMIN — Medication 500 MILLIGRAM(S): at 06:44

## 2022-04-21 RX ADMIN — Medication 3 MILLILITER(S): at 05:51

## 2022-04-21 RX ADMIN — Medication 3 MILLILITER(S): at 23:11

## 2022-04-21 RX ADMIN — SPIRONOLACTONE 25 MILLIGRAM(S): 25 TABLET, FILM COATED ORAL at 17:28

## 2022-04-21 RX ADMIN — Medication 3 MILLILITER(S): at 17:40

## 2022-04-21 RX ADMIN — Medication 40 MILLIGRAM(S): at 06:44

## 2022-04-21 RX ADMIN — Medication 81 MILLIGRAM(S): at 12:11

## 2022-04-21 RX ADMIN — Medication 5 MILLIGRAM(S): at 22:38

## 2022-04-21 RX ADMIN — Medication 650 MILLIGRAM(S): at 17:25

## 2022-04-21 RX ADMIN — GABAPENTIN 100 MILLIGRAM(S): 400 CAPSULE ORAL at 06:44

## 2022-04-21 RX ADMIN — CEFTRIAXONE 100 MILLIGRAM(S): 500 INJECTION, POWDER, FOR SOLUTION INTRAMUSCULAR; INTRAVENOUS at 09:37

## 2022-04-21 RX ADMIN — GABAPENTIN 100 MILLIGRAM(S): 400 CAPSULE ORAL at 22:30

## 2022-04-21 RX ADMIN — NICARDIPINE HYDROCHLORIDE 25 MG/HR: 30 CAPSULE, EXTENDED RELEASE ORAL at 03:21

## 2022-04-21 RX ADMIN — CHLORHEXIDINE GLUCONATE 5 MILLILITER(S): 213 SOLUTION TOPICAL at 06:45

## 2022-04-21 RX ADMIN — NYSTATIN CREAM 1 APPLICATION(S): 100000 CREAM TOPICAL at 06:45

## 2022-04-21 RX ADMIN — Medication 650 MILLIGRAM(S): at 00:42

## 2022-04-21 RX ADMIN — GABAPENTIN 100 MILLIGRAM(S): 400 CAPSULE ORAL at 14:57

## 2022-04-21 RX ADMIN — Medication 650 MILLIGRAM(S): at 07:13

## 2022-04-21 RX ADMIN — Medication 650 MILLIGRAM(S): at 06:45

## 2022-04-21 RX ADMIN — OXYCODONE HYDROCHLORIDE 5 MILLIGRAM(S): 5 TABLET ORAL at 23:00

## 2022-04-21 RX ADMIN — CHLORHEXIDINE GLUCONATE 5 MILLILITER(S): 213 SOLUTION TOPICAL at 17:25

## 2022-04-21 RX ADMIN — Medication 250 MILLIGRAM(S): at 06:43

## 2022-04-21 RX ADMIN — Medication 650 MILLIGRAM(S): at 01:12

## 2022-04-21 RX ADMIN — Medication 500 MILLIGRAM(S): at 17:24

## 2022-04-21 RX ADMIN — Medication 5 MILLIGRAM(S): at 22:29

## 2022-04-21 RX ADMIN — Medication 250 MILLIGRAM(S): at 17:24

## 2022-04-21 RX ADMIN — PANTOPRAZOLE SODIUM 40 MILLIGRAM(S): 20 TABLET, DELAYED RELEASE ORAL at 06:47

## 2022-04-21 RX ADMIN — SENNA PLUS 2 TABLET(S): 8.6 TABLET ORAL at 22:29

## 2022-04-21 RX ADMIN — SPIRONOLACTONE 25 MILLIGRAM(S): 25 TABLET, FILM COATED ORAL at 06:47

## 2022-04-21 RX ADMIN — NYSTATIN CREAM 1 APPLICATION(S): 100000 CREAM TOPICAL at 17:40

## 2022-04-21 RX ADMIN — Medication 50 MILLIEQUIVALENT(S): at 03:21

## 2022-04-21 RX ADMIN — Medication 3 MILLILITER(S): at 11:35

## 2022-04-21 RX ADMIN — Medication 650 MILLIGRAM(S): at 18:49

## 2022-04-21 RX ADMIN — Medication 50 MILLIEQUIVALENT(S): at 01:09

## 2022-04-21 NOTE — PROGRESS NOTE ADULT - SUBJECTIVE AND OBJECTIVE BOX
24H hour events: continues to be paced @80bpm, no ventricular escape     MEDICATIONS:  aspirin  chewable 81 milliGRAM(s) Oral daily  DOBUTamine Infusion 1 MICROgram(s)/kG/Min IV Continuous <Continuous>  furosemide    Tablet 40 milliGRAM(s) Oral daily  niCARdipine Infusion 5 mG/Hr IV Continuous <Continuous>  spironolactone 25 milliGRAM(s) Oral every 12 hours    cefTRIAXone   IVPB      cefTRIAXone   IVPB 2000 milliGRAM(s) IV Intermittent every 24 hours  vancomycin  IVPB 1000 milliGRAM(s) IV Intermittent every 12 hours    albuterol/ipratropium for Nebulization 3 milliLiter(s) Nebulizer every 6 hours    acetaminophen     Tablet .. 650 milliGRAM(s) Oral every 6 hours  acetaminophen     Tablet .. 650 milliGRAM(s) Oral every 6 hours PRN  gabapentin 100 milliGRAM(s) Oral every 8 hours  melatonin 5 milliGRAM(s) Oral at bedtime  oxyCODONE    IR 10 milliGRAM(s) Oral every 4 hours PRN  oxyCODONE    IR 5 milliGRAM(s) Oral every 4 hours PRN    bisacodyl Suppository 10 milliGRAM(s) Rectal once  pantoprazole    Tablet 40 milliGRAM(s) Oral before breakfast  polyethylene glycol 3350 17 Gram(s) Oral daily  polyethylene glycol 3350 17 Gram(s) Oral daily  senna 2 Tablet(s) Oral at bedtime    dextrose 50% Injectable 50 milliLiter(s) IV Push every 15 minutes  dextrose 50% Injectable 25 milliLiter(s) IV Push every 15 minutes  insulin lispro (ADMELOG) corrective regimen sliding scale   SubCutaneous Before meals and at bedtime    ascorbic acid 500 milliGRAM(s) Oral two times a day  chlorhexidine 0.12% Liquid 5 milliLiter(s) Oral Mucosa two times a day  chlorhexidine 2% Cloths 1 Application(s) Topical daily  nystatin Powder 1 Application(s) Topical two times a day  sodium chloride 0.9%. 1000 milliLiter(s) IV Continuous <Continuous>      REVIEW OF SYSTEMS:  See HPI, otherwise ROS negative.    PHYSICAL EXAM:  T(C): 36.3 (04-21-22 @ 08:00), Max: 37.4 (04-20-22 @ 12:00)  HR: 80 (04-21-22 @ 10:00) (80 - 82)  BP: --  RR: 25 (04-21-22 @ 10:00) (12 - 35)  SpO2: 95% (04-21-22 @ 10:00) (74% - 100%)  Wt(kg): --  I&O's Summary    20 Apr 2022 07:01  -  21 Apr 2022 07:00  --------------------------------------------------------  IN: 2049.4 mL / OUT: 2190 mL / NET: -140.6 mL    21 Apr 2022 07:01  -  21 Apr 2022 11:20  --------------------------------------------------------  IN: 135.8 mL / OUT: 1950 mL / NET: -1814.2 mL      Appearance: Alert. NAD	  Cardiovascular: +S1S2 RRR no m/g/r  Respiratory: CTA B/L	  Gastrointestinal:  Soft, NT. ND. +BS	  Skin: No rashes	  Extremities: No edema BLE  Vascular: Peripheral pulses palpable 2+ bilaterally    LABS:	 	    CBC Full  -  ( 21 Apr 2022 01:14 )  WBC Count : 19.08 K/uL  Hemoglobin : 8.0 g/dL  Hematocrit : 24.6 %  Platelet Count - Automated : 170 K/uL  Mean Cell Volume : 91.1 fl  Mean Cell Hemoglobin : 29.6 pg  Mean Cell Hemoglobin Concentration : 32.5 gm/dL  Auto Neutrophil # : x  Auto Lymphocyte # : x  Auto Monocyte # : x  Auto Eosinophil # : x  Auto Basophil # : x  Auto Neutrophil % : x  Auto Lymphocyte % : x  Auto Monocyte % : x  Auto Eosinophil % : x  Auto Basophil % : x    04-21    139  |  104  |  20  ----------------------------<  90  3.9   |  23  |  0.61  04-20    139  |  103  |  12  ----------------------------<  148<H>  3.9   |  23  |  0.61    Ca    8.5      21 Apr 2022 01:14  Ca    8.4      20 Apr 2022 00:46  Phos  3.0     04-21  Phos  3.2     04-20  Mg     2.3     04-21  Mg     2.2     04-20    TPro  5.9<L>  /  Alb  3.8  /  TBili  0.6  /  DBili  x   /  AST  30  /  ALT  26  /  AlkPhos  51  04-21  TPro  5.8<L>  /  Alb  3.8  /  TBili  1.0  /  DBili  x   /  AST  51<H>  /  ALT  26  /  AlkPhos  44  04-20      TSH: Thyroid Stimulating Hormone, Serum: 1.00 uIU/mL (04.12.22 @ 15:28)    CARDIAC MARKERS:    TELEMETRY:  80  	    ECG:  	< from: 12 Lead ECG (04.09.22 @ 17:55) >    Ventricular Rate 76 BPM    Atrial Rate 76 BPM    P-R Interval 344 ms    QRS Duration 116 ms    Q-T Interval 418 ms    QTC Calculation(Bazett) 470 ms    P Axis 75 degrees    R Axis -6 degrees    T Axis 122 degrees    Diagnosis Line SINUS RHYTHM [id number] DEGREE A-V BLOCK  INCOMPLETE LEFT BUNDLE BRANCH BLOCK  MINIMAL VOLTAGE CRITERIA FOR LVH, MAY BE NORMAL VARIANT ( [place] product )  WHEN COMPARED WITH ECG OF 16-NOV-2017 02:51,  SIGNIFICANT CHANGES HAVE OCCURRED    < end of copied text >

## 2022-04-21 NOTE — OCCUPATIONAL THERAPY INITIAL EVALUATION ADULT - TRANSFER TRAINING, PT EVAL
Pt will complete transfers with independence within 4 weeks
Pt will be I with all functional transfers  within 4 weeks.

## 2022-04-21 NOTE — OCCUPATIONAL THERAPY INITIAL EVALUATION ADULT - NS ASR FOLLOW COMMAND OT EVAL
100% of the time/able to follow single-step instructions/unable to follow multi-step instructions
100% of the time/able to follow single-step instructions

## 2022-04-21 NOTE — OCCUPATIONAL THERAPY INITIAL EVALUATION ADULT - COGNITIVE, VISUAL PERCEPTUAL, OT EVAL
Pt will recall 4/4 words after 5 min within 4 weeks.
GOAL: Pt will independently scan environment to L side to safely navigate environment and complete ADLs 2* visual field cut in 4 weeks

## 2022-04-21 NOTE — PROVIDER CONTACT NOTE (OTHER) - ASSESSMENT
Patient having some confusion. Patient stated she was at home. When asked again patient was able to state she was at [facility] and came in for a slight stroke. Patient walked to the bathroom and urine was dark in color and had an odor. Patient remains A&Ox4 at this time with reorientation. RN assessed patient at bedside. PA made aware
Neuro assessment remains unchanged, NIHSS 2. Pt denies any pain, SOB, or discomfort.
Patient complaining of sore throat. Patient denies shortness or breath, denies chest pain, denies pressure in chest. Patient requesting cough drop. RN assessed patient at bedside. Patient has PMH of GERD. Patient troponins being drawn Q8
FS q1
Patient forgetful at times. Patient asking repetitive questions. Patient remains A&Ox4. Vital signs stable. RN assessed patient at bedside. PA made aware
Patient getting increasingly more confused. Patient repeating questions multiple times. Patient still remains A&Ox4 but needs reorientation at times. UA sent, awaiting results. RN assessed patient at bedside. Bed alarm on patient. PA made aware

## 2022-04-21 NOTE — PROGRESS NOTE ADULT - SUBJECTIVE AND OBJECTIVE BOX
Neurology Progress note;     HPI:  71F RH w/ AS w/ prior open heart aortic valve replacement 2019, HTN, BETSEY, former smoker, Emphysema/ chronic bronchitis, GERD s/p laparoscopic vertical sleeve gastrectomy presented with acute onset speech disturbance, vision change and gait imbalance. Per EMS, LKN: 4/9/22 at 10am per family. Pt's family reported blurry vision, gait imbalance, and speech disturbance (mild loss in fluency). Pt unable to provide full hx because she was confused on timing. She  endorsed blurry vision but denied any weakness, numbness/tingling. Pt takes a baby aspirin 81mg daily. Pt ambulates without assistance or assistive devices at baseline.  LKN: 4/9/22 at 10am  NIHSS: 3  preMRS: 0  Pt was not a candidate for tpa due to outside tpa window   Pt was not a candidate for mechanical thrombectomy due to no large vessel occlusion on CTA    SUBJECTIVE: patient seen and examined. now in CTICU. extubated, doing well in chair     Medications:      MEDICATIONS  (STANDING):  acetaminophen     Tablet .. 650 milliGRAM(s) Oral every 6 hours  albuterol/ipratropium for Nebulization 3 milliLiter(s) Nebulizer every 6 hours  ascorbic acid 500 milliGRAM(s) Oral two times a day  aspirin  chewable 81 milliGRAM(s) Oral daily  bisacodyl Suppository 10 milliGRAM(s) Rectal once  cefTRIAXone   IVPB      cefTRIAXone   IVPB 2000 milliGRAM(s) IV Intermittent every 24 hours  chlorhexidine 0.12% Liquid 5 milliLiter(s) Oral Mucosa two times a day  chlorhexidine 2% Cloths 1 Application(s) Topical daily  dextrose 50% Injectable 50 milliLiter(s) IV Push every 15 minutes  dextrose 50% Injectable 25 milliLiter(s) IV Push every 15 minutes  DOBUTamine Infusion 1 MICROgram(s)/kG/Min (2.57 mL/Hr) IV Continuous <Continuous>  furosemide    Tablet 40 milliGRAM(s) Oral daily  gabapentin 100 milliGRAM(s) Oral every 8 hours  insulin regular Infusion 3 Unit(s)/Hr (3 mL/Hr) IV Continuous <Continuous>  melatonin 5 milliGRAM(s) Oral at bedtime  niCARdipine Infusion 5 mG/Hr (25 mL/Hr) IV Continuous <Continuous>  nystatin Powder 1 Application(s) Topical two times a day  pantoprazole    Tablet 40 milliGRAM(s) Oral before breakfast  polyethylene glycol 3350 17 Gram(s) Oral daily  polyethylene glycol 3350 17 Gram(s) Oral daily  senna 2 Tablet(s) Oral at bedtime  sodium chloride 0.9%. 1000 milliLiter(s) (10 mL/Hr) IV Continuous <Continuous>  spironolactone 25 milliGRAM(s) Oral every 12 hours  vancomycin  IVPB 1000 milliGRAM(s) IV Intermittent every 12 hours    MEDICATIONS  (PRN):  acetaminophen     Tablet .. 650 milliGRAM(s) Oral every 6 hours PRN Mild Pain (1 - 3)  oxyCODONE    IR 5 milliGRAM(s) Oral every 4 hours PRN Moderate Pain (4 - 6)  oxyCODONE    IR 10 milliGRAM(s) Oral every 4 hours PRN Severe Pain (7 - 10)        PHYSICAL EXAM:     ICU Vital Signs Last 24 Hrs  T(C): 36.3 (21 Apr 2022 08:00), Max: 37.4 (20 Apr 2022 12:00)  T(F): 97.4 (21 Apr 2022 08:00), Max: 99.3 (20 Apr 2022 12:00)  HR: 80 (21 Apr 2022 08:00) (51 - 103)  BP: --  BP(mean): --  ABP: 129/63 (21 Apr 2022 08:00) (0/0 - 145/65)  ABP(mean): 88 (21 Apr 2022 08:00) (0 - 93)  RR: 16 (21 Apr 2022 08:00) (3 - 35)  SpO2: 94% (21 Apr 2022 08:00) (74% - 100%)      General: No acute distress  HEENT: EOM intact, LHH to counting (is aware)  Abdomen: Soft, nontender, nondistended   Extremities: No edema    NEUROLOGICAL EXAM:    Mental status: AAOx2- 3, able to follow simple and complex verbal commands. answers questions appropriately, able to name, repeat and recall. no aphasia, no dysarthria   Cranial Nerves: No facial asymmetry, LHHA to counting, no nystagmus, no dysarthria,  tongue midline  Motor exam: Normal tone, no drift, 5/5 RUE, 5/5 RLE, 5/5 LUE, 5/5 LLE, normal fine finger movements.  Sensation: Intact to light touch   Coordination/ Gait: No dysmetria, gait unable in ICU       LABS:                CBC Full  -  ( 21 Apr 2022 01:14 )  WBC Count : 19.08 K/uL  RBC Count : 2.70 M/uL  Hemoglobin : 8.0 g/dL  Hematocrit : 24.6 %  Platelet Count - Automated : 170 K/uL  Mean Cell Volume : 91.1 fl  Mean Cell Hemoglobin : 29.6 pg  Mean Cell Hemoglobin Concentration : 32.5 gm/dL  Auto Neutrophil # : x  Auto Lymphocyte # : x  Auto Monocyte # : x  Auto Eosinophil # : x  Auto Basophil # : x  Auto Neutrophil % : x  Auto Lymphocyte % : x  Auto Monocyte % : x  Auto Eosinophil % : x  Auto Basophil % : x      04-21    139  |  104  |  20  ----------------------------<  90  3.9   |  23  |  0.61    Ca    8.5      21 Apr 2022 01:14  Phos  3.0     04-21  Mg     2.3     04-21    TPro  5.9<L>  /  Alb  3.8  /  TBili  0.6  /  DBili  x   /  AST  30  /  ALT  26  /  AlkPhos  51  04-21      IMAGING: Reviewed by me.   MRI HEAD 04/10/22: Acute right occipital lobe infarct in a right PCA vascular distribution   with an additional punctate acute infarct involving the left cerebellum   and right splenium of the corpus callosum.    04/09/22:  Brain CT: Acute right occipital lobe infarct in the distribution of the   right PCA. No evidence for hemorrhage.  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 No   hemodynamically significant stenosis within the anterior circulation.  Brain CTA: No large vessel occlusion or stenosis. 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  Perfusion maps: Core infarct in the distribution of the right PCA.   Questionable areas of brain at risk in the right PCA as well as the   bilateral temporal and right frontal region.

## 2022-04-21 NOTE — OCCUPATIONAL THERAPY INITIAL EVALUATION ADULT - ADDITIONAL COMMENTS
(cont from above) numbness/tingling. No tpa or MT. Neuro exam notable for LHH, possible 4+/5 L hemiparesis. CTH w/ Impression: LHH w/ possible L hemiparesis possibly 2/2 R brain dysfunction, possibly 2/2 R PCA infarct seen on CTH vs. R MCA territory infarct

## 2022-04-21 NOTE — OCCUPATIONAL THERAPY INITIAL EVALUATION ADULT - PERTINENT HX OF CURRENT PROBLEM, REHAB EVAL
71F RH w/ AS w/ prior open heart aortic valve replacement 2019, HTN, BETSEY, former smoker, Emphysema/ chronic bronchitis, GERD s/p laparoscopic vertical sleeve gastrectomy presents with acute onset speech disturbance, vision change and gait imbalance. Per EMS, LKN: 4/9/22 at 10am per family. Pt's family reported blurry vision, gait imbalance, and speech disturbance (mild loss in fluency). She still endorses blurry vision but denies any weakness,

## 2022-04-21 NOTE — OCCUPATIONAL THERAPY INITIAL EVALUATION ADULT - DIAGNOSIS, OT EVAL
Pt p/w deficits in strength and balance impacting ADLs and mobility Pt p/w deficits in cognition, vision, strength and balance impacting ADLs and mobility

## 2022-04-21 NOTE — OCCUPATIONAL THERAPY INITIAL EVALUATION ADULT - PLANNED THERAPY INTERVENTIONS, OT EVAL
ADL retraining/balance training/bed mobility training/transfer training ADL retraining/balance training/bed mobility training/cognitive, visual perceptual/transfer training

## 2022-04-21 NOTE — PROGRESS NOTE ADULT - ASSESSMENT
71F RH w/ AS w/ prior open heart aortic valve replacement 2019, HTN, BETSEY, former smoker, Emphysema/ chronic bronchitis, GERD s/p laparoscopic vertical sleeve gastrectomy presented with acute onset speech disturbance, vision change and gait imbalance. Per EMS, LKN: 4/9/22 at 10am per family. Pt's family reported blurry vision, gait imbalance, and speech disturbance (mild loss in fluency). She still endorses blurry vision but denies any weakness, numbness/tingling. Pt takes a baby aspirin 81mg daily. NIHSS: 3, preMRS: 0. No tpa or MT.    o/e with HA   s/p CTS on 4/18 4/19 intubated on precedex, SERVIN, plan for CPAP  + aortic root abscess/endocarditis   extubated, SERVIN AAOx2-3     Impression:  Right PCA infarct etiology likely cardioembolic given findings of atrial mass, also incidental 6 mm ACOMM aneurysm.     NEURO: neurologically without acute chagne, continue close monitoring for neurologic deterioration, found to have an incidental aneurysm 6.7mm ACOM will follow with Dr. Contreras as outpatient for further evaluation and possible treatment, LDL 62: continue home dose statin  MRI Brain w/o noted above,  Physical therapy/OT: AR ;wean sedation as toelrated, on precedex     ANTITHROMBOTIC THERAPY: resume ASA when able , back on      PULMONARY: extubated. monitor airway in CTICU.     CARDIOVASCULAR:  TTE: ef 45%, mild-moderate MR, mild mitral stenosis, left atrial mass with increased gradient, bioprosthetic AV, mild AR, moderately dilated LA, left atrial mass suggestive of myxoma,  cardiac monitoring, S/P aortic valve replacement cardiology consulted, Troponin elevated. ILR placed to rule out A. Fib as possible source.   CT surgery following. Anticipaet cardiac cath and tentative OR pending clinical course. no objection for cath at this time.  s/p CTS 4/19    + aortic root abcess/     SBP goal: normotension being tolerated- avoid SBP > 160mmHg in setting of unsecured itnracranial aneurysm.  now on cardene drip in ICU     GASTROINTESTINAL:  dysphagia screen- passed, tolerating diet       Diet: Regular    RENAL: BUN/Cr without acute change, good urine output , maintain adequate hydration      Na Goal: Greater than 135     Sharma: N    HEMATOLOGY: H/H   anemia , no acute change, no active bleeding Platelets 320, she should have all age and risk appropriate      DVT ppx: Heparin s.c [] LMWH [x]     ID: febrile, no leukocytosis, repeat UA negative for UTI, was on ctx prior for UTI, follow up sensitivities , blood cx taken, infectious workup done ;   ; back on vanco and cefttiaxone pending cultures    for Aortic root abscses and endocarditis.     Other: remaining per CTICU team     DISPOSITION: AR once stable and workup is complete;         CORE MEASURES:        Admission NIHSS: 3     TPA: [] YES [x] NO      LDL/HDL: 62/32     Depression Screen: 0     Statin Therapy: Y     Dysphagia Screen: [x] PASS [] FAIL     Smoking [] YES [x] NO      Afib [] YES [x] NO     Stroke Education [x] YES [] NO    Zenon Vela MD  Vascular Neurology

## 2022-04-21 NOTE — PRE-ANESTHESIA EVALUATION ADULT - NSANTHPMHFT_GEN_ALL_CORE
70 yo F with pmhx of HTN, former smoker, Emphysema, GERD, BETSEY, obesity s/p sleeve gastrectomy, and AS s/p AVR in 2019 now w endocarditis s/p redo AVR now with heart block. Plan for Micra placement today.
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 Denies CP, SOB and Gerd like symptoms.

## 2022-04-21 NOTE — PROGRESS NOTE ADULT - SUBJECTIVE AND OBJECTIVE BOX
Patient seen and examined at the bedside.    Remained critically ill on continuous ICU monitoring.    OBJECTIVE:  Vital Signs Last 24 Hrs  T(C): 36.3 (21 Apr 2022 04:00), Max: 37.4 (20 Apr 2022 12:00)  T(F): 97.3 (21 Apr 2022 04:00), Max: 99.3 (20 Apr 2022 12:00)  HR: 80 (21 Apr 2022 07:30) (51 - 103)  BP: --  BP(mean): --  RR: 18 (21 Apr 2022 07:30) (3 - 35)  SpO2: 93% (21 Apr 2022 07:30) (74% - 100%)      Physical Exam:   General: NAD   Neurology: AAOx3, no focal deficits, strength equal x4   Eyes: bilateral pupils equal and reactive   ENT/Neck: Neck supple, trachea midline, No JVD   Respiratory: Rales noted bilaterally   CV: RRR, S1S2, no murmurs        [x] Sternal dressing, [x] Pleural CT         [x] Sinus Bradycardia [x] Temporary pacing - DDD 82   Abdominal: Soft, NT, ND +BS,   Extremities: 1-2+ pedal edema noted, + peripheral pulses   Skin: maceration/irritation under folds in pannus and under breast folds                      Assessment:  71F RH w/ AS w/ prior open heart aortic valve replacement 2019, HTN, BETSEY, former smoker, Emphysema/ chronic bronchitis, GERD s/p laparoscopic vertical sleeve gastrectomy    Endocarditis of prosthetic aortic valve S/P redo sternotomy, prosthetic valve endocarditis, 21mm homograft, placement of epicardial lead on 4/19   Hemodynamic instability   CHB / Sick sinus syndrome   Hypovolemia  Post op respiratory insufficiency   Acute blood loss anemia  Stress hyperglycemia  Obesity OHS / BETSEY   Right PCA infarct         Plan:   ***Neuro***  CTH on 4/9 with Right PCA infarct, MRI on 4/10 Acute R occipital lobe infarct in a R PCA vascular distribution with an additional punctate acute infarct involving the L cerebellum and R splenium of the corpus callosum   [x] Nonfocal, asymptomatic  Tylenol, Oxycodone, and gabapentin for analgesia   Post operative neuro assessment   Sleep regimen with Melatonin     ***Cardiovascular***  Invasive hemodynamic monitoring, assess perfusion indices   SR / CVP 12 / MAP 73 / Hct 23.9 / Lactate 1.6  [x] Dobutamine 1mcg for chronotropy  [x] Cardene 5mcg   Continuos reassessment of hemodynamics   EP following for SSS / CHB - tentatively planned for micra PPM today as per EP recs   Monitor chest tube outputs  [x] ASA   Serial EKG and cardiac enzymes     ***Pulmonary***  [x] RA  Continue to monitor SpO2 via pulse oximetry  Encourage bedside spirometry   Continue w/ bronchodilator              ***GI***  [x]  NPO for planned procedure  [x] Protonix    Bowel regimen with Miralax and Senna         ***Renal***  Continue to monitor I/Os, BUN/Creatinine.   Replete lytes PRN  Diuresis with Lasix and Aldactone       ***ID***  ID following, appreciate recommendations   Endocarditis, c/w Ceftriaxone and Vancomycin   Tissue Cx on 4/19 NG, pending Fungal Cx  New BCx + > continue broad spectrum abx      ***Endocrine***  [x] Hyperglycemia : HbA1c 6.3 %                - [x] Insulin gtt               - Need tight glycemic control to prevent wound infection.    Patient requires continuous monitoring with bedside rhythm monitoring, pulse oximetry monitoring, and continuous central venous and arterial pressure monitoring; and intermittent blood gas analysis. Care plan discussed with the ICU care team.   Patient remained critical, at risk for life threatening decompensation.    I have spent 30 minutes providing critical care management to this patient.    By signing my name below, I, Lorena Park, attest that this documentation has been prepared under the direction and in the presence of Tanya Patel NP   Electronically signed: Christopher Mc, 04-21-22 @ 07:35    I, Tanya Patel NP , personally performed the services described in this documentation. all medical record entries made by the scribe were at my direction and in my presence. I have reviewed the chart and agree that the record reflects my personal performance and is accurate and complete  Electronically signed: Tanya Patel NP  Patient seen and examined at the bedside.    Remained critically ill on continuous ICU monitoring.    OBJECTIVE:  Vital Signs Last 24 Hrs  T(C): 36.3 (21 Apr 2022 04:00), Max: 37.4 (20 Apr 2022 12:00)  T(F): 97.3 (21 Apr 2022 04:00), Max: 99.3 (20 Apr 2022 12:00)  HR: 80 (21 Apr 2022 07:30) (51 - 103)  BP: --  BP(mean): --  RR: 18 (21 Apr 2022 07:30) (3 - 35)  SpO2: 93% (21 Apr 2022 07:30) (74% - 100%)      Physical Exam:   General: NAD   Neurology: AAOx3, no focal deficits, strength equal x4   Eyes: bilateral pupils equal and reactive   ENT/Neck: Neck supple, trachea midline, No JVD   Respiratory: Rales noted bilaterally   CV: RRR, S1S2, no murmurs        [x] Sternal dressing, [x] Pleural CT         [x] Sinus Bradycardia [x] Temporary pacing - DDD 82   Abdominal: Soft, NT, ND +BS,   Extremities: 1-2+ pedal edema noted, + peripheral pulses   Skin: maceration/irritation under folds in pannus and under breast folds                      Assessment:  71F RH w/ AS w/ prior open heart aortic valve replacement 2019, HTN, BETSEY, former smoker, Emphysema/ chronic bronchitis, GERD s/p laparoscopic vertical sleeve gastrectomy    Endocarditis of prosthetic aortic valve S/P redo sternotomy, prosthetic valve endocarditis, 21mm homograft, placement of epicardial lead on 4/19   Hemodynamic instability   CHB / Sick sinus syndrome   Hypovolemia  Post op respiratory insufficiency   Acute blood loss anemia  Stress hyperglycemia  Obesity OHS / BETSEY   Right PCA infarct         Plan:   ***Neuro***  CTH on 4/9 with Right PCA infarct, MRI on 4/10 Acute R occipital lobe infarct in a R PCA vascular distribution with an additional punctate acute infarct involving the L cerebellum and R splenium of the corpus callosum   [x] Nonfocal, asymptomatic  Tylenol, Oxycodone, and gabapentin for analgesia   Post operative neuro assessment   Sleep regimen with Melatonin     ***Cardiovascular***  Invasive hemodynamic monitoring, assess perfusion indices   SB / CVP 14 / MAP 89 / Hct 24.6% / Lactate 1.0  [x] Dobutamine 1mcg for chronotropy  [x] Cardene 5mcg   Continuos reassessment of hemodynamics   EP following for SSS / CHB - tentatively planned for micra PPM today as per EP recs   Monitor chest tube outputs  [x] ASA   Serial EKG and cardiac enzymes     ***Pulmonary***  [x] RA  Continue to monitor SpO2 via pulse oximetry  Encourage bedside spirometry   Continue w/ bronchodilator              ***GI***  [x]  NPO for planned procedure  [x] Protonix    Bowel regimen with Miralax and Senna         ***Renal***  Continue to monitor I/Os, BUN/Creatinine.   Replete lytes PRN  Diuresis with Lasix and Aldactone       ***ID***  ID following, appreciate recommendations   Endocarditis, c/w Ceftriaxone and Vancomycin   Tissue Cx on 4/19 NG, pending Fungal Cx  New BCx + > continue broad spectrum abx      ***Endocrine***  [x] Hyperglycemia : HbA1c 6.3 %                - [x] Insulin gtt               - Need tight glycemic control to prevent wound infection.    Patient requires continuous monitoring with bedside rhythm monitoring, pulse oximetry monitoring, and continuous central venous and arterial pressure monitoring; and intermittent blood gas analysis. Care plan discussed with the ICU care team.   Patient remained critical, at risk for life threatening decompensation.    I have spent 30 minutes providing critical care management to this patient.    By signing my name below, I, Lorena Park, attest that this documentation has been prepared under the direction and in the presence of Tanya Patel NP   Electronically signed: Christopher Mc, 04-21-22 @ 07:35    I, Tanya Patel NP , personally performed the services described in this documentation. all medical record entries made by the scribe were at my direction and in my presence. I have reviewed the chart and agree that the record reflects my personal performance and is accurate and complete  Electronically signed: Tanya Patel NP  Patient seen and examined at the bedside.    Remained critically ill on continuous ICU monitoring.    OBJECTIVE:  Vital Signs Last 24 Hrs  T(C): 36.3 (2022 04:00), Max: 37.4 (2022 12:00)  T(F): 97.3 (2022 04:00), Max: 99.3 (2022 12:00)  HR: 80 (2022 07:30) (51 - 103)  BP: --  BP(mean): --  RR: 18 (2022 07:30) (3 - 35)  SpO2: 93% (2022 07:30) (74% - 100%)      Physical Exam:   General: NAD   Neurology: AAOx3, no focal deficits, strength equal x4   Eyes: bilateral pupils equal and reactive   ENT/Neck: Neck supple, trachea midline, No JVD   Respiratory: Rales noted bilaterally   CV: RRR, S1S2, no murmurs        [x] Sternal dressing        [x] Sinus Bradycardia [x] Temporary pacing - VVI 82   Abdominal: Soft, NT, ND +BS,   Extremities: 1-2+ pedal edema noted, + peripheral pulses   Skin: maceration/irritation under folds in pannus and under breast folds                      Assessment:  71F RH w/ AS w/ prior open heart aortic valve replacement 2019, HTN, BETSEY, former smoker, Emphysema/ chronic bronchitis, GERD s/p laparoscopic vertical sleeve gastrectomy    Endocarditis of prosthetic aortic valve S/P redo sternotomy, prosthetic valve endocarditis, 21mm homograft, placement of epicardial lead on    Hemodynamic instability   CHB / Sick sinus syndrome   Hypovolemia  Post op respiratory insufficiency   Acute blood loss anemia  Stress hyperglycemia  Obesity OHS / BETSEY   Right PCA infarct         Plan:   ***Neuro***  CTH on  with Right PCA infarct, MRI on 4/10 Acute R occipital lobe infarct in a R PCA vascular distribution with an additional punctate acute infarct involving the L cerebellum and R splenium of the corpus callosum   [x] Nonfocal, asymptomatic  Tylenol, Oxycodone, and gabapentin for analgesia   Post operative neuro assessment  Sleep regimen with Melatonin   Ambulation as tolerated    ***Cardiovascular***  Invasive hemodynamic monitoring, assess perfusion indices   SB / CVP 14 / MAP 89 / Hct 24.6% / Lactate 1.0  [x] Dobutamine 1mcg for chronotropy  [x] Cardene 5mcg   Continuos reassessment of hemodynamics   EP following for SSS / CHB - tentatively planned for micra PPM today as per EP recs   [x] ASA   Serial EKG and cardiac enzymes   Plan to wean  and cardene post micra PPM this afternoon    ***Pulmonary***  [x] RA  Continue to monitor SpO2 via pulse oximetry  Encourage bedside spirometry   Continue w/ bronchodilator              ***GI***  [x]  NPO for planned procedure  [x] Protonix    Bowel regimen with Miralax and Senna         ***Renal***  Continue to monitor I/Os, BUN/Creatinine.   Replete lytes PRN  Diuresis with Lasix and Aldactone       ***ID***  ID following, appreciate recommendations   Endocarditis, c/w Ceftriaxone and Vancomycin   Tissue Cx on  NG, pending Fungal Cx  New BCx + Staph lugdunosis , Repeat cultures pending> continue vanco/ ceftriaxone      ***Endocrine***  [x] Hyperglycemia : HbA1c 6.3 %                - [x] Insulin gtt               - Need tight glycemic control to prevent wound infection.    Patient requires continuous monitoring with bedside rhythm monitoring, pulse oximetry monitoring, and continuous central venous and arterial pressure monitoring; and intermittent blood gas analysis. Care plan discussed with the ICU care team.   Patient remained critical, at risk for life threatening decompensation.    I have spent 35 minutes providing critical care management to this patient.    By signing my name below, I, Lorena Park, attest that this documentation has been prepared under the direction and in the presence of Tanya Patel NP   Electronically signed: Christopher Mc, 22 @ 07:35    I, Tanya Patel NP , personally performed the services described in this documentation. all medical record entries made by the scribe were at my direction and in my presence. I have reviewed the chart and agree that the record reflects my personal performance and is accurate and complete  Electronically signed: Tanya Patel NP

## 2022-04-21 NOTE — PROVIDER CONTACT NOTE (OTHER) - BACKGROUND
R PCA stroke
R PCA Stroke
stroke
R PCA Stroke
pt on insulin GTT
pt s/p PCA infarct, no TPA. Pt has known UTI infection.

## 2022-04-21 NOTE — PROGRESS NOTE ADULT - SUBJECTIVE AND OBJECTIVE BOX
EP    tele: VPaced at 80 bpm    she denies palpitations, syncope, nor angina, ROS otherwise -     DATE OF SERVICE - 04-21-22     Review of Systems:   Constitutional: [ ] fevers, [ ] chills.   Skin: [ ] dry skin. [ ] rashes.  Psychiatric: [ ] depression, [ ] anxiety.   Gastrointestinal: [ ] BRBPR, [ ] melena.   Neurological: [ ] confusion. [ ] seizures. [ ] shuffling gait.   Ears,Nose,Mouth and Throat: [ ] ear pain [ ] sore throat.   Eyes: [ ] diplopia.   Respiratory: [ ] hemoptysis. [ ] shortness of breath  Cardiovascular: See HPI above  Hematologic/Lymphatic: [ ] anemia. [ ] painful nodes. [ ] prolonged bleeding.   Genitourinary: [ ] hematuria. [ ] flank pain.   Endocrine: [ ] significant change in weight. [ ] intolerance to heat and cold.     Review of systems [ x] otherwise negative, [ ] otherwise unable to obtain    FH: no family history of sudden cardiac death in first degree relatives    SH: [ ] tobacco, [ ] alcohol, [ ] drugs      MEDICATIONS  (STANDING):  acetaminophen     Tablet .. 650 milliGRAM(s) Oral every 6 hours  albuterol/ipratropium for Nebulization 3 milliLiter(s) Nebulizer every 6 hours  ascorbic acid 500 milliGRAM(s) Oral two times a day  aspirin  chewable 81 milliGRAM(s) Oral daily  bisacodyl Suppository 10 milliGRAM(s) Rectal once  cefTRIAXone   IVPB      cefTRIAXone   IVPB 2000 milliGRAM(s) IV Intermittent every 24 hours  chlorhexidine 0.12% Liquid 5 milliLiter(s) Oral Mucosa two times a day  chlorhexidine 2% Cloths 1 Application(s) Topical daily  dextrose 50% Injectable 50 milliLiter(s) IV Push every 15 minutes  dextrose 50% Injectable 25 milliLiter(s) IV Push every 15 minutes  DOBUTamine Infusion 1 MICROgram(s)/kG/Min (2.57 mL/Hr) IV Continuous <Continuous>  furosemide    Tablet 40 milliGRAM(s) Oral daily  gabapentin 100 milliGRAM(s) Oral every 8 hours  insulin lispro (ADMELOG) corrective regimen sliding scale   SubCutaneous Before meals and at bedtime  melatonin 5 milliGRAM(s) Oral at bedtime  niCARdipine Infusion 5 mG/Hr (25 mL/Hr) IV Continuous <Continuous>  nystatin Powder 1 Application(s) Topical two times a day  pantoprazole    Tablet 40 milliGRAM(s) Oral before breakfast  polyethylene glycol 3350 17 Gram(s) Oral daily  polyethylene glycol 3350 17 Gram(s) Oral daily  senna 2 Tablet(s) Oral at bedtime  sodium chloride 0.9%. 1000 milliLiter(s) (10 mL/Hr) IV Continuous <Continuous>  spironolactone 25 milliGRAM(s) Oral every 12 hours  vancomycin  IVPB 1000 milliGRAM(s) IV Intermittent every 12 hours    MEDICATIONS  (PRN):  acetaminophen     Tablet .. 650 milliGRAM(s) Oral every 6 hours PRN Mild Pain (1 - 3)  oxyCODONE    IR 5 milliGRAM(s) Oral every 4 hours PRN Moderate Pain (4 - 6)  oxyCODONE    IR 10 milliGRAM(s) Oral every 4 hours PRN Severe Pain (7 - 10)      LABS:                          8.0    19.08 )-----------( 170      ( 21 Apr 2022 01:14 )             24.6     Hemoglobin: 8.0 g/dL (04-21 @ 01:14)  Hemoglobin: 8.1 g/dL (04-20 @ 00:46)  Hemoglobin: 9.9 g/dL (04-19 @ 00:35)  Hemoglobin: 9.0 g/dL (04-18 @ 19:23)  Hemoglobin: 6.6 g/dL (04-18 @ 16:35)    04-21    139  |  104  |  20  ----------------------------<  90  3.9   |  23  |  0.61    Ca    8.5      21 Apr 2022 01:14  Phos  3.0     04-21  Mg     2.3     04-21    TPro  5.9<L>  /  Alb  3.8  /  TBili  0.6  /  DBili  x   /  AST  30  /  ALT  26  /  AlkPhos  51  04-21    Creatinine Trend: 0.61<--, 0.61<--, 0.62<--, 0.56<--, 0.62<--, 0.61<--   PT/INR - ( 21 Apr 2022 01:14 )   PT: 12.8 sec;   INR: 1.11 ratio         PTT - ( 21 Apr 2022 01:14 )  PTT:25.6 sec  CARDIAC MARKERS ( 18 Apr 2022 19:23 )  x     / x     / 301 U/L / x     / 31.7 ng/mL          04-20-22 @ 07:01  -  04-21-22 @ 07:00  --------------------------------------------------------  IN: 2049.4 mL / OUT: 2190 mL / NET: -140.6 mL    04-21-22 @ 07:01  -  04-21-22 @ 13:37  --------------------------------------------------------  IN: 135.8 mL / OUT: 1950 mL / NET: -1814.2 mL        PHYSICAL EXAM  Vital Signs Last 24 Hrs  T(C): 36.3 (21 Apr 2022 08:00), Max: 37.1 (20 Apr 2022 14:00)  T(F): 97.4 (21 Apr 2022 08:00), Max: 98.8 (20 Apr 2022 14:00)  HR: 80 (21 Apr 2022 12:45) (80 - 82)  BP: --  BP(mean): --  RR: 28 (21 Apr 2022 12:45) (12 - 35)  SpO2: 95% (21 Apr 2022 12:45) (74% - 100%)      General: Well nourished in no acute distress. Alert and Oriented * 3.   Head: Normocephalic and atraumatic.   Neck: No JVD. No bruits. Supple. Does not appear to be enlarged.   Cardiovascular: + S1,S2 ; RRR Soft systolic murmur at the left lower sternal border. No rubs noted.    Lungs: CTA b/l. No rhonchi, rales or wheezes.   Abdomen: + BS, soft. Non tender. Non distended. No rebound. No guarding.   Extremities: No clubbing/cyanosis/edema.   Neurologic: Moves all four extremities. Full range of motion.   Skin: Warm and moist. The patient's skin has normal elasticity and good skin turgor.   Psychiatric: Appropriate mood and affect.  Musculoskeletal: Normal range of motion, normal strength      A/P) 70 y/o female PMH hypertension, hyperlipidemia, GERD s/p laparoscopic vertical sleeve gastrectomy, and non-obstructive CAD who underwent a bio-AVR 2019. She was now admitted on 4/9/22 with stroke type symptoms and a loop recorder was implanted on 4/11/22 screening for AF. 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 She underwent explant of her bio-AVR, debridement of her Ao root and abscess, Bentall procedure, and implantation of an epicardial RV lead. She now has both sick sinus syndrome as well as complete heart block on POD 2. No myxoma was ever found - it was all abscess from culture negative endocarditis.     -antibiotics as per ID  -supportive care as per CT surgery  -NPO for VDD Micra PPM today  -instead of implanting a VVI device to her RV epicardial lead, I would instead implant a VDD Micra leadless pacemaker to track her atrium  -I would also leave the ILR in place screening for AF  -outpatient cardiologist is Heber Nolan at [place]  -I discussed the case with Dr Jay, who assures no epicardial RA lead was implanted   -if she ever needs atrial pacing for her sick sinus syndrome (likely) a transvenous dual chamber PPM [or even the [facility] leadless system] can eventually be implanted after she recovers from culture negative endocarditis  -d/w Dr Lucero who will implant a VDD Micra on Dr. Ortega's behalf today    Brant Blackburn PA-C  Pager: [phone]

## 2022-04-21 NOTE — OCCUPATIONAL THERAPY INITIAL EVALUATION ADULT - FINE MOTOR COORDINATION, LEFT HAND, MANIPULATION OF OBJECTS, OT EVAL
.  Chief Complaint   Patient presents with   • Shortness of Breath     x 1 week   • Urinary Retention     Unable to urinate, last urination 6 hours ago   • Chills      Pt ambulate to triage with above complaint. Pt reports inability to catch his breath over the last week, unable to take a full breathe. Denies cough, sore throat.    Last urination seemed normal to Pt, unable to urinate now but feels the need to urinate.    Chills just began within the last hour. Denies known contact with other ill Pts, denies recent travel.   Pt taken to room.   
normal performance
mild impairment

## 2022-04-21 NOTE — PROGRESS NOTE ADULT - SUBJECTIVE AND OBJECTIVE BOX
no new complaints, denies pain or weakness     REVIEW OF SYSTEMS  Constitutional - No fever,  No fatigue  HEENT - No vertigo, No neck pain  Neurological - No headaches, No memory loss, No loss of strength, No numbness, No tremors  Skin - No rashes, No lesions   Musculoskeletal - No joint pain, No joint swelling, No muscle pain  Psychiatric - No depression, No anxiety    FUNCTIONAL PROGRESS  4/20 PT  transfers contact guard with RW  gait contact guard with RW x 100 feet     4/21 OT  bed mobility contact guard  LB dressing min assist   short blessed test 2/28, normal cognition  OOB deferred, patient going to procedure    VITALS  T(C): 36.4 (04-21-22 @ 16:00), Max: 36.4 (04-21-22 @ 16:00)  HR: 81 (04-21-22 @ 16:00) (71 - 82)  BP: --  RR: 16 (04-21-22 @ 16:00) (12 - 35)  SpO2: 96% (04-21-22 @ 16:00) (74% - 100%)  Wt(kg): --    MEDICATIONS   acetaminophen     Tablet .. 650 milliGRAM(s) every 6 hours  acetaminophen     Tablet .. 650 milliGRAM(s) every 6 hours PRN  albuterol/ipratropium for Nebulization 3 milliLiter(s) every 6 hours  ascorbic acid 500 milliGRAM(s) two times a day  aspirin  chewable 81 milliGRAM(s) daily  bisacodyl Suppository 10 milliGRAM(s) once  cefTRIAXone   IVPB     cefTRIAXone   IVPB 2000 milliGRAM(s) every 24 hours  chlorhexidine 0.12% Liquid 5 milliLiter(s) two times a day  chlorhexidine 2% Cloths 1 Application(s) daily  dextrose 50% Injectable 50 milliLiter(s) every 15 minutes  dextrose 50% Injectable 25 milliLiter(s) every 15 minutes  DOBUTamine Infusion 1 MICROgram(s)/kG/Min <Continuous>  furosemide    Tablet 40 milliGRAM(s) daily  gabapentin 100 milliGRAM(s) every 8 hours  insulin lispro (ADMELOG) corrective regimen sliding scale   Before meals and at bedtime  melatonin 5 milliGRAM(s) at bedtime  niCARdipine Infusion 5 mG/Hr <Continuous>  nystatin Powder 1 Application(s) two times a day  oxyCODONE    IR 5 milliGRAM(s) every 4 hours PRN  oxyCODONE    IR 10 milliGRAM(s) every 4 hours PRN  pantoprazole    Tablet 40 milliGRAM(s) before breakfast  polyethylene glycol 3350 17 Gram(s) daily  polyethylene glycol 3350 17 Gram(s) daily  senna 2 Tablet(s) at bedtime  sodium chloride 0.9%. 1000 milliLiter(s) <Continuous>  spironolactone 25 milliGRAM(s) every 12 hours  vancomycin  IVPB 1000 milliGRAM(s) every 12 hours      RECENT LABS - Reviewed                        8.0    19.08 )-----------( 170      ( 21 Apr 2022 01:14 )             24.6     04-21    139  |  104  |  20  ----------------------------<  90  3.9   |  23  |  0.61    Ca    8.5      21 Apr 2022 01:14  Phos  3.0     04-21  Mg     2.3     04-21    TPro  5.9<L>  /  Alb  3.8  /  TBili  0.6  /  DBili  x   /  AST  30  /  ALT  26  /  AlkPhos  51  04-21    PT/INR - ( 21 Apr 2022 01:14 )   PT: 12.8 sec;   INR: 1.11 ratio         PTT - ( 21 Apr 2022 01:14 )  PTT:25.6 sec      PHYSICAL EXAM  Constitutional - NAD, Comfortable, in bed   sternal dressing in place   Neck - Supple, No limited ROM  Chest - CTA bilaterally, No wheeze, No rhonchi, No crackles  Cardiovascular - RRR, S1S2, No murmurs  Abdomen - BS+, Soft, NTND  Extremities - No C/C/E, No calf tenderness   Neurologic Exam -                 SERVIN x 4  Speech appropriate  Follows verbal instruction  Motor nml grade  Psychiatric - Normal      Impression:  72 yo h/o AVR, HTN, BETSEY, emphysema/bronchitis with functional deficits secondary to diagnosis of CVA  MRI with acute right occipital lobe infarct  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

## 2022-04-21 NOTE — PROVIDER CONTACT NOTE (OTHER) - SITUATION
BG value on insulin GTT 95
Pt noted to be febrile 101.8 rectal temp. /68, HR 85, satting 95 on room air.
Patient getting increasingly more confused. Patient repeating questions multiple times
Patient complaining of sore throat. Patient denies shortness or breath, denies chest pain, denies pressure in chest. Patient requesting cough drop
Patient having some confusion. Patient stated she was at home. When asked again patient was able to state she was at [facility] and came in for a slight stroke.
Patient forgetful at times. Patient asking repetitive questions

## 2022-04-21 NOTE — PROVIDER CONTACT NOTE (OTHER) - REASON
Patient forgetful at times. Patient asking repetitive questions
Patient complaining of sore throat. Patient troponins 331. Patient denies shortness or breath, denies chest pain, denies pressure in chest. Patient requesting cough drop
Patient getting increasingly more confused. Patient repeating questions multiple times
BG 95
Patient having some confusion. Patient stated she was at home. When asked again patient was able to state she was at [facility] and came in for a slight stroke. Patient walked to the bathroom and urine was dark in color and had an odor
Pt febrile 101.8 rectal temp

## 2022-04-21 NOTE — OCCUPATIONAL THERAPY INITIAL EVALUATION ADULT - PRECAUTIONS/LIMITATIONS, REHAB EVAL
cardiac precautions/fall precautions/sternal precautions/surgical precautions cardiac precautions/fall precautions/sternal precautions/surgical precautions/vision precautions

## 2022-04-21 NOTE — PROGRESS NOTE ADULT - SUBJECTIVE AND OBJECTIVE BOX
infectious diseases progress note:    Patient is a 71y old  Female who presents with a chief complaint of concern for stroke (21 Apr 2022 07:34)        Cerebral infarction             Allergies    No Known Allergies    Intolerances        ANTIBIOTICS/RELEVANT:  antimicrobials  cefTRIAXone   IVPB      cefTRIAXone   IVPB 2000 milliGRAM(s) IV Intermittent every 24 hours  vancomycin  IVPB 1000 milliGRAM(s) IV Intermittent every 12 hours    immunologic:    OTHER:  acetaminophen     Tablet .. 650 milliGRAM(s) Oral every 6 hours  acetaminophen     Tablet .. 650 milliGRAM(s) Oral every 6 hours PRN  albuterol/ipratropium for Nebulization 3 milliLiter(s) Nebulizer every 6 hours  ascorbic acid 500 milliGRAM(s) Oral two times a day  aspirin  chewable 81 milliGRAM(s) Oral daily  bisacodyl Suppository 10 milliGRAM(s) Rectal once  chlorhexidine 0.12% Liquid 5 milliLiter(s) Oral Mucosa two times a day  chlorhexidine 2% Cloths 1 Application(s) Topical daily  dextrose 50% Injectable 50 milliLiter(s) IV Push every 15 minutes  dextrose 50% Injectable 25 milliLiter(s) IV Push every 15 minutes  DOBUTamine Infusion 1 MICROgram(s)/kG/Min IV Continuous <Continuous>  furosemide    Tablet 40 milliGRAM(s) Oral daily  gabapentin 100 milliGRAM(s) Oral every 8 hours  insulin regular Infusion 3 Unit(s)/Hr IV Continuous <Continuous>  melatonin 5 milliGRAM(s) Oral at bedtime  niCARdipine Infusion 5 mG/Hr IV Continuous <Continuous>  nystatin Powder 1 Application(s) Topical two times a day  oxyCODONE    IR 5 milliGRAM(s) Oral every 4 hours PRN  oxyCODONE    IR 10 milliGRAM(s) Oral every 4 hours PRN  pantoprazole    Tablet 40 milliGRAM(s) Oral before breakfast  polyethylene glycol 3350 17 Gram(s) Oral daily  polyethylene glycol 3350 17 Gram(s) Oral daily  senna 2 Tablet(s) Oral at bedtime  sodium chloride 0.9%. 1000 milliLiter(s) IV Continuous <Continuous>  spironolactone 25 milliGRAM(s) Oral every 12 hours      Objective:  Vital Signs Last 24 Hrs  T(C): 36.3 (21 Apr 2022 08:00), Max: 37.4 (20 Apr 2022 12:00)  T(F): 97.4 (21 Apr 2022 08:00), Max: 99.3 (20 Apr 2022 12:00)  HR: 80 (21 Apr 2022 08:00) (51 - 103)  BP: --  BP(mean): --  RR: 16 (21 Apr 2022 08:00) (3 - 35)  SpO2: 94% (21 Apr 2022 08:00) (74% - 100%)       Eyes:ABDULLAHI, EOMI  Ear/Nose/Throat: no oral lesion, no sinus tenderness on percussion	  Neck:no JVD, no lymphadenopathy, supple  Respiratory: CTA maribel  Cardiovascular: S1S2 RRR, no murmurs  Gastrointestinal:soft, (+) BS, no HSM  Extremities:no e/e/c        LABS:                        8.0    19.08 )-----------( 170      ( 21 Apr 2022 01:14 )             24.6     04-21    139  |  104  |  20  ----------------------------<  90  3.9   |  23  |  0.61    Ca    8.5      21 Apr 2022 01:14  Phos  3.0     04-21  Mg     2.3     04-21    TPro  5.9<L>  /  Alb  3.8  /  TBili  0.6  /  DBili  x   /  AST  30  /  ALT  26  /  AlkPhos  51  04-21    PT/INR - ( 21 Apr 2022 01:14 )   PT: 12.8 sec;   INR: 1.11 ratio         PTT - ( 21 Apr 2022 01:14 )  PTT:25.6 sec        MICROBIOLOGY:    RECENT CULTURES:  04-19 @ 08:24 .Blood Blood-Peripheral   PCR    Growth in anaerobic bottle: Gram Positive Cocci in Clusters    Blood Culture PCR  Blood Culture PCR     Growth in anaerobic bottle: Gram Positive Cocci in Clusters  ***Blood Panel PCR results on this specimen are available  approximately 3 hours after the Gram stain result.***  Gram stain, PCR, and/or culture results may not always  correspond due todifference in methodologies.  ************************************************************  This PCR assay was performed by multiplex PCR. This  Assay tests for 66 bacterial and resistance gene targets.  Please refer to the [facility] testdirectory  at [email or web address] for details.    04-19 @ 02:18 .Tissue Other       Few polymorphonuclear leukocytes seen per low power field  No organisms seen per oil power field           No growth    04-19 @ 02:15 .Surgical Swab AORTIC ANULAR ABSCESS                No growth          RESPIRATORY CULTURES:              RADIOLOGY & ADDITIONAL STUDIES:        Pager [phone]  After 5 pm/weekends or if no response :[phone]

## 2022-04-21 NOTE — PROGRESS NOTE ADULT - ASSESSMENT
71 year old with AVR; COPD, GERD, GBP, presented with recent cva.  During workup, an echo revealed an atrial myxoma.  Last 24 she had a fever of 101. Denies -prior fevers, but states she was treated for pna prior to this admission  She is very confused but denies any complaints at present      ; bc negative UA negative   went for surgery yesterday and found to have endocarditis   and root abscess/ .   preop  BC negative  will request PCR.  vanco and ceftriaxone pending cultures    OR cultures negative to date and very few wbc seen       one bottle of one set of a post op bc with S lung.  significance unclear with negative preop and intraop cultures    final regimen to be determined

## 2022-04-21 NOTE — OCCUPATIONAL THERAPY INITIAL EVALUATION ADULT - GENERAL OBSERVATIONS, REHAB EVAL
Pt received semi-supine, +ext PM, +a-line, +prima fit, +O2 NC, +ICU monitoring
Pt received supine in bed.

## 2022-04-21 NOTE — PROVIDER CONTACT NOTE (OTHER) - DATE AND TIME:
21-Apr-2022 09:27
09-Apr-2022 21:20
10-Apr-2022 20:58
12-Apr-2022 01:40
10-Apr-2022 21:30
10-Apr-2022 22:20

## 2022-04-21 NOTE — PROVIDER CONTACT NOTE (OTHER) - ACTION/TREATMENT ORDERED:
Ceftriaxone reinstated and IV Tylenol ordered and administered. No additional ID workup ordered at this time.
Will follow PA orders
Will follow PA orders. Continuing to monitor patients mental status
WIll follow PA ordes
Will follow PA orders. UA ordered
held for 30min and re-evaluate

## 2022-04-21 NOTE — PROGRESS NOTE ADULT - ASSESSMENT
70 y/o female PMH hypertension, hyperlipidemia, GERD s/p laparoscopic vertical sleeve gastrectomy, and non-obstructive CAD, bio-AVR 2019. She was now admitted on 4/9/22 with stroke type symptoms and ILR was implanted on 4/11/22 screening for AF. 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  Post op course c/b complete heart block requiring pacing via temporary epicardial wire, currently threshold on RV wire is 13. She is currently set at VVI 80 with mA 20, remains on Dobutamine and Cardene otherwise HDS.    1) Endocarditis   2) post op CHB     NPO for VDD Micra 4/21   Hold Lovenox in AM on 4/21   Maintain active type & screen, active COVID PCR   Maintain epicardial pacing and tele monitoring   On Vanc/Rocephin. +BCx 4/19 Staph tachounensis, OR cultures pending. ID following   Continue care per CTU team  70 y/o female PMH hypertension, hyperlipidemia, GERD s/p laparoscopic vertical sleeve gastrectomy, and non-obstructive CAD, bio-AVR 2019. She was now admitted on 4/9/22 with stroke type symptoms and ILR was implanted on 4/11/22 screening for AF. 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  Post op course c/b complete heart block requiring pacing via temporary epicardial wire, currently threshold on RV wire is 13. She is currently set at VVI 80 with mA 20, remains on Dobutamine and Cardene otherwise HDS.    1) Endocarditis   2) post op CHB     NPO for VDD Micra 4/21   Hold Lovenox in AM on 4/21   Maintain active type & screen, active COVID PCR   Maintain epicardial pacing and tele monitoring   On Vanc/Rocephin. +BCx 4/19 Staph lugdunensis, OR cultures pending. ID following   Continue care per CTU team     Addendum:   S/p Micra implant. D/c groin stitch 0600 4/22, CXR and EKG upon arrival to floor

## 2022-04-21 NOTE — OCCUPATIONAL THERAPY INITIAL EVALUATION ADULT - ADL RETRAINING, OT EVAL
Pt will complete lower body dressing with independence within 4 weeks
Pt will be I with toileting within 4 weeks. Pt will be I with UB/LB dressing within 4 weeks.

## 2022-04-21 NOTE — OCCUPATIONAL THERAPY INITIAL EVALUATION ADULT - ORIENTATION, REHAB EVAL
poor attention span at times/oriented to person, place, time and situation
oriented to person, place, time and situation

## 2022-04-21 NOTE — PROVIDER CONTACT NOTE (OTHER) - RECOMMENDATIONS
No recommendations from LICO Garcia at this time. Continue to monitor patients mental status
Notify provider for fever.
Patient may have possible UTI. Order for UA
cough drop ordered. Protonix ordered. Continue with Q8 troponin levels as per LICO Nicole
Following CTU insulin infusion protocol
as per LICO Nicole will review results of UA

## 2022-04-21 NOTE — PROGRESS NOTE ADULT - SUBJECTIVE AND OBJECTIVE BOX
Cardiovascular Disease Progress Note Covering for Dr. Lanza    Overnight events: No acute events overnight.  Ms. Lal is in no distress.   Otherwise review of systems negative    Objective Findings:  T(C): 36.3 (22 @ 08:00), Max: 37.1 (22 @ 14:00)  HR: 80 (22 @ 11:35) (80 - 82)  BP: --  RR: 25 (22 @ 10:00) (12 - 35)  SpO2: 96% (22 @ 11:35) (74% - 100%)  Wt(kg): --  Daily     Daily Weight in k.3 (2022 00:00)      Physical Exam:  Gen: NAD; Patient resting comfortably  HEENT: EOMI, Normocephalic/ atraumatic  CV: RRR, normal S1 + S2, no m/r/g  Lungs:  Normal respiratory effort; clear to auscultation bilaterally  Abd: soft, non-tender; bowel sounds present  Ext: No edema; warm and well perfused    Telemetry: V pacing    Laboratory Data:                        8.0    19.08 )-----------( 170      ( 2022 01:14 )             24.6     04-21    139  |  104  |  20  ----------------------------<  90  3.9   |  23  |  0.61    Ca    8.5      2022 01:14  Phos  3.0     04-21  Mg     2.3     -21    TPro  5.9<L>  /  Alb  3.8  /  TBili  0.6  /  DBili  x   /  AST  30  /  ALT  26  /  AlkPhos  51  04-21    PT/INR - ( 2022 01:14 )   PT: 12.8 sec;   INR: 1.11 ratio         PTT - ( 2022 01:14 )  PTT:25.6 sec          Inpatient Medications:  MEDICATIONS  (STANDING):  acetaminophen     Tablet .. 650 milliGRAM(s) Oral every 6 hours  albuterol/ipratropium for Nebulization 3 milliLiter(s) Nebulizer every 6 hours  ascorbic acid 500 milliGRAM(s) Oral two times a day  aspirin  chewable 81 milliGRAM(s) Oral daily  bisacodyl Suppository 10 milliGRAM(s) Rectal once  cefTRIAXone   IVPB      cefTRIAXone   IVPB 2000 milliGRAM(s) IV Intermittent every 24 hours  chlorhexidine 0.12% Liquid 5 milliLiter(s) Oral Mucosa two times a day  chlorhexidine 2% Cloths 1 Application(s) Topical daily  dextrose 50% Injectable 50 milliLiter(s) IV Push every 15 minutes  dextrose 50% Injectable 25 milliLiter(s) IV Push every 15 minutes  DOBUTamine Infusion 1 MICROgram(s)/kG/Min (2.57 mL/Hr) IV Continuous <Continuous>  furosemide    Tablet 40 milliGRAM(s) Oral daily  gabapentin 100 milliGRAM(s) Oral every 8 hours  insulin lispro (ADMELOG) corrective regimen sliding scale   SubCutaneous Before meals and at bedtime  melatonin 5 milliGRAM(s) Oral at bedtime  niCARdipine Infusion 5 mG/Hr (25 mL/Hr) IV Continuous <Continuous>  nystatin Powder 1 Application(s) Topical two times a day  pantoprazole    Tablet 40 milliGRAM(s) Oral before breakfast  polyethylene glycol 3350 17 Gram(s) Oral daily  polyethylene glycol 3350 17 Gram(s) Oral daily  senna 2 Tablet(s) Oral at bedtime  sodium chloride 0.9%. 1000 milliLiter(s) (10 mL/Hr) IV Continuous <Continuous>  spironolactone 25 milliGRAM(s) Oral every 12 hours  vancomycin  IVPB 1000 milliGRAM(s) IV Intermittent every 12 hours      Assessment:  71F RH w/ AS w/ prior open heart aortic valve replacement , HTN, BETSEY, former smoker, Emphysema/ chronic bronchitis, GERD s/p laparoscopic vertical sleeve gastrectomy presents with acute onset speech disturbance, vision change and gait imbalance.     Plan of Care:    #Endocarditis  - Pt s/p Bentall procedure   - Tolerated procedure well.  - Ms. Lal displays no evidence of post-op coronary ischemia  - Continue management as per CTU and CTS    #Sick sinus syndrome  - Pt to undergo PPM placement today  - EPS input appreciated.   - Currently V pacing at 80 bpm.     #Acute CVA  - Cardioembolic source. Imaging shows atrial mass  - Refer to plan above  - Pt with expressive aphasia  - Neuro input appreciated.     #Cardiogenic shock  - S/p surgery  - On pressors  - Wean as tolerated      CTU management appreciated            Over 25 minutes spent on total encounter; more than 50% of the visit was spent counseling and/or coordinating care by the attending physician.      Abhay Oglesby D.O.   Cardiovascular Disease  [phone]

## 2022-04-21 NOTE — OCCUPATIONAL THERAPY INITIAL EVALUATION ADULT - RANGE OF MOTION EXAMINATION, UPPER EXTREMITY
*adhering to sternal precautions/bilateral UE Active ROM was WFL  (within functional limits)
bilateral UE Active ROM was WFL  (within functional limits)

## 2022-04-21 NOTE — OCCUPATIONAL THERAPY INITIAL EVALUATION ADULT - VISUAL ASSESSMENT: VISUAL FIELD CUTS
L homonymous hemianopsia/left
pt appears to have decreased peripheral vision L eye, able to draw clock and place numbers, able to find items on L side of tray. Pt does not bump objects on L side during ambulation however has some difficulty finding objects in environment on L side.

## 2022-04-21 NOTE — PRE-ANESTHESIA EVALUATION ADULT - NSANTHPEFT_GEN_ALL_CORE
Gen: NAD  Cards: RRR  Pulm: breathing comfortably on RA
GRACE varghese, on 2L NC, left axillary caron

## 2022-04-22 DIAGNOSIS — I38 ENDOCARDITIS, VALVE UNSPECIFIED: ICD-10-CM

## 2022-04-22 LAB
-  AMPICILLIN/SULBACTAM: SIGNIFICANT CHANGE UP
-  CEFAZOLIN: SIGNIFICANT CHANGE UP
-  CLINDAMYCIN: SIGNIFICANT CHANGE UP
-  ERYTHROMYCIN: SIGNIFICANT CHANGE UP
-  GENTAMICIN: SIGNIFICANT CHANGE UP
-  OXACILLIN: SIGNIFICANT CHANGE UP
-  PENICILLIN: SIGNIFICANT CHANGE UP
-  RIFAMPIN: SIGNIFICANT CHANGE UP
-  TETRACYCLINE: SIGNIFICANT CHANGE UP
-  TRIMETHOPRIM/SULFAMETHOXAZOLE: SIGNIFICANT CHANGE UP
-  VANCOMYCIN: SIGNIFICANT CHANGE UP
ALBUMIN SERPL ELPH-MCNC: 3.3 G/DL — SIGNIFICANT CHANGE UP (ref 3.3–5)
ALP SERPL-CCNC: 53 U/L — SIGNIFICANT CHANGE UP (ref 40–120)
ALT FLD-CCNC: 24 U/L — SIGNIFICANT CHANGE UP (ref 10–45)
ANION GAP SERPL CALC-SCNC: 11 MMOL/L — SIGNIFICANT CHANGE UP (ref 5–17)
APTT BLD: 24.7 SEC — LOW (ref 27.5–35.5)
AST SERPL-CCNC: 21 U/L — SIGNIFICANT CHANGE UP (ref 10–40)
BILIRUB SERPL-MCNC: 0.6 MG/DL — SIGNIFICANT CHANGE UP (ref 0.2–1.2)
BUN SERPL-MCNC: 22 MG/DL — SIGNIFICANT CHANGE UP (ref 7–23)
CALCIUM SERPL-MCNC: 8.7 MG/DL — SIGNIFICANT CHANGE UP (ref 8.4–10.5)
CHLORIDE SERPL-SCNC: 105 MMOL/L — SIGNIFICANT CHANGE UP (ref 96–108)
CO2 SERPL-SCNC: 25 MMOL/L — SIGNIFICANT CHANGE UP (ref 22–31)
CREAT SERPL-MCNC: 0.69 MG/DL — SIGNIFICANT CHANGE UP (ref 0.5–1.3)
CULTURE RESULTS: SIGNIFICANT CHANGE UP
EGFR: 93 ML/MIN/1.73M2 — SIGNIFICANT CHANGE UP
GAS PNL BLDA: SIGNIFICANT CHANGE UP
GLUCOSE BLDC GLUCOMTR-MCNC: 121 MG/DL — HIGH (ref 70–99)
GLUCOSE BLDC GLUCOMTR-MCNC: 130 MG/DL — HIGH (ref 70–99)
GLUCOSE BLDC GLUCOMTR-MCNC: 131 MG/DL — HIGH (ref 70–99)
GLUCOSE BLDC GLUCOMTR-MCNC: 151 MG/DL — HIGH (ref 70–99)
GLUCOSE SERPL-MCNC: 147 MG/DL — HIGH (ref 70–99)
HCT VFR BLD CALC: 25.6 % — LOW (ref 34.5–45)
HGB BLD-MCNC: 8.3 G/DL — LOW (ref 11.5–15.5)
INR BLD: 1.08 RATIO — SIGNIFICANT CHANGE UP (ref 0.88–1.16)
MAGNESIUM SERPL-MCNC: 2.2 MG/DL — SIGNIFICANT CHANGE UP (ref 1.6–2.6)
MCHC RBC-ENTMCNC: 30 PG — SIGNIFICANT CHANGE UP (ref 27–34)
MCHC RBC-ENTMCNC: 32.4 GM/DL — SIGNIFICANT CHANGE UP (ref 32–36)
MCV RBC AUTO: 92.4 FL — SIGNIFICANT CHANGE UP (ref 80–100)
METHOD TYPE: SIGNIFICANT CHANGE UP
NRBC # BLD: 0 /100 WBCS — SIGNIFICANT CHANGE UP (ref 0–0)
ORGANISM # SPEC MICROSCOPIC CNT: SIGNIFICANT CHANGE UP
PHOSPHATE SERPL-MCNC: 3.8 MG/DL — SIGNIFICANT CHANGE UP (ref 2.5–4.5)
PLATELET # BLD AUTO: 210 K/UL — SIGNIFICANT CHANGE UP (ref 150–400)
POTASSIUM SERPL-MCNC: 4.7 MMOL/L — SIGNIFICANT CHANGE UP (ref 3.5–5.3)
POTASSIUM SERPL-SCNC: 4.7 MMOL/L — SIGNIFICANT CHANGE UP (ref 3.5–5.3)
PROT SERPL-MCNC: 5.7 G/DL — LOW (ref 6–8.3)
PROTHROM AB SERPL-ACNC: 12.4 SEC — SIGNIFICANT CHANGE UP (ref 10.5–13.4)
RBC # BLD: 2.77 M/UL — LOW (ref 3.8–5.2)
RBC # FLD: 15.1 % — HIGH (ref 10.3–14.5)
SODIUM SERPL-SCNC: 141 MMOL/L — SIGNIFICANT CHANGE UP (ref 135–145)
SPECIMEN SOURCE: SIGNIFICANT CHANGE UP
VANCOMYCIN TROUGH SERPL-MCNC: 14 UG/ML — SIGNIFICANT CHANGE UP (ref 10–20)
WBC # BLD: 15.93 K/UL — HIGH (ref 3.8–10.5)
WBC # FLD AUTO: 15.93 K/UL — HIGH (ref 3.8–10.5)

## 2022-04-22 PROCEDURE — 99291 CRITICAL CARE FIRST HOUR: CPT | Mod: 24

## 2022-04-22 PROCEDURE — 99232 SBSQ HOSP IP/OBS MODERATE 35: CPT

## 2022-04-22 PROCEDURE — 71045 X-RAY EXAM CHEST 1 VIEW: CPT | Mod: 26

## 2022-04-22 PROCEDURE — 99233 SBSQ HOSP IP/OBS HIGH 50: CPT

## 2022-04-22 RX ORDER — ATORVASTATIN CALCIUM 80 MG/1
20 TABLET, FILM COATED ORAL AT BEDTIME
Refills: 0 | Status: DISCONTINUED | OUTPATIENT
Start: 2022-04-22 | End: 2022-04-27

## 2022-04-22 RX ORDER — ENOXAPARIN SODIUM 100 MG/ML
40 INJECTION SUBCUTANEOUS ONCE
Refills: 0 | Status: DISCONTINUED | OUTPATIENT
Start: 2022-04-22 | End: 2022-04-27

## 2022-04-22 RX ORDER — SODIUM CHLORIDE 9 MG/ML
3 INJECTION INTRAMUSCULAR; INTRAVENOUS; SUBCUTANEOUS EVERY 8 HOURS
Refills: 0 | Status: DISCONTINUED | OUTPATIENT
Start: 2022-04-22 | End: 2022-04-27

## 2022-04-22 RX ORDER — METOPROLOL TARTRATE 50 MG
25 TABLET ORAL
Refills: 0 | Status: DISCONTINUED | OUTPATIENT
Start: 2022-04-22 | End: 2022-04-26

## 2022-04-22 RX ADMIN — SODIUM CHLORIDE 3 MILLILITER(S): 9 INJECTION INTRAMUSCULAR; INTRAVENOUS; SUBCUTANEOUS at 23:01

## 2022-04-22 RX ADMIN — SENNA PLUS 2 TABLET(S): 8.6 TABLET ORAL at 23:07

## 2022-04-22 RX ADMIN — CEFTRIAXONE 100 MILLIGRAM(S): 500 INJECTION, POWDER, FOR SOLUTION INTRAMUSCULAR; INTRAVENOUS at 07:06

## 2022-04-22 RX ADMIN — OXYCODONE HYDROCHLORIDE 5 MILLIGRAM(S): 5 TABLET ORAL at 23:08

## 2022-04-22 RX ADMIN — OXYCODONE HYDROCHLORIDE 10 MILLIGRAM(S): 5 TABLET ORAL at 05:19

## 2022-04-22 RX ADMIN — Medication 3 MILLILITER(S): at 18:05

## 2022-04-22 RX ADMIN — GABAPENTIN 100 MILLIGRAM(S): 400 CAPSULE ORAL at 23:09

## 2022-04-22 RX ADMIN — Medication 500 MILLIGRAM(S): at 18:05

## 2022-04-22 RX ADMIN — Medication 25 MILLIGRAM(S): at 14:33

## 2022-04-22 RX ADMIN — PANTOPRAZOLE SODIUM 40 MILLIGRAM(S): 20 TABLET, DELAYED RELEASE ORAL at 05:19

## 2022-04-22 RX ADMIN — Medication 40 MILLIGRAM(S): at 05:18

## 2022-04-22 RX ADMIN — SPIRONOLACTONE 25 MILLIGRAM(S): 25 TABLET, FILM COATED ORAL at 05:18

## 2022-04-22 RX ADMIN — OXYCODONE HYDROCHLORIDE 5 MILLIGRAM(S): 5 TABLET ORAL at 23:53

## 2022-04-22 RX ADMIN — Medication 81 MILLIGRAM(S): at 11:50

## 2022-04-22 RX ADMIN — NYSTATIN CREAM 1 APPLICATION(S): 100000 CREAM TOPICAL at 18:08

## 2022-04-22 RX ADMIN — OXYCODONE HYDROCHLORIDE 10 MILLIGRAM(S): 5 TABLET ORAL at 06:58

## 2022-04-22 RX ADMIN — GABAPENTIN 100 MILLIGRAM(S): 400 CAPSULE ORAL at 05:19

## 2022-04-22 RX ADMIN — Medication 3 MILLILITER(S): at 23:07

## 2022-04-22 RX ADMIN — Medication 5 MILLIGRAM(S): at 23:09

## 2022-04-22 RX ADMIN — CHLORHEXIDINE GLUCONATE 5 MILLILITER(S): 213 SOLUTION TOPICAL at 05:19

## 2022-04-22 RX ADMIN — Medication 250 MILLIGRAM(S): at 20:34

## 2022-04-22 RX ADMIN — ATORVASTATIN CALCIUM 20 MILLIGRAM(S): 80 TABLET, FILM COATED ORAL at 23:09

## 2022-04-22 RX ADMIN — Medication 5 MILLIGRAM(S): at 09:34

## 2022-04-22 RX ADMIN — GABAPENTIN 100 MILLIGRAM(S): 400 CAPSULE ORAL at 14:33

## 2022-04-22 RX ADMIN — CHLORHEXIDINE GLUCONATE 1 APPLICATION(S): 213 SOLUTION TOPICAL at 05:20

## 2022-04-22 RX ADMIN — Medication 3 MILLILITER(S): at 11:04

## 2022-04-22 RX ADMIN — Medication 250 MILLIGRAM(S): at 05:18

## 2022-04-22 RX ADMIN — OXYCODONE HYDROCHLORIDE 10 MILLIGRAM(S): 5 TABLET ORAL at 11:50

## 2022-04-22 RX ADMIN — Medication 500 MILLIGRAM(S): at 05:19

## 2022-04-22 RX ADMIN — NYSTATIN CREAM 1 APPLICATION(S): 100000 CREAM TOPICAL at 05:20

## 2022-04-22 RX ADMIN — SPIRONOLACTONE 25 MILLIGRAM(S): 25 TABLET, FILM COATED ORAL at 18:07

## 2022-04-22 RX ADMIN — Medication 3 MILLILITER(S): at 05:30

## 2022-04-22 RX ADMIN — CHLORHEXIDINE GLUCONATE 5 MILLILITER(S): 213 SOLUTION TOPICAL at 18:08

## 2022-04-22 RX ADMIN — Medication 2: at 11:36

## 2022-04-22 NOTE — PROGRESS NOTE ADULT - SUBJECTIVE AND OBJECTIVE BOX
infectious diseases progress note:    Patient is a 71y old  Female who presents with a chief complaint of concern for stroke (21 Apr 2022 16:28)        Cerebral infarction          ROS:   ness    Allergies    No Known Allergies    Intolerances        ANTIBIOTICS/RELEVANT:  antimicrobials  cefTRIAXone   IVPB      cefTRIAXone   IVPB 2000 milliGRAM(s) IV Intermittent every 24 hours  vancomycin  IVPB 1000 milliGRAM(s) IV Intermittent every 12 hours    immunologic:    OTHER:  acetaminophen     Tablet .. 650 milliGRAM(s) Oral every 6 hours PRN  albuterol/ipratropium for Nebulization 3 milliLiter(s) Nebulizer every 6 hours  ascorbic acid 500 milliGRAM(s) Oral two times a day  aspirin  chewable 81 milliGRAM(s) Oral daily  bisacodyl Suppository 10 milliGRAM(s) Rectal once  chlorhexidine 0.12% Liquid 5 milliLiter(s) Oral Mucosa two times a day  chlorhexidine 2% Cloths 1 Application(s) Topical daily  dextrose 50% Injectable 50 milliLiter(s) IV Push every 15 minutes  dextrose 50% Injectable 25 milliLiter(s) IV Push every 15 minutes  furosemide    Tablet 40 milliGRAM(s) Oral daily  gabapentin 100 milliGRAM(s) Oral every 8 hours  insulin lispro (ADMELOG) corrective regimen sliding scale   SubCutaneous Before meals and at bedtime  melatonin 5 milliGRAM(s) Oral at bedtime  nystatin Powder 1 Application(s) Topical two times a day  oxyCODONE    IR 5 milliGRAM(s) Oral every 4 hours PRN  oxyCODONE    IR 10 milliGRAM(s) Oral every 4 hours PRN  pantoprazole    Tablet 40 milliGRAM(s) Oral before breakfast  polyethylene glycol 3350 17 Gram(s) Oral daily  senna 2 Tablet(s) Oral at bedtime  sodium chloride 0.9%. 1000 milliLiter(s) IV Continuous <Continuous>  spironolactone 25 milliGRAM(s) Oral every 12 hours      Objective:  Vital Signs Last 24 Hrs  T(C): 36.3 (22 Apr 2022 04:00), Max: 36.4 (21 Apr 2022 16:00)  T(F): 97.3 (22 Apr 2022 04:00), Max: 97.6 (21 Apr 2022 16:00)  HR: 54 (22 Apr 2022 07:00) (53 - 82)  BP: 157/70 (22 Apr 2022 07:00) (113/55 - 177/84)  BP(mean): 100 (22 Apr 2022 07:00) (78 - 121)  RR: 17 (22 Apr 2022 07:00) (13 - 34)  SpO2: 95% (22 Apr 2022 07:00) (81% - 100%)       Eyes:ABDULLAHI, EOMI  Ear/Nose/Throat: no oral lesion, no sinus tenderness on percussion	  Neck:no JVD, no lymphadenopathy, supple  Respiratory: CTA maribel  Cardiovascular: S1S2 RRR, no murmurs  Gastrointestinal:soft, (+) BS, no HSM  Extremities:no e/e/c        LABS:                        8.3    15.93 )-----------( 210      ( 22 Apr 2022 00:47 )             25.6     04-22    141  |  105  |  22  ----------------------------<  147<H>  4.7   |  25  |  0.69    Ca    8.7      22 Apr 2022 00:47  Phos  3.8     04-22  Mg     2.2     04-22    TPro  5.7<L>  /  Alb  3.3  /  TBili  0.6  /  DBili  x   /  AST  21  /  ALT  24  /  AlkPhos  53  04-22    PT/INR - ( 22 Apr 2022 00:47 )   PT: 12.4 sec;   INR: 1.08 ratio         PTT - ( 22 Apr 2022 00:47 )  PTT:24.7 sec        MICROBIOLOGY:    RECENT CULTURES:  04-21 @ 00:30 .Blood Blood                No growth to date.    04-19 @ 08:24 .Blood Blood-Peripheral   PCR    Growth in anaerobic bottle: Gram Positive Cocci in Clusters    Blood Culture PCR  Blood Culture PCR     Growth in anaerobic bottle: Staphylococcus lugdunensis  ***Blood Panel PCR results on this specimen are available  approximately 3 hours after the Gram stain result.***  Gram stain, PCR, and/or culture results may not always  correspond due to difference in methodologies.  ************************************************************  This PCR assay was performed by multiplex PCR. This  Assay tests for 66 bacterial and resistance gene targets.  Please refer to the [facility] Labs test directory  at [email or web address] for details.    04-19 @ 02:18 .Tissue Other       Few polymorphonuclear leukocytes seen per low power field  No organisms seen per oil power field           No growth    04-19 @ 02:15 .Surgical Swab AORTIC ANULAR ABSCESS                No growth          RESPIRATORY CULTURES:              RADIOLOGY & ADDITIONAL STUDIES:        Pager [phone]  After 5 pm/weekends or if no response :[phone]

## 2022-04-22 NOTE — PROGRESS NOTE ADULT - SUBJECTIVE AND OBJECTIVE BOX
24H hour events: Patient feels well, denies groin pain.    MEDICATIONS:  aspirin  chewable 81 milliGRAM(s) Oral daily  furosemide    Tablet 40 milliGRAM(s) Oral daily  spironolactone 25 milliGRAM(s) Oral every 12 hours  cefTRIAXone   IVPB      cefTRIAXone   IVPB 2000 milliGRAM(s) IV Intermittent every 24 hours  vancomycin  IVPB 1000 milliGRAM(s) IV Intermittent every 12 hours  albuterol/ipratropium for Nebulization 3 milliLiter(s) Nebulizer every 6 hours  acetaminophen     Tablet .. 650 milliGRAM(s) Oral every 6 hours PRN  gabapentin 100 milliGRAM(s) Oral every 8 hours  melatonin 5 milliGRAM(s) Oral at bedtime  oxyCODONE    IR 5 milliGRAM(s) Oral every 4 hours PRN  oxyCODONE    IR 10 milliGRAM(s) Oral every 4 hours PRN  bisacodyl Suppository 10 milliGRAM(s) Rectal once  pantoprazole    Tablet 40 milliGRAM(s) Oral before breakfast  polyethylene glycol 3350 17 Gram(s) Oral daily  senna 2 Tablet(s) Oral at bedtime  dextrose 50% Injectable 50 milliLiter(s) IV Push every 15 minutes  dextrose 50% Injectable 25 milliLiter(s) IV Push every 15 minutes  insulin lispro (ADMELOG) corrective regimen sliding scale   SubCutaneous Before meals and at bedtime  ascorbic acid 500 milliGRAM(s) Oral two times a day  chlorhexidine 0.12% Liquid 5 milliLiter(s) Oral Mucosa two times a day  chlorhexidine 2% Cloths 1 Application(s) Topical daily  nystatin Powder 1 Application(s) Topical two times a day  sodium chloride 0.9%. 1000 milliLiter(s) IV Continuous <Continuous>      REVIEW OF SYSTEMS:  Complete 10point ROS negative.    PHYSICAL EXAM:  T(C): 36.3 (04-22-22 @ 04:00), Max: 36.4 (04-21-22 @ 16:00)  HR: 57 (04-22-22 @ 08:00) (53 - 82)  BP: 178/74 (04-22-22 @ 08:00) (113/55 - 178/74)  RR: 20 (04-22-22 @ 08:00) (13 - 34)  SpO2: 97% (04-22-22 @ 08:00) (81% - 100%)  Wt(kg): --  I&O's Summary    21 Apr 2022 07:01  -  22 Apr 2022 07:00  --------------------------------------------------------  IN: 1396.2 mL / OUT: 3825 mL / NET: -2428.8 mL    22 Apr 2022 07:01  -  22 Apr 2022 08:52  --------------------------------------------------------  IN: 20 mL / OUT: 550 mL / NET: -530 mL        Appearance: Normal	  Cardiovascular: Normal S1 S2, No JVD, No murmurs, No edema  Respiratory: Lungs clear to auscultation	  Psychiatry: A & O x 3, Mood & affect appropriate  Gastrointestinal:  Soft, Non-tender, + BS	  Skin: Groin C/D/I No rashes, No ecchymoses, No cyanosis	  Neurologic: Non-focal  Extremities:  No clubbing, cyanosis or edema  Vascular: Peripheral pulses palpable 2+ bilaterally        LABS:	 	    CBC Full  -  ( 22 Apr 2022 00:47 )  WBC Count : 15.93 K/uL  Hemoglobin : 8.3 g/dL  Hematocrit : 25.6 %  Platelet Count - Automated : 210 K/uL  Mean Cell Volume : 92.4 fl  Mean Cell Hemoglobin : 30.0 pg  Mean Cell Hemoglobin Concentration : 32.4 gm/dL  Auto Neutrophil # : x  Auto Lymphocyte # : x  Auto Monocyte # : x  Auto Eosinophil # : x  Auto Basophil # : x  Auto Neutrophil % : x  Auto Lymphocyte % : x  Auto Monocyte % : x  Auto Eosinophil % : x  Auto Basophil % : x    04-22    141  |  105  |  22  ----------------------------<  147<H>  4.7   |  25  |  0.69  04-21    139  |  104  |  20  ----------------------------<  90  3.9   |  23  |  0.61    Ca    8.7      22 Apr 2022 00:47  Ca    8.5      21 Apr 2022 01:14  Phos  3.8     04-22  Phos  3.0     04-21  Mg     2.2     04-22  Mg     2.3     04-21    TPro  5.7<L>  /  Alb  3.3  /  TBili  0.6  /  DBili  x   /  AST  21  /  ALT  24  /  AlkPhos  53  04-22  TPro  5.9<L>  /  Alb  3.8  /  TBili  0.6  /  DBili  x   /  AST  30  /  ALT  26  /  AlkPhos  51  04-21        TELEMETRY: SR with AV sync ventricular pacing 60s      < from: Xray Chest 1 View- PORTABLE-Urgent (Xray Chest 1 View- PORTABLE-Urgent .) (04.21.22 @ 14:54) >  INTERPRETATION:  EXAMINATION: XR CHEST URGENT    CLINICAL INDICATION: Status post Micra pacemaker    TECHNIQUE: Single frontal, portable view of the chest was obtained.    COMPARISON: Chest x-ray 4/20/2022    FINDINGS:  Right IJ approach central venous catheter with tip terminating in the   SVC. Loop recorder in the left chest. Interval placement of Micra   pacemaker which projects over the left chest wall.  The heart size is unchanged from prior exam. Status post median   sternotomy with mediastinal surgical clips.  Unchanged left lower lung opacity which is obscuring the costophrenic   angle. Right lower lung linear atelectasis.  There is no pneumothorax.    IMPRESSION:  Unchanged left lower lung opacity which is obscuring the costophrenic   angle. This may represent small pleural effusion and atelectasis versus   consolidation.    Right lower lung linear atelectasis.    < end of copied text >

## 2022-04-22 NOTE — PROGRESS NOTE ADULT - SUBJECTIVE AND OBJECTIVE BOX
Patient seen and examined at the bedside.    Remained critically ill on continuous ICU monitoring.    OBJECTIVE:  Vital Signs Last 24 Hrs  T(C): 36.4 (22 Apr 2022 08:00), Max: 36.4 (21 Apr 2022 16:00)  T(F): 97.5 (22 Apr 2022 08:00), Max: 97.6 (21 Apr 2022 16:00)  HR: 57 (22 Apr 2022 08:00) (53 - 82)  BP: 178/74 (22 Apr 2022 08:00) (113/55 - 178/74)  BP(mean): 106 (22 Apr 2022 08:00) (78 - 121)  RR: 20 (22 Apr 2022 08:00) (13 - 34)  SpO2: 97% (22 Apr 2022 08:00) (81% - 100%)      Physical Exam:   General: NAD   Neurology: AAOx3, no focal deficits, strength equal x4   Eyes: bilateral pupils equal and reactive   ENT/Neck: Neck supple, trachea midline, No JVD   Respiratory: Rales noted bilaterally   CV: RRR, S1S2, no murmurs        [x] Sternal dressing        [x] Paced  [x] PPM - VDD    Abdominal: Soft, NT, ND +BS,   Extremities: 1-2+ pedal edema noted, + peripheral pulses   Skin: maceration/irritation under folds in pannus and under breast folds                    Assessment:  71F RH w/ AS w/ prior open heart aortic valve replacement 2019, HTN, BETSEY, former smoker, Emphysema/ chronic bronchitis, GERD s/p laparoscopic vertical sleeve gastrectomy    Endocarditis of prosthetic aortic valve S/P redo sternotomy, prosthetic valve endocarditis, 21mm homograft, placement of epicardial lead on 4/19   CHB / Sick sinus syndrome s/p PPM on 4/21   Hypovolemia  Post op respiratory insufficiency   Acute blood loss anemia  Stress hyperglycemia  Obesity OHS / BETSEY   Right PCA infarct       Plan:   ***Neuro***  CTH on 4/9 with Right PCA infarct, MRI on 4/10 Acute R occipital lobe infarct in a R PCA vascular distribution with an additional punctate acute infarct involving the L cerebellum and R splenium of the corpus callosum   [x] Nonfocal, asymptomatic  Post operative neuro assessment   Sleep regimen with Melatonin     ***Cardiovascular***  Invasive hemodynamic monitoring, assess perfusion indices   Paced / Hct 25.6% / Lactate 0.8  Continuos reassessment of hemodynamics   EP following for SSS / CHB - s/p Medtronic Micra AV   [x] ASA   Serial EKG and cardiac enzymes     ***Pulmonary***  [x] 4L NC   Encourage incentive spirometry, continue pulse ox monitoring, follow ABGs   Continue bronchodilators     ***GI***  [x]  Diet:  Consistent carb, DASH/TLC diet   [x] Protonix    Bowel regimen with Miralax and Senna     ***Renal***  Continue to monitor I/Os, BUN/Creatinine.   Replete lytes PRN  Diuresis with Lasix and Aldactone     ***ID***  ID following, appreciate recommendations   Tissue Cx on 4/19 NG, pending Fungal Cx  BCx on 4/19 + Staph lugdunosis 1/2,  repeat BCx x2 on 4/21 NGTD   Endocarditis, c/w Ceftriaxone and Vancomycin   Nystatin for thrush     ***Endocrine***  [x] Stress Hyperglycemia : HbA1c 6.3 %                - [x] ISS              - Need tight glycemic control to prevent wound infection.        Patient requires continuous monitoring with bedside rhythm monitoring, pulse oximetry monitoring, and continuous central venous and arterial pressure monitoring; and intermittent blood gas analysis. Care plan discussed with the ICU care team.   Patient remained critical, at risk for life threatening decompensation.    I have spent 30 minutes providing critical care management to this patient.    By signing my name below, I, Jacquelyn Reddy, attest that this documentation has been prepared under the direction and in the presence of Tanya Patel NP   Electronically signed: Rashmi Horowitz, 04-22-22 @ 08:59    I, Tanya Patel, , personally performed the services described in this documentation. all medical record entries made by the rashmi were at my direction and in my presence. I have reviewed the chart and agree that the record reflects my personal performance and is accurate and complete  Electronically signed: Tanya Patel NP  Patient seen and examined at the bedside.    Remained critically ill on continuous ICU monitoring.    OBJECTIVE:  Vital Signs Last 24 Hrs  T(C): 36.4 (22 Apr 2022 08:00), Max: 36.4 (21 Apr 2022 16:00)  T(F): 97.5 (22 Apr 2022 08:00), Max: 97.6 (21 Apr 2022 16:00)  HR: 57 (22 Apr 2022 08:00) (53 - 82)  BP: 178/74 (22 Apr 2022 08:00) (113/55 - 178/74)  BP(mean): 106 (22 Apr 2022 08:00) (78 - 121)  RR: 20 (22 Apr 2022 08:00) (13 - 34)  SpO2: 97% (22 Apr 2022 08:00) (81% - 100%)      Physical Exam:   General: NAD   Neurology: AAOx3, no focal deficits, strength equal x4   Eyes: bilateral pupils equal and reactive   ENT/Neck: Neck supple, trachea midline, No JVD   Respiratory: Rales noted bilaterally   CV: RRR, S1S2, no murmurs        [x] Sternal dressing        [x] Paced  [x] PPM - VDD    Abdominal: Soft, NT, ND +BS,   Extremities: 1-2+ pedal edema noted, + peripheral pulses   Skin: maceration/irritation under folds in pannus and under breast folds                    Assessment:  71F RH w/ AS w/ prior open heart aortic valve replacement 2019, HTN, BETSEY, former smoker, Emphysema/ chronic bronchitis, GERD s/p laparoscopic vertical sleeve gastrectomy    Endocarditis of prosthetic aortic valve S/P redo sternotomy, prosthetic valve endocarditis, 21mm homograft, placement of epicardial lead on 4/19   CHB / Sick sinus syndrome s/p PPM on 4/21   Hypovolemia  Post op respiratory insufficiency   Acute blood loss anemia  Stress hyperglycemia  Obesity OHS / BETSEY   Right PCA infarct       Plan:   ***Neuro***  CTH on 4/9 with Right PCA infarct, MRI on 4/10 Acute R occipital lobe infarct in a R PCA vascular distribution with an additional punctate acute infarct involving the L cerebellum and R splenium of the corpus callosum   [x] Nonfocal, asymptomatic  Post operative neuro assessment   Sleep regimen with Melatonin     ***Cardiovascular***  Invasive hemodynamic monitoring, assess perfusion indices   Paced / Hct 25.6% / Lactate 0.8  Continuos reassessment of hemodynamics   EP following for SSS / CHB - s/p Medtronic Micra PPM yesterday   Will f/u with EP if ok to resume Lovenox   [x] ASA   Serial EKG and cardiac enzymes     ***Pulmonary***  [x] 4L NC   Encourage incentive spirometry, continue pulse ox monitoring, follow ABGs   Continue bronchodilators     ***GI***  [x]  Diet:  Consistent carb, DASH/TLC diet   [x] Protonix    Bowel regimen with Miralax and Senna     ***Renal***  Continue to monitor I/Os, BUN/Creatinine.   Replete lytes PRN  Diuresis with Lasix and Aldactone     ***ID***  ID following, appreciate recommendations   Tissue Cx on 4/19 NG, pending Fungal Cx  BCx on 4/19 + Staph lugdunosis 1/2  Endocarditis, c/w Ceftriaxone and Vancomycin - will f/u with ID re: PICC line placement for long term antibiotics  Repeat BCx x2 sent yesterday - NGTD     Nystatin for thrush     ***Endocrine***  [x] Stress Hyperglycemia : HbA1c 6.3 %                - [x] ISS              - Need tight glycemic control to prevent wound infection.        Patient requires continuous monitoring with bedside rhythm monitoring, pulse oximetry monitoring, and continuous central venous and arterial pressure monitoring; and intermittent blood gas analysis. Care plan discussed with the ICU care team.   Patient remained critical, at risk for life threatening decompensation.    I have spent 30 minutes providing critical care management to this patient.    By signing my name below, IJacquelyn, attest that this documentation has been prepared under the direction and in the presence of Tanya Patel NP   Electronically signed: Rashmi Horowitz, 04-22-22 @ 08:59    I, Tanya Patel, , personally performed the services described in this documentation. all medical record entries made by the rashmi were at my direction and in my presence. I have reviewed the chart and agree that the record reflects my personal performance and is accurate and complete  Electronically signed: Tanya Patel NP  Patient seen and examined at the bedside.    Remained critically ill on continuous ICU monitoring.    OBJECTIVE:  Vital Signs Last 24 Hrs  T(C): 36.4 (22 Apr 2022 08:00), Max: 36.4 (21 Apr 2022 16:00)  T(F): 97.5 (22 Apr 2022 08:00), Max: 97.6 (21 Apr 2022 16:00)  HR: 57 (22 Apr 2022 08:00) (53 - 82)  BP: 178/74 (22 Apr 2022 08:00) (113/55 - 178/74)  BP(mean): 106 (22 Apr 2022 08:00) (78 - 121)  RR: 20 (22 Apr 2022 08:00) (13 - 34)  SpO2: 97% (22 Apr 2022 08:00) (81% - 100%)      Physical Exam:   General: NAD   Neurology: AAOx3, no focal deficits, strength equal x4   Eyes: bilateral pupils equal and reactive   ENT/Neck: Neck supple, trachea midline, No JVD   Respiratory: Rales noted bilaterally   CV: RRR, S1S2, no murmurs        [x] Sternal dressing        [x] Paced  [x] PPM - VDD , R groin site stable, no hematoma, stitch d/c this am   Abdominal: Soft, NT, ND +BS,   Extremities: 1-2+ pedal edema noted, + peripheral pulses   Skin: maceration/irritation under folds in pannus and under breast folds                    Assessment:  71F RH w/ AS w/ prior open heart aortic valve replacement 2019, HTN, BETSEY, former smoker, Emphysema/ chronic bronchitis, GERD s/p laparoscopic vertical sleeve gastrectomy    Endocarditis of prosthetic aortic valve S/P redo sternotomy, prosthetic valve endocarditis, 21mm homograft, placement of epicardial lead on 4/19   CHB / Sick sinus syndrome s/p PPM on 4/21   Hypovolemia  Post op respiratory insufficiency   Acute blood loss anemia  Stress hyperglycemia  Obesity OHS / BETSEY   Right PCA infarct       Plan:   ***Neuro***  CTH on 4/9 with Right PCA infarct, MRI on 4/10 Acute R occipital lobe infarct in a R PCA vascular distribution with an additional punctate acute infarct involving the L cerebellum and R splenium of the corpus callosum   [x] Nonfocal, asymptomatic  Post operative neuro assessment   Sleep regimen with Melatonin   Ambulate as tolerated    ***Cardiovascular***  Invasive hemodynamic monitoring, assess perfusion indices   Paced / Hct 25.6% / Lactate 0.8  Continuos reassessment of hemodynamics   EP following for SSS / CHB - s/p Medtronic Micra PPM yesterday   Will f/u with EP if ok to resume Lovenox  [x] ASA   Serial EKG and cardiac enzymes   Lopressor 25 bid for htn    ***Pulmonary***  [x] 4L NC, wean as tolerated  Encourage incentive spirometry, continue pulse ox monitoring, follow ABGs   Continue bronchodilators     ***GI***  [x]  Diet:  Consistent carb, DASH/TLC diet   [x] Protonix    Bowel regimen with Miralax and Senna     ***Renal***  Continue to monitor I/Os, BUN/Creatinine.   Replete lytes PRN  Diuresis with Lasix and Aldactone     ***ID***  ID following, appreciate recommendations   Tissue Cx on 4/19 NG, pending Fungal Cx  BCx on 4/19 + Staph lugdunosis 1/2  Endocarditis, c/w Ceftriaxone and Vancomycin - will f/u with ID re: PICC line placement for long term antibiotics  Repeat BCx x2 sent yesterday - NGTD     Nystatin for thrush     ***Endocrine***  [x] Stress Hyperglycemia : HbA1c 6.3 %                - [x] ISS              - Need tight glycemic control to prevent wound infection.        Patient requires continuous monitoring with bedside rhythm monitoring, pulse oximetry monitoring, and continuous central venous and arterial pressure monitoring; and intermittent blood gas analysis. Care plan discussed with the ICU care team.   Patient remained critical, at risk for life threatening decompensation.    I have spent 35 minutes providing critical care management to this patient.    By signing my name below, I, Jacquelyn Reddy, attest that this documentation has been prepared under the direction and in the presence of Tanya Patel NP   Electronically signed: Rashmi Horowitz, 04-22-22 @ 08:59    I, Tanya Patel, , personally performed the services described in this documentation. all medical record entries made by the rashmi were at my direction and in my presence. I have reviewed the chart and agree that the record reflects my personal performance and is accurate and complete  Electronically signed: Tanya Patel NP

## 2022-04-22 NOTE — PROGRESS NOTE ADULT - ASSESSMENT
72 y/o female PMH hypertension, hyperlipidemia, GERD s/p laparoscopic vertical sleeve gastrectomy, and non-obstructive CAD, bio-AVR 2019. She was now admitted on 4/9/22 with stroke type symptoms and ILR was implanted on 4/11/22 screening for AF. 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  Post op course c/b complete heart block requiring pacing via temporary epicardial wire, currently threshold on RV wire is 13. She is currently set at VVI 80 with mA 20, remains on Dobutamine and Cardene otherwise HDS.    1) Endocarditis   2) post op CHB     - s/p Medtronic Micra AV on 4/21/22  - Interrogation performed by device rep with normal PPM function  - CXR with appropriate position  - Post PPM teaching enforced with patient  - PPM booklet, Fact sheet, Temp ID card and follow up appointment card given to the patient.   - Patient to follow up in EP clinic for wound check - to be called with appt.   72 y/o female PMH hypertension, hyperlipidemia, GERD s/p laparoscopic vertical sleeve gastrectomy, and non-obstructive CAD, bio-AVR 2019. She was now admitted on 4/9/22 with stroke type symptoms and ILR was implanted on 4/11/22 screening for AF. 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  Post op course c/b complete heart block requiring pacing via temporary epicardial wire, currently threshold on RV wire is 13. She is currently set at VVI 80 with mA 20, remains on Dobutamine and Cardene otherwise HDS.    1) Endocarditis   2) post op CHB     - s/p Medtronic Micra AV on 4/21/22  - Interrogation performed by device rep with normal PPM function  - CXR with appropriate position  - Post PPM teaching enforced with patient  - PPM booklet, Fact sheet, Temp ID card and follow up appointment card given to the patient.   - Patient to follow up in EP clinic for wound check - to be called with appt.  - EP to sign off, reconsult as needed   70 y/o female PMH hypertension, hyperlipidemia, GERD s/p laparoscopic vertical sleeve gastrectomy, and non-obstructive CAD, bio-AVR 2019. She was now admitted on 4/9/22 with stroke type symptoms and ILR was implanted on 4/11/22 screening for AF. 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  Post op course c/b complete heart block requiring pacing via temporary epicardial wire, currently threshold on RV wire is 13. She is currently set at VVI 80 with mA 20, remains on Dobutamine and Cardene otherwise HDS.    1) Endocarditis   2) post op CHB     - s/p Medtronic Micra AV on 4/21/22  - Interrogation performed by device rep with normal PPM function  - CXR with appropriate position  - Post PPM teaching enforced with patient  - PPM booklet, Fact sheet, Temp ID card and follow up appointment card given to the patient.   - Patient to follow up in EP clinic for wound check - to be called with appt  - May resume Lovenox from EP perspective  - EP to sign off, reconsult as needed   70 y/o female PMH hypertension, hyperlipidemia, GERD s/p laparoscopic vertical sleeve gastrectomy, and non-obstructive CAD, bio-AVR 2019. She was now admitted on 4/9/22 with stroke type symptoms and ILR was implanted on 4/11/22 screening for AF. 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  Post op course c/b complete heart block requiring pacing via temporary epicardial wire, currently threshold on RV wire is 13. She is currently set at VVI 80 with mA 20, remains on Dobutamine and Cardene otherwise HDS.    1) Endocarditis   2) post op CHB     - s/p Medtronic Micra AV on 4/21/22  - Interrogation performed by device rep with normal PPM function  - CXR with appropriate position  - Post PPM teaching enforced with patient  - PPM booklet, Fact sheet, Temp ID card and follow up appointment card given to the patient.   - Patient to follow up in EP clinic for wound check as scheduled 5/4/22 at 10:40 AM  - May resume Lovenox from EP perspective  - EP to sign off, reconsult as needed

## 2022-04-22 NOTE — PROGRESS NOTE ADULT - ASSESSMENT
71F RH w/ AS w/ prior open heart aortic valve replacement 2019, HTN, BETSEY, former smoker, Emphysema/ chronic bronchitis, GERD s/p laparoscopic vertical sleeve gastrectomy presents with acute onset speech disturbance, vision change and gait imbalance. Per EMS, LKN: 4/9/22 at 10am per family. Pt's family reported blurry vision, gait imbalance, and speech disturbance (mild loss in fluency).  71F RH w/ AS w/ prior open heart aortic valve replacement 2019, HTN, BETSEY, former smoker, Emphysema/ chronic bronchitis, GERD s/p laparoscopic vertical sleeve gastrectomy    CTH on 4/9 with Right PCA infarct, MRI on 4/10 Acute R occipital lobe infarct in a R PCA vascular distribution with an additional punctate acute infarct involving the L cerebellum and R splenium of the corpus callosum   Endocarditis of prosthetic aortic valve S/P redo sternotomy, prosthetic valve endocarditis, 21mm homograft, placement of epicardial lead on 4/19   CHB / Sick sinus syndrome s/p PPM on 4/21    71F RH w/ AS w/ prior open heart aortic valve replacement 2019, HTN, BETSEY, former smoker, Emphysema/ chronic bronchitis, GERD s/p laparoscopic vertical sleeve gastrectomy presents with acute onset speech disturbance, vision change and gait imbalance.     4/9 CTH with Right PCA infarct, MRI on 4/10 Acute R occipital lobe infarct in a R PCA vascular distribution with an additional punctate acute infarct involving the L cerebellum and R splenium of the corpus callosum.    4/11 Underwent ECHO-. A large round mass measuring 3.8cm by 3cm,  is seen in the left atrium suggestive of atrial myxoma.  It appears to be attached to the atrial septum although no stalk is visualized.  4/12 CT surgery consult called  patient seen and examined in  no acute distress  family  at bedside  amenable to cardiac surgery workup  and surgery. Discussed with Stroke team benefits > risk of cardiac surgery    Of note patient febrile overnight  -tmax  101.8 on cefTRIAXone   IVPB 1000 milliGRAM(s) IV Intermittent every 24 hours for positive UA.  Blood cultures-  Cardiac cath scheduled for Wednesday 4/13. Tentative OR date for Friday April 15.  4/13 VSS; cath today; wbc 8 pt afebrile; + ceftriaxone for UTI; repeat UA neg 4//12- BC testing- ID not clearing the patient for OR for am 4/14as per neuro- pt cleared for OHS  ?re-scheduled OR date- when cleared by ID   4/15 VSS Cleared by ID for OR Likely Mon/Tuesday 4/16 Preop workup in progress  OR Monday 4/18 OR with Dr. 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 Explant of prosthetic valve. Debridement of aortic root and abscess with sat in the non-coronary sinus and above the anterior leaflet of the mitral valve. Reconstruction using a 21mm aortic homograft, with reimplantation of right and left main coronary buttons. There was a weakening from the infection of the right atrium adjacent to the abscess cavity which was reinforced with a bovine pericardial patch.  Endocarditis of prosthetic aortic valve S/P redo sternotomy, prosthetic valve endocarditis, 21mm homograft, placement of epicardial lead on 4/19   CHB / Sick sinus syndrome s/p PPM on 4/21    71F RH w/ AS w/ prior open heart aortic valve replacement 2019, HTN, BETSEY, former smoker, Emphysema/ chronic bronchitis, GERD s/p laparoscopic vertical sleeve gastrectomy presents with acute onset speech disturbance, vision change and gait imbalance.     4/9 CTH with Right PCA infarct, MRI on 4/10 Acute R occipital lobe infarct in a R PCA vascular distribution with an additional punctate acute infarct involving the L cerebellum and R splenium of the corpus callosum.    4/11 Underwent ECHO-. A large round mass measuring 3.8cm by 3cm,  is seen in the left atrium suggestive of atrial myxoma.  It appears to be attached to the atrial septum although no stalk is visualized.  4/12 CT surgery consult called  patient seen and examined in  no acute distress  family  at bedside  amenable to cardiac surgery workup  and surgery. Discussed with Stroke team benefits > risk of cardiac surgery    Of note patient febrile overnight  -tmax  101.8 on cefTRIAXone   IVPB 1000 milliGRAM(s) IV Intermittent every 24 hours for positive UA.  Blood cultures-  Cardiac cath scheduled for Wednesday 4/13. Tentative OR date for Friday April 15.  4/13 VSS; cath today; wbc 8 pt afebrile; + ceftriaxone for UTI; repeat UA neg 4//12- BC testing- ID not clearing the patient for OR for am 4/14as per neuro- pt cleared for OHS  ?re-scheduled OR date- when cleared by ID   4/15 VSS Cleared by ID for OR Likely Mon/Tuesday 4/16 Preop workup in progress  OR Monday 4/18 OR with Dr. 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 Explant of prosthetic valve. Debridement of aortic root and abscess with sat in the non-coronary sinus and above the anterior leaflet of the mitral valve. Reconstruction using a 21mm aortic homograft, with reimplantation of right and left main coronary buttons. There was a weakening from the infection of the right atrium adjacent to the abscess cavity which was reinforced with a bovine pericardial patch.  4/20 EP consulted for CHB/SSS post-operatively    71F RH w/ AS w/ prior open heart aortic valve replacement 2019, HTN, BETSEY, former smoker, Emphysema/ chronic bronchitis, GERD s/p laparoscopic vertical sleeve gastrectomy presents with acute onset speech disturbance, vision change and gait imbalance.     4/9 CTH with Right PCA infarct, MRI on 4/10 Acute R occipital lobe infarct in a R PCA vascular distribution with an additional punctate acute infarct involving the L cerebellum and R splenium of the corpus callosum.    4/11 Underwent ECHO-. A large round mass measuring 3.8cm by 3cm,  is seen in the left atrium suggestive of atrial myxoma.  It appears to be attached to the atrial septum although no stalk is visualized.  4/12 CT surgery consult called  patient seen and examined in  no acute distress  family  at bedside  amenable to cardiac surgery workup  and surgery. Discussed with Stroke team benefits > risk of cardiac surgery    Of note patient febrile overnight  -tmax  101.8 on cefTRIAXone   IVPB 1000 milliGRAM(s) IV Intermittent every 24 hours for positive UA.  Blood cultures-  Cardiac cath scheduled for Wednesday 4/13. Tentative OR date for Friday April 15.  4/13 VSS; cath today; wbc 8 pt afebrile; + ceftriaxone for UTI; repeat UA neg 4//12- BC testing- ID not clearing the patient for OR for am 4/14as per neuro- pt cleared for OHS  ?re-scheduled OR date- when cleared by ID   4/15 VSS Cleared by ID for OR Likely Mon/Tuesday 4/16 Preop workup in progress  OR Monday 4/18 OR with Dr. 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 Explant of prosthetic valve. Debridement of aortic root and abscess with sat in the non-coronary sinus and above the anterior leaflet of the mitral valve. Reconstruction using a 21mm aortic homograft, with reimplantation of right and left main coronary buttons. There was a weakening from the infection of the right atrium adjacent to the abscess cavity which was reinforced with a bovine pericardial patch.  4/20 EP consulted for CHB/SSS post-operatively   4/21 Micro PPM placed  4/22 Right groin stitch removed s/p Micra. OR Cultures negative to date.  ID Dr. Sharma following.  Continues on Vanco 1gram Q12/Ceftriaxone 2gram QD.  Will eventually need PICC line for long term ABX. TX to 2[address] floor bed.

## 2022-04-22 NOTE — PROGRESS NOTE ADULT - SUBJECTIVE AND OBJECTIVE BOX
Subjective: "Im doing ok "  Patient denies headache/blurry vision.  No chest pain or shortness of breath.  No fever/chills.  No abdominal pain/nausea/vomiting/diarrhea.  Patient states she has not had a BM since surgery but is passing flatus.  Denies dysuria.     TELEMETRY: Vpace 80s    VITAL SIGNS    Vital Signs Last 24 Hrs  T(C): 36.4 (22 @ 15:59), Max: 36.6 (22 @ 11:00)  T(F): 97.5 (22 @ 15:59), Max: 97.9 (22 @ 11:00)  HR: 82 (22 @ 15:59) (53 - 84)  BP: 155/70 (22 @ 15:59) (113/55 - 178/74)  RR: 17 (22 @ 15:59) (14 - 34)  SpO2: 96% (22 @ 15:59) (81% - 99%)             @ 07:01  -   @ 07:00  --------------------------------------------------------  IN: 1396.2 mL / OUT: 3825 mL / NET: -2428.8 mL     @ 07:01  -   @ 18:32  --------------------------------------------------------  IN: 20 mL / OUT: 1550 mL / NET: -1530 mL       Daily     Daily Weight in k.5 (2022 15:59)  Admit Wt: Drug Dosing Weight  Height (cm): 152.4 (2022 13:12)  Weight (kg): 85.5 (2022 02:14)  BMI (kg/m2): 36.8 (2022 13:12)  BSA (m2): 1.82 (2022 13:12)    Bilirubin Total, Serum: 0.6 mg/dL ( @ 00:47)    CAPILLARY BLOOD GLUCOSE      POCT Blood Glucose.: 121 mg/dL (2022 16:19)  POCT Blood Glucose.: 151 mg/dL (2022 10:35)  POCT Blood Glucose.: 131 mg/dL (2022 07:01)  POCT Blood Glucose.: 135 mg/dL (2022 22:36)              PHYSICAL EXAM    Neurology: alert and oriented x 3, nonfocal, no gross deficits, able to move all 4 extremities   CV : RRR +S1/s2  Sternal Wound :  CDI with dressing , Stable  Lungs: clear/diminished bilaterally. No cough/wheeze/rhonchi, RR easy, unlabored.  On 2L NC    Abdomen: soft, nontender, nondistended, positive bowel sounds, per pt no bowel movement post surgery Neg N/V/D   :  pt voiding without difficulty   Extremities: Right groin micra insertion site is MC/clean/dry   SERVIN; +1-2 LE  edema, neg calf tenderness.   PPP bilaterally          acetaminophen     Tablet .. 650 milliGRAM(s) Oral every 6 hours PRN  albuterol/ipratropium for Nebulization 3 milliLiter(s) Nebulizer every 6 hours  ascorbic acid 500 milliGRAM(s) Oral two times a day  aspirin  chewable 81 milliGRAM(s) Oral daily  atorvastatin 20 milliGRAM(s) Oral at bedtime  bisacodyl Suppository 10 milliGRAM(s) Rectal once  cefTRIAXone   IVPB      cefTRIAXone   IVPB 2000 milliGRAM(s) IV Intermittent every 24 hours  chlorhexidine 0.12% Liquid 5 milliLiter(s) Oral Mucosa two times a day  chlorhexidine 2% Cloths 1 Application(s) Topical daily  dextrose 50% Injectable 50 milliLiter(s) IV Push every 15 minutes  dextrose 50% Injectable 25 milliLiter(s) IV Push every 15 minutes  enoxaparin Injectable 40 milliGRAM(s) SubCutaneous once  furosemide    Tablet 40 milliGRAM(s) Oral daily  gabapentin 100 milliGRAM(s) Oral every 8 hours  insulin lispro (ADMELOG) corrective regimen sliding scale   SubCutaneous Before meals and at bedtime  melatonin 5 milliGRAM(s) Oral at bedtime  metoprolol tartrate 25 milliGRAM(s) Oral two times a day  nystatin Powder 1 Application(s) Topical two times a day  oxyCODONE    IR 5 milliGRAM(s) Oral every 4 hours PRN  oxyCODONE    IR 10 milliGRAM(s) Oral every 4 hours PRN  pantoprazole    Tablet 40 milliGRAM(s) Oral before breakfast  polyethylene glycol 3350 17 Gram(s) Oral daily  senna 2 Tablet(s) Oral at bedtime  sodium chloride 0.9% lock flush 3 milliLiter(s) IV Push every 8 hours  sodium chloride 0.9%. 1000 milliLiter(s) IV Continuous <Continuous>  spironolactone 25 milliGRAM(s) Oral every 12 hours  vancomycin  IVPB 1000 milliGRAM(s) IV Intermittent every 12 hours                         Subjective: "Im doing ok "  Patient denies headache/blurry vision.  No chest pain or shortness of breath.  No fever/chills.  No abdominal pain/nausea/vomiting/diarrhea.  Patient states she has not had a BM since surgery but is passing flatus.  Denies dysuria.     TELEMETRY: Vpace 80s    VITAL SIGNS    Vital Signs Last 24 Hrs  T(C): 36.4 (22 @ 15:59), Max: 36.6 (22 @ 11:00)  T(F): 97.5 (22 @ 15:59), Max: 97.9 (22 @ 11:00)  HR: 82 (22 @ 15:59) (53 - 84)  BP: 155/70 (22 @ 15:59) (113/55 - 178/74)  RR: 17 (22 @ 15:59) (14 - 34)  SpO2: 96% (22 @ 15:59) (81% - 99%)             @ 07:01  -   @ 07:00  --------------------------------------------------------  IN: 1396.2 mL / OUT: 3825 mL / NET: -2428.8 mL     @ 07:01  -   @ 18:32  --------------------------------------------------------  IN: 20 mL / OUT: 1550 mL / NET: -1530 mL       Daily     Daily Weight in k.5 (2022 15:59)  Admit Wt: Drug Dosing Weight  Height (cm): 152.4 (2022 13:12)  Weight (kg): 85.5 (2022 02:14)  BMI (kg/m2): 36.8 (2022 13:12)  BSA (m2): 1.82 (2022 13:12)    Bilirubin Total, Serum: 0.6 mg/dL ( @ 00:47)    CAPILLARY BLOOD GLUCOSE      POCT Blood Glucose.: 121 mg/dL (2022 16:19)  POCT Blood Glucose.: 151 mg/dL (2022 10:35)  POCT Blood Glucose.: 131 mg/dL (2022 07:01)  POCT Blood Glucose.: 135 mg/dL (2022 22:36)          PHYSICAL EXAM    Neurology: alert and oriented x 3, nonfocal, no gross deficits, able to move all 4 extremities   CV : RRR +S1/s2  Sternal Wound :  CDI with dressing , Stable  Lungs: clear/diminished bilaterally. No cough/wheeze/rhonchi, RR easy, unlabored.  On 2L NC    Abdomen: soft, nontender, nondistended, positive bowel sounds, per pt no bowel movement post surgery Neg N/V/D   :  pt voiding without difficulty   Extremities: Right groin micra insertion site is MC/clean/dry Left leg SVG sites c/d/i with opsite dressings no s/s bleeding/hematoma.  SERVIN; +1-2 LE  edema, neg calf tenderness.   PPP bilaterally          acetaminophen     Tablet .. 650 milliGRAM(s) Oral every 6 hours PRN  albuterol/ipratropium for Nebulization 3 milliLiter(s) Nebulizer every 6 hours  ascorbic acid 500 milliGRAM(s) Oral two times a day  aspirin  chewable 81 milliGRAM(s) Oral daily  atorvastatin 20 milliGRAM(s) Oral at bedtime  bisacodyl Suppository 10 milliGRAM(s) Rectal once  cefTRIAXone   IVPB      cefTRIAXone   IVPB 2000 milliGRAM(s) IV Intermittent every 24 hours  chlorhexidine 0.12% Liquid 5 milliLiter(s) Oral Mucosa two times a day  chlorhexidine 2% Cloths 1 Application(s) Topical daily  dextrose 50% Injectable 50 milliLiter(s) IV Push every 15 minutes  dextrose 50% Injectable 25 milliLiter(s) IV Push every 15 minutes  enoxaparin Injectable 40 milliGRAM(s) SubCutaneous once  furosemide    Tablet 40 milliGRAM(s) Oral daily  gabapentin 100 milliGRAM(s) Oral every 8 hours  insulin lispro (ADMELOG) corrective regimen sliding scale   SubCutaneous Before meals and at bedtime  melatonin 5 milliGRAM(s) Oral at bedtime  metoprolol tartrate 25 milliGRAM(s) Oral two times a day  nystatin Powder 1 Application(s) Topical two times a day  oxyCODONE    IR 5 milliGRAM(s) Oral every 4 hours PRN  oxyCODONE    IR 10 milliGRAM(s) Oral every 4 hours PRN  pantoprazole    Tablet 40 milliGRAM(s) Oral before breakfast  polyethylene glycol 3350 17 Gram(s) Oral daily  senna 2 Tablet(s) Oral at bedtime  sodium chloride 0.9% lock flush 3 milliLiter(s) IV Push every 8 hours  sodium chloride 0.9%. 1000 milliLiter(s) IV Continuous <Continuous>  spironolactone 25 milliGRAM(s) Oral every 12 hours  vancomycin  IVPB 1000 milliGRAM(s) IV Intermittent every 12 hours

## 2022-04-22 NOTE — PROGRESS NOTE ADULT - ASSESSMENT
71 year old with AVR; COPD, GERD, GBP, presented with recent cva.  During workup, an echo revealed an atrial myxoma.  Last 24 she had a fever of 101. Denies -prior fevers, but states she was treated for pna prior to this admission  She is very confused but denies any complaints at present      ; bc negative UA negative   went for surgery yesterday and found to have endocarditis   and root abscess/ .   preop  BC negative  will request PCR.  vanco and ceftriaxone pending cultures    OR cultures negative to date and very few wbc seen       one bottle of one set of a post op bc with S lung.  significance unclear with negative preop and intraop cultures  PCR pending   current vanco levels ok    final regimen to be determined

## 2022-04-22 NOTE — PROGRESS NOTE ADULT - ACP WITH SCRIBE
I personally performed the service described in the documentation  recorded by the scribe in my presence, and it accurately and completely records my words and actions.

## 2022-04-22 NOTE — PROGRESS NOTE ADULT - PROBLEM SELECTOR PLAN 3
4/18 OR with Dr. 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 Explant of prosthetic valve. Debridement of aortic root and abscess with sat in the non-coronary sinus and above the anterior leaflet of the mitral valve. Reconstruction using a 21mm aortic homograft, with reimplantation of right and left main coronary buttons. There was a weakening from the infection of the right atrium adjacent to the abscess cavity which was reinforced with a bovine pericardial patch.    -OR cultures NTD  -Continue Long term IV Antibiotics per ID, Dr. Sharma following.  Will need eventual PICC placement.  Currently on Vanco 1gram Q12 and Ceftriaxone 2gram QD.

## 2022-04-22 NOTE — PROGRESS NOTE ADULT - SUBJECTIVE AND OBJECTIVE BOX
Neurology Progress note;     HPI:  71F RH w/ AS w/ prior open heart aortic valve replacement 2019, HTN, BETSEY, former smoker, Emphysema/ chronic bronchitis, GERD s/p laparoscopic vertical sleeve gastrectomy presented with acute onset speech disturbance, vision change and gait imbalance. Per EMS, LKN: 4/9/22 at 10am per family. Pt's family reported blurry vision, gait imbalance, and speech disturbance (mild loss in fluency). Pt unable to provide full hx because she was confused on timing. She  endorsed blurry vision but denied any weakness, numbness/tingling. Pt takes a baby aspirin 81mg daily. Pt ambulates without assistance or assistive devices at baseline.  LKN: 4/9/22 at 10am  NIHSS: 3  preMRS: 0  Pt was not a candidate for tpa due to outside tpa window   Pt was not a candidate for mechanical thrombectomy due to no large vessel occlusion on CTA    SUBJECTIVE: patient seen and examined. now in CTICU. extubated, doing okay    Medications:      MEDICATIONS  (STANDING):  albuterol/ipratropium for Nebulization 3 milliLiter(s) Nebulizer every 6 hours  ascorbic acid 500 milliGRAM(s) Oral two times a day  aspirin  chewable 81 milliGRAM(s) Oral daily  bisacodyl Suppository 10 milliGRAM(s) Rectal once  cefTRIAXone   IVPB      cefTRIAXone   IVPB 2000 milliGRAM(s) IV Intermittent every 24 hours  chlorhexidine 0.12% Liquid 5 milliLiter(s) Oral Mucosa two times a day  chlorhexidine 2% Cloths 1 Application(s) Topical daily  dextrose 50% Injectable 50 milliLiter(s) IV Push every 15 minutes  dextrose 50% Injectable 25 milliLiter(s) IV Push every 15 minutes  furosemide    Tablet 40 milliGRAM(s) Oral daily  gabapentin 100 milliGRAM(s) Oral every 8 hours  insulin lispro (ADMELOG) corrective regimen sliding scale   SubCutaneous Before meals and at bedtime  melatonin 5 milliGRAM(s) Oral at bedtime  nystatin Powder 1 Application(s) Topical two times a day  pantoprazole    Tablet 40 milliGRAM(s) Oral before breakfast  polyethylene glycol 3350 17 Gram(s) Oral daily  senna 2 Tablet(s) Oral at bedtime  sodium chloride 0.9%. 1000 milliLiter(s) (10 mL/Hr) IV Continuous <Continuous>  spironolactone 25 milliGRAM(s) Oral every 12 hours  vancomycin  IVPB 1000 milliGRAM(s) IV Intermittent every 12 hours    MEDICATIONS  (PRN):  acetaminophen     Tablet .. 650 milliGRAM(s) Oral every 6 hours PRN Mild Pain (1 - 3)  oxyCODONE    IR 5 milliGRAM(s) Oral every 4 hours PRN Moderate Pain (4 - 6)  oxyCODONE    IR 10 milliGRAM(s) Oral every 4 hours PRN Severe Pain (7 - 10)        PHYSICAL EXAM:       ICU Vital Signs Last 24 Hrs  T(C): 36.4 (22 Apr 2022 08:00), Max: 36.4 (21 Apr 2022 16:00)  T(F): 97.5 (22 Apr 2022 08:00), Max: 97.6 (21 Apr 2022 16:00)  HR: 57 (22 Apr 2022 08:00) (53 - 82)  BP: 178/74 (22 Apr 2022 08:00) (113/55 - 178/74)  BP(mean): 106 (22 Apr 2022 08:00) (78 - 121)  ABP: 165/77 (22 Apr 2022 06:00) (97/59 - 165/77)  ABP(mean): 91 (22 Apr 2022 05:00) (62 - 117)  RR: 20 (22 Apr 2022 08:00) (13 - 34)  SpO2: 97% (22 Apr 2022 08:00) (81% - 100%)        General: No acute distress  HEENT: EOM intact, LHH to counting (is aware)  Abdomen: Soft, nontender, nondistended   Extremities: No edema    NEUROLOGICAL EXAM:    Mental status: AAOx2- 3, able to follow simple and complex verbal commands. answers questions appropriately, able to name, repeat and recall. no aphasia, no dysarthria   Cranial Nerves: No facial asymmetry, LHHA to counting, no nystagmus, no dysarthria,  tongue midline  Motor exam: Normal tone, no drift, 5/5 RUE, 5/5 RLE, 5/5 LUE, 5/5 LLE, normal fine finger movements.  Sensation: Intact to light touch   Coordination/ Gait: No dysmetria, gait unable in ICU       LABS:                CBC Full  -  ( 22 Apr 2022 00:47 )  WBC Count : 15.93 K/uL  RBC Count : 2.77 M/uL  Hemoglobin : 8.3 g/dL  Hematocrit : 25.6 %  Platelet Count - Automated : 210 K/uL  Mean Cell Volume : 92.4 fl  Mean Cell Hemoglobin : 30.0 pg  Mean Cell Hemoglobin Concentration : 32.4 gm/dL  Auto Neutrophil # : x  Auto Lymphocyte # : x  Auto Monocyte # : x  Auto Eosinophil # : x  Auto Basophil # : x  Auto Neutrophil % : x  Auto Lymphocyte % : x  Auto Monocyte % : x  Auto Eosinophil % : x  Auto Basophil % : x    04-22    141  |  105  |  22  ----------------------------<  147<H>  4.7   |  25  |  0.69    Ca    8.7      22 Apr 2022 00:47  Phos  3.8     04-22  Mg     2.2     04-22    TPro  5.7<L>  /  Alb  3.3  /  TBili  0.6  /  DBili  x   /  AST  21  /  ALT  24  /  AlkPhos  53  04-22      IMAGING: Reviewed by me.   MRI HEAD 04/10/22: Acute right occipital lobe infarct in a right PCA vascular distribution   with an additional punctate acute infarct involving the left cerebellum   and right splenium of the corpus callosum.    04/09/22:  Brain CT: Acute right occipital lobe infarct in the distribution of the   right PCA. No evidence for hemorrhage.  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 No   hemodynamically significant stenosis within the anterior circulation.  Brain CTA: No large vessel occlusion or stenosis. 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  Perfusion maps: Core infarct in the distribution of the right PCA.   Questionable areas of brain at risk in the right PCA as well as the   bilateral temporal and right frontal region.

## 2022-04-22 NOTE — PROGRESS NOTE ADULT - PROBLEM SELECTOR PLAN 1
4/18 OR with Dr. 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 Explant of prosthetic valve. Debridement of aortic root and abscess with sat in the non-coronary sinus and above the anterior leaflet of the mitral valve. Reconstruction using a 21mm aortic homograft, with reimplantation of right and left main coronary buttons. There was a weakening from the infection of the right atrium adjacent to the abscess cavity which was reinforced with a bovine pericardial patch.    Post op course complicated by CHB/SSS--Micra PPM placed on 4/21 with EPS.    -Continue ASA 81mg QD  -Lovenox 40mg QD DVT ppx  -Continue Lopressor 25mg Q12  -Continue Lasix 40mg PO QD/Aldactone 25mg Q12  -Continue Long term IV Antibiotics per ID, Dr. Sharma following.  Will need eventual PICC placement.  Currently on Vanco 1gram Q12 and Ceftriaxone 2gram QD.  -Patient encouraged to continue OOB/cough deep breathing exercises OOB to chair.  Ambulation encouraged.  -Daily CXR

## 2022-04-22 NOTE — PROGRESS NOTE ADULT - SUBJECTIVE AND OBJECTIVE BOX
Cardiovascular Disease Progress Note Covering for Dr. Lanza    Overnight events: No acute events overnight.  Ms. Lal is in no distress.   Otherwise review of systems negative    Objective Findings:  T(C): 36.4 (22 @ 08:00), Max: 36.4 (22 @ 16:00)  HR: 84 (22 @ 12:00) (53 - 84)  BP: 146/91 (22 @ 12:00) (113/55 - 178/74)  RR: 34 (22 @ 12:00) (14 - 34)  SpO2: 97% (22 @ 12:00) (81% - 100%)  Wt(kg): --  Daily Height in cm: 152.4 (2022 13:12)    Daily Weight in k.3 (2022 00:00)      Physical Exam:  Gen: NAD; Patient resting comfortably  HEENT: EOMI, Normocephalic/ atraumatic  CV: RRR, normal S1 + S2, no m/r/g  Lungs:  Normal respiratory effort; clear to auscultation bilaterally  Abd: soft, non-tender; bowel sounds present  Ext: No edema; warm and well perfused    Telemetry: V pacing.     Laboratory Data:                        8.3    15.93 )-----------( 210      ( 2022 00:47 )             25.6         141  |  105  |  22  ----------------------------<  147<H>  4.7   |  25  |  0.69    Ca    8.7      2022 00:47  Phos  3.8       Mg     2.2         TPro  5.7<L>  /  Alb  3.3  /  TBili  0.6  /  DBili  x   /  AST  21  /  ALT  24  /  AlkPhos  53      PT/INR - ( 2022 00:47 )   PT: 12.4 sec;   INR: 1.08 ratio         PTT - ( 2022 00:47 )  PTT:24.7 sec          Inpatient Medications:  MEDICATIONS  (STANDING):  albuterol/ipratropium for Nebulization 3 milliLiter(s) Nebulizer every 6 hours  ascorbic acid 500 milliGRAM(s) Oral two times a day  aspirin  chewable 81 milliGRAM(s) Oral daily  bisacodyl Suppository 10 milliGRAM(s) Rectal once  cefTRIAXone   IVPB      cefTRIAXone   IVPB 2000 milliGRAM(s) IV Intermittent every 24 hours  chlorhexidine 0.12% Liquid 5 milliLiter(s) Oral Mucosa two times a day  chlorhexidine 2% Cloths 1 Application(s) Topical daily  dextrose 50% Injectable 50 milliLiter(s) IV Push every 15 minutes  dextrose 50% Injectable 25 milliLiter(s) IV Push every 15 minutes  enoxaparin Injectable 40 milliGRAM(s) SubCutaneous once  furosemide    Tablet 40 milliGRAM(s) Oral daily  gabapentin 100 milliGRAM(s) Oral every 8 hours  insulin lispro (ADMELOG) corrective regimen sliding scale   SubCutaneous Before meals and at bedtime  melatonin 5 milliGRAM(s) Oral at bedtime  nystatin Powder 1 Application(s) Topical two times a day  pantoprazole    Tablet 40 milliGRAM(s) Oral before breakfast  polyethylene glycol 3350 17 Gram(s) Oral daily  senna 2 Tablet(s) Oral at bedtime  sodium chloride 0.9%. 1000 milliLiter(s) (10 mL/Hr) IV Continuous <Continuous>  spironolactone 25 milliGRAM(s) Oral every 12 hours  vancomycin  IVPB 1000 milliGRAM(s) IV Intermittent every 12 hours      Assessment:  71F RH w/ AS w/ prior open heart aortic valve replacement , HTN, BETSEY, former smoker, Emphysema/ chronic bronchitis, GERD s/p laparoscopic vertical sleeve gastrectomy presents with acute onset speech disturbance, vision change and gait imbalance.     Plan of Care:    #Endocarditis  - Pt s/p Bentall procedure   - Tolerated procedure well.  - Ms. Lal displays no evidence of post-op coronary ischemia  - Continue management as per CTU and CTS    #Complete heart block  - S/p micra PPM placement   - EPS input appreciated.   - Device interrogation shows normal function.     #Acute CVA  - Cardioembolic source.  - Refer to plan above  - Pt with expressive aphasia  - Neuro input appreciated.             Over 25 minutes spent on total encounter; more than 50% of the visit was spent counseling and/or coordinating care by the attending physician.      Abhay Oglesby D.O.   Cardiovascular Disease  [phone]

## 2022-04-22 NOTE — PROGRESS NOTE ADULT - SUBJECTIVE AND OBJECTIVE BOX
EP    tele:  80s    she denies palpitations, syncope, nor angina, ROS otherwise -     DATE OF SERVICE - 04-22-22     Review of Systems:   Constitutional: [ ] fevers, [ ] chills.   Skin: [ ] dry skin. [ ] rashes.  Psychiatric: [ ] depression, [ ] anxiety.   Gastrointestinal: [ ] BRBPR, [ ] melena.   Neurological: [ ] confusion. [ ] seizures. [ ] shuffling gait.   Ears,Nose,Mouth and Throat: [ ] ear pain [ ] sore throat.   Eyes: [ ] diplopia.   Respiratory: [ ] hemoptysis. [ ] shortness of breath  Cardiovascular: See HPI above  Hematologic/Lymphatic: [ ] anemia. [ ] painful nodes. [ ] prolonged bleeding.   Genitourinary: [ ] hematuria. [ ] flank pain.   Endocrine: [ ] significant change in weight. [ ] intolerance to heat and cold.     Review of systems [ x] otherwise negative, [ ] otherwise unable to obtain    FH: no family history of sudden cardiac death in first degree relatives    SH: [ ] tobacco, [ ] alcohol, [ ] drugs      MEDICATIONS  (STANDING):  albuterol/ipratropium for Nebulization 3 milliLiter(s) Nebulizer every 6 hours  ascorbic acid 500 milliGRAM(s) Oral two times a day  aspirin  chewable 81 milliGRAM(s) Oral daily  atorvastatin 20 milliGRAM(s) Oral at bedtime  bisacodyl Suppository 10 milliGRAM(s) Rectal once  cefTRIAXone   IVPB      cefTRIAXone   IVPB 2000 milliGRAM(s) IV Intermittent every 24 hours  chlorhexidine 0.12% Liquid 5 milliLiter(s) Oral Mucosa two times a day  chlorhexidine 2% Cloths 1 Application(s) Topical daily  dextrose 50% Injectable 50 milliLiter(s) IV Push every 15 minutes  dextrose 50% Injectable 25 milliLiter(s) IV Push every 15 minutes  enoxaparin Injectable 40 milliGRAM(s) SubCutaneous once  furosemide    Tablet 40 milliGRAM(s) Oral daily  gabapentin 100 milliGRAM(s) Oral every 8 hours  insulin lispro (ADMELOG) corrective regimen sliding scale   SubCutaneous Before meals and at bedtime  melatonin 5 milliGRAM(s) Oral at bedtime  metoprolol tartrate 25 milliGRAM(s) Oral two times a day  nystatin Powder 1 Application(s) Topical two times a day  pantoprazole    Tablet 40 milliGRAM(s) Oral before breakfast  polyethylene glycol 3350 17 Gram(s) Oral daily  senna 2 Tablet(s) Oral at bedtime  sodium chloride 0.9% lock flush 3 milliLiter(s) IV Push every 8 hours  sodium chloride 0.9%. 1000 milliLiter(s) (10 mL/Hr) IV Continuous <Continuous>  spironolactone 25 milliGRAM(s) Oral every 12 hours  vancomycin  IVPB 1000 milliGRAM(s) IV Intermittent every 12 hours    MEDICATIONS  (PRN):  acetaminophen     Tablet .. 650 milliGRAM(s) Oral every 6 hours PRN Mild Pain (1 - 3)  oxyCODONE    IR 5 milliGRAM(s) Oral every 4 hours PRN Moderate Pain (4 - 6)  oxyCODONE    IR 10 milliGRAM(s) Oral every 4 hours PRN Severe Pain (7 - 10)      LABS:                          8.3    15.93 )-----------( 210      ( 22 Apr 2022 00:47 )             25.6     Hemoglobin: 8.3 g/dL (04-22 @ 00:47)  Hemoglobin: 8.0 g/dL (04-21 @ 01:14)  Hemoglobin: 8.1 g/dL (04-20 @ 00:46)  Hemoglobin: 9.9 g/dL (04-19 @ 00:35)  Hemoglobin: 9.0 g/dL (04-18 @ 19:23)    04-22    141  |  105  |  22  ----------------------------<  147<H>  4.7   |  25  |  0.69    Ca    8.7      22 Apr 2022 00:47  Phos  3.8     04-22  Mg     2.2     04-22    TPro  5.7<L>  /  Alb  3.3  /  TBili  0.6  /  DBili  x   /  AST  21  /  ALT  24  /  AlkPhos  53  04-22    Creatinine Trend: 0.69<--, 0.61<--, 0.61<--, 0.62<--, 0.56<--, 0.62<--   PT/INR - ( 22 Apr 2022 00:47 )   PT: 12.4 sec;   INR: 1.08 ratio         PTT - ( 22 Apr 2022 00:47 )  PTT:24.7 sec          04-21-22 @ 07:01  -  04-22-22 @ 07:00  --------------------------------------------------------  IN: 1396.2 mL / OUT: 3825 mL / NET: -2428.8 mL    04-22-22 @ 07:01  -  04-22-22 @ 17:30  --------------------------------------------------------  IN: 20 mL / OUT: 1350 mL / NET: -1330 mL        PHYSICAL EXAM  Vital Signs Last 24 Hrs  T(C): 36.4 (22 Apr 2022 15:59), Max: 36.6 (22 Apr 2022 11:00)  T(F): 97.5 (22 Apr 2022 15:59), Max: 97.9 (22 Apr 2022 11:00)  HR: 82 (22 Apr 2022 15:59) (53 - 84)  BP: 155/70 (22 Apr 2022 15:59) (113/55 - 178/74)  BP(mean): 89 (22 Apr 2022 15:00) (78 - 121)  RR: 17 (22 Apr 2022 15:59) (14 - 34)  SpO2: 96% (22 Apr 2022 15:59) (81% - 99%)        General: Well nourished in no acute distress. Alert and Oriented * 3.   Head: Normocephalic and atraumatic.   Neck: No JVD. No bruits. Supple. Does not appear to be enlarged.   Cardiovascular: + S1,S2 ; RRR Soft systolic murmur at the left lower sternal border. No rubs noted.    Lungs: CTA b/l. No rhonchi, rales or wheezes.   Abdomen: + BS, soft. Non tender. Non distended. No rebound. No guarding.   Extremities: No clubbing/cyanosis/edema.   Neurologic: Moves all four extremities. Full range of motion.   Skin: Warm and moist. The patient's skin has normal elasticity and good skin turgor.   Psychiatric: Appropriate mood and affect.  Musculoskeletal: Normal range of motion, normal strength      A/P) 72 y/o female PMH hypertension, hyperlipidemia, GERD s/p laparoscopic vertical sleeve gastrectomy, and non-obstructive CAD who underwent a bio-AVR 2019. She was now admitted on 4/9/22 with stroke type symptoms and a loop recorder was implanted on 4/11/22 screening for AF. 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 She underwent explant of her bio-AVR, debridement of her Ao root and abscess, Bentall procedure, and implantation of an epicardial RV lead. She now has both sick sinus syndrome as well as complete heart block on POD 2. No myxoma was ever found - it was all abscess from culture negative endocarditis.     -antibiotics as per ID  -supportive care as per CT surgery  -I would also leave the ILR in place screening for AF  -I discussed the case with Dr Jay, who assures no epicardial RA lead was implanted   -now s/p micra PPM  -if she ever needs atrial pacing for her sick sinus syndrome (likely) a transvenous dual chamber PPM [or even the [facility] leadless system] can eventually be implanted after she recovers from culture negative endocarditis  -outpatient cardiologist is Heber Nolan at [place]    Brant Blackburn PA-C  Pager: [phone]

## 2022-04-23 DIAGNOSIS — I44.2 ATRIOVENTRICULAR BLOCK, COMPLETE: ICD-10-CM

## 2022-04-23 LAB
ALBUMIN SERPL ELPH-MCNC: 3.8 G/DL — SIGNIFICANT CHANGE UP (ref 3.3–5)
ALP SERPL-CCNC: 59 U/L — SIGNIFICANT CHANGE UP (ref 40–120)
ALT FLD-CCNC: 28 U/L — SIGNIFICANT CHANGE UP (ref 10–45)
ANION GAP SERPL CALC-SCNC: 13 MMOL/L — SIGNIFICANT CHANGE UP (ref 5–17)
AST SERPL-CCNC: 20 U/L — SIGNIFICANT CHANGE UP (ref 10–40)
BILIRUB SERPL-MCNC: 1 MG/DL — SIGNIFICANT CHANGE UP (ref 0.2–1.2)
BUN SERPL-MCNC: 23 MG/DL — SIGNIFICANT CHANGE UP (ref 7–23)
CALCIUM SERPL-MCNC: 9.3 MG/DL — SIGNIFICANT CHANGE UP (ref 8.4–10.5)
CHLORIDE SERPL-SCNC: 100 MMOL/L — SIGNIFICANT CHANGE UP (ref 96–108)
CO2 SERPL-SCNC: 28 MMOL/L — SIGNIFICANT CHANGE UP (ref 22–31)
CREAT SERPL-MCNC: 0.64 MG/DL — SIGNIFICANT CHANGE UP (ref 0.5–1.3)
CULTURE RESULTS: SIGNIFICANT CHANGE UP
EGFR: 94 ML/MIN/1.73M2 — SIGNIFICANT CHANGE UP
GLUCOSE BLDC GLUCOMTR-MCNC: 109 MG/DL — HIGH (ref 70–99)
GLUCOSE BLDC GLUCOMTR-MCNC: 147 MG/DL — HIGH (ref 70–99)
GLUCOSE BLDC GLUCOMTR-MCNC: 156 MG/DL — HIGH (ref 70–99)
GLUCOSE BLDC GLUCOMTR-MCNC: 157 MG/DL — HIGH (ref 70–99)
GLUCOSE SERPL-MCNC: 125 MG/DL — HIGH (ref 70–99)
HCT VFR BLD CALC: 31.9 % — LOW (ref 34.5–45)
HGB BLD-MCNC: 9.8 G/DL — LOW (ref 11.5–15.5)
MCHC RBC-ENTMCNC: 28.7 PG — SIGNIFICANT CHANGE UP (ref 27–34)
MCHC RBC-ENTMCNC: 30.7 GM/DL — LOW (ref 32–36)
MCV RBC AUTO: 93.5 FL — SIGNIFICANT CHANGE UP (ref 80–100)
NRBC # BLD: 0 /100 WBCS — SIGNIFICANT CHANGE UP (ref 0–0)
PLATELET # BLD AUTO: 287 K/UL — SIGNIFICANT CHANGE UP (ref 150–400)
POTASSIUM SERPL-MCNC: 4.3 MMOL/L — SIGNIFICANT CHANGE UP (ref 3.5–5.3)
POTASSIUM SERPL-SCNC: 4.3 MMOL/L — SIGNIFICANT CHANGE UP (ref 3.5–5.3)
PROT SERPL-MCNC: 6.6 G/DL — SIGNIFICANT CHANGE UP (ref 6–8.3)
RBC # BLD: 3.41 M/UL — LOW (ref 3.8–5.2)
RBC # FLD: 14.9 % — HIGH (ref 10.3–14.5)
SODIUM SERPL-SCNC: 141 MMOL/L — SIGNIFICANT CHANGE UP (ref 135–145)
SPECIMEN SOURCE: SIGNIFICANT CHANGE UP
WBC # BLD: 13.15 K/UL — HIGH (ref 3.8–10.5)
WBC # FLD AUTO: 13.15 K/UL — HIGH (ref 3.8–10.5)

## 2022-04-23 PROCEDURE — 71045 X-RAY EXAM CHEST 1 VIEW: CPT | Mod: 26

## 2022-04-23 RX ORDER — SORBITOL SOLUTION 70 %
15 SOLUTION, ORAL MISCELLANEOUS ONCE
Refills: 0 | Status: COMPLETED | OUTPATIENT
Start: 2022-04-23 | End: 2022-04-23

## 2022-04-23 RX ADMIN — SODIUM CHLORIDE 3 MILLILITER(S): 9 INJECTION INTRAMUSCULAR; INTRAVENOUS; SUBCUTANEOUS at 22:14

## 2022-04-23 RX ADMIN — OXYCODONE HYDROCHLORIDE 10 MILLIGRAM(S): 5 TABLET ORAL at 22:40

## 2022-04-23 RX ADMIN — OXYCODONE HYDROCHLORIDE 5 MILLIGRAM(S): 5 TABLET ORAL at 08:35

## 2022-04-23 RX ADMIN — Medication 10 MILLIGRAM(S): at 10:07

## 2022-04-23 RX ADMIN — SPIRONOLACTONE 25 MILLIGRAM(S): 25 TABLET, FILM COATED ORAL at 06:02

## 2022-04-23 RX ADMIN — PANTOPRAZOLE SODIUM 40 MILLIGRAM(S): 20 TABLET, DELAYED RELEASE ORAL at 06:03

## 2022-04-23 RX ADMIN — SPIRONOLACTONE 25 MILLIGRAM(S): 25 TABLET, FILM COATED ORAL at 17:21

## 2022-04-23 RX ADMIN — CHLORHEXIDINE GLUCONATE 1 APPLICATION(S): 213 SOLUTION TOPICAL at 10:13

## 2022-04-23 RX ADMIN — GABAPENTIN 100 MILLIGRAM(S): 400 CAPSULE ORAL at 13:49

## 2022-04-23 RX ADMIN — NYSTATIN CREAM 1 APPLICATION(S): 100000 CREAM TOPICAL at 10:13

## 2022-04-23 RX ADMIN — Medication 25 MILLIGRAM(S): at 17:19

## 2022-04-23 RX ADMIN — Medication 15 MILLILITER(S): at 16:00

## 2022-04-23 RX ADMIN — SODIUM CHLORIDE 3 MILLILITER(S): 9 INJECTION INTRAMUSCULAR; INTRAVENOUS; SUBCUTANEOUS at 05:12

## 2022-04-23 RX ADMIN — Medication 2: at 21:53

## 2022-04-23 RX ADMIN — OXYCODONE HYDROCHLORIDE 5 MILLIGRAM(S): 5 TABLET ORAL at 15:15

## 2022-04-23 RX ADMIN — SENNA PLUS 2 TABLET(S): 8.6 TABLET ORAL at 22:14

## 2022-04-23 RX ADMIN — Medication 500 MILLIGRAM(S): at 06:01

## 2022-04-23 RX ADMIN — Medication 40 MILLIGRAM(S): at 06:02

## 2022-04-23 RX ADMIN — ATORVASTATIN CALCIUM 20 MILLIGRAM(S): 80 TABLET, FILM COATED ORAL at 22:14

## 2022-04-23 RX ADMIN — Medication 3 MILLILITER(S): at 11:26

## 2022-04-23 RX ADMIN — OXYCODONE HYDROCHLORIDE 10 MILLIGRAM(S): 5 TABLET ORAL at 22:05

## 2022-04-23 RX ADMIN — Medication 250 MILLIGRAM(S): at 08:39

## 2022-04-23 RX ADMIN — Medication 500 MILLIGRAM(S): at 17:18

## 2022-04-23 RX ADMIN — Medication 5 MILLIGRAM(S): at 22:14

## 2022-04-23 RX ADMIN — GABAPENTIN 100 MILLIGRAM(S): 400 CAPSULE ORAL at 06:02

## 2022-04-23 RX ADMIN — Medication 25 MILLIGRAM(S): at 06:02

## 2022-04-23 RX ADMIN — Medication 81 MILLIGRAM(S): at 11:27

## 2022-04-23 RX ADMIN — POLYETHYLENE GLYCOL 3350 17 GRAM(S): 17 POWDER, FOR SOLUTION ORAL at 11:26

## 2022-04-23 RX ADMIN — SODIUM CHLORIDE 3 MILLILITER(S): 9 INJECTION INTRAMUSCULAR; INTRAVENOUS; SUBCUTANEOUS at 13:47

## 2022-04-23 RX ADMIN — CHLORHEXIDINE GLUCONATE 5 MILLILITER(S): 213 SOLUTION TOPICAL at 06:01

## 2022-04-23 RX ADMIN — Medication 250 MILLIGRAM(S): at 21:53

## 2022-04-23 RX ADMIN — OXYCODONE HYDROCHLORIDE 5 MILLIGRAM(S): 5 TABLET ORAL at 14:48

## 2022-04-23 RX ADMIN — OXYCODONE HYDROCHLORIDE 5 MILLIGRAM(S): 5 TABLET ORAL at 08:07

## 2022-04-23 RX ADMIN — Medication 2: at 08:02

## 2022-04-23 RX ADMIN — Medication 3 MILLILITER(S): at 06:01

## 2022-04-23 RX ADMIN — CEFTRIAXONE 100 MILLIGRAM(S): 500 INJECTION, POWDER, FOR SOLUTION INTRAMUSCULAR; INTRAVENOUS at 08:04

## 2022-04-23 NOTE — PROGRESS NOTE ADULT - ASSESSMENT
71F RH w/ AS w/ prior open heart aortic valve replacement 2019, HTN, BETSEY, former smoker, Emphysema/ chronic bronchitis, GERD s/p laparoscopic vertical sleeve gastrectomy presents with acute onset speech disturbance, vision change and gait imbalance.     4/9 CTH with Right PCA infarct, MRI on 4/10 Acute R occipital lobe infarct in a R PCA vascular distribution with an additional punctate acute infarct involving the L cerebellum and R splenium of the corpus callosum.    4/11 Underwent ECHO-. A large round mass measuring 3.8cm by 3cm,  is seen in the left atrium suggestive of atrial myxoma.  It appears to be attached to the atrial septum although no stalk is visualized.  4/12 CT surgery consult called  patient seen and examined in  no acute distress  family  at bedside  amenable to cardiac surgery workup  and surgery. Discussed with Stroke team benefits > risk of cardiac surgery    Of note patient febrile overnight  -tmax  101.8 on cefTRIAXone   IVPB 1000 milliGRAM(s) IV Intermittent every 24 hours for positive UA.  Blood cultures-  Cardiac cath scheduled for Wednesday 4/13. Tentative OR date for Friday April 15.  4/13 VSS; cath today; wbc 8 pt afebrile; + ceftriaxone for UTI; repeat UA neg 4//12- BC testing- ID not clearing the patient for OR for am 4/14as per neuro- pt cleared for [facility]  ?re-scheduled OR date- when cleared by ID   4/15 VSS Cleared by ID for OR Likely Mon/Tuesday 4/16 Preop workup in progress  OR Monday 4/18 OR with Dr. 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 Explant of prosthetic valve. Debridement of aortic root and abscess with sat in the non-coronary sinus and above the anterior leaflet of the mitral valve. Reconstruction using a 21mm aortic homograft, with reimplantation of right and left main coronary buttons. There was a weakening from the infection of the right atrium adjacent to the abscess cavity which was reinforced with a bovine pericardial patch.  4/20 EP consulted for CHB/SSS post-operatively   4/21 Micro PPM placed  4/22 Right groin stitch removed s/p Micra. OR Cultures negative to date.  ID Dr. Sharma following.  Continues on Vanco 1gram Q12/Ceftriaxone 2gram QD.  Will eventually need PICC line for long term ABX. TX to 2cohen floor bed.    71F RH w/ AS w/ prior open heart aortic valve replacement 2019, HTN, BETSEY, former smoker, Emphysema/ chronic bronchitis, GERD s/p laparoscopic vertical sleeve gastrectomy presents with acute onset speech disturbance, vision change and gait imbalance.     4/9 CTH with Right PCA infarct, MRI on 4/10 Acute R occipital lobe infarct in a R PCA vascular distribution with an additional punctate acute infarct involving the L cerebellum and R splenium of the corpus callosum.    4/11 Underwent ECHO-. A large round mass measuring 3.8cm by 3cm,  is seen in the left atrium suggestive of atrial myxoma.  It appears to be attached to the atrial septum although no stalk is visualized.  4/12 CT surgery consult called  patient seen and examined in  no acute distress  family  at bedside  amenable to cardiac surgery workup  and surgery. Discussed with Stroke team benefits > risk of cardiac surgery    Of note patient febrile overnight  -tmax  101.8 on cefTRIAXone   IVPB 1000 milliGRAM(s) IV Intermittent every 24 hours for positive UA.  Blood cultures-  Cardiac cath scheduled for Wednesday 4/13. Tentative OR date for Friday April 15.  4/13 VSS; cath today; wbc 8 pt afebrile; + ceftriaxone for UTI; repeat UA neg 4//12- BC testing- ID not clearing the patient for OR for am 4/14as per neuro- pt cleared for [facility]  ?re-scheduled OR date- when cleared by ID   4/15 VSS Cleared by ID for OR Likely Mon/Tuesday 4/16 Preop workup in progress  OR Monday 4/18 OR with Dr. 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 Explant of prosthetic valve. Debridement of aortic root and abscess with sat in the non-coronary sinus and above the anterior leaflet of the mitral valve. Reconstruction using a 21mm aortic homograft, with reimplantation of right and left main coronary buttons. There was a weakening from the infection of the right atrium adjacent to the abscess cavity which was reinforced with a bovine pericardial patch.  4/20 EP consulted for CHB/SSS post-operatively   4/21 Micro PPM placed  4/22 Right groin stitch removed s/p Micra. OR Cultures negative to date.  ID Dr. Sharma following.  Continues on Vanco 1gram Q12/Ceftriaxone 2gram QD.  Will eventually need PICC line for long term ABX. TX to 2cohen floor bed.   4/23 VSS; V.Paced 80-90; continue abx as per ID; ID following; follow OR cx; BC neg 4/21; pt will need picc line  discharge planning- acute rehab when stable

## 2022-04-23 NOTE — PROGRESS NOTE ADULT - SUBJECTIVE AND OBJECTIVE BOX
Cardiovascular Disease Progress Note    Overnight events: No acute events overnight.  Pt denies chest pain or SOB.   Otherwise review of systems negative    Objective Findings:  T(C): 36.8 (22 @ 11:53), Max: 36.9 (22 @ 05:03)  HR: 83 (22 @ 11:53) (79 - 83)  BP: 125/94 (22 @ 11:53) (116/70 - 155/70)  RR: 18 (22 @ 11:53) (17 - 18)  SpO2: 93% (22 @ 11:53) (91% - 96%)  Wt(kg): --  Daily     Daily Weight in k.8 (2022 07:19)      Physical Exam:  Gen: NAD; Patient resting comfortably  HEENT: EOMI, Normocephalic/ atraumatic  CV: RRR, normal S1 + S2, no m/r/g  Lungs:  Normal respiratory effort; clear to auscultation bilaterally  Abd: soft, non-tender; bowel sounds present  Ext: No edema; warm and well perfused    Telemetry: V pacing    Laboratory Data:                        9.8    13.15 )-----------( 287      ( 2022 06:10 )             31.9         141  |  100  |  23  ----------------------------<  125<H>  4.3   |  28  |  0.64    Ca    9.3      2022 06:10  Phos  3.8       Mg     2.2         TPro  6.6  /  Alb  3.8  /  TBili  1.0  /  DBili  x   /  AST  20  /  ALT  28  /  AlkPhos  59      PT/INR - ( 2022 00:47 )   PT: 12.4 sec;   INR: 1.08 ratio         PTT - ( 2022 00:47 )  PTT:24.7 sec          Inpatient Medications:  MEDICATIONS  (STANDING):  albuterol/ipratropium for Nebulization 3 milliLiter(s) Nebulizer every 6 hours  ascorbic acid 500 milliGRAM(s) Oral two times a day  aspirin  chewable 81 milliGRAM(s) Oral daily  atorvastatin 20 milliGRAM(s) Oral at bedtime  bisacodyl Suppository 10 milliGRAM(s) Rectal once  cefTRIAXone   IVPB      cefTRIAXone   IVPB 2000 milliGRAM(s) IV Intermittent every 24 hours  chlorhexidine 0.12% Liquid 5 milliLiter(s) Oral Mucosa two times a day  chlorhexidine 2% Cloths 1 Application(s) Topical daily  dextrose 50% Injectable 50 milliLiter(s) IV Push every 15 minutes  dextrose 50% Injectable 25 milliLiter(s) IV Push every 15 minutes  enoxaparin Injectable 40 milliGRAM(s) SubCutaneous once  furosemide    Tablet 40 milliGRAM(s) Oral daily  insulin lispro (ADMELOG) corrective regimen sliding scale   SubCutaneous Before meals and at bedtime  melatonin 5 milliGRAM(s) Oral at bedtime  metoprolol tartrate 25 milliGRAM(s) Oral two times a day  nystatin Powder 1 Application(s) Topical two times a day  pantoprazole    Tablet 40 milliGRAM(s) Oral before breakfast  polyethylene glycol 3350 17 Gram(s) Oral daily  senna 2 Tablet(s) Oral at bedtime  sodium chloride 0.9% lock flush 3 milliLiter(s) IV Push every 8 hours  sodium chloride 0.9%. 1000 milliLiter(s) (10 mL/Hr) IV Continuous <Continuous>  sorbitol 70% Solution 15 milliLiter(s) Oral once  spironolactone 25 milliGRAM(s) Oral every 12 hours  vancomycin  IVPB 1000 milliGRAM(s) IV Intermittent every 12 hours      Assessment:  71F RH w/ AS w/ prior open heart aortic valve replacement 2019, HTN, BETSEY, former smoker, Emphysema/ chronic bronchitis, GERD s/p laparoscopic vertical sleeve gastrectomy presents with acute onset speech disturbance, vision change and gait imbalance.     Plan of Care:    #Endocarditis of proshtetic AV  - Pt s/p Bentall procedure   - Tolerated procedure well.  - Ms. Lal displays no evidence of post-op coronary ischemia  - Continue management as per CTU and CTS    #Complete heart block  - S/p micra PPM placement   - EPS input appreciated.   - Device interrogation shows normal function.     #Acute CVA  - Cardioembolic source.  - Refer to plan above  - Pt with expressive aphasia  - Neuro input appreciated.     #ACP (advance care planning)-  Advanced care planning was addressed.  Risks, benefits and alternatives of medical treatment and procedures were discussed. 30 minutes spent addressing advance care plans.          Over 25 minutes spent on total encounter; more than 50% of the visit was spent counseling and/or coordinating care by the attending physician.      Abhay Oglesby D.O.   Cardiovascular Disease  [phone]

## 2022-04-23 NOTE — PROGRESS NOTE ADULT - PROBLEM SELECTOR PLAN 3
4/18 OR with Dr. 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 Explant of prosthetic valve. Debridement of aortic root and abscess with sat in the non-coronary sinus and above the anterior leaflet of the mitral valve. Reconstruction using a 21mm aortic homograft, with reimplantation of right and left main coronary buttons. There was a weakening from the infection of the right atrium adjacent to the abscess cavity which was reinforced with a bovine pericardial patch.    -OR cultures NTD  -Continue Long term IV Antibiotics per ID, Dr. Sharma following.  Will need eventual PICC placement.  Currently on Vanco 1gram Q12 and Ceftriaxone 2gram QD. 4/18 OR with Dr. 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 Explant of prosthetic valve. Debridement of aortic root and abscess with sat in the non-coronary sinus and above the anterior leaflet of the mitral valve. Reconstruction using a 21mm aortic homograft, with reimplantation of right and left main coronary buttons. There was a weakening from the infection of the right atrium adjacent to the abscess cavity which was reinforced with a bovine pericardial patch.    -OR cultures NTD  bc neg 4/21   -Continue Long term IV Antibiotics per ID, Dr. Sharma following.  Will need eventual PICC placement.  Currently on Vanco 1gram Q12 and Ceftriaxone 2gram QD.

## 2022-04-23 NOTE — PROGRESS NOTE ADULT - ASSESSMENT
EP ATTENDING    Patient seen and examined. Agree with above. Supportive care as per CTS. Has Micra follow up with Dr Arsalan Lucero on 5/4/22 at 10:40 AM, and ILR r/o AF is followed by Dr Lanza. No further inpatient EP workup expected.

## 2022-04-23 NOTE — PROGRESS NOTE ADULT - SUBJECTIVE AND OBJECTIVE BOX
VITAL SIGNS    Telemetry:    Vital Signs Last 24 Hrs  T(C): 36.9 (22 @ 05:03), Max: 36.9 (22 @ 05:03)  T(F): 98.4 (22 @ 05:03), Max: 98.4 (22 @ 05:03)  HR: 83 (22 @ 05:03) (65 - 84)  BP: 116/70 (22 @ 05:03) (116/70 - 155/70)  RR: 18 (22 @ 05:03) (14 - 34)  SpO2: 91% (22 @ 05:03) (91% - 99%)             @ 07:01  -   @ 07:00  --------------------------------------------------------  IN: 420 mL / OUT: 1650 mL / NET: -1230 mL       Daily     Daily Weight in k.8 (2022 07:19)  Admit Wt: Drug Dosing Weight  Height (cm): 152.4 (2022 13:12)  Weight (kg): 85.5 (2022 02:14)  BMI (kg/m2): 36.8 (2022 13:12)  BSA (m2): 1.82 (2022 13:12)    Bilirubin Total, Serum: 1.0 mg/dL ( @ 06:10)    CAPILLARY BLOOD GLUCOSE      POCT Blood Glucose.: 157 mg/dL (2022 07:56)  POCT Blood Glucose.: 130 mg/dL (2022 21:38)  POCT Blood Glucose.: 121 mg/dL (2022 16:19)          acetaminophen     Tablet .. 650 milliGRAM(s) Oral every 6 hours PRN  albuterol/ipratropium for Nebulization 3 milliLiter(s) Nebulizer every 6 hours  ascorbic acid 500 milliGRAM(s) Oral two times a day  aspirin  chewable 81 milliGRAM(s) Oral daily  atorvastatin 20 milliGRAM(s) Oral at bedtime  bisacodyl Suppository 10 milliGRAM(s) Rectal once  cefTRIAXone   IVPB      cefTRIAXone   IVPB 2000 milliGRAM(s) IV Intermittent every 24 hours  chlorhexidine 0.12% Liquid 5 milliLiter(s) Oral Mucosa two times a day  chlorhexidine 2% Cloths 1 Application(s) Topical daily  dextrose 50% Injectable 50 milliLiter(s) IV Push every 15 minutes  dextrose 50% Injectable 25 milliLiter(s) IV Push every 15 minutes  enoxaparin Injectable 40 milliGRAM(s) SubCutaneous once  furosemide    Tablet 40 milliGRAM(s) Oral daily  gabapentin 100 milliGRAM(s) Oral every 8 hours  insulin lispro (ADMELOG) corrective regimen sliding scale   SubCutaneous Before meals and at bedtime  melatonin 5 milliGRAM(s) Oral at bedtime  metoprolol tartrate 25 milliGRAM(s) Oral two times a day  nystatin Powder 1 Application(s) Topical two times a day  oxyCODONE    IR 5 milliGRAM(s) Oral every 4 hours PRN  oxyCODONE    IR 10 milliGRAM(s) Oral every 4 hours PRN  pantoprazole    Tablet 40 milliGRAM(s) Oral before breakfast  polyethylene glycol 3350 17 Gram(s) Oral daily  senna 2 Tablet(s) Oral at bedtime  sodium chloride 0.9% lock flush 3 milliLiter(s) IV Push every 8 hours  sodium chloride 0.9%. 1000 milliLiter(s) IV Continuous <Continuous>  spironolactone 25 milliGRAM(s) Oral every 12 hours  vancomycin  IVPB 1000 milliGRAM(s) IV Intermittent every 12 hours      PHYSICAL EXAM    Subjective: "Hi.   Neurology: alert and oriented x 3, nonfocal, no gross deficits  CV : tele:  RSR  Sternal Wound :  CDI with dressing , Stable  Lungs: clear. RR easy, unlabored   Abdomen: soft, nontender, nondistended, positive bowel sounds, bowel movement   Neg N/V/D   :  pt voiding without difficulty   Extremities:   SERVIN; edema, neg calf tenderness.   PPP bilaterally      PW:  Chest tubes:                 VITAL SIGNS    Telemetry:  v.paced 80-90   Vital Signs Last 24 Hrs  T(C): 36.9 (22 @ 05:03), Max: 36.9 (22 @ 05:03)  T(F): 98.4 (22 @ 05:03), Max: 98.4 (22 @ 05:03)  HR: 83 (22 @ 05:03) (65 - 84)  BP: 116/70 (22 @ 05:03) (116/70 - 155/70)  RR: 18 (22 @ 05:03) (14 - 34)  SpO2: 91% (22 @ 05:03) (91% - 99%)             @ 07:01  -   @ 07:00  --------------------------------------------------------  IN: 420 mL / OUT: 1650 mL / NET: -1230 mL       Daily     Daily Weight in k.8 (2022 07:19)  Admit Wt: Drug Dosing Weight  Height (cm): 152.4 (2022 13:12)  Weight (kg): 85.5 (2022 02:14)  BMI (kg/m2): 36.8 (2022 13:12)  BSA (m2): 1.82 (2022 13:12)    Bilirubin Total, Serum: 1.0 mg/dL ( @ 06:10)    CAPILLARY BLOOD GLUCOSE      POCT Blood Glucose.: 157 mg/dL (2022 07:56)  POCT Blood Glucose.: 130 mg/dL (2022 21:38)  POCT Blood Glucose.: 121 mg/dL (2022 16:19)          acetaminophen     Tablet .. 650 milliGRAM(s) Oral every 6 hours PRN  albuterol/ipratropium for Nebulization 3 milliLiter(s) Nebulizer every 6 hours  ascorbic acid 500 milliGRAM(s) Oral two times a day  aspirin  chewable 81 milliGRAM(s) Oral daily  atorvastatin 20 milliGRAM(s) Oral at bedtime  bisacodyl Suppository 10 milliGRAM(s) Rectal once  cefTRIAXone   IVPB      cefTRIAXone   IVPB 2000 milliGRAM(s) IV Intermittent every 24 hours  chlorhexidine 0.12% Liquid 5 milliLiter(s) Oral Mucosa two times a day  chlorhexidine 2% Cloths 1 Application(s) Topical daily  dextrose 50% Injectable 50 milliLiter(s) IV Push every 15 minutes  dextrose 50% Injectable 25 milliLiter(s) IV Push every 15 minutes  enoxaparin Injectable 40 milliGRAM(s) SubCutaneous once  furosemide    Tablet 40 milliGRAM(s) Oral daily  gabapentin 100 milliGRAM(s) Oral every 8 hours  insulin lispro (ADMELOG) corrective regimen sliding scale   SubCutaneous Before meals and at bedtime  melatonin 5 milliGRAM(s) Oral at bedtime  metoprolol tartrate 25 milliGRAM(s) Oral two times a day  nystatin Powder 1 Application(s) Topical two times a day  oxyCODONE    IR 5 milliGRAM(s) Oral every 4 hours PRN  oxyCODONE    IR 10 milliGRAM(s) Oral every 4 hours PRN  pantoprazole    Tablet 40 milliGRAM(s) Oral before breakfast  polyethylene glycol 3350 17 Gram(s) Oral daily  senna 2 Tablet(s) Oral at bedtime  sodium chloride 0.9% lock flush 3 milliLiter(s) IV Push every 8 hours  sodium chloride 0.9%. 1000 milliLiter(s) IV Continuous <Continuous>  spironolactone 25 milliGRAM(s) Oral every 12 hours  vancomycin  IVPB 1000 milliGRAM(s) IV Intermittent every 12 hours      PHYSICAL EXAM    Subjective: "I need to have a bowel movement."   Neurology: alert and oriented x 3, nonfocal, no gross deficits  CV : tele: v.paced 80-90   Sternal Wound :  CDI with dressing; sternum stable   Lungs: clear. RR easy, unlabored   Abdomen: soft, nontender, nondistended, positive bowel sounds, neg bowel movement   Neg N/V/D   :  pt voiding without difficulty   Extremities:   SERVIN; neg edema, neg calf tenderness.   PPP bilaterally      PW: no  Chest tubes: no

## 2022-04-23 NOTE — PROGRESS NOTE ADULT - SUBJECTIVE AND OBJECTIVE BOX
EP    tele:  80 s    pt seen and examined, no complaints, ROS - .     DATE OF SERVICE - 04-23-22     Review of Systems:   Constitutional: [ ] fevers, [ ] chills.   Skin: [ ] dry skin. [ ] rashes.  Psychiatric: [ ] depression, [ ] anxiety.   Gastrointestinal: [ ] BRBPR, [ ] melena.   Neurological: [ ] confusion. [ ] seizures. [ ] shuffling gait.   Ears,Nose,Mouth and Throat: [ ] ear pain [ ] sore throat.   Eyes: [ ] diplopia.   Respiratory: [ ] hemoptysis. [ ] shortness of breath  Cardiovascular: See HPI above  Hematologic/Lymphatic: [ ] anemia. [ ] painful nodes. [ ] prolonged bleeding.   Genitourinary: [ ] hematuria. [ ] flank pain.   Endocrine: [ ] significant change in weight. [ ] intolerance to heat and cold.     Review of systems [ x] otherwise negative, [ ] otherwise unable to obtain    FH: no family history of sudden cardiac death in first degree relatives    SH: [ ] tobacco, [ ] alcohol, [ ] drugs               acetaminophen     Tablet .. 650 milliGRAM(s) Oral every 6 hours PRN  albuterol/ipratropium for Nebulization 3 milliLiter(s) Nebulizer every 6 hours  ascorbic acid 500 milliGRAM(s) Oral two times a day  aspirin  chewable 81 milliGRAM(s) Oral daily  atorvastatin 20 milliGRAM(s) Oral at bedtime  bisacodyl Suppository 10 milliGRAM(s) Rectal once  cefTRIAXone   IVPB      cefTRIAXone   IVPB 2000 milliGRAM(s) IV Intermittent every 24 hours  chlorhexidine 0.12% Liquid 5 milliLiter(s) Oral Mucosa two times a day  chlorhexidine 2% Cloths 1 Application(s) Topical daily  dextrose 50% Injectable 50 milliLiter(s) IV Push every 15 minutes  dextrose 50% Injectable 25 milliLiter(s) IV Push every 15 minutes  enoxaparin Injectable 40 milliGRAM(s) SubCutaneous once  furosemide    Tablet 40 milliGRAM(s) Oral daily  gabapentin 100 milliGRAM(s) Oral every 8 hours  insulin lispro (ADMELOG) corrective regimen sliding scale   SubCutaneous Before meals and at bedtime  melatonin 5 milliGRAM(s) Oral at bedtime  metoprolol tartrate 25 milliGRAM(s) Oral two times a day  nystatin Powder 1 Application(s) Topical two times a day  oxyCODONE    IR 5 milliGRAM(s) Oral every 4 hours PRN  oxyCODONE    IR 10 milliGRAM(s) Oral every 4 hours PRN  pantoprazole    Tablet 40 milliGRAM(s) Oral before breakfast  polyethylene glycol 3350 17 Gram(s) Oral daily  senna 2 Tablet(s) Oral at bedtime  sodium chloride 0.9% lock flush 3 milliLiter(s) IV Push every 8 hours  sodium chloride 0.9%. 1000 milliLiter(s) IV Continuous <Continuous>  spironolactone 25 milliGRAM(s) Oral every 12 hours  vancomycin  IVPB 1000 milliGRAM(s) IV Intermittent every 12 hours                            9.8    13.15 )-----------( 287      ( 23 Apr 2022 06:10 )             31.9       Hemoglobin: 9.8 g/dL (04-23 @ 06:10)  Hemoglobin: 8.3 g/dL (04-22 @ 00:47)  Hemoglobin: 8.0 g/dL (04-21 @ 01:14)  Hemoglobin: 8.1 g/dL (04-20 @ 00:46)  Hemoglobin: 9.9 g/dL (04-19 @ 00:35)      04-23    141  |  100  |  23  ----------------------------<  125<H>  4.3   |  28  |  0.64    Ca    9.3      23 Apr 2022 06:10  Phos  3.8     04-22  Mg     2.2     04-22    TPro  6.6  /  Alb  3.8  /  TBili  1.0  /  DBili  x   /  AST  20  /  ALT  28  /  AlkPhos  59  04-23    Creatinine Trend: 0.64<--, 0.69<--, 0.61<--, 0.61<--, 0.62<--, 0.56<--    COAGS:           T(C): 36.9 (04-23-22 @ 05:03), Max: 36.9 (04-23-22 @ 05:03)  HR: 83 (04-23-22 @ 05:03) (54 - 84)  BP: 116/70 (04-23-22 @ 05:03) (116/70 - 178/74)  RR: 18 (04-23-22 @ 05:03) (14 - 34)  SpO2: 91% (04-23-22 @ 05:03) (91% - 99%)  Wt(kg): --    I&O's Summary    21 Apr 2022 07:01  -  22 Apr 2022 07:00  --------------------------------------------------------  IN: 1396.2 mL / OUT: 3825 mL / NET: -2428.8 mL    22 Apr 2022 07:01  -  23 Apr 2022 06:59  --------------------------------------------------------  IN: 420 mL / OUT: 1650 mL / NET: -1230 mL      General: Well nourished in no acute distress. Alert and Oriented * 3.   Head: Normocephalic and atraumatic.   Neck: No JVD. No bruits. Supple. Does not appear to be enlarged.   Cardiovascular: + S1,S2 ; RRR Soft systolic murmur at the left lower sternal border. No rubs noted.    Lungs: CTA b/l. No rhonchi, rales or wheezes.   Abdomen: + BS, soft. Non tender. Non distended. No rebound. No guarding.   Extremities: No clubbing/cyanosis/edema.   Neurologic: Moves all four extremities. Full range of motion.   Skin: Warm and moist. The patient's skin has normal elasticity and good skin turgor.   Psychiatric: Appropriate mood and affect.  Musculoskeletal: Normal range of motion, normal strength      A/P) 70 y/o female PMH hypertension, hyperlipidemia, GERD s/p laparoscopic vertical sleeve gastrectomy, and non-obstructive CAD who underwent a bio-AVR 2019. She was now admitted on 4/9/22 with stroke type symptoms and a loop recorder was implanted on 4/11/22 screening for AF. 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 She underwent explant of her bio-AVR, debridement of her Ao root and abscess, Bentall procedure, and implantation of an epicardial RV lead. She now has both sick sinus syndrome as well as complete heart block on POD 2. No myxoma was ever found - it was all abscess from culture negative endocarditis.     -antibiotics as per ID  -supportive care as per CT surgery  - WE recommend that to  leave the ILR in place screening for AF  - s/p micra PPM  -if she ever needs atrial pacing for her sick sinus syndrome (likely) a transvenous dual chamber PPM [or even the [facility] leadless system] can eventually be implanted after she recovers from culture negative endocarditis  -outpatient cardiologist is Heber Nolan at [place]

## 2022-04-23 NOTE — PROGRESS NOTE ADULT - PROBLEM SELECTOR PLAN 1
4/18 OR with Dr. 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 Explant of prosthetic valve. Debridement of aortic root and abscess with sat in the non-coronary sinus and above the anterior leaflet of the mitral valve. Reconstruction using a 21mm aortic homograft, with reimplantation of right and left main coronary buttons. There was a weakening from the infection of the right atrium adjacent to the abscess cavity which was reinforced with a bovine pericardial patch.    Post op course complicated by CHB/SSS--Micra PPM placed on 4/21 with EPS.    -Continue ASA 81mg QD  -Lovenox 40mg QD DVT ppx  -Continue Lopressor 25mg Q12  -Continue Lasix 40mg PO QD/Aldactone 25mg Q12  -Continue Long term IV Antibiotics per ID, Dr. Sharma following.  Will need eventual PICC placement.  Currently on Vanco 1gram Q12 and Ceftriaxone 2gram QD.  -Patient encouraged to continue OOB/cough deep breathing exercises OOB to chair.  Ambulation encouraged.  -Daily CXR 4/18 OR with Dr. 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 Explant of prosthetic valve. Debridement of aortic root and abscess with sat in the non-coronary sinus and above the anterior leaflet of the mitral valve. Reconstruction using a 21mm aortic homograft, with reimplantation of right and left main coronary buttons. There was a weakening from the infection of the right atrium adjacent to the abscess cavity which was reinforced with a bovine pericardial patch.    continue postop care   Continue ASA 81mg QD  Continue Lopressor 25mg Q12  diuresis w/ Lasix 40mg PO QD/Aldactone 25mg Q12  Continue Long term IV Antibiotics per ID, Dr. Sharma following.  Will need eventual PICC placement.  Currently on Vanco 1gram Q12 and Ceftriaxone 2gram QD  pulm toilet  pain management  increase activity as tolerated  Discharge planning- acute rehab when stable

## 2022-04-24 LAB
ANION GAP SERPL CALC-SCNC: 13 MMOL/L — SIGNIFICANT CHANGE UP (ref 5–17)
BUN SERPL-MCNC: 19 MG/DL — SIGNIFICANT CHANGE UP (ref 7–23)
CALCIUM SERPL-MCNC: 9 MG/DL — SIGNIFICANT CHANGE UP (ref 8.4–10.5)
CHLORIDE SERPL-SCNC: 99 MMOL/L — SIGNIFICANT CHANGE UP (ref 96–108)
CO2 SERPL-SCNC: 27 MMOL/L — SIGNIFICANT CHANGE UP (ref 22–31)
CREAT SERPL-MCNC: 0.63 MG/DL — SIGNIFICANT CHANGE UP (ref 0.5–1.3)
CULTURE RESULTS: SIGNIFICANT CHANGE UP
EGFR: 95 ML/MIN/1.73M2 — SIGNIFICANT CHANGE UP
GLUCOSE BLDC GLUCOMTR-MCNC: 123 MG/DL — HIGH (ref 70–99)
GLUCOSE BLDC GLUCOMTR-MCNC: 133 MG/DL — HIGH (ref 70–99)
GLUCOSE BLDC GLUCOMTR-MCNC: 134 MG/DL — HIGH (ref 70–99)
GLUCOSE BLDC GLUCOMTR-MCNC: 135 MG/DL — HIGH (ref 70–99)
GLUCOSE SERPL-MCNC: 126 MG/DL — HIGH (ref 70–99)
HCT VFR BLD CALC: 30.3 % — LOW (ref 34.5–45)
HGB BLD-MCNC: 9.7 G/DL — LOW (ref 11.5–15.5)
MCHC RBC-ENTMCNC: 29.8 PG — SIGNIFICANT CHANGE UP (ref 27–34)
MCHC RBC-ENTMCNC: 32 GM/DL — SIGNIFICANT CHANGE UP (ref 32–36)
MCV RBC AUTO: 93.2 FL — SIGNIFICANT CHANGE UP (ref 80–100)
NRBC # BLD: 0 /100 WBCS — SIGNIFICANT CHANGE UP (ref 0–0)
PLATELET # BLD AUTO: 292 K/UL — SIGNIFICANT CHANGE UP (ref 150–400)
POTASSIUM SERPL-MCNC: 4.2 MMOL/L — SIGNIFICANT CHANGE UP (ref 3.5–5.3)
POTASSIUM SERPL-SCNC: 4.2 MMOL/L — SIGNIFICANT CHANGE UP (ref 3.5–5.3)
RBC # BLD: 3.25 M/UL — LOW (ref 3.8–5.2)
RBC # FLD: 14.7 % — HIGH (ref 10.3–14.5)
SODIUM SERPL-SCNC: 139 MMOL/L — SIGNIFICANT CHANGE UP (ref 135–145)
SPECIMEN SOURCE: SIGNIFICANT CHANGE UP
VANCOMYCIN TROUGH SERPL-MCNC: 11.7 UG/ML — SIGNIFICANT CHANGE UP (ref 10–20)
WBC # BLD: 14.2 K/UL — HIGH (ref 3.8–10.5)
WBC # FLD AUTO: 14.2 K/UL — HIGH (ref 3.8–10.5)

## 2022-04-24 RX ADMIN — SPIRONOLACTONE 25 MILLIGRAM(S): 25 TABLET, FILM COATED ORAL at 06:01

## 2022-04-24 RX ADMIN — OXYCODONE HYDROCHLORIDE 5 MILLIGRAM(S): 5 TABLET ORAL at 10:55

## 2022-04-24 RX ADMIN — Medication 3 MILLILITER(S): at 06:03

## 2022-04-24 RX ADMIN — Medication 40 MILLIGRAM(S): at 06:01

## 2022-04-24 RX ADMIN — SODIUM CHLORIDE 3 MILLILITER(S): 9 INJECTION INTRAMUSCULAR; INTRAVENOUS; SUBCUTANEOUS at 06:01

## 2022-04-24 RX ADMIN — CEFTRIAXONE 100 MILLIGRAM(S): 500 INJECTION, POWDER, FOR SOLUTION INTRAMUSCULAR; INTRAVENOUS at 08:04

## 2022-04-24 RX ADMIN — SODIUM CHLORIDE 3 MILLILITER(S): 9 INJECTION INTRAMUSCULAR; INTRAVENOUS; SUBCUTANEOUS at 21:01

## 2022-04-24 RX ADMIN — SPIRONOLACTONE 25 MILLIGRAM(S): 25 TABLET, FILM COATED ORAL at 17:30

## 2022-04-24 RX ADMIN — Medication 81 MILLIGRAM(S): at 11:39

## 2022-04-24 RX ADMIN — OXYCODONE HYDROCHLORIDE 10 MILLIGRAM(S): 5 TABLET ORAL at 21:30

## 2022-04-24 RX ADMIN — OXYCODONE HYDROCHLORIDE 5 MILLIGRAM(S): 5 TABLET ORAL at 10:50

## 2022-04-24 RX ADMIN — ATORVASTATIN CALCIUM 20 MILLIGRAM(S): 80 TABLET, FILM COATED ORAL at 21:01

## 2022-04-24 RX ADMIN — Medication 25 MILLIGRAM(S): at 17:30

## 2022-04-24 RX ADMIN — CHLORHEXIDINE GLUCONATE 1 APPLICATION(S): 213 SOLUTION TOPICAL at 10:39

## 2022-04-24 RX ADMIN — Medication 250 MILLIGRAM(S): at 21:01

## 2022-04-24 RX ADMIN — SENNA PLUS 2 TABLET(S): 8.6 TABLET ORAL at 21:01

## 2022-04-24 RX ADMIN — OXYCODONE HYDROCHLORIDE 10 MILLIGRAM(S): 5 TABLET ORAL at 11:25

## 2022-04-24 RX ADMIN — OXYCODONE HYDROCHLORIDE 10 MILLIGRAM(S): 5 TABLET ORAL at 20:50

## 2022-04-24 RX ADMIN — SODIUM CHLORIDE 3 MILLILITER(S): 9 INJECTION INTRAMUSCULAR; INTRAVENOUS; SUBCUTANEOUS at 13:08

## 2022-04-24 RX ADMIN — Medication 250 MILLIGRAM(S): at 10:14

## 2022-04-24 RX ADMIN — PANTOPRAZOLE SODIUM 40 MILLIGRAM(S): 20 TABLET, DELAYED RELEASE ORAL at 06:01

## 2022-04-24 RX ADMIN — Medication 5 MILLIGRAM(S): at 21:01

## 2022-04-24 RX ADMIN — Medication 25 MILLIGRAM(S): at 06:01

## 2022-04-24 RX ADMIN — OXYCODONE HYDROCHLORIDE 5 MILLIGRAM(S): 5 TABLET ORAL at 10:15

## 2022-04-24 NOTE — PROGRESS NOTE ADULT - SUBJECTIVE AND OBJECTIVE BOX
Subjective: Patient seen and examined. No new events except as noted.   Feels go, wants to go home.  Awaiting PICC.     REVIEW OF SYSTEMS:    CONSTITUTIONAL: + weakness, fevers or chills  EYES/ENT: No visual changes;  No vertigo or throat pain   NECK: No pain or stiffness  RESPIRATORY: No cough, wheezing, hemoptysis; No shortness of breath  CARDIOVASCULAR: No chest pain or palpitations  GASTROINTESTINAL: No abdominal or epigastric pain. No nausea, vomiting, or hematemesis; No diarrhea or constipation. No melena or hematochezia.  GENITOURINARY: No dysuria, frequency or hematuria  NEUROLOGICAL: No numbness or weakness  SKIN: No itching, burning, rashes, or lesions   All other review of systems is negative unless indicated above.    MEDICATIONS:  MEDICATIONS  (STANDING):  albuterol/ipratropium for Nebulization 3 milliLiter(s) Nebulizer every 6 hours  aspirin  chewable 81 milliGRAM(s) Oral daily  atorvastatin 20 milliGRAM(s) Oral at bedtime  bisacodyl Suppository 10 milliGRAM(s) Rectal once  cefTRIAXone   IVPB      cefTRIAXone   IVPB 2000 milliGRAM(s) IV Intermittent every 24 hours  chlorhexidine 0.12% Liquid 5 milliLiter(s) Oral Mucosa two times a day  chlorhexidine 2% Cloths 1 Application(s) Topical daily  dextrose 50% Injectable 50 milliLiter(s) IV Push every 15 minutes  dextrose 50% Injectable 25 milliLiter(s) IV Push every 15 minutes  enoxaparin Injectable 40 milliGRAM(s) SubCutaneous once  furosemide    Tablet 40 milliGRAM(s) Oral daily  insulin lispro (ADMELOG) corrective regimen sliding scale   SubCutaneous Before meals and at bedtime  melatonin 5 milliGRAM(s) Oral at bedtime  metoprolol tartrate 25 milliGRAM(s) Oral two times a day  nystatin Powder 1 Application(s) Topical two times a day  pantoprazole    Tablet 40 milliGRAM(s) Oral before breakfast  polyethylene glycol 3350 17 Gram(s) Oral daily  senna 2 Tablet(s) Oral at bedtime  sodium chloride 0.9% lock flush 3 milliLiter(s) IV Push every 8 hours  sodium chloride 0.9%. 1000 milliLiter(s) (10 mL/Hr) IV Continuous <Continuous>  spironolactone 25 milliGRAM(s) Oral every 12 hours  vancomycin  IVPB 1000 milliGRAM(s) IV Intermittent every 12 hours      PHYSICAL EXAM:  T(C): 37.1 (04-24-22 @ 05:00), Max: 37.1 (04-24-22 @ 05:00)  HR: 85 (04-24-22 @ 05:00) (83 - 85)  BP: 110/75 (04-24-22 @ 05:00) (110/75 - 125/94)  RR: 18 (04-24-22 @ 05:00) (18 - 18)  SpO2: 93% (04-24-22 @ 05:00) (92% - 93%)  Wt(kg): --  I&O's Summary    23 Apr 2022 07:01  -  24 Apr 2022 07:00  --------------------------------------------------------  IN: 830 mL / OUT: 650 mL / NET: 180 mL    24 Apr 2022 07:01  -  24 Apr 2022 11:35  --------------------------------------------------------  IN: 240 mL / OUT: 0 mL / NET: 240 mL    Appearance: NAD  HEENT: dry oral mucosa, PERRL, EOMI	  Lymphatic: No lymphadenopathy , no edema  Cardiovascular: Normal S1 S2, No JVD, No murmurs , Peripheral pulses palpable 2+ bilaterally  Respiratory: Lungs clear to auscultation, normal effort 	  Gastrointestinal:  Soft, Non-tender, + BS	  Skin: No rashes, No ecchymoses, No cyanosis, warm to touch  Musculoskeletal: Normal range of motion, normal strength  Psychiatry: Mood & affect appropriate  Ext: No edema    LABS:    CARDIAC MARKERS:                        9.7    14.20 )-----------( 292      ( 24 Apr 2022 05:55 )             30.3     04-24    139  |  99  |  19  ----------------------------<  126<H>  4.2   |  27  |  0.63    Ca    9.0      24 Apr 2022 05:55    TPro  6.6  /  Alb  3.8  /  TBili  1.0  /  DBili  x   /  AST  20  /  ALT  28  /  AlkPhos  59  04-23    proBNP:   Lipid Profile:   HgA1c:   TSH:    TELEMETRY: Paced	    ECG:  	  RADIOLOGY:   DIAGNOSTIC TESTING:  [ ] Echocardiogram:  [ ]  Catheterization:  [ ] Stress Test:    OTHER:

## 2022-04-24 NOTE — PROGRESS NOTE ADULT - SUBJECTIVE AND OBJECTIVE BOX
Neurology Progress note;     HPI:  71F RH w/ AS w/ prior open heart aortic valve replacement 2019, HTN, BETSEY, former smoker, Emphysema/ chronic bronchitis, GERD s/p laparoscopic vertical sleeve gastrectomy presented with acute onset speech disturbance, vision change and gait imbalance. Per EMS, LKN: 4/9/22 at 10am per family. Pt's family reported blurry vision, gait imbalance, and speech disturbance (mild loss in fluency). Pt unable to provide full hx because she was confused on timing. She  endorsed blurry vision but denied any weakness, numbness/tingling. Pt takes a baby aspirin 81mg daily. Pt ambulates without assistance or assistive devices at baseline.  LKN: 4/9/22 at 10am  NIHSS: 3  preMRS: 0  Pt was not a candidate for tpa due to outside tpa window   Pt was not a candidate for mechanical thrombectomy due to no large vessel occlusion on CTA    SUBJECTIVE: patient seen and examined.  doing okay    Medications:      MEDICATIONS  (STANDING):  albuterol/ipratropium for Nebulization 3 milliLiter(s) Nebulizer every 6 hours  aspirin  chewable 81 milliGRAM(s) Oral daily  atorvastatin 20 milliGRAM(s) Oral at bedtime  bisacodyl Suppository 10 milliGRAM(s) Rectal once  cefTRIAXone   IVPB      cefTRIAXone   IVPB 2000 milliGRAM(s) IV Intermittent every 24 hours  chlorhexidine 0.12% Liquid 5 milliLiter(s) Oral Mucosa two times a day  chlorhexidine 2% Cloths 1 Application(s) Topical daily  dextrose 50% Injectable 50 milliLiter(s) IV Push every 15 minutes  dextrose 50% Injectable 25 milliLiter(s) IV Push every 15 minutes  enoxaparin Injectable 40 milliGRAM(s) SubCutaneous once  furosemide    Tablet 40 milliGRAM(s) Oral daily  insulin lispro (ADMELOG) corrective regimen sliding scale   SubCutaneous Before meals and at bedtime  melatonin 5 milliGRAM(s) Oral at bedtime  metoprolol tartrate 25 milliGRAM(s) Oral two times a day  nystatin Powder 1 Application(s) Topical two times a day  pantoprazole    Tablet 40 milliGRAM(s) Oral before breakfast  polyethylene glycol 3350 17 Gram(s) Oral daily  senna 2 Tablet(s) Oral at bedtime  sodium chloride 0.9% lock flush 3 milliLiter(s) IV Push every 8 hours  sodium chloride 0.9%. 1000 milliLiter(s) (10 mL/Hr) IV Continuous <Continuous>  spironolactone 25 milliGRAM(s) Oral every 12 hours  vancomycin  IVPB 1000 milliGRAM(s) IV Intermittent every 12 hours    MEDICATIONS  (PRN):  acetaminophen     Tablet .. 650 milliGRAM(s) Oral every 6 hours PRN Mild Pain (1 - 3)  oxyCODONE    IR 10 milliGRAM(s) Oral every 4 hours PRN Severe Pain (7 - 10)  oxyCODONE    IR 5 milliGRAM(s) Oral every 4 hours PRN Moderate Pain (4 - 6)        PHYSICAL EXAM:       Vital Signs Last 24 Hrs  T(C): 37 (04-24-22 @ 12:15), Max: 37.1 (04-24-22 @ 05:00)  T(F): 98.6 (04-24-22 @ 12:15), Max: 98.7 (04-24-22 @ 05:00)  HR: 85 (04-24-22 @ 12:15) (84 - 85)  BP: 153/72 (04-24-22 @ 12:15) (110/75 - 153/72)  BP(mean): 99 (04-24-22 @ 12:15) (86 - 99)  RR: 18 (04-24-22 @ 12:15) (18 - 18)  SpO2: 92% (04-24-22 @ 12:15) (92% - 93%)            General: No acute distress  HEENT: EOM intact, LHH to counting (is aware)  Abdomen: Soft, nontender, nondistended   Extremities: No edema    NEUROLOGICAL EXAM:    Mental status: AAOx2- 3, able to follow simple and complex verbal commands. answers questions appropriately, able to name, repeat and recall. no aphasia, no dysarthria   Cranial Nerves: No facial asymmetry, LHHA to counting, no nystagmus, no dysarthria,  tongue midline  Motor exam: Normal tone, no drift, 5/5 RUE, 5/5 RLE, 5/5 LUE, 5/5 LLE, normal fine finger movements.  Sensation: Intact to light touch   Coordination/ Gait: No dysmetria, gait unable in ICU       LABS:  CBC Full  -  ( 24 Apr 2022 05:55 )  WBC Count : 14.20 K/uL  RBC Count : 3.25 M/uL  Hemoglobin : 9.7 g/dL  Hematocrit : 30.3 %  Platelet Count - Automated : 292 K/uL  Mean Cell Volume : 93.2 fl  Mean Cell Hemoglobin : 29.8 pg  Mean Cell Hemoglobin Concentration : 32.0 gm/dL  Auto Neutrophil # : x  Auto Lymphocyte # : x  Auto Monocyte # : x  Auto Eosinophil # : x  Auto Basophil # : x  Auto Neutrophil % : x  Auto Lymphocyte % : x  Auto Monocyte % : x  Auto Eosinophil % : x  Auto Basophil % : x    04-24    139  |  99  |  19  ----------------------------<  126<H>  4.2   |  27  |  0.63    Ca    9.0      24 Apr 2022 05:55    TPro  6.6  /  Alb  3.8  /  TBili  1.0  /  DBili  x   /  AST  20  /  ALT  28  /  AlkPhos  59  04-23    IMAGING: Reviewed by me.   MRI HEAD 04/10/22: Acute right occipital lobe infarct in a right PCA vascular distribution   with an additional punctate acute infarct involving the left cerebellum   and right splenium of the corpus callosum.    04/09/22:  Brain CT: Acute right occipital lobe infarct in the distribution of the   right PCA. No evidence for hemorrhage.  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 No   hemodynamically significant stenosis within the anterior circulation.  Brain CTA: No large vessel occlusion or stenosis. 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  Perfusion maps: Core infarct in the distribution of the right PCA.   Questionable areas of brain at risk in the right PCA as well as the   bilateral temporal and right frontal region.

## 2022-04-24 NOTE — PROGRESS NOTE ADULT - PROBLEM SELECTOR PLAN 1
4/18 OR with Dr. 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 Explant of prosthetic valve. Debridement of aortic root and abscess with sat in the non-coronary sinus and above the anterior leaflet of the mitral valve. Reconstruction using a 21mm aortic homograft, with reimplantation of right and left main coronary buttons. There was a weakening from the infection of the right atrium adjacent to the abscess cavity which was reinforced with a bovine pericardial patch.    Continue postop care   Continue ASA 81mg QD and Lopressor 25mg Q12  Continue diuresis w/ Lasix 40mg PO QD and Aldactone 25mg Q12  ID Dr. Sharma following   Continue Vanco 1gram Q12 and Ceftriaxone 2gram QD per ID  OR cx and BCx from 4/21 NGTD - will discuss when pt will be cleared for PICC line w/ Dr. Sharma   Pulm toilet, encourage incentive spirometry 10x/hr while awake   Increase activity as tolerated, ambulate 4x daily and w/ PT   Pain management, wound care and assessment  Discharge planning- PT recommending acute rehab when stable, pt wants home w/ PT

## 2022-04-24 NOTE — PROGRESS NOTE ADULT - PA/NP ONLY VISIT
PA/NP only visit

## 2022-04-24 NOTE — PROGRESS NOTE ADULT - ASSESSMENT
71F RH w/ AS w/ prior open heart aortic valve replacement 2019, HTN, BETSEY, former smoker, Emphysema/ chronic bronchitis, GERD s/p laparoscopic vertical sleeve gastrectomy presented with acute onset speech disturbance, vision change and gait imbalance. Per EMS, LKN: 4/9/22 at 10am per family. Pt's family reported blurry vision, gait imbalance, and speech disturbance (mild loss in fluency). She still endorses blurry vision but denies any weakness, numbness/tingling. Pt takes a baby aspirin 81mg daily. NIHSS: 3, preMRS: 0. No tpa or MT.    o/e with HA   s/p CTS on 4/18 4/19 intubated on precedex, SERVIN, plan for CPAP  + aortic root abscess/endocarditis   extubated, SERVIN AAOx2-3     Impression:  Right PCA infarct etiology likely cardioembolic given findings of atrial mass, also incidental 6 mm ACOMM aneurysm.     NEURO: neurologically without acute chagne, continue close monitoring for neurologic deterioration, found to have an incidental aneurysm 6.7mm ACOM will follow with Dr. Contreras as outpatient for further evaluation and possible treatment, LDL 62: continue home dose statin  MRI Brain w/o noted above,  Physical therapy/OT: AR ;wean sedation as toelrated, on precedex     ANTITHROMBOTIC THERAPY: resume ASA when able , back on      PULMONARY: monitor airway.     CARDIOVASCULAR:  TTE: ef 45%, mild-moderate MR, mild mitral stenosis, left atrial mass with increased gradient, bioprosthetic AV, mild AR, moderately dilated LA, left atrial mass suggestive of myxoma,  cardiac monitoring, S/P aortic valve replacement cardiology consulted, Troponin elevated. ILR placed to rule out A. Fib as possible source.   CT surgery following. Anticipaet cardiac cath and tentative OR pending clinical course. no objection for cath at this time.  s/p CTS 4/19    + aortic root abcess/     SBP goal: normotension being tolerated- avoid SBP > 160mmHg in setting of unsecured itnracranial aneurysm.      GASTROINTESTINAL:  dysphagia screen- passed, tolerating diet       Diet: Regular    RENAL: BUN/Cr without acute change, good urine output , maintain adequate hydration      Na Goal: Greater than 135     Sharma: N    HEMATOLOGY: H/H   anemia , no acute change, no active bleeding Platelets 320, she should have all age and risk appropriate      DVT ppx: Heparin s.c [] LMWH [x]     ID: febrile, no leukocytosis, repeat UA negative for UTI, was on ctx prior for UTI, follow up sensitivities , blood cx taken, infectious workup done ;   ; back on vanco and cefttiaxone pending cultures    for Aortic root abscses and endocarditis.     Other: remaining per CTS team     DISPOSITION: AR once stable and workup is complete;         CORE MEASURES:        Admission NIHSS: 3     TPA: [] YES [x] NO      LDL/HDL: 62/32     Depression Screen: 0     Statin Therapy: Y     Dysphagia Screen: [x] PASS [] FAIL     Smoking [] YES [x] NO      Afib [] YES [x] NO     Stroke Education [x] YES [] NO    Zenon Vela MD  Vascular Neurology

## 2022-04-24 NOTE — PROGRESS NOTE ADULT - PROBLEM SELECTOR PLAN 3
4/18 OR with Dr. 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 Explant of prosthetic valve. Debridement of aortic root and abscess with sat in the non-coronary sinus and above the anterior leaflet of the mitral valve. Reconstruction using a 21mm aortic homograft, with reimplantation of right and left main coronary buttons. There was a weakening from the infection of the right atrium adjacent to the abscess cavity which was reinforced with a bovine pericardial patch.    ID Dr. Sharma following   Continue Vanco 1gram Q12 and Ceftriaxone 2gram QD per ID  OR cx and BCx from 4/21 NGTD - will discuss when pt will be cleared for PICC line w/ Dr. Sharma

## 2022-04-24 NOTE — PROGRESS NOTE ADULT - ASSESSMENT
71F RH w/ AS w/ prior open heart aortic valve replacement 2019, HTN, BETSEY, former smoker, Emphysema/ chronic bronchitis, GERD s/p laparoscopic vertical sleeve gastrectomy presents with acute onset speech disturbance, vision change and gait imbalance.     4/9 CTH with Right PCA infarct, MRI on 4/10 Acute R occipital lobe infarct in a R PCA vascular distribution with an additional punctate acute infarct involving the L cerebellum and R splenium of the corpus callosum.    4/11 Underwent ECHO-. A large round mass measuring 3.8cm by 3cm,  is seen in the left atrium suggestive of atrial myxoma.  It appears to be attached to the atrial septum although no stalk is visualized.  4/12 CT surgery consult called  patient seen and examined in  no acute distress  family  at bedside  amenable to cardiac surgery workup  and surgery. Discussed with Stroke team benefits > risk of cardiac surgery    Of note patient febrile overnight  -tmax  101.8 on cefTRIAXone   IVPB 1000 milliGRAM(s) IV Intermittent every 24 hours for positive UA.  Blood cultures-  Cardiac cath scheduled for Wednesday 4/13. Tentative OR date for Friday April 15.  4/13 VSS; cath today; wbc 8 pt afebrile; + ceftriaxone for UTI; repeat UA neg 4//12- BC testing- ID not clearing the patient for OR for am 4/14as per neuro- pt cleared for OHS  ?re-scheduled OR date- when cleared by ID   4/15 VSS Cleared by ID for OR Likely Mon/Tuesday 4/16 Preop workup in progress  OR Monday 4/18 OR with Dr. 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 Explant of prosthetic valve. Debridement of aortic root and abscess with sat in the non-coronary sinus and above the anterior leaflet of the mitral valve. Reconstruction using a 21mm aortic homograft, with reimplantation of right and left main coronary buttons. There was a weakening from the infection of the right atrium adjacent to the abscess cavity which was reinforced with a bovine pericardial patch.  4/20 EP consulted for CHB/SSS post-operatively   4/21 Micro PPM placed  4/22 Right groin stitch removed s/p Micra. OR Cultures negative to date.  ID Dr. Sharma following.  Continues on Vanco 1gram Q12/Ceftriaxone 2gram QD.  Will eventually need PICC line for long term ABX. TX to 2cohen floor bed.   4/23 VSS; V.Paced 80-90; continue abx as per ID; ID following; follow OR cx; BC neg 4/21; pt will need picc line  4/24 VSS, NAD. Continue abx as per ID. BCx from 4/21 remain NGTD - will f/u w/ ID regarding clearance for PICC line.   discharge planning- PT recommending acute rehab when stable, pt wants home w/ PT

## 2022-04-24 NOTE — PROGRESS NOTE ADULT - SUBJECTIVE AND OBJECTIVE BOX
Subjective: "I'm having some pain at my incision." Denies chest pain, SOB, palpitations, headache, dizziness, fever, chills, or n/v/d.     Telemetry:  80s-90s      Vital Signs Last 24 Hrs  T(C): 37 (22 @ 12:15), Max: 37.1 (22 @ 05:00)  T(F): 98.6 (22 @ 12:15), Max: 98.7 (22 @ 05:00)  HR: 85 (22 @ 12:15) (84 - 85)  BP: 153/72 (22 @ 12:15) (110/75 - 153/72)  RR: 18 (22 @ 12:15) (18 - 18)  SpO2: 92% (22 @ 12:15) (92% - 93%)              @ 07:01  -   @ 07:00  --------------------------------------------------------  IN: 830 mL / OUT: 650 mL / NET: 180 mL     @ 07:01  -   @ 13:57  --------------------------------------------------------  IN: 460 mL / OUT: 0 mL / NET: 460 mL     Daily Weight in k.7 (2022 07:12)    POCT Blood Glucose.: 133 mg/dL (2022 11:28)  POCT Blood Glucose.: 135 mg/dL (2022 07:44)  POCT Blood Glucose.: 156 mg/dL (2022 21:21)  POCT Blood Glucose.: 147 mg/dL (2022 16:27)                            9.7    14.20 )-----------( 292      ( 2022 05:55 )             30.3       139  |  99  |  19  ----------------------------<  126<H>  4.2   |  27  |  0.63    Ca    9.0      2022 05:55    TPro  6.6  /  Alb  3.8  /  TBili  1.0  /  DBili  x   /  AST  20  /  ALT  28  /  AlkPhos  59        PHYSICAL EXAM:    Neurology: alert and oriented x 3, nonfocal, no gross deficits    CV: RRR, (+)S1/S2, no murmur appreciated    Sternal Wound: MSI MC - CDI, Stable    Lungs: CTA b/l, no wheezes, rales, or rhonchi     Abdomen: soft, nontender, nondistended, (+) bowel sounds, (+) BM - last     : voiding freely                Extremities: no edema b/l LE, no calf tenderness, SERVIN, PPP b/l           acetaminophen Tablet 650 milliGRAM(s) Oral every 6 hours PRN  albuterol/ipratropium for Nebulization 3 milliLiter(s) Nebulizer every 6 hours  aspirin chewable 81 milliGRAM(s) Oral daily  atorvastatin 20 milliGRAM(s) Oral at bedtime  cefTRIAXone IVPB 2000 milliGRAM(s) IV Intermittent every 24 hours  enoxaparin Injectable 40 milliGRAM(s) SubCutaneous once  furosemide Tablet 40 milliGRAM(s) Oral daily  insulin lispro (ADMELOG) corrective regimen sliding scale SubCutaneous Before meals and at bedtime  melatonin 5 milliGRAM(s) Oral at bedtime  metoprolol tartrate 25 milliGRAM(s) Oral two times a day  nystatin Powder 1 Application(s) Topical two times a day  oxyCODONE IR 10 milliGRAM(s) Oral every 4 hours PRN  oxyCODONE IR 5 milliGRAM(s) Oral every 4 hours PRN  pantoprazole Tablet 40 milliGRAM(s) Oral before breakfast  polyethylene glycol 3350 17 Gram(s) Oral daily  senna 2 Tablet(s) Oral at bedtime  sodium chloride 0.9% lock flush 3 milliLiter(s) IV Push every 8 hours  sodium chloride 0.9%. 1000 milliLiter(s) IV Continuous <Continuous>  spironolactone 25 milliGRAM(s) Oral every 12 hours  vancomycin  IVPB 1000 milliGRAM(s) IV Intermittent every 12 hours       Physical Therapy Rec:   Home  [  ]   Home w/ PT  [  ]  Rehab  [ x ] - PT recommending acute rehab, pt wants home w/ PT     Discussed with Cardiothoracic Team at AM rounds.

## 2022-04-25 LAB
ANION GAP SERPL CALC-SCNC: 13 MMOL/L — SIGNIFICANT CHANGE UP (ref 5–17)
BUN SERPL-MCNC: 21 MG/DL — SIGNIFICANT CHANGE UP (ref 7–23)
CALCIUM SERPL-MCNC: 9 MG/DL — SIGNIFICANT CHANGE UP (ref 8.4–10.5)
CHLORIDE SERPL-SCNC: 99 MMOL/L — SIGNIFICANT CHANGE UP (ref 96–108)
CO2 SERPL-SCNC: 28 MMOL/L — SIGNIFICANT CHANGE UP (ref 22–31)
CREAT SERPL-MCNC: 0.7 MG/DL — SIGNIFICANT CHANGE UP (ref 0.5–1.3)
EGFR: 92 ML/MIN/1.73M2 — SIGNIFICANT CHANGE UP
GLUCOSE BLDC GLUCOMTR-MCNC: 115 MG/DL — HIGH (ref 70–99)
GLUCOSE BLDC GLUCOMTR-MCNC: 134 MG/DL — HIGH (ref 70–99)
GLUCOSE BLDC GLUCOMTR-MCNC: 138 MG/DL — HIGH (ref 70–99)
GLUCOSE BLDC GLUCOMTR-MCNC: 166 MG/DL — HIGH (ref 70–99)
GLUCOSE SERPL-MCNC: 124 MG/DL — HIGH (ref 70–99)
HCT VFR BLD CALC: 29.5 % — LOW (ref 34.5–45)
HGB BLD-MCNC: 9.3 G/DL — LOW (ref 11.5–15.5)
MCHC RBC-ENTMCNC: 29.2 PG — SIGNIFICANT CHANGE UP (ref 27–34)
MCHC RBC-ENTMCNC: 31.5 GM/DL — LOW (ref 32–36)
MCV RBC AUTO: 92.8 FL — SIGNIFICANT CHANGE UP (ref 80–100)
NRBC # BLD: 0 /100 WBCS — SIGNIFICANT CHANGE UP (ref 0–0)
PLATELET # BLD AUTO: 302 K/UL — SIGNIFICANT CHANGE UP (ref 150–400)
POTASSIUM SERPL-MCNC: 4 MMOL/L — SIGNIFICANT CHANGE UP (ref 3.5–5.3)
POTASSIUM SERPL-SCNC: 4 MMOL/L — SIGNIFICANT CHANGE UP (ref 3.5–5.3)
RBC # BLD: 3.18 M/UL — LOW (ref 3.8–5.2)
RBC # FLD: 14.9 % — HIGH (ref 10.3–14.5)
SODIUM SERPL-SCNC: 140 MMOL/L — SIGNIFICANT CHANGE UP (ref 135–145)
VANCOMYCIN TROUGH SERPL-MCNC: 5.8 UG/ML — LOW (ref 10–20)
WBC # BLD: 10.68 K/UL — HIGH (ref 3.8–10.5)
WBC # FLD AUTO: 10.68 K/UL — HIGH (ref 3.8–10.5)

## 2022-04-25 PROCEDURE — 99232 SBSQ HOSP IP/OBS MODERATE 35: CPT

## 2022-04-25 PROCEDURE — 71045 X-RAY EXAM CHEST 1 VIEW: CPT | Mod: 26

## 2022-04-25 RX ORDER — VANCOMYCIN HCL 1 G
1000 VIAL (EA) INTRAVENOUS EVERY 12 HOURS
Refills: 0 | Status: DISCONTINUED | OUTPATIENT
Start: 2022-04-25 | End: 2022-04-27

## 2022-04-25 RX ADMIN — Medication 5 MILLIGRAM(S): at 21:11

## 2022-04-25 RX ADMIN — Medication 2: at 22:20

## 2022-04-25 RX ADMIN — SODIUM CHLORIDE 3 MILLILITER(S): 9 INJECTION INTRAMUSCULAR; INTRAVENOUS; SUBCUTANEOUS at 05:49

## 2022-04-25 RX ADMIN — CEFTRIAXONE 100 MILLIGRAM(S): 500 INJECTION, POWDER, FOR SOLUTION INTRAMUSCULAR; INTRAVENOUS at 08:44

## 2022-04-25 RX ADMIN — OXYCODONE HYDROCHLORIDE 10 MILLIGRAM(S): 5 TABLET ORAL at 14:20

## 2022-04-25 RX ADMIN — Medication 3 MILLILITER(S): at 18:21

## 2022-04-25 RX ADMIN — OXYCODONE HYDROCHLORIDE 10 MILLIGRAM(S): 5 TABLET ORAL at 23:28

## 2022-04-25 RX ADMIN — Medication 3 MILLILITER(S): at 05:50

## 2022-04-25 RX ADMIN — Medication 3 MILLILITER(S): at 00:19

## 2022-04-25 RX ADMIN — OXYCODONE HYDROCHLORIDE 10 MILLIGRAM(S): 5 TABLET ORAL at 13:48

## 2022-04-25 RX ADMIN — SODIUM CHLORIDE 3 MILLILITER(S): 9 INJECTION INTRAMUSCULAR; INTRAVENOUS; SUBCUTANEOUS at 14:12

## 2022-04-25 RX ADMIN — Medication 81 MILLIGRAM(S): at 13:48

## 2022-04-25 RX ADMIN — Medication 40 MILLIGRAM(S): at 05:49

## 2022-04-25 RX ADMIN — ATORVASTATIN CALCIUM 20 MILLIGRAM(S): 80 TABLET, FILM COATED ORAL at 21:10

## 2022-04-25 RX ADMIN — CHLORHEXIDINE GLUCONATE 1 APPLICATION(S): 213 SOLUTION TOPICAL at 08:32

## 2022-04-25 RX ADMIN — SODIUM CHLORIDE 3 MILLILITER(S): 9 INJECTION INTRAMUSCULAR; INTRAVENOUS; SUBCUTANEOUS at 21:21

## 2022-04-25 RX ADMIN — Medication 3 MILLILITER(S): at 13:49

## 2022-04-25 RX ADMIN — Medication 250 MILLIGRAM(S): at 22:20

## 2022-04-25 RX ADMIN — SPIRONOLACTONE 25 MILLIGRAM(S): 25 TABLET, FILM COATED ORAL at 05:49

## 2022-04-25 RX ADMIN — SPIRONOLACTONE 25 MILLIGRAM(S): 25 TABLET, FILM COATED ORAL at 18:19

## 2022-04-25 RX ADMIN — OXYCODONE HYDROCHLORIDE 10 MILLIGRAM(S): 5 TABLET ORAL at 23:55

## 2022-04-25 RX ADMIN — Medication 25 MILLIGRAM(S): at 18:18

## 2022-04-25 RX ADMIN — OXYCODONE HYDROCHLORIDE 10 MILLIGRAM(S): 5 TABLET ORAL at 10:53

## 2022-04-25 RX ADMIN — Medication 25 MILLIGRAM(S): at 05:49

## 2022-04-25 RX ADMIN — PANTOPRAZOLE SODIUM 40 MILLIGRAM(S): 20 TABLET, DELAYED RELEASE ORAL at 05:49

## 2022-04-25 RX ADMIN — Medication 250 MILLIGRAM(S): at 10:59

## 2022-04-25 RX ADMIN — OXYCODONE HYDROCHLORIDE 10 MILLIGRAM(S): 5 TABLET ORAL at 10:23

## 2022-04-25 NOTE — PROGRESS NOTE ADULT - SUBJECTIVE AND OBJECTIVE BOX
EP ATTENDING    tele: AS-    she denies palpitations, syncope, nor angina, ROS otherwise -     DATE OF SERVICE - 04-25-22     Review of Systems:   Constitutional: [ ] fevers, [ ] chills.   Skin: [ ] dry skin. [ ] rashes.  Psychiatric: [ ] depression, [ ] anxiety.   Gastrointestinal: [ ] BRBPR, [ ] melena.   Neurological: [ ] confusion. [ ] seizures. [ ] shuffling gait.   Ears,Nose,Mouth and Throat: [ ] ear pain [ ] sore throat.   Eyes: [ ] diplopia.   Respiratory: [ ] hemoptysis. [ ] shortness of breath  Cardiovascular: See HPI above  Hematologic/Lymphatic: [ ] anemia. [ ] painful nodes. [ ] prolonged bleeding.   Genitourinary: [ ] hematuria. [ ] flank pain.   Endocrine: [ ] significant change in weight. [ ] intolerance to heat and cold.     Review of systems [ x] otherwise negative, [ ] otherwise unable to obtain    FH: no family history of sudden cardiac death in first degree relatives    SH: [ ] tobacco, [ ] alcohol, [ ] drugs    acetaminophen     Tablet .. 650 milliGRAM(s) Oral every 6 hours PRN  albuterol/ipratropium for Nebulization 3 milliLiter(s) Nebulizer every 6 hours  aspirin  chewable 81 milliGRAM(s) Oral daily  atorvastatin 20 milliGRAM(s) Oral at bedtime  bisacodyl Suppository 10 milliGRAM(s) Rectal once  cefTRIAXone   IVPB      cefTRIAXone   IVPB 2000 milliGRAM(s) IV Intermittent every 24 hours  chlorhexidine 0.12% Liquid 5 milliLiter(s) Oral Mucosa two times a day  chlorhexidine 2% Cloths 1 Application(s) Topical daily  dextrose 50% Injectable 50 milliLiter(s) IV Push every 15 minutes  dextrose 50% Injectable 25 milliLiter(s) IV Push every 15 minutes  enoxaparin Injectable 40 milliGRAM(s) SubCutaneous once  furosemide    Tablet 40 milliGRAM(s) Oral daily  insulin lispro (ADMELOG) corrective regimen sliding scale   SubCutaneous Before meals and at bedtime  melatonin 5 milliGRAM(s) Oral at bedtime  metoprolol tartrate 25 milliGRAM(s) Oral two times a day  nystatin Powder 1 Application(s) Topical two times a day  oxyCODONE    IR 5 milliGRAM(s) Oral every 4 hours PRN  oxyCODONE    IR 10 milliGRAM(s) Oral every 4 hours PRN  pantoprazole    Tablet 40 milliGRAM(s) Oral before breakfast  polyethylene glycol 3350 17 Gram(s) Oral daily  senna 2 Tablet(s) Oral at bedtime  sodium chloride 0.9% lock flush 3 milliLiter(s) IV Push every 8 hours  sodium chloride 0.9%. 1000 milliLiter(s) IV Continuous <Continuous>  spironolactone 25 milliGRAM(s) Oral every 12 hours                            9.3    10.68 )-----------( 302      ( 25 Apr 2022 06:20 )             29.5       04-25    140  |  99  |  21  ----------------------------<  124<H>  4.0   |  28  |  0.70    Ca    9.0      25 Apr 2022 06:18              T(C): 36.5 (04-25-22 @ 12:21), Max: 37.1 (04-24-22 @ 19:46)  HR: 85 (04-25-22 @ 12:21) (84 - 85)  BP: 132/73 (04-25-22 @ 12:21) (110/75 - 132/73)  RR: 18 (04-25-22 @ 12:21) (18 - 18)  SpO2: 94% (04-25-22 @ 12:21) (94% - 95%)  Wt(kg): --    I&O's Summary    24 Apr 2022 07:01  -  25 Apr 2022 07:00  --------------------------------------------------------  IN: 810 mL / OUT: 500 mL / NET: 310 mL    25 Apr 2022 07:01  -  25 Apr 2022 12:24  --------------------------------------------------------  IN: 220 mL / OUT: 0 mL / NET: 220 mL        General: Well nourished in no acute distress. Alert and Oriented * 3.   Head: Normocephalic and atraumatic.   Neck: No JVD. No bruits. Supple. Does not appear to be enlarged.   Cardiovascular: + S1,S2 ; RRR Soft systolic murmur at the left lower sternal border. No rubs noted.    Lungs: CTA b/l. No rhonchi, rales or wheezes.   Abdomen: + BS, soft. Non tender. Non distended. No rebound. No guarding.   Extremities: No clubbing/cyanosis/edema.   Neurologic: Moves all four extremities. Full range of motion.   Skin: Warm and moist. The patient's skin has normal elasticity and good skin turgor.   Psychiatric: Appropriate mood and affect.  Musculoskeletal: Normal range of motion, normal strength        A/P) 72 y/o female PMH hypertension, hyperlipidemia, GERD s/p laparoscopic vertical sleeve gastrectomy, and non-obstructive CAD who underwent a bio-AVR 2019. She was now admitted on 4/9/22 with stroke type symptoms and a loop recorder was implanted on 4/11/22 screening for AF. 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 There was no myxoma. She underwent explant of her bio-AVR, debridement of her Ao root and abscess, Bentall procedure, and implantation of an epicardial RV lead. She now has both sick sinus syndrome as well as complete heart block. No myxoma was ever found - it was all abscess from culture negative endocarditis. She is now s/p Micra PPM    -antibiotics as per ID  -supportive care as per CT surgery  -recommend to leave the ILR in place screening for AF  -if she ever needs atrial pacing for her sick sinus syndrome (likely) a transvenous dual chamber PPM [or even the [place] leadless system] can eventually be implanted after she recovers from culture negative endocarditis  -outpatient cardiologist is Heber Nolan at [place]  -Has Micra follow up with Dr Arsalan Lucero on 5/4/22 at 10:40 AM, and ILR r/o AF is followed by Dr Lanza.   -No further inpatient EP workup expected.        Haley Ortega M.D., [facility]  Cardiac Electrophysiology  [facility]  [address] [facility], [address] [place] [address]  [email or web address]    office [phone]  pager [phone]

## 2022-04-25 NOTE — PROGRESS NOTE ADULT - ASSESSMENT
71 year old with AVR; COPD, GERD, GBP, presented with recent cva.  During workup, an echo revealed an atrial myxoma.  Last 24 she had a fever of 101. Denies -prior fevers, but states she was treated for pna prior to this admission  She is very confused but denies any complaints at present    found to have endocarditis   and root abscess/ .   preop  BC negative  will request PCR.  vanco and ceftriaxone pending cultures    OR cultures negative to date and very few wbc seen       one bottle of one set of a post op bc with S lung.  significance unclear with negative preop and intraop cultures  PCR pending   current vanco levels ok       discussed  with Dr. Jay  awaiting pcr  to place picc and plan to continue present therapy  until; pcr back

## 2022-04-25 NOTE — PROGRESS NOTE ADULT - SUBJECTIVE AND OBJECTIVE BOX
infectious diseases progress note:    Patient is a 71y old  Female who presents with a chief complaint of concern for stroke (25 Apr 2022 09:45)        Cerebral infarction         s    Allergies    No Known Allergies    Intolerances        ANTIBIOTICS/RELEVANT:  antimicrobials  cefTRIAXone   IVPB 2000 milliGRAM(s) IV Intermittent every 24 hours  cefTRIAXone   IVPB      vancomycin  IVPB 1000 milliGRAM(s) IV Intermittent every 12 hours    immunologic:    OTHER:  acetaminophen     Tablet .. 650 milliGRAM(s) Oral every 6 hours PRN  albuterol/ipratropium for Nebulization 3 milliLiter(s) Nebulizer every 6 hours  aspirin  chewable 81 milliGRAM(s) Oral daily  atorvastatin 20 milliGRAM(s) Oral at bedtime  bisacodyl Suppository 10 milliGRAM(s) Rectal once  chlorhexidine 0.12% Liquid 5 milliLiter(s) Oral Mucosa two times a day  chlorhexidine 2% Cloths 1 Application(s) Topical daily  dextrose 50% Injectable 50 milliLiter(s) IV Push every 15 minutes  dextrose 50% Injectable 25 milliLiter(s) IV Push every 15 minutes  enoxaparin Injectable 40 milliGRAM(s) SubCutaneous once  furosemide    Tablet 40 milliGRAM(s) Oral daily  insulin lispro (ADMELOG) corrective regimen sliding scale   SubCutaneous Before meals and at bedtime  melatonin 5 milliGRAM(s) Oral at bedtime  metoprolol tartrate 25 milliGRAM(s) Oral two times a day  nystatin Powder 1 Application(s) Topical two times a day  oxyCODONE    IR 10 milliGRAM(s) Oral every 4 hours PRN  oxyCODONE    IR 5 milliGRAM(s) Oral every 4 hours PRN  pantoprazole    Tablet 40 milliGRAM(s) Oral before breakfast  polyethylene glycol 3350 17 Gram(s) Oral daily  senna 2 Tablet(s) Oral at bedtime  sodium chloride 0.9% lock flush 3 milliLiter(s) IV Push every 8 hours  sodium chloride 0.9%. 1000 milliLiter(s) IV Continuous <Continuous>  spironolactone 25 milliGRAM(s) Oral every 12 hours      Objective:  Vital Signs Last 24 Hrs  T(C): 36.7 (25 Apr 2022 06:00), Max: 37.1 (24 Apr 2022 19:46)  T(F): 98 (25 Apr 2022 06:00), Max: 98.8 (24 Apr 2022 19:46)  HR: 84 (25 Apr 2022 06:00) (84 - 85)  BP: 110/75 (25 Apr 2022 06:00) (110/75 - 153/72)  BP(mean): 87 (25 Apr 2022 06:00) (87 - 99)  RR: 18 (25 Apr 2022 06:00) (18 - 18)  SpO2: 95% (25 Apr 2022 06:00) (92% - 95%)       Eyes:ABDULLAHI, EOMI  Ear/Nose/Throat: no oral lesion, no sinus tenderness on percussion	  Neck:no JVD, no lymphadenopathy, supple  Respiratory: CTA maribel  Cardiovascular: S1S2 RRR, no murmurs  Gastrointestinal:soft, (+) BS, no HSM  Extremities:no e/e/c        LABS:                        9.3    10.68 )-----------( 302      ( 25 Apr 2022 06:20 )             29.5     04-25    140  |  99  |  21  ----------------------------<  124<H>  4.0   |  28  |  0.70    Ca    9.0      25 Apr 2022 06:18              MICROBIOLOGY:    RECENT CULTURES:  04-21 @ 00:30 .Blood Blood                No growth to date.    04-19 @ 08:24 .Blood Blood-Peripheral   PCR    Growth in anaerobic bottle: Gram Positive Cocci in Clusters    Blood Culture PCR  Staphylococcus lugdunensis  Blood Culture PCR     Growth in anaerobic bottle: Staphylococcus lugdunensis  ***Blood Panel PCR results on this specimen are available  approximately 3 hours after the Gram stain result.***  Gram stain, PCR, and/or culture results may not always  correspond due to difference in methodologies.  ************************************************************  This PCR assay was performed by multiplex PCR. This  Assay tests for 66 bacterial and resistance gene targets.  Please refer to the [facility] test directory  at [email or web address] for details.    04-19 @ 02:18 .Tissue Other       Few polymorphonuclear leukocytes seen per low power field  No organisms seen per oil power field           No growth at 5 days    04-19 @ 02:15 .Surgical Swab AORTIC ANULAR ABSCESS                No growth at 5 days          RESPIRATORY CULTURES:              RADIOLOGY & ADDITIONAL STUDIES:        Pager [phone]  After 5 pm/weekends or if no response :[phone]

## 2022-04-25 NOTE — PROGRESS NOTE ADULT - SUBJECTIVE AND OBJECTIVE BOX
Subjective: Patient seen and examined. No new events except as noted.   Feels good.  Pending PICC placement.     REVIEW OF SYSTEMS:    CONSTITUTIONAL: + weakness, fevers or chills  EYES/ENT: No visual changes;  No vertigo or throat pain   NECK: No pain or stiffness  RESPIRATORY: No cough, wheezing, hemoptysis; No shortness of breath  CARDIOVASCULAR: No chest pain or palpitations  GASTROINTESTINAL: No abdominal or epigastric pain. No nausea, vomiting, or hematemesis; No diarrhea or constipation. No melena or hematochezia.  GENITOURINARY: No dysuria, frequency or hematuria  NEUROLOGICAL: No numbness or weakness  SKIN: No itching, burning, rashes, or lesions   All other review of systems is negative unless indicated above.    MEDICATIONS:  MEDICATIONS  (STANDING):  albuterol/ipratropium for Nebulization 3 milliLiter(s) Nebulizer every 6 hours  aspirin  chewable 81 milliGRAM(s) Oral daily  atorvastatin 20 milliGRAM(s) Oral at bedtime  bisacodyl Suppository 10 milliGRAM(s) Rectal once  cefTRIAXone   IVPB 2000 milliGRAM(s) IV Intermittent every 24 hours  cefTRIAXone   IVPB      chlorhexidine 0.12% Liquid 5 milliLiter(s) Oral Mucosa two times a day  chlorhexidine 2% Cloths 1 Application(s) Topical daily  dextrose 50% Injectable 50 milliLiter(s) IV Push every 15 minutes  dextrose 50% Injectable 25 milliLiter(s) IV Push every 15 minutes  enoxaparin Injectable 40 milliGRAM(s) SubCutaneous once  furosemide    Tablet 40 milliGRAM(s) Oral daily  insulin lispro (ADMELOG) corrective regimen sliding scale   SubCutaneous Before meals and at bedtime  melatonin 5 milliGRAM(s) Oral at bedtime  metoprolol tartrate 25 milliGRAM(s) Oral two times a day  nystatin Powder 1 Application(s) Topical two times a day  pantoprazole    Tablet 40 milliGRAM(s) Oral before breakfast  polyethylene glycol 3350 17 Gram(s) Oral daily  senna 2 Tablet(s) Oral at bedtime  sodium chloride 0.9% lock flush 3 milliLiter(s) IV Push every 8 hours  sodium chloride 0.9%. 1000 milliLiter(s) (10 mL/Hr) IV Continuous <Continuous>  spironolactone 25 milliGRAM(s) Oral every 12 hours  vancomycin  IVPB 1000 milliGRAM(s) IV Intermittent every 12 hours    PHYSICAL EXAM:  T(C): 36.7 (04-25-22 @ 06:00), Max: 37.1 (04-24-22 @ 19:46)  HR: 84 (04-25-22 @ 06:00) (84 - 85)  BP: 110/75 (04-25-22 @ 06:00) (110/75 - 153/72)  RR: 18 (04-25-22 @ 06:00) (18 - 18)  SpO2: 95% (04-25-22 @ 06:00) (92% - 95%)  Wt(kg): --  I&O's Summary    24 Apr 2022 07:01  -  25 Apr 2022 07:00  --------------------------------------------------------  IN: 810 mL / OUT: 500 mL / NET: 310 mL    25 Apr 2022 07:01  -  25 Apr 2022 10:03  --------------------------------------------------------  IN: 220 mL / OUT: 0 mL / NET: 220 mL    Appearance: NAD	  HEENT: Normal oral mucosa, PERRL, EOMI	  Lymphatic: No lymphadenopathy , no edema  Cardiovascular: Paced S1 S2, No JVD, No murmurs , Peripheral pulses palpable 2+ bilaterally  Respiratory: Coarse breath sounds	  Gastrointestinal:  Soft, Non-tender, + BS	  Skin: No rashes, No ecchymoses, No cyanosis, warm to touch - incision MC c/d/i  NEUROLOGICAL EXAM:    Mental status: AAOx2- 3, able to follow simple and complex verbal commands. answers questions appropriately, able to name, repeat and recall. no aphasia, no dysarthria   Cranial Nerves: No facial asymmetry, LHHA to counting, no nystagmus, no dysarthria,  tongue midline  Motor exam: Normal tone, no drift, 5/5 RUE, 5/5 RLE, 5/5 LUE, 5/5 LLE, normal fine finger movements.  Sensation: Intact to light touch   Coordination/ Gait: No dysmetria, gait unable in ICU   Ext: No edema    LABS:    CARDIAC MARKERS:                        9.3    10.68 )-----------( 302      ( 25 Apr 2022 06:20 )             29.5     04-25    140  |  99  |  21  ----------------------------<  124<H>  4.0   |  28  |  0.70    Ca    9.0      25 Apr 2022 06:18    proBNP:   Lipid Profile:   HgA1c:   TSH:     TELEMETRY: 	    ECG:  	  RADIOLOGY:   DIAGNOSTIC TESTING:  [ ] Echocardiogram:  [ ]  Catheterization:  [ ] Stress Test:    OTHER:

## 2022-04-25 NOTE — PROGRESS NOTE ADULT - ASSESSMENT
71F RH w/ AS w/ prior open heart aortic valve replacement 2019, HTN, BETSEY, former smoker, Emphysema/ chronic bronchitis, GERD s/p laparoscopic vertical sleeve gastrectomy presents with acute onset speech disturbance, vision change and gait imbalance.     4/9 CTH with Right PCA infarct, MRI on 4/10 Acute R occipital lobe infarct in a R PCA vascular distribution with an additional punctate acute infarct involving the L cerebellum and R splenium of the corpus callosum.    4/11 Underwent ECHO-. A large round mass measuring 3.8cm by 3cm,  is seen in the left atrium suggestive of atrial myxoma.  It appears to be attached to the atrial septum although no stalk is visualized.  4/12 CT surgery consult called  patient seen and examined in  no acute distress  family  at bedside  amenable to cardiac surgery workup  and surgery. Discussed with Stroke team benefits > risk of cardiac surgery    Of note patient febrile overnight  -tmax  101.8 on cefTRIAXone   IVPB 1000 milliGRAM(s) IV Intermittent every 24 hours for positive UA.  Blood cultures-  Cardiac cath scheduled for Wednesday 4/13. Tentative OR date for Friday April 15.  4/13 VSS; cath today; wbc 8 pt afebrile; + ceftriaxone for UTI; repeat UA neg 4//12- BC testing- ID not clearing the patient for OR for am 4/14as per neuro- pt cleared for [facility]  ?re-scheduled OR date- when cleared by ID   4/15 VSS Cleared by ID for OR Likely Mon/Tuesday 4/16 Preop workup in progress  OR Monday 4/18 OR with Dr. 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 Explant of prosthetic valve. Debridement of aortic root and abscess with sat in the non-coronary sinus and above the anterior leaflet of the mitral valve. Reconstruction using a 21mm aortic homograft, with reimplantation of right and left main coronary buttons. There was a weakening from the infection of the right atrium adjacent to the abscess cavity which was reinforced with a bovine pericardial patch.  4/20 EP consulted for CHB/SSS post-operatively   4/21 Micro PPM placed  4/22 Right groin stitch removed s/p Micra. OR Cultures negative to date.  ID Dr. Sharma following.  Continues on Vanco 1gram Q12/Ceftriaxone 2gram QD.  Will eventually need PICC line for long term ABX. TX to 2cohen floor bed.   4/23 VSS; V.Paced 80-90; continue abx as per ID; ID following; follow OR cx; BC neg 4/21; pt will need picc line  4/24 VSS, NAD. Continue abx as per ID. BCx from 4/21 remain NGTD - will f/u w/ ID regarding clearance for PICC line.   4/25 pending PICC line placement to continue Vanco and ceftriaxone for 6 week abx course  discharge planning- PT recommending acute rehab when stable, pt wants home w/ PT

## 2022-04-25 NOTE — PROGRESS NOTE ADULT - ASSESSMENT
71F RH w/ AS w/ prior open heart aortic valve replacement 2019, HTN, BETSEY, former smoker, Emphysema/ chronic bronchitis, GERD s/p laparoscopic vertical sleeve gastrectomy presented with acute onset speech disturbance, vision change and gait imbalance. Per EMS, LKN: 4/9/22 at 10am per family. Pt's family reported blurry vision, gait imbalance, and speech disturbance (mild loss in fluency). She still endorses blurry vision but denies any weakness, numbness/tingling. Pt takes a baby aspirin 81mg daily. NIHSS: 3, preMRS: 0. No tpa or MT.    o/e with HA   s/p CTS on 4/18 4/19 intubated on precedex, SERVIN, plan for CPAP  + aortic root abscess/endocarditis   extubated, SERVIN AAOx2-3     Impression:  Right PCA infarct etiology likely cardioembolic given findings of atrial mass, also incidental 6 mm ACOMM aneurysm.     NEURO: neurologically without acute chagne, continue close monitoring for neurologic deterioration, found to have an incidental aneurysm 6.7mm ACOM will follow with Dr. Contreras as outpatient for further evaluation and possible treatment, LDL 62: continue home dose statin  MRI Brain w/o noted above,  Physical therapy/OT: AR ;wean sedation as toelrated, on precedex     ANTITHROMBOTIC THERAPY: resume ASA when able , back on      PULMONARY: monitor airway.     CARDIOVASCULAR:  TTE: ef 45%, mild-moderate MR, mild mitral stenosis, left atrial mass with increased gradient, bioprosthetic AV, mild AR, moderately dilated LA, left atrial mass suggestive of myxoma,  cardiac monitoring, S/P aortic valve replacement cardiology consulted, Troponin elevated. ILR placed to rule out A. Fib as possible source.   CT surgery following. Anticipaet cardiac cath and tentative OR pending clinical course. no objection for cath at this time.  s/p CTS 4/19    + aortic root abcess/     SBP goal: normotension being tolerated- avoid SBP > 160mmHg in setting of unsecured itnracranial aneurysm.      GASTROINTESTINAL:  dysphagia screen- passed, tolerating diet       Diet: Regular    RENAL: BUN/Cr without acute change, good urine output , maintain adequate hydration      Na Goal: Greater than 135     Sharma: N    HEMATOLOGY: H/H   anemia , no acute change, no active bleeding Platelets 320, she should have all age and risk appropriate      DVT ppx: Heparin s.c [] LMWH [x]     ID: febrile, no leukocytosis, repeat UA negative for UTI, was on ctx prior for UTI, follow up sensitivities , blood cx taken, infectious workup done ;   ; back on vanco and cefttiaxone pending cultures    for Aortic root abscses and endocarditis. PICC line for abx for 6 weeks     Other: remaining per CTS team     DISPOSITION: AR once stable and workup is complete; family wants home         CORE MEASURES:        Admission NIHSS: 3     TPA: [] YES [x] NO      LDL/HDL: 62/32     Depression Screen: 0     Statin Therapy: Y     Dysphagia Screen: [x] PASS [] FAIL     Smoking [] YES [x] NO      Afib [] YES [x] NO     Stroke Education [x] YES [] NO    Zenon Vela MD  Vascular Neurology

## 2022-04-25 NOTE — PROGRESS NOTE ADULT - PROBLEM SELECTOR PLAN 1
right PCA infarct likely cardioembolic given findings of atrial mass    - s/p ILR  ASA and statin  TTE - large left atrial mass suggestive of myxoma    - s/p LHC 4/13      - s/p Bentall procedure 4/18        - found to have prosthetic valve endocarditis with aortic root and annular abscess - explant, debridement, reconstruction and reimplantation        - ceftriaxone and vanco - pending PICC placement  ID following  CTS following

## 2022-04-25 NOTE — PROGRESS NOTE ADULT - SUBJECTIVE AND OBJECTIVE BOX
VITAL SIGNS    Telemetry:   80-90  Vital Signs Last 24 Hrs  T(C): 36.7 (22 @ 06:00), Max: 37.1 (22 @ 19:46)  T(F): 98 (22 @ 06:00), Max: 98.8 (22 @ 19:46)  HR: 84 (22 @ 06:00) (84 - 85)  BP: 110/75 (22 @ 06:00) (110/75 - 153/72)  RR: 18 (22 @ 06:00) (18 - 18)  SpO2: 95% (22 @ 06:00) (92% - 95%)             @ 07:01  -   @ 07:00  --------------------------------------------------------  IN: 810 mL / OUT: 500 mL / NET: 310 mL     @ 07:01  -   @ 09:46  --------------------------------------------------------  IN: 220 mL / OUT: 0 mL / NET: 220 mL       Daily     Daily Weight in k.6 (2022 07:17)  Admit Wt: Drug Dosing Weight  Height (cm): 152.4 (2022 13:12)  Weight (kg): 85.5 (2022 02:14)  BMI (kg/m2): 36.8 (2022 13:12)  BSA (m2): 1.82 (2022 13:12)      CAPILLARY BLOOD GLUCOSE      POCT Blood Glucose.: 138 mg/dL (2022 07:29)  POCT Blood Glucose.: 134 mg/dL (2022 21:32)  POCT Blood Glucose.: 123 mg/dL (2022 16:37)  POCT Blood Glucose.: 133 mg/dL (2022 11:28)          MEDICATIONS  acetaminophen     Tablet .. 650 milliGRAM(s) Oral every 6 hours PRN  albuterol/ipratropium for Nebulization 3 milliLiter(s) Nebulizer every 6 hours  aspirin  chewable 81 milliGRAM(s) Oral daily  atorvastatin 20 milliGRAM(s) Oral at bedtime  bisacodyl Suppository 10 milliGRAM(s) Rectal once  cefTRIAXone   IVPB      cefTRIAXone   IVPB 2000 milliGRAM(s) IV Intermittent every 24 hours  chlorhexidine 0.12% Liquid 5 milliLiter(s) Oral Mucosa two times a day  chlorhexidine 2% Cloths 1 Application(s) Topical daily  dextrose 50% Injectable 50 milliLiter(s) IV Push every 15 minutes  dextrose 50% Injectable 25 milliLiter(s) IV Push every 15 minutes  enoxaparin Injectable 40 milliGRAM(s) SubCutaneous once  furosemide    Tablet 40 milliGRAM(s) Oral daily  insulin lispro (ADMELOG) corrective regimen sliding scale   SubCutaneous Before meals and at bedtime  melatonin 5 milliGRAM(s) Oral at bedtime  metoprolol tartrate 25 milliGRAM(s) Oral two times a day  nystatin Powder 1 Application(s) Topical two times a day  oxyCODONE    IR 10 milliGRAM(s) Oral every 4 hours PRN  oxyCODONE    IR 5 milliGRAM(s) Oral every 4 hours PRN  pantoprazole    Tablet 40 milliGRAM(s) Oral before breakfast  polyethylene glycol 3350 17 Gram(s) Oral daily  senna 2 Tablet(s) Oral at bedtime  sodium chloride 0.9% lock flush 3 milliLiter(s) IV Push every 8 hours  sodium chloride 0.9%. 1000 milliLiter(s) IV Continuous <Continuous>  spironolactone 25 milliGRAM(s) Oral every 12 hours  vancomycin  IVPB 1000 milliGRAM(s) IV Intermittent every 12 hours      >>> <<<  PHYSICAL EXAM  Subjective: NAD  Neurology: alert and oriented x 3, nonfocal, no gross deficits  CV :s1s2  Sternal Wound :  CDI , Stable  Lungs:cta b/l  Abdomen: soft, NT,ND, (+ )BM  :  voiding  Extremities: -c/c/ trace edema      LABS  -    140  |  99  |  21  ----------------------------<  124<H>  4.0   |  28  |  0.70    Ca    9.0      2022 06:18                                   9.3    10.68 )-----------( 302      ( 2022 06:20 )             29.5                 PAST MEDICAL & SURGICAL HISTORY:  Acid reflux    HTN (hypertension)    BETSEY on CPAP    Ex-smoker    COPD (chronic obstructive pulmonary disease)  w/ Emphysema and chronic bronchitis no recent exacerb/no intubation hx    Obesity (BMI 30-39.9)    Aortic valve stenosis, etiology of cardiac valve disease unspecified    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

## 2022-04-25 NOTE — PROGRESS NOTE ADULT - SUBJECTIVE AND OBJECTIVE BOX
Neurology Progress note;     HPI:  71F RH w/ AS w/ prior open heart aortic valve replacement 2019, HTN, BETSEY, former smoker, Emphysema/ chronic bronchitis, GERD s/p laparoscopic vertical sleeve gastrectomy presented with acute onset speech disturbance, vision change and gait imbalance. Per EMS, LKN: 4/9/22 at 10am per family. Pt's family reported blurry vision, gait imbalance, and speech disturbance (mild loss in fluency). Pt unable to provide full hx because she was confused on timing. She  endorsed blurry vision but denied any weakness, numbness/tingling. Pt takes a baby aspirin 81mg daily. Pt ambulates without assistance or assistive devices at baseline.  LKN: 4/9/22 at 10am  NIHSS: 3  preMRS: 0  Pt was not a candidate for tpa due to outside tpa window   Pt was not a candidate for mechanical thrombectomy due to no large vessel occlusion on CTA    SUBJECTIVE: patient seen and examined.  doing okay    Medications:      MEDICATIONS  (STANDING):  albuterol/ipratropium for Nebulization 3 milliLiter(s) Nebulizer every 6 hours  aspirin  chewable 81 milliGRAM(s) Oral daily  atorvastatin 20 milliGRAM(s) Oral at bedtime  bisacodyl 5 milliGRAM(s) Oral at bedtime  bisacodyl Suppository 10 milliGRAM(s) Rectal once  cefTRIAXone   IVPB 2000 milliGRAM(s) IV Intermittent every 24 hours  cefTRIAXone   IVPB      chlorhexidine 0.12% Liquid 5 milliLiter(s) Oral Mucosa two times a day  chlorhexidine 2% Cloths 1 Application(s) Topical daily  dextrose 50% Injectable 50 milliLiter(s) IV Push every 15 minutes  dextrose 50% Injectable 25 milliLiter(s) IV Push every 15 minutes  enoxaparin Injectable 40 milliGRAM(s) SubCutaneous once  furosemide    Tablet 40 milliGRAM(s) Oral daily  insulin lispro (ADMELOG) corrective regimen sliding scale   SubCutaneous Before meals and at bedtime  melatonin 5 milliGRAM(s) Oral at bedtime  metoprolol tartrate 25 milliGRAM(s) Oral two times a day  nystatin Powder 1 Application(s) Topical two times a day  pantoprazole    Tablet 40 milliGRAM(s) Oral before breakfast  polyethylene glycol 3350 17 Gram(s) Oral daily  senna 2 Tablet(s) Oral at bedtime  sodium chloride 0.9% lock flush 3 milliLiter(s) IV Push every 8 hours  sodium chloride 0.9%. 1000 milliLiter(s) (10 mL/Hr) IV Continuous <Continuous>  spironolactone 25 milliGRAM(s) Oral every 12 hours    MEDICATIONS  (PRN):  acetaminophen     Tablet .. 650 milliGRAM(s) Oral every 6 hours PRN Mild Pain (1 - 3)  oxyCODONE    IR 10 milliGRAM(s) Oral every 4 hours PRN Severe Pain (7 - 10)  oxyCODONE    IR 5 milliGRAM(s) Oral every 4 hours PRN Moderate Pain (4 - 6)        PHYSICAL EXAM:       Vital Signs Last 24 Hrs  T(C): 36.5 (04-25-22 @ 12:21), Max: 37.1 (04-24-22 @ 19:46)  T(F): 97.7 (04-25-22 @ 12:21), Max: 98.8 (04-24-22 @ 19:46)  HR: 84 (04-25-22 @ 18:00) (84 - 85)  BP: 116/79 (04-25-22 @ 18:00) (110/75 - 132/73)  BP(mean): 91 (04-25-22 @ 18:00) (87 - 92)  RR: 18 (04-25-22 @ 18:00) (18 - 18)  SpO2: 94% (04-25-22 @ 18:00) (94% - 95%)              General: No acute distress  HEENT: EOM intact, LHH to counting (is aware)  Abdomen: Soft, nontender, nondistended   Extremities: No edema    NEUROLOGICAL EXAM:    Mental status: AAOx2- 3, able to follow simple and complex verbal commands. answers questions appropriately, able to name, repeat and recall. no aphasia, no dysarthria   Cranial Nerves: No facial asymmetry, LHHA to counting, no nystagmus, no dysarthria,  tongue midline  Motor exam: Normal tone, no drift, 5/5 RUE, 5/5 RLE, 5/5 LUE, 5/5 LLE, normal fine finger movements.  Sensation: Intact to light touch   Coordination/ Gait: No dysmetria, gait unable in ICU       LABS:  CBC Full  -  ( 25 Apr 2022 06:20 )  WBC Count : 10.68 K/uL  RBC Count : 3.18 M/uL  Hemoglobin : 9.3 g/dL  Hematocrit : 29.5 %  Platelet Count - Automated : 302 K/uL  Mean Cell Volume : 92.8 fl  Mean Cell Hemoglobin : 29.2 pg  Mean Cell Hemoglobin Concentration : 31.5 gm/dL  Auto Neutrophil # : x  Auto Lymphocyte # : x  Auto Monocyte # : x  Auto Eosinophil # : x  Auto Basophil # : x  Auto Neutrophil % : x  Auto Lymphocyte % : x  Auto Monocyte % : x  Auto Eosinophil % : x  Auto Basophil % : x    04-25    140  |  99  |  21  ----------------------------<  124<H>  4.0   |  28  |  0.70    Ca    9.0      25 Apr 2022 06:18      IMAGING: Reviewed by me.   MRI HEAD 04/10/22: Acute right occipital lobe infarct in a right PCA vascular distribution   with an additional punctate acute infarct involving the left cerebellum   and right splenium of the corpus callosum.    04/09/22:  Brain CT: Acute right occipital lobe infarct in the distribution of the   right PCA. No evidence for hemorrhage.  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 No   hemodynamically significant stenosis within the anterior circulation.  Brain CTA: No large vessel occlusion or stenosis. 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  Perfusion maps: Core infarct in the distribution of the right PCA.   Questionable areas of brain at risk in the right PCA as well as the   bilateral temporal and right frontal region.

## 2022-04-26 LAB
ALBUMIN SERPL ELPH-MCNC: 3.7 G/DL — SIGNIFICANT CHANGE UP (ref 3.3–5)
ALP SERPL-CCNC: 60 U/L — SIGNIFICANT CHANGE UP (ref 40–120)
ALT FLD-CCNC: 20 U/L — SIGNIFICANT CHANGE UP (ref 10–45)
ANION GAP SERPL CALC-SCNC: 16 MMOL/L — SIGNIFICANT CHANGE UP (ref 5–17)
APTT BLD: 25.7 SEC — LOW (ref 27.5–35.5)
AST SERPL-CCNC: 15 U/L — SIGNIFICANT CHANGE UP (ref 10–40)
BILIRUB SERPL-MCNC: 1 MG/DL — SIGNIFICANT CHANGE UP (ref 0.2–1.2)
BUN SERPL-MCNC: 23 MG/DL — SIGNIFICANT CHANGE UP (ref 7–23)
CALCIUM SERPL-MCNC: 9.1 MG/DL — SIGNIFICANT CHANGE UP (ref 8.4–10.5)
CHLORIDE SERPL-SCNC: 97 MMOL/L — SIGNIFICANT CHANGE UP (ref 96–108)
CO2 SERPL-SCNC: 26 MMOL/L — SIGNIFICANT CHANGE UP (ref 22–31)
CREAT SERPL-MCNC: 0.65 MG/DL — SIGNIFICANT CHANGE UP (ref 0.5–1.3)
CULTURE RESULTS: SIGNIFICANT CHANGE UP
CULTURE RESULTS: SIGNIFICANT CHANGE UP
EGFR: 94 ML/MIN/1.73M2 — SIGNIFICANT CHANGE UP
GLUCOSE BLDC GLUCOMTR-MCNC: 133 MG/DL — HIGH (ref 70–99)
GLUCOSE BLDC GLUCOMTR-MCNC: 138 MG/DL — HIGH (ref 70–99)
GLUCOSE BLDC GLUCOMTR-MCNC: 145 MG/DL — HIGH (ref 70–99)
GLUCOSE SERPL-MCNC: 120 MG/DL — HIGH (ref 70–99)
HCT VFR BLD CALC: 29.7 % — LOW (ref 34.5–45)
HGB BLD-MCNC: 9.5 G/DL — LOW (ref 11.5–15.5)
MAGNESIUM SERPL-MCNC: 2 MG/DL — SIGNIFICANT CHANGE UP (ref 1.6–2.6)
MCHC RBC-ENTMCNC: 29.6 PG — SIGNIFICANT CHANGE UP (ref 27–34)
MCHC RBC-ENTMCNC: 32 GM/DL — SIGNIFICANT CHANGE UP (ref 32–36)
MCV RBC AUTO: 92.5 FL — SIGNIFICANT CHANGE UP (ref 80–100)
NRBC # BLD: 0 /100 WBCS — SIGNIFICANT CHANGE UP (ref 0–0)
PHOSPHATE SERPL-MCNC: 4.4 MG/DL — SIGNIFICANT CHANGE UP (ref 2.5–4.5)
PLATELET # BLD AUTO: 353 K/UL — SIGNIFICANT CHANGE UP (ref 150–400)
POTASSIUM SERPL-MCNC: 3.7 MMOL/L — SIGNIFICANT CHANGE UP (ref 3.5–5.3)
POTASSIUM SERPL-SCNC: 3.7 MMOL/L — SIGNIFICANT CHANGE UP (ref 3.5–5.3)
PROT SERPL-MCNC: 6.8 G/DL — SIGNIFICANT CHANGE UP (ref 6–8.3)
RBC # BLD: 3.21 M/UL — LOW (ref 3.8–5.2)
RBC # FLD: 15 % — HIGH (ref 10.3–14.5)
SODIUM SERPL-SCNC: 139 MMOL/L — SIGNIFICANT CHANGE UP (ref 135–145)
SPECIMEN SOURCE: SIGNIFICANT CHANGE UP
SPECIMEN SOURCE: SIGNIFICANT CHANGE UP
VANCOMYCIN TROUGH SERPL-MCNC: 15.3 UG/ML — SIGNIFICANT CHANGE UP (ref 10–20)
WBC # BLD: 11.65 K/UL — HIGH (ref 3.8–10.5)
WBC # FLD AUTO: 11.65 K/UL — HIGH (ref 3.8–10.5)

## 2022-04-26 PROCEDURE — 99232 SBSQ HOSP IP/OBS MODERATE 35: CPT

## 2022-04-26 RX ORDER — CEFTRIAXONE 500 MG/1
1 INJECTION, POWDER, FOR SOLUTION INTRAMUSCULAR; INTRAVENOUS
Qty: 0 | Refills: 0 | DISCHARGE
Start: 2022-04-26

## 2022-04-26 RX ORDER — OXYCODONE HYDROCHLORIDE 5 MG/1
1 TABLET ORAL
Qty: 20 | Refills: 0
Start: 2022-04-26 | End: 2022-04-30

## 2022-04-26 RX ORDER — FUROSEMIDE 40 MG
1 TABLET ORAL
Qty: 30 | Refills: 0
Start: 2022-04-26 | End: 2022-05-25

## 2022-04-26 RX ORDER — VANCOMYCIN HCL 1 G
1 VIAL (EA) INTRAVENOUS
Qty: 0 | Refills: 0 | DISCHARGE
Start: 2022-04-26

## 2022-04-26 RX ORDER — IRBESARTAN 75 MG/1
1 TABLET ORAL
Qty: 0 | Refills: 0 | DISCHARGE

## 2022-04-26 RX ORDER — CEFTRIAXONE 500 MG/1
2 INJECTION, POWDER, FOR SOLUTION INTRAMUSCULAR; INTRAVENOUS
Qty: 0 | Refills: 0 | DISCHARGE
Start: 2022-04-26

## 2022-04-26 RX ORDER — METOPROLOL TARTRATE 50 MG
50 TABLET ORAL DAILY
Refills: 0 | Status: DISCONTINUED | OUTPATIENT
Start: 2022-04-26 | End: 2022-04-27

## 2022-04-26 RX ORDER — OXYCODONE HYDROCHLORIDE 5 MG/1
10 TABLET ORAL ONCE
Refills: 0 | Status: DISCONTINUED | OUTPATIENT
Start: 2022-04-26 | End: 2022-04-26

## 2022-04-26 RX ORDER — SENNA PLUS 8.6 MG/1
2 TABLET ORAL
Qty: 14 | Refills: 0
Start: 2022-04-26 | End: 2022-05-02

## 2022-04-26 RX ORDER — SPIRONOLACTONE 25 MG/1
1 TABLET, FILM COATED ORAL
Qty: 60 | Refills: 0
Start: 2022-04-26 | End: 2022-05-25

## 2022-04-26 RX ORDER — ROSUVASTATIN CALCIUM 5 MG/1
1 TABLET ORAL
Qty: 0 | Refills: 0 | DISCHARGE

## 2022-04-26 RX ORDER — ATORVASTATIN CALCIUM 80 MG/1
1 TABLET, FILM COATED ORAL
Qty: 30 | Refills: 0
Start: 2022-04-26 | End: 2022-05-25

## 2022-04-26 RX ORDER — METOPROLOL TARTRATE 50 MG
1 TABLET ORAL
Qty: 30 | Refills: 0
Start: 2022-04-26 | End: 2022-05-25

## 2022-04-26 RX ORDER — OXYCODONE HYDROCHLORIDE 5 MG/1
5 TABLET ORAL ONCE
Refills: 0 | Status: DISCONTINUED | OUTPATIENT
Start: 2022-04-26 | End: 2022-04-26

## 2022-04-26 RX ORDER — ASPIRIN/CALCIUM CARB/MAGNESIUM 324 MG
1 TABLET ORAL
Qty: 30 | Refills: 0
Start: 2022-04-26 | End: 2022-05-25

## 2022-04-26 RX ORDER — OXYCODONE HYDROCHLORIDE 5 MG/1
5 TABLET ORAL EVERY 6 HOURS
Refills: 0 | Status: DISCONTINUED | OUTPATIENT
Start: 2022-04-26 | End: 2022-04-27

## 2022-04-26 RX ORDER — ZOLPIDEM TARTRATE 10 MG/1
1 TABLET ORAL
Qty: 0 | Refills: 0 | DISCHARGE

## 2022-04-26 RX ORDER — ASPIRIN/CALCIUM CARB/MAGNESIUM 324 MG
1 TABLET ORAL
Qty: 0 | Refills: 0 | DISCHARGE

## 2022-04-26 RX ORDER — PANTOPRAZOLE SODIUM 20 MG/1
1 TABLET, DELAYED RELEASE ORAL
Qty: 30 | Refills: 0
Start: 2022-04-26 | End: 2022-05-25

## 2022-04-26 RX ORDER — ACETAMINOPHEN 500 MG
1 TABLET ORAL
Qty: 15 | Refills: 0
Start: 2022-04-26 | End: 2022-04-30

## 2022-04-26 RX ADMIN — SPIRONOLACTONE 25 MILLIGRAM(S): 25 TABLET, FILM COATED ORAL at 17:34

## 2022-04-26 RX ADMIN — SODIUM CHLORIDE 3 MILLILITER(S): 9 INJECTION INTRAMUSCULAR; INTRAVENOUS; SUBCUTANEOUS at 05:17

## 2022-04-26 RX ADMIN — CHLORHEXIDINE GLUCONATE 1 APPLICATION(S): 213 SOLUTION TOPICAL at 09:41

## 2022-04-26 RX ADMIN — OXYCODONE HYDROCHLORIDE 10 MILLIGRAM(S): 5 TABLET ORAL at 12:32

## 2022-04-26 RX ADMIN — Medication 3 MILLILITER(S): at 12:02

## 2022-04-26 RX ADMIN — Medication 3 MILLILITER(S): at 17:35

## 2022-04-26 RX ADMIN — Medication 81 MILLIGRAM(S): at 09:41

## 2022-04-26 RX ADMIN — SODIUM CHLORIDE 3 MILLILITER(S): 9 INJECTION INTRAMUSCULAR; INTRAVENOUS; SUBCUTANEOUS at 13:00

## 2022-04-26 RX ADMIN — CHLORHEXIDINE GLUCONATE 5 MILLILITER(S): 213 SOLUTION TOPICAL at 17:34

## 2022-04-26 RX ADMIN — PANTOPRAZOLE SODIUM 40 MILLIGRAM(S): 20 TABLET, DELAYED RELEASE ORAL at 05:17

## 2022-04-26 RX ADMIN — Medication 3 MILLILITER(S): at 00:06

## 2022-04-26 RX ADMIN — OXYCODONE HYDROCHLORIDE 5 MILLIGRAM(S): 5 TABLET ORAL at 23:40

## 2022-04-26 RX ADMIN — Medication 40 MILLIGRAM(S): at 05:04

## 2022-04-26 RX ADMIN — Medication 250 MILLIGRAM(S): at 17:35

## 2022-04-26 RX ADMIN — Medication 250 MILLIGRAM(S): at 05:04

## 2022-04-26 RX ADMIN — OXYCODONE HYDROCHLORIDE 5 MILLIGRAM(S): 5 TABLET ORAL at 22:58

## 2022-04-26 RX ADMIN — Medication 3 MILLILITER(S): at 05:05

## 2022-04-26 RX ADMIN — OXYCODONE HYDROCHLORIDE 10 MILLIGRAM(S): 5 TABLET ORAL at 13:05

## 2022-04-26 RX ADMIN — Medication 25 MILLIGRAM(S): at 05:05

## 2022-04-26 RX ADMIN — Medication 25 MILLIGRAM(S): at 17:51

## 2022-04-26 RX ADMIN — OXYCODONE HYDROCHLORIDE 5 MILLIGRAM(S): 5 TABLET ORAL at 18:32

## 2022-04-26 RX ADMIN — ATORVASTATIN CALCIUM 20 MILLIGRAM(S): 80 TABLET, FILM COATED ORAL at 22:58

## 2022-04-26 RX ADMIN — OXYCODONE HYDROCHLORIDE 5 MILLIGRAM(S): 5 TABLET ORAL at 03:05

## 2022-04-26 RX ADMIN — SPIRONOLACTONE 25 MILLIGRAM(S): 25 TABLET, FILM COATED ORAL at 05:04

## 2022-04-26 RX ADMIN — SODIUM CHLORIDE 3 MILLILITER(S): 9 INJECTION INTRAMUSCULAR; INTRAVENOUS; SUBCUTANEOUS at 21:48

## 2022-04-26 RX ADMIN — OXYCODONE HYDROCHLORIDE 5 MILLIGRAM(S): 5 TABLET ORAL at 03:40

## 2022-04-26 RX ADMIN — OXYCODONE HYDROCHLORIDE 5 MILLIGRAM(S): 5 TABLET ORAL at 17:33

## 2022-04-26 RX ADMIN — NYSTATIN CREAM 1 APPLICATION(S): 100000 CREAM TOPICAL at 05:17

## 2022-04-26 RX ADMIN — NYSTATIN CREAM 1 APPLICATION(S): 100000 CREAM TOPICAL at 17:34

## 2022-04-26 RX ADMIN — CEFTRIAXONE 100 MILLIGRAM(S): 500 INJECTION, POWDER, FOR SOLUTION INTRAMUSCULAR; INTRAVENOUS at 08:00

## 2022-04-26 RX ADMIN — Medication 5 MILLIGRAM(S): at 22:58

## 2022-04-26 RX ADMIN — CHLORHEXIDINE GLUCONATE 5 MILLILITER(S): 213 SOLUTION TOPICAL at 05:17

## 2022-04-26 NOTE — PROGRESS NOTE ADULT - PROBLEM SELECTOR PLAN 1
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

## 2022-04-26 NOTE — PROGRESS NOTE ADULT - ASSESSMENT
71 year old with AVR; COPD, GERD, GBP, presented with recent cva.  During workup, an echo revealed an atrial myxoma.  Last 24 she had a fever of 101. Denies -prior fevers, but states she was treated for pna prior to this admission  She is very confused but denies any complaints at present    found to have endocarditis   and root abscess/ .   preop  BC negative  will request PCR.  vanco and ceftriaxone pending cultures    OR cultures negative to date and very few wbc seen       one bottle of one set of a post op bc with S lung.  significance unclear with negative preop and intraop cultures  PCR pending   current vanco levels ok       discussed  with Dr. Jay  awaiting pcr  PLAN TO SEND HOME ON CURRENT REGIMEN PENDING PCR  THE VANCO LEVELS ARE LOW BUT WE ARE NOT TREATING MRSA SO i WOULD NOT PUSH DOSE AT PRESENT   to place picc and plan to continue present therapy  until; pcr back

## 2022-04-26 NOTE — PROGRESS NOTE ADULT - SUBJECTIVE AND OBJECTIVE BOX
infectious diseases progress note:    Patient is a 71y old  Female who presents with a chief complaint of concern for stroke (25 Apr 2022 12:24)        Cerebral infarction               Allergies    No Known Allergies    Intolerances        ANTIBIOTICS/RELEVANT:  antimicrobials  cefTRIAXone   IVPB 2000 milliGRAM(s) IV Intermittent every 24 hours  cefTRIAXone   IVPB      vancomycin  IVPB 1000 milliGRAM(s) IV Intermittent every 12 hours    immunologic:    OTHER:  acetaminophen     Tablet .. 650 milliGRAM(s) Oral every 6 hours PRN  albuterol/ipratropium for Nebulization 3 milliLiter(s) Nebulizer every 6 hours  aspirin  chewable 81 milliGRAM(s) Oral daily  atorvastatin 20 milliGRAM(s) Oral at bedtime  bisacodyl 5 milliGRAM(s) Oral at bedtime  bisacodyl Suppository 10 milliGRAM(s) Rectal once  chlorhexidine 0.12% Liquid 5 milliLiter(s) Oral Mucosa two times a day  chlorhexidine 2% Cloths 1 Application(s) Topical daily  dextrose 50% Injectable 25 milliLiter(s) IV Push every 15 minutes  dextrose 50% Injectable 50 milliLiter(s) IV Push every 15 minutes  enoxaparin Injectable 40 milliGRAM(s) SubCutaneous once  furosemide    Tablet 40 milliGRAM(s) Oral daily  insulin lispro (ADMELOG) corrective regimen sliding scale   SubCutaneous Before meals and at bedtime  melatonin 5 milliGRAM(s) Oral at bedtime  metoprolol tartrate 25 milliGRAM(s) Oral two times a day  nystatin Powder 1 Application(s) Topical two times a day  pantoprazole    Tablet 40 milliGRAM(s) Oral before breakfast  polyethylene glycol 3350 17 Gram(s) Oral daily  senna 2 Tablet(s) Oral at bedtime  sodium chloride 0.9% lock flush 3 milliLiter(s) IV Push every 8 hours  sodium chloride 0.9%. 1000 milliLiter(s) IV Continuous <Continuous>  spironolactone 25 milliGRAM(s) Oral every 12 hours      Objective:  Vital Signs Last 24 Hrs  T(C): 36.9 (26 Apr 2022 04:10), Max: 36.9 (26 Apr 2022 04:10)  T(F): 98.4 (26 Apr 2022 04:10), Max: 98.4 (26 Apr 2022 04:10)  HR: 84 (26 Apr 2022 04:10) (81 - 85)  BP: 103/63 (26 Apr 2022 04:10) (103/63 - 132/73)  BP(mean): 77 (26 Apr 2022 04:10) (77 - 92)  RR: 18 (26 Apr 2022 04:10) (18 - 18)  SpO2: 93% (26 Apr 2022 04:10) (93% - 94%)       Eyes:ABDULLAHI, EOMI  Ear/Nose/Throat: no oral lesion, no sinus tenderness on percussion	  Neck:no JVD, no lymphadenopathy, supple  Respiratory: CTA maribel  Cardiovascular: S1S2 RRR, no murmurs  Gastrointestinal:soft, (+) BS, no HSM  Extremities:no e/e/c        LABS:                        9.5    11.65 )-----------( 353      ( 26 Apr 2022 05:54 )             29.7     04-26    139  |  97  |  23  ----------------------------<  120<H>  3.7   |  26  |  0.65    Ca    9.1      26 Apr 2022 05:54  Phos  4.4     04-26  Mg     2.0     04-26    TPro  6.8  /  Alb  3.7  /  TBili  1.0  /  DBili  x   /  AST  15  /  ALT  20  /  AlkPhos  60  04-26    PTT - ( 26 Apr 2022 05:54 )  PTT:25.7 sec        MICROBIOLOGY:    RECENT CULTURES:  04-21 @ 00:30 .Blood Blood                No Growth Final          RESPIRATORY CULTURES:              RADIOLOGY & ADDITIONAL STUDIES:        Pager [phone]  After 5 pm/weekends or if no response :[phone]

## 2022-04-26 NOTE — PROGRESS NOTE ADULT - ASSESSMENT
EP ATTENDING    Agree with above. F/U with Dr Lucero on 5/4 for Micra care, and ILR r/o AF will be followed by Dr Lanza. No further inpatient EP workup expected. D/C planning as per CTS

## 2022-04-26 NOTE — PROGRESS NOTE ADULT - SUBJECTIVE AND OBJECTIVE BOX
EP    tele: AS-    she denies palpitations, syncope, nor angina, ROS otherwise -     DATE OF SERVICE - 04-26-22     Review of Systems:   Constitutional: [ ] fevers, [ ] chills.   Skin: [ ] dry skin. [ ] rashes.  Psychiatric: [ ] depression, [ ] anxiety.   Gastrointestinal: [ ] BRBPR, [ ] melena.   Neurological: [ ] confusion. [ ] seizures. [ ] shuffling gait.   Ears,Nose,Mouth and Throat: [ ] ear pain [ ] sore throat.   Eyes: [ ] diplopia.   Respiratory: [ ] hemoptysis. [ ] shortness of breath  Cardiovascular: See HPI above  Hematologic/Lymphatic: [ ] anemia. [ ] painful nodes. [ ] prolonged bleeding.   Genitourinary: [ ] hematuria. [ ] flank pain.   Endocrine: [ ] significant change in weight. [ ] intolerance to heat and cold.     Review of systems [ x] otherwise negative, [ ] otherwise unable to obtain    FH: no family history of sudden cardiac death in first degree relatives    SH: [ ] tobacco, [ ] alcohol, [ ] drugs      MEDICATIONS  (STANDING):  albuterol/ipratropium for Nebulization 3 milliLiter(s) Nebulizer every 6 hours  aspirin  chewable 81 milliGRAM(s) Oral daily  atorvastatin 20 milliGRAM(s) Oral at bedtime  bisacodyl 5 milliGRAM(s) Oral at bedtime  bisacodyl Suppository 10 milliGRAM(s) Rectal once  cefTRIAXone   IVPB 2000 milliGRAM(s) IV Intermittent every 24 hours  cefTRIAXone   IVPB      chlorhexidine 0.12% Liquid 5 milliLiter(s) Oral Mucosa two times a day  chlorhexidine 2% Cloths 1 Application(s) Topical daily  dextrose 50% Injectable 25 milliLiter(s) IV Push every 15 minutes  dextrose 50% Injectable 50 milliLiter(s) IV Push every 15 minutes  enoxaparin Injectable 40 milliGRAM(s) SubCutaneous once  furosemide    Tablet 40 milliGRAM(s) Oral daily  insulin lispro (ADMELOG) corrective regimen sliding scale   SubCutaneous Before meals and at bedtime  melatonin 5 milliGRAM(s) Oral at bedtime  metoprolol succinate ER 50 milliGRAM(s) Oral daily  nystatin Powder 1 Application(s) Topical two times a day  pantoprazole    Tablet 40 milliGRAM(s) Oral before breakfast  polyethylene glycol 3350 17 Gram(s) Oral daily  senna 2 Tablet(s) Oral at bedtime  sodium chloride 0.9% lock flush 3 milliLiter(s) IV Push every 8 hours  sodium chloride 0.9%. 1000 milliLiter(s) (10 mL/Hr) IV Continuous <Continuous>  spironolactone 25 milliGRAM(s) Oral every 12 hours  vancomycin  IVPB 1000 milliGRAM(s) IV Intermittent every 12 hours    MEDICATIONS  (PRN):  acetaminophen     Tablet .. 650 milliGRAM(s) Oral every 6 hours PRN Mild Pain (1 - 3)      LABS:                          9.5    11.65 )-----------( 353      ( 26 Apr 2022 05:54 )             29.7     Hemoglobin: 9.5 g/dL (04-26 @ 05:54)  Hemoglobin: 9.3 g/dL (04-25 @ 06:20)  Hemoglobin: 9.7 g/dL (04-24 @ 05:55)  Hemoglobin: 9.8 g/dL (04-23 @ 06:10)  Hemoglobin: 8.3 g/dL (04-22 @ 00:47)    04-26    139  |  97  |  23  ----------------------------<  120<H>  3.7   |  26  |  0.65    Ca    9.1      26 Apr 2022 05:54  Phos  4.4     04-26  Mg     2.0     04-26    TPro  6.8  /  Alb  3.7  /  TBili  1.0  /  DBili  x   /  AST  15  /  ALT  20  /  AlkPhos  60  04-26    Creatinine Trend: 0.65<--, 0.70<--, 0.63<--, 0.64<--, 0.69<--, 0.61<--   PTT - ( 26 Apr 2022 05:54 )  PTT:25.7 sec          04-25-22 @ 07:01  -  04-26-22 @ 07:00  --------------------------------------------------------  IN: 760 mL / OUT: 500 mL / NET: 260 mL    04-26-22 @ 07:01  -  04-26-22 @ 13:49  --------------------------------------------------------  IN: 590 mL / OUT: 500 mL / NET: 90 mL        PHYSICAL EXAM  Vital Signs Last 24 Hrs  T(C): 36.9 (26 Apr 2022 12:11), Max: 36.9 (26 Apr 2022 04:10)  T(F): 98.5 (26 Apr 2022 12:11), Max: 98.5 (26 Apr 2022 12:11)  HR: 81 (26 Apr 2022 12:11) (81 - 84)  BP: 120/76 (26 Apr 2022 12:11) (103/63 - 120/76)  BP(mean): 77 (26 Apr 2022 04:10) (77 - 91)  RR: 18 (26 Apr 2022 12:11) (18 - 18)  SpO2: 95% (26 Apr 2022 12:11) (93% - 95%)      General: Well nourished in no acute distress. Alert and Oriented * 3.   Head: Normocephalic and atraumatic.   Neck: No JVD. No bruits. Supple. Does not appear to be enlarged.   Cardiovascular: + S1,S2 ; RRR Soft systolic murmur at the left lower sternal border. No rubs noted.    Lungs: CTA b/l. No rhonchi, rales or wheezes.   Abdomen: + BS, soft. Non tender. Non distended. No rebound. No guarding.   Extremities: No clubbing/cyanosis/edema.   Neurologic: Moves all four extremities. Full range of motion.   Skin: Warm and moist. The patient's skin has normal elasticity and good skin turgor.   Psychiatric: Appropriate mood and affect.  Musculoskeletal: Normal range of motion, normal strength      A/P) 70 y/o female PMH hypertension, hyperlipidemia, GERD s/p laparoscopic vertical sleeve gastrectomy, and non-obstructive CAD who underwent a bio-AVR 2019. She was now admitted on 4/9/22 with stroke type symptoms and a loop recorder was implanted on 4/11/22 screening for AF. 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 There was no myxoma. She underwent explant of her bio-AVR, debridement of her Ao root and abscess, Bentall procedure, and implantation of an epicardial RV lead. She now has both sick sinus syndrome as well as complete heart block. No myxoma was ever found - it was all abscess from culture negative endocarditis. She is now s/p Micra PPM    -antibiotics as per ID  -supportive care as per CT surgery  -recommend to leave the ILR in place screening for AF  -if she ever needs atrial pacing for her sick sinus syndrome (likely) a transvenous dual chamber PPM [or even the [facility] leadless system] can eventually be implanted after she recovers from culture negative endocarditis  -outpatient cardiologist is Heber Nolan at [facility]  -Has Micra follow up with Dr Arsalan Lucero on 5/4/22 at 10:40 AM, and ILR r/o AF is followed by Dr Lanza.   -No further inpatient EP workup expected.    -d/c planning per primary team    Brant Blackburn PA-C  Pager: [phone]

## 2022-04-26 NOTE — PROGRESS NOTE ADULT - SUBJECTIVE AND OBJECTIVE BOX
VITAL SIGNS    Telemetry: SR 50-60   Vital Signs Last 24 Hrs  T(C): 36.9 (22 @ 04:10), Max: 36.9 (22 @ 04:10)  T(F): 98.4 (22 @ 04:10), Max: 98.4 (22 @ 04:10)  HR: 84 (22 @ 04:10) (81 - 85)  BP: 103/63 (22 @ 04:10) (103/63 - 132/73)  RR: 18 (22 @ 04:10) (18 - 18)  SpO2: 93% (22 @ 04:10) (93% - 94%)             @ 07:01  -   @ 07:00  --------------------------------------------------------  IN: 760 mL / OUT: 500 mL / NET: 260 mL     @ 07:01  -   @ 11:13  --------------------------------------------------------  IN: 350 mL / OUT: 0 mL / NET: 350 mL       Daily     Daily Weight in k.6 (2022 09:31)  Admit Wt: Drug Dosing Weight  Height (cm): 152.4 (2022 13:12)  Weight (kg): 85.5 (2022 02:14)  BMI (kg/m2): 36.8 (2022 13:12)  BSA (m2): 1.82 (2022 13:12)    Bilirubin Total, Serum: 1.0 mg/dL ( @ 05:54)    CAPILLARY BLOOD GLUCOSE      POCT Blood Glucose.: 133 mg/dL (2022 07:44)  POCT Blood Glucose.: 166 mg/dL (2022 21:37)  POCT Blood Glucose.: 134 mg/dL (2022 16:37)  POCT Blood Glucose.: 115 mg/dL (2022 11:37)          MEDICATIONS  acetaminophen     Tablet .. 650 milliGRAM(s) Oral every 6 hours PRN  albuterol/ipratropium for Nebulization 3 milliLiter(s) Nebulizer every 6 hours  aspirin  chewable 81 milliGRAM(s) Oral daily  atorvastatin 20 milliGRAM(s) Oral at bedtime  bisacodyl 5 milliGRAM(s) Oral at bedtime  bisacodyl Suppository 10 milliGRAM(s) Rectal once  cefTRIAXone   IVPB 2000 milliGRAM(s) IV Intermittent every 24 hours  cefTRIAXone   IVPB      chlorhexidine 0.12% Liquid 5 milliLiter(s) Oral Mucosa two times a day  chlorhexidine 2% Cloths 1 Application(s) Topical daily  dextrose 50% Injectable 25 milliLiter(s) IV Push every 15 minutes  dextrose 50% Injectable 50 milliLiter(s) IV Push every 15 minutes  enoxaparin Injectable 40 milliGRAM(s) SubCutaneous once  furosemide    Tablet 40 milliGRAM(s) Oral daily  insulin lispro (ADMELOG) corrective regimen sliding scale   SubCutaneous Before meals and at bedtime  melatonin 5 milliGRAM(s) Oral at bedtime  metoprolol tartrate 25 milliGRAM(s) Oral two times a day  nystatin Powder 1 Application(s) Topical two times a day  pantoprazole    Tablet 40 milliGRAM(s) Oral before breakfast  polyethylene glycol 3350 17 Gram(s) Oral daily  senna 2 Tablet(s) Oral at bedtime  sodium chloride 0.9% lock flush 3 milliLiter(s) IV Push every 8 hours  sodium chloride 0.9%. 1000 milliLiter(s) IV Continuous <Continuous>  spironolactone 25 milliGRAM(s) Oral every 12 hours  vancomycin  IVPB 1000 milliGRAM(s) IV Intermittent every 12 hours      >>> <<<  PHYSICAL EXAM  Subjective: nad  Neurology: alert and oriented x 3, nonfocal, no gross deficits  CV :s1s2  Sternal Wound :  CDI , Stable  Lungs:cta  Abdomen: soft, NT,ND, ( +)BM  :  voiding  Extremities:  LUE picc intact no efdema b/l lle    LABS      139  |  97  |  23  ----------------------------<  120<H>  3.7   |  26  |  0.65    Ca    9.1      2022 05:54  Phos  4.4       Mg     2.0         TPro  6.8  /  Alb  3.7  /  TBili  1.0  /  DBili  x   /  AST  15  /  ALT  20  /  AlkPhos  60                                   9.5    11.65 )-----------( 353      ( 2022 05:54 )             29.7          PTT - ( 2022 05:54 )  PTT:25.7 sec       PAST MEDICAL & SURGICAL HISTORY:  Acid reflux    HTN (hypertension)    BETSEY on CPAP    Ex-smoker    COPD (chronic obstructive pulmonary disease)  w/ Emphysema and chronic bronchitis no recent exacerb/no intubation hx    Obesity (BMI 30-39.9)    Aortic valve stenosis, etiology of cardiac valve disease unspecified    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

## 2022-04-26 NOTE — PROGRESS NOTE ADULT - ASSESSMENT
71F RH w/ AS w/ prior open heart aortic valve replacement 2019, HTN, BETSEY, former smoker, Emphysema/ chronic bronchitis, GERD s/p laparoscopic vertical sleeve gastrectomy presents with acute onset speech disturbance, vision change and gait imbalance.     4/9 CTH with Right PCA infarct, MRI on 4/10 Acute R occipital lobe infarct in a R PCA vascular distribution with an additional punctate acute infarct involving the L cerebellum and R splenium of the corpus callosum.    4/11 Underwent ECHO-. A large round mass measuring 3.8cm by 3cm,  is seen in the left atrium suggestive of atrial myxoma.  It appears to be attached to the atrial septum although no stalk is visualized.  4/12 CT surgery consult called  patient seen and examined in  no acute distress  family  at bedside  amenable to cardiac surgery workup  and surgery. Discussed with Stroke team benefits > risk of cardiac surgery    Of note patient febrile overnight  -tmax  101.8 on cefTRIAXone   IVPB 1000 milliGRAM(s) IV Intermittent every 24 hours for positive UA.  Blood cultures-  Cardiac cath scheduled for Wednesday 4/13. Tentative OR date for Friday April 15.  4/13 VSS; cath today; wbc 8 pt afebrile; + ceftriaxone for UTI; repeat UA neg 4//12- BC testing- ID not clearing the patient for OR for am 4/14as per neuro- pt cleared for [facility]  ?re-scheduled OR date- when cleared by ID   4/15 VSS Cleared by ID for OR Likely Mon/Tuesday 4/16 Preop workup in progress  OR Monday 4/18 OR with Dr. 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 Explant of prosthetic valve. Debridement of aortic root and abscess with sat in the non-coronary sinus and above the anterior leaflet of the mitral valve. Reconstruction using a 21mm aortic homograft, with reimplantation of right and left main coronary buttons. There was a weakening from the infection of the right atrium adjacent to the abscess cavity which was reinforced with a bovine pericardial patch.  4/20 EP consulted for CHB/SSS post-operatively   4/21 Micro PPM placed  4/22 Right groin stitch removed s/p Micra. OR Cultures negative to date.  ID Dr. Sharma following.  Continues on Vanco 1gram Q12/Ceftriaxone 2gram QD.  Will eventually need PICC line for long term ABX. TX to 2[address] floor bed.   4/23 VSS; V.Paced 80-90; continue abx as per ID; ID following; follow OR cx; BC neg 4/21; pt will need picc line  4/24 VSS, NAD. Continue abx as per ID. BCx from 4/21 remain NGTD - will f/u w/ ID regarding clearance for PICC line.   4/25 pending PICC line placement to continue Vanco and ceftriaxone for 6 week abx course  4/26 Pending insurance authorization prior to discharge   discharge planning- PT recommending acute rehab when stable, pt wants home w/ PT

## 2022-04-26 NOTE — PROGRESS NOTE ADULT - SUBJECTIVE AND OBJECTIVE BOX
Subjective: Patient seen and examined. No new events except as noted.   S/p PICC yesterday.  Plan for discharge today.     REVIEW OF SYSTEMS:    CONSTITUTIONAL: + weakness, fevers or chills  EYES/ENT: No visual changes;  No vertigo or throat pain   NECK: No pain or stiffness  RESPIRATORY: No cough, wheezing, hemoptysis; No shortness of breath  CARDIOVASCULAR: No chest pain or palpitations  GASTROINTESTINAL: No abdominal or epigastric pain. No nausea, vomiting, or hematemesis; No diarrhea or constipation. No melena or hematochezia.  GENITOURINARY: No dysuria, frequency or hematuria  NEUROLOGICAL: No numbness or weakness  SKIN: No itching, burning, rashes, or lesions   All other review of systems is negative unless indicated above.    MEDICATIONS:  MEDICATIONS  (STANDING):  albuterol/ipratropium for Nebulization 3 milliLiter(s) Nebulizer every 6 hours  aspirin  chewable 81 milliGRAM(s) Oral daily  atorvastatin 20 milliGRAM(s) Oral at bedtime  bisacodyl 5 milliGRAM(s) Oral at bedtime  bisacodyl Suppository 10 milliGRAM(s) Rectal once  cefTRIAXone   IVPB 2000 milliGRAM(s) IV Intermittent every 24 hours  cefTRIAXone   IVPB      chlorhexidine 0.12% Liquid 5 milliLiter(s) Oral Mucosa two times a day  chlorhexidine 2% Cloths 1 Application(s) Topical daily  dextrose 50% Injectable 25 milliLiter(s) IV Push every 15 minutes  dextrose 50% Injectable 50 milliLiter(s) IV Push every 15 minutes  enoxaparin Injectable 40 milliGRAM(s) SubCutaneous once  furosemide    Tablet 40 milliGRAM(s) Oral daily  insulin lispro (ADMELOG) corrective regimen sliding scale   SubCutaneous Before meals and at bedtime  melatonin 5 milliGRAM(s) Oral at bedtime  metoprolol tartrate 25 milliGRAM(s) Oral two times a day  nystatin Powder 1 Application(s) Topical two times a day  pantoprazole    Tablet 40 milliGRAM(s) Oral before breakfast  polyethylene glycol 3350 17 Gram(s) Oral daily  senna 2 Tablet(s) Oral at bedtime  sodium chloride 0.9% lock flush 3 milliLiter(s) IV Push every 8 hours  sodium chloride 0.9%. 1000 milliLiter(s) (10 mL/Hr) IV Continuous <Continuous>  spironolactone 25 milliGRAM(s) Oral every 12 hours  vancomycin  IVPB 1000 milliGRAM(s) IV Intermittent every 12 hours    PHYSICAL EXAM:  T(C): 36.9 (04-26-22 @ 04:10), Max: 36.9 (04-26-22 @ 04:10)  HR: 84 (04-26-22 @ 04:10) (81 - 85)  BP: 103/63 (04-26-22 @ 04:10) (103/63 - 132/73)  RR: 18 (04-26-22 @ 04:10) (18 - 18)  SpO2: 93% (04-26-22 @ 04:10) (93% - 94%)  Wt(kg): --  I&O's Summary    25 Apr 2022 07:01  -  26 Apr 2022 07:00  --------------------------------------------------------  IN: 760 mL / OUT: 500 mL / NET: 260 mL    Appearance: NAD	  HEENT: Normal oral mucosa, PERRL, EOMI	  Lymphatic: No lymphadenopathy , no edema  Cardiovascular: Paced S1 S2, No JVD, No murmurs , Peripheral pulses palpable 2+ bilaterally  Respiratory: Lungs clear to auscultation, normal effort 	  Gastrointestinal:  Soft, Non-tender, + BS	  Skin: No rashes, No ecchymoses, No cyanosis, warm to touch - R PICC, sternal incision c/d/i  NEUROLOGICAL EXAM:    Mental status: AAOx2- 3, able to follow simple and complex verbal commands. answers questions appropriately, able to name, repeat and recall. no aphasia, no dysarthria   Cranial Nerves: No facial asymmetry, LHHA to counting, no nystagmus, no dysarthria,  tongue midline  Motor exam: Normal tone, no drift, 5/5 RUE, 5/5 RLE, 5/5 LUE, 5/5 LLE, normal fine finger movements.  Sensation: Intact to light touch   Coordination/ Gait: No dysmetria  Ext: No edema    LABS:    CARDIAC MARKERS:                       9.5    11.65 )-----------( 353      ( 26 Apr 2022 05:54 )             29.7     04-26    139  |  97  |  23  ----------------------------<  120<H>  3.7   |  26  |  0.65    Ca    9.1      26 Apr 2022 05:54  Phos  4.4     04-26  Mg     2.0     04-26    TPro  6.8  /  Alb  3.7  /  TBili  1.0  /  DBili  x   /  AST  15  /  ALT  20  /  AlkPhos  60  04-26    proBNP:   Lipid Profile:   HgA1c:   TSH:     TELEMETRY: Paced	    ECG:  	  RADIOLOGY:   DIAGNOSTIC TESTING:  [ ] Echocardiogram:  [ ]  Catheterization:  [ ] Stress Test:    OTHER:

## 2022-04-27 ENCOUNTER — TRANSCRIPTION ENCOUNTER (OUTPATIENT)
Age: 72
End: 2022-04-27

## 2022-04-27 VITALS
DIASTOLIC BLOOD PRESSURE: 75 MMHG | HEART RATE: 82 BPM | RESPIRATION RATE: 17 BRPM | OXYGEN SATURATION: 94 % | TEMPERATURE: 99 F | SYSTOLIC BLOOD PRESSURE: 111 MMHG

## 2022-04-27 LAB
ALBUMIN SERPL ELPH-MCNC: 3.6 G/DL — SIGNIFICANT CHANGE UP (ref 3.3–5)
ALP SERPL-CCNC: 54 U/L — SIGNIFICANT CHANGE UP (ref 40–120)
ALT FLD-CCNC: 15 U/L — SIGNIFICANT CHANGE UP (ref 10–45)
ANION GAP SERPL CALC-SCNC: 11 MMOL/L — SIGNIFICANT CHANGE UP (ref 5–17)
AST SERPL-CCNC: 16 U/L — SIGNIFICANT CHANGE UP (ref 10–40)
BILIRUB SERPL-MCNC: 1 MG/DL — SIGNIFICANT CHANGE UP (ref 0.2–1.2)
BUN SERPL-MCNC: 16 MG/DL — SIGNIFICANT CHANGE UP (ref 7–23)
CALCIUM SERPL-MCNC: 8.9 MG/DL — SIGNIFICANT CHANGE UP (ref 8.4–10.5)
CHLORIDE SERPL-SCNC: 97 MMOL/L — SIGNIFICANT CHANGE UP (ref 96–108)
CO2 SERPL-SCNC: 29 MMOL/L — SIGNIFICANT CHANGE UP (ref 22–31)
CREAT SERPL-MCNC: 0.58 MG/DL — SIGNIFICANT CHANGE UP (ref 0.5–1.3)
EGFR: 97 ML/MIN/1.73M2 — SIGNIFICANT CHANGE UP
GLUCOSE BLDC GLUCOMTR-MCNC: 120 MG/DL — HIGH (ref 70–99)
GLUCOSE BLDC GLUCOMTR-MCNC: 137 MG/DL — HIGH (ref 70–99)
GLUCOSE SERPL-MCNC: 115 MG/DL — HIGH (ref 70–99)
HCT VFR BLD CALC: 26.9 % — LOW (ref 34.5–45)
HGB BLD-MCNC: 8.6 G/DL — LOW (ref 11.5–15.5)
MAGNESIUM SERPL-MCNC: 2.2 MG/DL — SIGNIFICANT CHANGE UP (ref 1.6–2.6)
MCHC RBC-ENTMCNC: 29.5 PG — SIGNIFICANT CHANGE UP (ref 27–34)
MCHC RBC-ENTMCNC: 32 GM/DL — SIGNIFICANT CHANGE UP (ref 32–36)
MCV RBC AUTO: 92.1 FL — SIGNIFICANT CHANGE UP (ref 80–100)
NRBC # BLD: 0 /100 WBCS — SIGNIFICANT CHANGE UP (ref 0–0)
PHOSPHATE SERPL-MCNC: 4.2 MG/DL — SIGNIFICANT CHANGE UP (ref 2.5–4.5)
PLATELET # BLD AUTO: 346 K/UL — SIGNIFICANT CHANGE UP (ref 150–400)
POTASSIUM SERPL-MCNC: 3.7 MMOL/L — SIGNIFICANT CHANGE UP (ref 3.5–5.3)
POTASSIUM SERPL-SCNC: 3.7 MMOL/L — SIGNIFICANT CHANGE UP (ref 3.5–5.3)
PROT SERPL-MCNC: 6 G/DL — SIGNIFICANT CHANGE UP (ref 6–8.3)
RBC # BLD: 2.92 M/UL — LOW (ref 3.8–5.2)
RBC # FLD: 15.2 % — HIGH (ref 10.3–14.5)
SODIUM SERPL-SCNC: 137 MMOL/L — SIGNIFICANT CHANGE UP (ref 135–145)
WBC # BLD: 8.34 K/UL — SIGNIFICANT CHANGE UP (ref 3.8–10.5)
WBC # FLD AUTO: 8.34 K/UL — SIGNIFICANT CHANGE UP (ref 3.8–10.5)

## 2022-04-27 PROCEDURE — 87077 CULTURE AEROBIC IDENTIFY: CPT

## 2022-04-27 PROCEDURE — 85576 BLOOD PLATELET AGGREGATION: CPT

## 2022-04-27 PROCEDURE — 86965 POOLING BLOOD PLATELETS: CPT

## 2022-04-27 PROCEDURE — U0003: CPT

## 2022-04-27 PROCEDURE — 86901 BLOOD TYPING SEROLOGIC RH(D): CPT

## 2022-04-27 PROCEDURE — P9016: CPT

## 2022-04-27 PROCEDURE — 71250 CT THORAX DX C-: CPT

## 2022-04-27 PROCEDURE — 85027 COMPLETE CBC AUTOMATED: CPT

## 2022-04-27 PROCEDURE — 36415 COLL VENOUS BLD VENIPUNCTURE: CPT

## 2022-04-27 PROCEDURE — 94002 VENT MGMT INPAT INIT DAY: CPT

## 2022-04-27 PROCEDURE — 70450 CT HEAD/BRAIN W/O DYE: CPT | Mod: MA

## 2022-04-27 PROCEDURE — 86923 COMPATIBILITY TEST ELECTRIC: CPT

## 2022-04-27 PROCEDURE — 0042T: CPT

## 2022-04-27 PROCEDURE — C1887: CPT

## 2022-04-27 PROCEDURE — 82607 VITAMIN B-12: CPT

## 2022-04-27 PROCEDURE — 83605 ASSAY OF LACTIC ACID: CPT

## 2022-04-27 PROCEDURE — C1894: CPT

## 2022-04-27 PROCEDURE — 83735 ASSAY OF MAGNESIUM: CPT

## 2022-04-27 PROCEDURE — 81003 URINALYSIS AUTO W/O SCOPE: CPT

## 2022-04-27 PROCEDURE — P9047: CPT

## 2022-04-27 PROCEDURE — P9045: CPT

## 2022-04-27 PROCEDURE — P9012: CPT

## 2022-04-27 PROCEDURE — 82746 ASSAY OF FOLIC ACID SERUM: CPT

## 2022-04-27 PROCEDURE — 97162 PT EVAL MOD COMPLEX 30 MIN: CPT

## 2022-04-27 PROCEDURE — 86803 HEPATITIS C AB TEST: CPT

## 2022-04-27 PROCEDURE — 33274 TCAT INSJ/RPL PERM LDLS PM: CPT

## 2022-04-27 PROCEDURE — 83550 IRON BINDING TEST: CPT

## 2022-04-27 PROCEDURE — 85014 HEMATOCRIT: CPT

## 2022-04-27 PROCEDURE — 99152 MOD SED SAME PHYS/QHP 5/>YRS: CPT

## 2022-04-27 PROCEDURE — 86900 BLOOD TYPING SEROLOGIC ABO: CPT

## 2022-04-27 PROCEDURE — 85025 COMPLETE CBC W/AUTO DIFF WBC: CPT

## 2022-04-27 PROCEDURE — U0005: CPT

## 2022-04-27 PROCEDURE — 83540 ASSAY OF IRON: CPT

## 2022-04-27 PROCEDURE — 87637 SARSCOV2&INF A&B&RSV AMP PRB: CPT

## 2022-04-27 PROCEDURE — 82330 ASSAY OF CALCIUM: CPT

## 2022-04-27 PROCEDURE — 84480 ASSAY TRIIODOTHYRONINE (T3): CPT

## 2022-04-27 PROCEDURE — C1889: CPT

## 2022-04-27 PROCEDURE — 84484 ASSAY OF TROPONIN QUANT: CPT

## 2022-04-27 PROCEDURE — 82962 GLUCOSE BLOOD TEST: CPT

## 2022-04-27 PROCEDURE — 82550 ASSAY OF CK (CPK): CPT

## 2022-04-27 PROCEDURE — 87075 CULTR BACTERIA EXCEPT BLOOD: CPT

## 2022-04-27 PROCEDURE — 94640 AIRWAY INHALATION TREATMENT: CPT

## 2022-04-27 PROCEDURE — 85018 HEMOGLOBIN: CPT

## 2022-04-27 PROCEDURE — C1729: CPT

## 2022-04-27 PROCEDURE — 87640 STAPH A DNA AMP PROBE: CPT

## 2022-04-27 PROCEDURE — 80048 BASIC METABOLIC PNL TOTAL CA: CPT

## 2022-04-27 PROCEDURE — 92507 TX SP LANG VOICE COMM INDIV: CPT

## 2022-04-27 PROCEDURE — 81001 URINALYSIS AUTO W/SCOPE: CPT

## 2022-04-27 PROCEDURE — 97530 THERAPEUTIC ACTIVITIES: CPT

## 2022-04-27 PROCEDURE — 70551 MRI BRAIN STEM W/O DYE: CPT

## 2022-04-27 PROCEDURE — 87150 DNA/RNA AMPLIFIED PROBE: CPT

## 2022-04-27 PROCEDURE — C1898: CPT

## 2022-04-27 PROCEDURE — 87641 MR-STAPH DNA AMP PROBE: CPT

## 2022-04-27 PROCEDURE — 86891 AUTOLOGOUS BLOOD OP SALVAGE: CPT

## 2022-04-27 PROCEDURE — P9100: CPT

## 2022-04-27 PROCEDURE — 82565 ASSAY OF CREATININE: CPT

## 2022-04-27 PROCEDURE — C1781: CPT

## 2022-04-27 PROCEDURE — 87040 BLOOD CULTURE FOR BACTERIA: CPT

## 2022-04-27 PROCEDURE — 93306 TTE W/DOPPLER COMPLETE: CPT

## 2022-04-27 PROCEDURE — C1751: CPT

## 2022-04-27 PROCEDURE — 97110 THERAPEUTIC EXERCISES: CPT

## 2022-04-27 PROCEDURE — 84436 ASSAY OF TOTAL THYROXINE: CPT

## 2022-04-27 PROCEDURE — 84132 ASSAY OF SERUM POTASSIUM: CPT

## 2022-04-27 PROCEDURE — 80053 COMPREHEN METABOLIC PANEL: CPT

## 2022-04-27 PROCEDURE — 99232 SBSQ HOSP IP/OBS MODERATE 35: CPT

## 2022-04-27 PROCEDURE — 87070 CULTURE OTHR SPECIMN AEROBIC: CPT

## 2022-04-27 PROCEDURE — 85396 CLOTTING ASSAY WHOLE BLOOD: CPT

## 2022-04-27 PROCEDURE — 84295 ASSAY OF SERUM SODIUM: CPT

## 2022-04-27 PROCEDURE — 94010 BREATHING CAPACITY TEST: CPT

## 2022-04-27 PROCEDURE — 83036 HEMOGLOBIN GLYCOSYLATED A1C: CPT

## 2022-04-27 PROCEDURE — 93454 CORONARY ARTERY ANGIO S&I: CPT

## 2022-04-27 PROCEDURE — 97116 GAIT TRAINING THERAPY: CPT

## 2022-04-27 PROCEDURE — 82803 BLOOD GASES ANY COMBINATION: CPT

## 2022-04-27 PROCEDURE — 82947 ASSAY GLUCOSE BLOOD QUANT: CPT

## 2022-04-27 PROCEDURE — C1764: CPT

## 2022-04-27 PROCEDURE — C1769: CPT

## 2022-04-27 PROCEDURE — 82553 CREATINE MB FRACTION: CPT

## 2022-04-27 PROCEDURE — 97130 THER IVNTJ EA ADDL 15 MIN: CPT

## 2022-04-27 PROCEDURE — 33285 INSJ SUBQ CAR RHYTHM MNTR: CPT

## 2022-04-27 PROCEDURE — 97164 PT RE-EVAL EST PLAN CARE: CPT

## 2022-04-27 PROCEDURE — P9037: CPT

## 2022-04-27 PROCEDURE — 86985 SPLIT BLOOD OR PRODUCTS: CPT

## 2022-04-27 PROCEDURE — 99285 EMERGENCY DEPT VISIT HI MDM: CPT | Mod: 25

## 2022-04-27 PROCEDURE — 92523 SPEECH SOUND LANG COMPREHEN: CPT

## 2022-04-27 PROCEDURE — 36569 INSJ PICC 5 YR+ W/O IMAGING: CPT

## 2022-04-27 PROCEDURE — 97129 THER IVNTJ 1ST 15 MIN: CPT

## 2022-04-27 PROCEDURE — 71045 X-RAY EXAM CHEST 1 VIEW: CPT

## 2022-04-27 PROCEDURE — 82435 ASSAY OF BLOOD CHLORIDE: CPT

## 2022-04-27 PROCEDURE — 85610 PROTHROMBIN TIME: CPT

## 2022-04-27 PROCEDURE — 85384 FIBRINOGEN ACTIVITY: CPT

## 2022-04-27 PROCEDURE — 87186 SC STD MICRODIL/AGAR DIL: CPT

## 2022-04-27 PROCEDURE — C1786: CPT

## 2022-04-27 PROCEDURE — 82652 VIT D 1 25-DIHYDROXY: CPT

## 2022-04-27 PROCEDURE — 99153 MOD SED SAME PHYS/QHP EA: CPT

## 2022-04-27 PROCEDURE — 97168 OT RE-EVAL EST PLAN CARE: CPT

## 2022-04-27 PROCEDURE — 87102 FUNGUS ISOLATION CULTURE: CPT

## 2022-04-27 PROCEDURE — 70496 CT ANGIOGRAPHY HEAD: CPT | Mod: MA

## 2022-04-27 PROCEDURE — 82728 ASSAY OF FERRITIN: CPT

## 2022-04-27 PROCEDURE — 83880 ASSAY OF NATRIURETIC PEPTIDE: CPT

## 2022-04-27 PROCEDURE — P9011: CPT

## 2022-04-27 PROCEDURE — 85730 THROMBOPLASTIN TIME PARTIAL: CPT

## 2022-04-27 PROCEDURE — 84443 ASSAY THYROID STIM HORMONE: CPT

## 2022-04-27 PROCEDURE — 94003 VENT MGMT INPAT SUBQ DAY: CPT

## 2022-04-27 PROCEDURE — 80061 LIPID PANEL: CPT

## 2022-04-27 PROCEDURE — 93005 ELECTROCARDIOGRAM TRACING: CPT

## 2022-04-27 PROCEDURE — 70498 CT ANGIOGRAPHY NECK: CPT | Mod: MA

## 2022-04-27 PROCEDURE — 80202 ASSAY OF VANCOMYCIN: CPT

## 2022-04-27 PROCEDURE — 86850 RBC ANTIBODY SCREEN: CPT

## 2022-04-27 PROCEDURE — 97166 OT EVAL MOD COMPLEX 45 MIN: CPT

## 2022-04-27 PROCEDURE — 84100 ASSAY OF PHOSPHORUS: CPT

## 2022-04-27 RX ORDER — OXYCODONE HYDROCHLORIDE 5 MG/1
1 TABLET ORAL
Qty: 20 | Refills: 0
Start: 2022-04-27 | End: 2022-05-01

## 2022-04-27 RX ORDER — POTASSIUM CHLORIDE 20 MEQ
20 PACKET (EA) ORAL ONCE
Refills: 0 | Status: COMPLETED | OUTPATIENT
Start: 2022-04-27 | End: 2022-04-27

## 2022-04-27 RX ORDER — OXYCODONE HYDROCHLORIDE 5 MG/1
5 TABLET ORAL ONCE
Refills: 0 | Status: DISCONTINUED | OUTPATIENT
Start: 2022-04-27 | End: 2022-04-27

## 2022-04-27 RX ORDER — SPIRONOLACTONE 25 MG/1
1 TABLET, FILM COATED ORAL
Qty: 60 | Refills: 0
Start: 2022-04-27 | End: 2022-05-26

## 2022-04-27 RX ORDER — FUROSEMIDE 40 MG
1 TABLET ORAL
Qty: 30 | Refills: 0
Start: 2022-04-27 | End: 2022-05-26

## 2022-04-27 RX ORDER — POLYETHYLENE GLYCOL 3350 17 G/17G
17 POWDER, FOR SOLUTION ORAL
Qty: 0 | Refills: 0 | DISCHARGE
Start: 2022-04-27

## 2022-04-27 RX ORDER — ACETAMINOPHEN 500 MG
1 TABLET ORAL
Qty: 15 | Refills: 0
Start: 2022-04-27 | End: 2022-05-01

## 2022-04-27 RX ORDER — PANTOPRAZOLE SODIUM 20 MG/1
1 TABLET, DELAYED RELEASE ORAL
Qty: 10 | Refills: 0
Start: 2022-04-27 | End: 2022-05-06

## 2022-04-27 RX ORDER — METOPROLOL TARTRATE 50 MG
1 TABLET ORAL
Qty: 30 | Refills: 0
Start: 2022-04-27 | End: 2022-05-26

## 2022-04-27 RX ORDER — ATORVASTATIN CALCIUM 80 MG/1
1 TABLET, FILM COATED ORAL
Qty: 30 | Refills: 0
Start: 2022-04-27 | End: 2022-05-26

## 2022-04-27 RX ORDER — ASPIRIN/CALCIUM CARB/MAGNESIUM 324 MG
1 TABLET ORAL
Qty: 30 | Refills: 0
Start: 2022-04-27 | End: 2022-05-26

## 2022-04-27 RX ADMIN — Medication 40 MILLIGRAM(S): at 05:36

## 2022-04-27 RX ADMIN — OXYCODONE HYDROCHLORIDE 5 MILLIGRAM(S): 5 TABLET ORAL at 06:29

## 2022-04-27 RX ADMIN — OXYCODONE HYDROCHLORIDE 5 MILLIGRAM(S): 5 TABLET ORAL at 13:07

## 2022-04-27 RX ADMIN — SPIRONOLACTONE 25 MILLIGRAM(S): 25 TABLET, FILM COATED ORAL at 05:37

## 2022-04-27 RX ADMIN — Medication 3 MILLILITER(S): at 05:41

## 2022-04-27 RX ADMIN — Medication 81 MILLIGRAM(S): at 12:30

## 2022-04-27 RX ADMIN — SODIUM CHLORIDE 3 MILLILITER(S): 9 INJECTION INTRAMUSCULAR; INTRAVENOUS; SUBCUTANEOUS at 13:07

## 2022-04-27 RX ADMIN — SODIUM CHLORIDE 3 MILLILITER(S): 9 INJECTION INTRAMUSCULAR; INTRAVENOUS; SUBCUTANEOUS at 05:22

## 2022-04-27 RX ADMIN — PANTOPRAZOLE SODIUM 40 MILLIGRAM(S): 20 TABLET, DELAYED RELEASE ORAL at 05:37

## 2022-04-27 RX ADMIN — Medication 50 MILLIGRAM(S): at 05:36

## 2022-04-27 RX ADMIN — Medication 3 MILLILITER(S): at 12:30

## 2022-04-27 RX ADMIN — OXYCODONE HYDROCHLORIDE 5 MILLIGRAM(S): 5 TABLET ORAL at 12:29

## 2022-04-27 RX ADMIN — CEFTRIAXONE 100 MILLIGRAM(S): 500 INJECTION, POWDER, FOR SOLUTION INTRAMUSCULAR; INTRAVENOUS at 08:15

## 2022-04-27 RX ADMIN — Medication 250 MILLIGRAM(S): at 05:37

## 2022-04-27 RX ADMIN — CHLORHEXIDINE GLUCONATE 5 MILLILITER(S): 213 SOLUTION TOPICAL at 05:21

## 2022-04-27 RX ADMIN — Medication 20 MILLIEQUIVALENT(S): at 12:30

## 2022-04-27 RX ADMIN — Medication 3 MILLILITER(S): at 00:05

## 2022-04-27 RX ADMIN — NYSTATIN CREAM 1 APPLICATION(S): 100000 CREAM TOPICAL at 05:36

## 2022-04-27 NOTE — PROGRESS NOTE ADULT - PROBLEM SELECTOR PROBLEM 3
HTN (hypertension)
Endocarditis, valve
HTN (hypertension)
Acute UTI
HTN (hypertension)
HTN (hypertension)
Endocarditis, valve
HTN (hypertension)
Endocarditis, valve
HTN (hypertension)
Endocarditis, valve
HTN (hypertension)
HTN (hypertension)
Endocarditis, valve
HTN (hypertension)

## 2022-04-27 NOTE — PROGRESS NOTE ADULT - CRITICAL CARE SERVICES PROVIDED
Patient is critically ill, requiring critical care services.

## 2022-04-27 NOTE — PROGRESS NOTE ADULT - SUBJECTIVE AND OBJECTIVE BOX
Subjective: Patient seen and examined. No new events except as noted.   Pending discharge.     REVIEW OF SYSTEMS:    CONSTITUTIONAL: + weakness, fevers or chills  EYES/ENT: No visual changes;  No vertigo or throat pain   NECK: No pain or stiffness  RESPIRATORY: No cough, wheezing, hemoptysis; No shortness of breath  CARDIOVASCULAR: No chest pain or palpitations  GASTROINTESTINAL: No abdominal or epigastric pain. No nausea, vomiting, or hematemesis; No diarrhea or constipation. No melena or hematochezia.  GENITOURINARY: No dysuria, frequency or hematuria  NEUROLOGICAL: No numbness or weakness  SKIN: No itching, burning, rashes, or lesions   All other review of systems is negative unless indicated above.    MEDICATIONS:  MEDICATIONS  (STANDING):  albuterol/ipratropium for Nebulization 3 milliLiter(s) Nebulizer every 6 hours  aspirin  chewable 81 milliGRAM(s) Oral daily  atorvastatin 20 milliGRAM(s) Oral at bedtime  bisacodyl 5 milliGRAM(s) Oral at bedtime  bisacodyl Suppository 10 milliGRAM(s) Rectal once  cefTRIAXone   IVPB      cefTRIAXone   IVPB 2000 milliGRAM(s) IV Intermittent every 24 hours  chlorhexidine 0.12% Liquid 5 milliLiter(s) Oral Mucosa two times a day  chlorhexidine 2% Cloths 1 Application(s) Topical daily  dextrose 50% Injectable 50 milliLiter(s) IV Push every 15 minutes  dextrose 50% Injectable 25 milliLiter(s) IV Push every 15 minutes  enoxaparin Injectable 40 milliGRAM(s) SubCutaneous once  furosemide    Tablet 40 milliGRAM(s) Oral daily  insulin lispro (ADMELOG) corrective regimen sliding scale   SubCutaneous Before meals and at bedtime  melatonin 5 milliGRAM(s) Oral at bedtime  metoprolol succinate ER 50 milliGRAM(s) Oral daily  nystatin Powder 1 Application(s) Topical two times a day  pantoprazole    Tablet 40 milliGRAM(s) Oral before breakfast  polyethylene glycol 3350 17 Gram(s) Oral daily  senna 2 Tablet(s) Oral at bedtime  sodium chloride 0.9% lock flush 3 milliLiter(s) IV Push every 8 hours  sodium chloride 0.9%. 1000 milliLiter(s) (10 mL/Hr) IV Continuous <Continuous>  spironolactone 25 milliGRAM(s) Oral every 12 hours  vancomycin  IVPB 1000 milliGRAM(s) IV Intermittent every 12 hours    PHYSICAL EXAM:  T(C): 37 (04-27-22 @ 04:28), Max: 37 (04-26-22 @ 19:58)  HR: 82 (04-27-22 @ 04:28) (80 - 82)  BP: 112/78 (04-27-22 @ 04:28) (109/72 - 123/59)  RR: 18 (04-27-22 @ 04:28) (17 - 18)  SpO2: 94% (04-27-22 @ 04:28) (94% - 96%)  Wt(kg): --  I&O's Summary    26 Apr 2022 07:01  -  27 Apr 2022 07:00  --------------------------------------------------------  IN: 1370 mL / OUT: 1400 mL / NET: -30 mL    Appearance: NAD	  HEENT: Normal oral mucosa, PERRL, EOMI	  Lymphatic: No lymphadenopathy , no edema  Cardiovascular: Paced S1 S2, No JVD, No murmurs , Peripheral pulses palpable 2+ bilaterally  Respiratory: Lungs clear to auscultation, normal effort 	  Gastrointestinal:  Soft, Non-tender, + BS	  Skin: No rashes, No ecchymoses, No cyanosis, warm to touch - R PICC, sternal incision c/d/i  NEUROLOGICAL EXAM:    Mental status: AAOx2- 3, able to follow simple and complex verbal commands. answers questions appropriately, able to name, repeat and recall. no aphasia, no dysarthria   Cranial Nerves: No facial asymmetry, LHHA to counting, no nystagmus, no dysarthria,  tongue midline  Motor exam: Normal tone, no drift, 5/5 RUE, 5/5 RLE, 5/5 LUE, 5/5 LLE, normal fine finger movements.  Sensation: Intact to light touch   Coordination/ Gait: No dysmetria  Ext: No edema    LABS:    CARDIAC MARKERS:                       8.6    8.34  )-----------( 346      ( 27 Apr 2022 06:04 )             26.9     04-27    137  |  97  |  16  ----------------------------<  115<H>  3.7   |  29  |  0.58    Ca    8.9      27 Apr 2022 06:04  Phos  4.2     04-27  Mg     2.2     04-27    TPro  6.0  /  Alb  3.6  /  TBili  1.0  /  DBili  x   /  AST  16  /  ALT  15  /  AlkPhos  54  04-27    proBNP:   Lipid Profile:   HgA1c:   TSH:     TELEMETRY: Paced	    ECG:  	  RADIOLOGY:   DIAGNOSTIC TESTING:  [ ] Echocardiogram:  [ ]  Catheterization:  [ ] Stress Test:    OTHER:

## 2022-04-27 NOTE — PROGRESS NOTE ADULT - REASON FOR ADMISSION
Preop Reop  AVR
concern for stroke
preop
R PCA infarct atrial myxoma
concern for stroke

## 2022-04-27 NOTE — PROGRESS NOTE ADULT - SUBJECTIVE AND OBJECTIVE BOX
infectious diseases progress note:    Patient is a 71y old  Female who presents with a chief complaint of concern for stroke (26 Apr 2022 13:47)        Cerebral infarction               Allergies    No Known Allergies    Intolerances        ANTIBIOTICS/RELEVANT:  antimicrobials  cefTRIAXone   IVPB      cefTRIAXone   IVPB 2000 milliGRAM(s) IV Intermittent every 24 hours  vancomycin  IVPB 1000 milliGRAM(s) IV Intermittent every 12 hours    immunologic:    OTHER:  acetaminophen     Tablet .. 650 milliGRAM(s) Oral every 6 hours PRN  albuterol/ipratropium for Nebulization 3 milliLiter(s) Nebulizer every 6 hours  aspirin  chewable 81 milliGRAM(s) Oral daily  atorvastatin 20 milliGRAM(s) Oral at bedtime  bisacodyl 5 milliGRAM(s) Oral at bedtime  bisacodyl Suppository 10 milliGRAM(s) Rectal once  chlorhexidine 0.12% Liquid 5 milliLiter(s) Oral Mucosa two times a day  chlorhexidine 2% Cloths 1 Application(s) Topical daily  dextrose 50% Injectable 50 milliLiter(s) IV Push every 15 minutes  dextrose 50% Injectable 25 milliLiter(s) IV Push every 15 minutes  enoxaparin Injectable 40 milliGRAM(s) SubCutaneous once  furosemide    Tablet 40 milliGRAM(s) Oral daily  insulin lispro (ADMELOG) corrective regimen sliding scale   SubCutaneous Before meals and at bedtime  melatonin 5 milliGRAM(s) Oral at bedtime  metoprolol succinate ER 50 milliGRAM(s) Oral daily  nystatin Powder 1 Application(s) Topical two times a day  oxyCODONE    IR 5 milliGRAM(s) Oral every 6 hours PRN  pantoprazole    Tablet 40 milliGRAM(s) Oral before breakfast  polyethylene glycol 3350 17 Gram(s) Oral daily  senna 2 Tablet(s) Oral at bedtime  sodium chloride 0.9% lock flush 3 milliLiter(s) IV Push every 8 hours  sodium chloride 0.9%. 1000 milliLiter(s) IV Continuous <Continuous>  spironolactone 25 milliGRAM(s) Oral every 12 hours      Objective:  Vital Signs Last 24 Hrs  T(C): 37 (27 Apr 2022 04:28), Max: 37 (26 Apr 2022 19:58)  T(F): 98.6 (27 Apr 2022 04:28), Max: 98.6 (26 Apr 2022 19:58)  HR: 82 (27 Apr 2022 04:28) (80 - 82)  BP: 112/78 (27 Apr 2022 04:28) (109/72 - 123/59)  BP(mean): --  RR: 18 (27 Apr 2022 04:28) (17 - 18)  SpO2: 94% (27 Apr 2022 04:28) (94% - 96%)       Eyes:ABDULLAHI, EOMI  Ear/Nose/Throat: no oral lesion, no sinus tenderness on percussion	  Neck:no JVD, no lymphadenopathy, supple  Respiratory: CTA maribel  Cardiovascular: S1S2 RRR, no murmurs  Gastrointestinal:soft, (+) BS, no HSM  Extremities:no e/e/c        LABS:                        8.6    8.34  )-----------( 346      ( 27 Apr 2022 06:04 )             26.9     04-27    137  |  97  |  16  ----------------------------<  115<H>  3.7   |  29  |  0.58    Ca    8.9      27 Apr 2022 06:04  Phos  4.2     04-27  Mg     2.2     04-27    TPro  6.0  /  Alb  3.6  /  TBili  1.0  /  DBili  x   /  AST  16  /  ALT  15  /  AlkPhos  54  04-27    PTT - ( 26 Apr 2022 05:54 )  PTT:25.7 sec        MICROBIOLOGY:    RECENT CULTURES:  04-21 @ 00:30 .Blood Blood                No Growth Final          RESPIRATORY CULTURES:              RADIOLOGY & ADDITIONAL STUDIES:        Pager [phone]  After 5 pm/weekends or if no response :[phone]

## 2022-04-27 NOTE — PROGRESS NOTE ADULT - PROBLEM SELECTOR PROBLEM 1
S/P aortic valve replacement
CVA (cerebrovascular accident)
S/P aortic valve replacement
CVA (cerebrovascular accident)
S/P aortic valve replacement
S/P aortic valve replacement
CVA (cerebrovascular accident)
CVA (cerebrovascular accident)
S/P aortic valve replacement

## 2022-04-27 NOTE — DISCHARGE NOTE NURSING/CASE MANAGEMENT/SOCIAL WORK - PATIENT PORTAL LINK FT
You can access the FollowMyHealth Patient Portal offered by [facility] by registering at the following website: [email or web address]. By joining [facility]’s FollowMyHealth portal, you will also be able to view your health information using other applications (apps) compatible with our system.

## 2022-04-27 NOTE — PROGRESS NOTE ADULT - PROBLEM SELECTOR PLAN 4
Post op course complicated by CHB  EP consulted  4/21 s/p Micra PPM placement
Post op course complicated by CHB  EP consulted  4/21 s/p Micra PPM placement
post-op CHB/SSS    - 4/21 s/p micro PPM placement  EP following
Post op course complicated by CHB  EP consulted  4/21 s/p Micra PPM placement
Post op course complicated by CHB  EP consulted  s/p 4/21 Micra PPM placement
post-op CHB/SSS    - 4/21 s/p micro PPM placement  EP following

## 2022-04-27 NOTE — PROGRESS NOTE ADULT - PROVIDER SPECIALTY LIST ADULT
CT Surgery
Cardiology
Critical Care
Electrophysiology
Electrophysiology
Infectious Disease
Infectious Disease
Neurology
Pulmonology
CT Surgery
Cardiology
Cardiology
Critical Care
Critical Care
Electrophysiology
Electrophysiology
Infectious Disease
Neurology
Neurology
Pulmonology
Rehab Medicine
Cardiology
Critical Care
Critical Care
Electrophysiology
Infectious Disease
Neurology
Neurology
Rehab Medicine
Infectious Disease
Neurology
CT Surgery
Neurology
Neurology
CT Surgery
Cardiology
Cardiology
CT Surgery
CT Surgery
Cardiology
CT Surgery

## 2022-04-27 NOTE — PROGRESS NOTE ADULT - PROBLEM SELECTOR PROBLEM 2
Atrial mass
S/P aortic valve replacement
S/P aortic valve replacement
Atrial mass
HTN (hypertension)
S/P aortic valve replacement
HTN (hypertension)
S/P aortic valve replacement
HTN (hypertension)
HTN (hypertension)
S/P aortic valve replacement
HTN (hypertension)
S/P aortic valve replacement

## 2022-04-27 NOTE — PROGRESS NOTE ADULT - ASSESSMENT
71F RH w/ AS w/ prior open heart aortic valve replacement 2019, HTN, BETSEY, former smoker, Emphysema/ chronic bronchitis, GERD s/p laparoscopic vertical sleeve gastrectomy presented with acute onset speech disturbance, vision change and gait imbalance. Per EMS, LKN: 4/9/22 at 10am per family. Pt's family reported blurry vision, gait imbalance, and speech disturbance (mild loss in fluency). She still endorses blurry vision but denies any weakness, numbness/tingling. Pt takes a baby aspirin 81mg daily. NIHSS: 3, preMRS: 0. No tpa or MT.    o/e with [facility]   s/p CTS on 4/18 4/19 intubated on precedex, SERVIN, plan for CPAP  + aortic root abscess/endocarditis   extubated, SERVIN AAOx2-3     Impression:  Right PCA infarct etiology likely cardioembolic given findings of atrial mass, also incidental 6 mm ACOMM aneurysm.     NEURO: neurologically without acute chagne, continue close monitoring for neurologic deterioration, found to have an incidental aneurysm 6.7mm ACOM will follow with Dr. Contreras as outpatient for further evaluation and possible treatment, LDL 62: continue home dose statin  MRI Brain w/o noted above,  Physical therapy/OT: AR  ANTITHROMBOTIC THERAPY: resume ASA when able , back on      PULMONARY: monitor airway.     CARDIOVASCULAR:  TTE: ef 45%, mild-moderate MR, mild mitral stenosis, left atrial mass with increased gradient, bioprosthetic AV, mild AR, moderately dilated LA, left atrial mass suggestive of myxoma,  cardiac monitoring, S/P aortic valve replacement cardiology consulted, Troponin elevated. ILR placed to rule out A. Fib as possible source.   CT surgery following. Anticipaet cardiac cath and tentative OR pending clinical course. no objection for cath at this time.  s/p CTS 4/19    + aortic root abcess/     SBP goal: normotension being tolerated- avoid SBP > 160mmHg in setting of unsecured itnracranial aneurysm.      GASTROINTESTINAL:  dysphagia screen- passed, tolerating diet       Diet: Regular    RENAL: BUN/Cr without acute change, good urine output , maintain adequate hydration      Na Goal: Greater than 135     Sharma: N    HEMATOLOGY: H/H   anemia , no acute change, no active bleeding Platelets 320, she should have all age and risk appropriate      DVT ppx: Heparin s.c [] LMWH [x]     ID: febrile, no leukocytosis, repeat UA negative for UTI, was on ctx prior for UTI, follow up sensitivities , blood cx taken, infectious workup done ;   ; back on vanco and cefttiaxone pending cultures    for Aortic root abscses and endocarditis. PICC line for abx for 6 weeks     Other: remaining per CTS team     DISPOSITION: AR once stable and workup is complete; family wants home ; needs to f/u with me and neuro IR on discharge         CORE MEASURES:        Admission NIHSS: 3     TPA: [] YES [x] NO      LDL/HDL: 62/32     Depression Screen: 0     Statin Therapy: Y     Dysphagia Screen: [x] PASS [] FAIL     Smoking [] YES [x] NO      Afib [] YES [x] NO     Stroke Education [x] YES [] NO    Zenon Vela MD  Vascular Neurology

## 2022-04-27 NOTE — DISCHARGE NOTE NURSING/CASE MANAGEMENT/SOCIAL WORK - NSDCPEFALRISK_GEN_ALL_CORE
For information on Fall & Injury Prevention, visit: [email or web address] OR  [email or web address] OR  https://www.cdc.gov/steadi/patient.html

## 2022-04-27 NOTE — PROGRESS NOTE ADULT - PROBLEM SELECTOR PLAN 1
Nothing in the vagina for 6 weeks (no tampons, no intercourse, no douching, no bath tubs, no swimming pools). May shower.  If you have a temperature above 100.4F, excessive vaginal bleeding or severe abdominal pain please call your doctor or come to the emergency department. 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

## 2022-04-27 NOTE — PROGRESS NOTE ADULT - PROBLEM SELECTOR PROBLEM 4
CHB (complete heart block)

## 2022-04-27 NOTE — PROGRESS NOTE ADULT - SUBJECTIVE AND OBJECTIVE BOX
Neurology Progress note;     HPI:  71F RH w/ AS w/ prior open heart aortic valve replacement 2019, HTN, BETSEY, former smoker, Emphysema/ chronic bronchitis, GERD s/p laparoscopic vertical sleeve gastrectomy presented with acute onset speech disturbance, vision change and gait imbalance. Per EMS, LKN: 4/9/22 at 10am per family. Pt's family reported blurry vision, gait imbalance, and speech disturbance (mild loss in fluency). Pt unable to provide full hx because she was confused on timing. She  endorsed blurry vision but denied any weakness, numbness/tingling. Pt takes a baby aspirin 81mg daily. Pt ambulates without assistance or assistive devices at baseline.  LKN: 4/9/22 at 10am  NIHSS: 3  preMRS: 0  Pt was not a candidate for tpa due to outside tpa window   Pt was not a candidate for mechanical thrombectomy due to no large vessel occlusion on CTA    SUBJECTIVE: patient seen and examined.  doing okay dc planning     Medications:      CBC Full  -  ( 27 Apr 2022 06:04 )  WBC Count : 8.34 K/uL  RBC Count : 2.92 M/uL  Hemoglobin : 8.6 g/dL  Hematocrit : 26.9 %  Platelet Count - Automated : 346 K/uL  Mean Cell Volume : 92.1 fl  Mean Cell Hemoglobin : 29.5 pg  Mean Cell Hemoglobin Concentration : 32.0 gm/dL  Auto Neutrophil # : x  Auto Lymphocyte # : x  Auto Monocyte # : x  Auto Eosinophil # : x  Auto Basophil # : x  Auto Neutrophil % : x  Auto Lymphocyte % : x  Auto Monocyte % : x  Auto Eosinophil % : x  Auto Basophil % : x    04-27    137  |  97  |  16  ----------------------------<  115<H>  3.7   |  29  |  0.58    Ca    8.9      27 Apr 2022 06:04  Phos  4.2     04-27  Mg     2.2     04-27    TPro  6.0  /  Alb  3.6  /  TBili  1.0  /  DBili  x   /  AST  16  /  ALT  15  /  AlkPhos  54  04-27      PHYSICAL EXAM:       Vital Signs Last 24 Hrs  T(C): 36.5 (04-25-22 @ 12:21), Max: 37.1 (04-24-22 @ 19:46)  T(F): 97.7 (04-25-22 @ 12:21), Max: 98.8 (04-24-22 @ 19:46)  HR: 84 (04-25-22 @ 18:00) (84 - 85)  BP: 116/79 (04-25-22 @ 18:00) (110/75 - 132/73)  BP(mean): 91 (04-25-22 @ 18:00) (87 - 92)  RR: 18 (04-25-22 @ 18:00) (18 - 18)  SpO2: 94% (04-25-22 @ 18:00) (94% - 95%)         General: No acute distress  HEENT: EOM intact, LHH to counting (is aware)  Abdomen: Soft, nontender, nondistended   Extremities: No edema    NEUROLOGICAL EXAM:    Mental status: AAOx2- 3, able to follow simple and complex verbal commands. answers questions appropriately, able to name, repeat and recall. no aphasia, no dysarthria   Cranial Nerves: No facial asymmetry, LHHA to counting, no nystagmus, no dysarthria,  tongue midline  Motor exam: Normal tone, no drift, 5/5 RUE, 5/5 RLE, 5/5 LUE, 5/5 LLE, normal fine finger movements.  Sensation: Intact to light touch   Coordination/ Gait: No dysmetria, gait unable in ICU       LABS:  CBC Full  -  ( 27 Apr 2022 06:04 )  WBC Count : 8.34 K/uL  RBC Count : 2.92 M/uL  Hemoglobin : 8.6 g/dL  Hematocrit : 26.9 %  Platelet Count - Automated : 346 K/uL  Mean Cell Volume : 92.1 fl  Mean Cell Hemoglobin : 29.5 pg  Mean Cell Hemoglobin Concentration : 32.0 gm/dL  Auto Neutrophil # : x  Auto Lymphocyte # : x  Auto Monocyte # : x  Auto Eosinophil # : x  Auto Basophil # : x  Auto Neutrophil % : x  Auto Lymphocyte % : x  Auto Monocyte % : x  Auto Eosinophil % : x  Auto Basophil % : x    04-27    137  |  97  |  16  ----------------------------<  115<H>  3.7   |  29  |  0.58    Ca    8.9      27 Apr 2022 06:04  Phos  4.2     04-27  Mg     2.2     04-27    TPro  6.0  /  Alb  3.6  /  TBili  1.0  /  DBili  x   /  AST  16  /  ALT  15  /  AlkPhos  54  04-27      IMAGING: Reviewed by me.   MRI HEAD 04/10/22: Acute right occipital lobe infarct in a right PCA vascular distribution   with an additional punctate acute infarct involving the left cerebellum   and right splenium of the corpus callosum.    04/09/22:  Brain CT: Acute right occipital lobe infarct in the distribution of the   right PCA. No evidence for hemorrhage.  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 No   hemodynamically significant stenosis within the anterior circulation.  Brain CTA: No large vessel occlusion or stenosis. 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  Perfusion maps: Core infarct in the distribution of the right PCA.   Questionable areas of brain at risk in the right PCA as well as the   bilateral temporal and right frontal region.

## 2022-04-27 NOTE — PROGRESS NOTE ADULT - ASSESSMENT
71 year old with AVR; COPD, GERD, GBP, presented with recent cva.  During workup, an echo revealed an atrial myxoma.  Last 24 she had a fever of 101. Denies -prior fevers, but states she was treated for pna prior to this admission  She is very confused but denies any complaints at present    found to have endocarditis   and root abscess/ .   preop  BC negative  will request PCR.  vanco and ceftriaxone pending cultures    OR cultures negative to date and very few wbc seen       one bottle of one set of a post op bc with S lung.  significance unclear with negative preop and intraop cultures  PCR pending   current vanco levels ok  as we are not treating mrsa        PLAN TO SEND HOME ON CURRENT REGIMEN PENDING PCR  THE VANCO LEVELS ARE LOW BUT WE ARE NOT TREATING MRSA SO i WOULD NOT PUSH DOSE AT PRESENT   to place picc and plan to continue present therapy  until; pcr back

## 2022-04-27 NOTE — PROGRESS NOTE ADULT - SUBJECTIVE AND OBJECTIVE BOX
EP    tele: AS-    resting comfortably, no new complaints.  awaiting dc home.    DATE OF SERVICE - 04-27-22     Review of Systems:   Constitutional: [ ] fevers, [ ] chills.   Skin: [ ] dry skin. [ ] rashes.  Psychiatric: [ ] depression, [ ] anxiety.   Gastrointestinal: [ ] BRBPR, [ ] melena.   Neurological: [ ] confusion. [ ] seizures. [ ] shuffling gait.   Ears,Nose,Mouth and Throat: [ ] ear pain [ ] sore throat.   Eyes: [ ] diplopia.   Respiratory: [ ] hemoptysis. [ ] shortness of breath  Cardiovascular: See HPI above  Hematologic/Lymphatic: [ ] anemia. [ ] painful nodes. [ ] prolonged bleeding.   Genitourinary: [ ] hematuria. [ ] flank pain.   Endocrine: [ ] significant change in weight. [ ] intolerance to heat and cold.     Review of systems [ x] otherwise negative, [ ] otherwise unable to obtain    FH: no family history of sudden cardiac death in first degree relatives    SH: [ ] tobacco, [ ] alcohol, [ ] drugs    acetaminophen     Tablet .. 650 milliGRAM(s) Oral every 6 hours PRN  albuterol/ipratropium for Nebulization 3 milliLiter(s) Nebulizer every 6 hours  aspirin  chewable 81 milliGRAM(s) Oral daily  atorvastatin 20 milliGRAM(s) Oral at bedtime  bisacodyl 5 milliGRAM(s) Oral at bedtime  bisacodyl Suppository 10 milliGRAM(s) Rectal once  cefTRIAXone   IVPB      cefTRIAXone   IVPB 2000 milliGRAM(s) IV Intermittent every 24 hours  chlorhexidine 0.12% Liquid 5 milliLiter(s) Oral Mucosa two times a day  chlorhexidine 2% Cloths 1 Application(s) Topical daily  dextrose 50% Injectable 50 milliLiter(s) IV Push every 15 minutes  dextrose 50% Injectable 25 milliLiter(s) IV Push every 15 minutes  enoxaparin Injectable 40 milliGRAM(s) SubCutaneous once  furosemide    Tablet 40 milliGRAM(s) Oral daily  insulin lispro (ADMELOG) corrective regimen sliding scale   SubCutaneous Before meals and at bedtime  melatonin 5 milliGRAM(s) Oral at bedtime  metoprolol succinate ER 50 milliGRAM(s) Oral daily  nystatin Powder 1 Application(s) Topical two times a day  oxyCODONE    IR 5 milliGRAM(s) Oral every 6 hours PRN  oxyCODONE    IR 5 milliGRAM(s) Oral once  pantoprazole    Tablet 40 milliGRAM(s) Oral before breakfast  polyethylene glycol 3350 17 Gram(s) Oral daily  potassium chloride    Tablet ER 20 milliEquivalent(s) Oral once  senna 2 Tablet(s) Oral at bedtime  sodium chloride 0.9% lock flush 3 milliLiter(s) IV Push every 8 hours  sodium chloride 0.9%. 1000 milliLiter(s) IV Continuous <Continuous>  spironolactone 25 milliGRAM(s) Oral every 12 hours  vancomycin  IVPB 1000 milliGRAM(s) IV Intermittent every 12 hours                            8.6    8.34  )-----------( 346      ( 27 Apr 2022 06:04 )             26.9       04-27    137  |  97  |  16  ----------------------------<  115<H>  3.7   |  29  |  0.58    Ca    8.9      27 Apr 2022 06:04  Phos  4.2     04-27  Mg     2.2     04-27    TPro  6.0  /  Alb  3.6  /  TBili  1.0  /  DBili  x   /  AST  16  /  ALT  15  /  AlkPhos  54  04-27    T(C): 37 (04-27-22 @ 04:28), Max: 37 (04-26-22 @ 19:58)  HR: 82 (04-27-22 @ 04:28) (80 - 82)  BP: 112/78 (04-27-22 @ 04:28) (109/72 - 123/59)  RR: 18 (04-27-22 @ 04:28) (17 - 18)  SpO2: 94% (04-27-22 @ 04:28) (94% - 96%)  Wt(kg): --    I&O's Summary    26 Apr 2022 07:01  -  27 Apr 2022 07:00  --------------------------------------------------------  IN: 1370 mL / OUT: 1400 mL / NET: -30 mL    27 Apr 2022 07:01  -  27 Apr 2022 11:05  --------------------------------------------------------  IN: 290 mL / OUT: 550 mL / NET: -260 mL    General: Well nourished in no acute distress. Alert and Oriented * 3.   Head: Normocephalic and atraumatic.   Neck: No JVD. No bruits. Supple. Does not appear to be enlarged.   Cardiovascular: + S1,S2 ; RRR Soft systolic murmur at the left lower sternal border. No rubs noted.    Lungs: CTA b/l. No rhonchi, rales or wheezes.   Abdomen: + BS, soft. Non tender. Non distended. No rebound. No guarding.   Extremities: No clubbing/cyanosis/edema.   Neurologic: Moves all four extremities. Full range of motion.   Skin: Warm and moist. The patient's skin has normal elasticity and good skin turgor.   Psychiatric: Appropriate mood and affect.  Musculoskeletal: Normal range of motion, normal strength      A/P) 72 y/o female PMH hypertension, hyperlipidemia, GERD s/p laparoscopic vertical sleeve gastrectomy, and non-obstructive CAD who underwent a bio-AVR 2019. She was now admitted on 4/9/22 with stroke type symptoms and a loop recorder was implanted on 4/11/22 screening for AF. 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 There was no myxoma. She underwent explant of her bio-AVR, debridement of her Ao root and abscess, Bentall procedure, and implantation of an epicardial RV lead. She now has both sick sinus syndrome as well as complete heart block. No myxoma was ever found - it was all abscess from culture negative endocarditis. She is now s/p Micra PPM    -antibiotics as per ID  -supportive care as per CT surgery  -recommend to leave the ILR in place screening for AF  -if she ever needs atrial pacing for her sick sinus syndrome (likely) a transvenous dual chamber PPM [or even the [facility] leadless system] can eventually be implanted after she recovers from culture negative endocarditis  -outpatient cardiologist is Heber Marquez at [place]  -Has Micra follow up with Dr Arsalan Lucero on 5/4/22 at 10:40 AM, and ILR r/o AF is followed by Dr Lanza.   -No further inpatient EP workup expected.    -d/c planning per primary team    Austin Dia M.D.  Cardiac Electrophysiology  [phone]

## 2022-04-28 ENCOUNTER — APPOINTMENT (OUTPATIENT)
Dept: CARE COORDINATION | Facility: HOME HEALTH | Age: 72
End: 2022-04-28
Payer: MEDICARE

## 2022-04-28 VITALS
DIASTOLIC BLOOD PRESSURE: 70 MMHG | OXYGEN SATURATION: 95 % | RESPIRATION RATE: 12 BRPM | SYSTOLIC BLOOD PRESSURE: 106 MMHG | HEART RATE: 84 BPM

## 2022-04-28 PROCEDURE — 99024 POSTOP FOLLOW-UP VISIT: CPT

## 2022-04-28 RX ORDER — SPIRONOLACTONE 25 MG/1
25 TABLET ORAL TWICE DAILY
Refills: 0 | Status: ACTIVE | COMMUNITY
Start: 2022-04-28

## 2022-04-28 RX ORDER — ALBUTEROL SULFATE 2.5 MG/3ML
(2.5 MG/3ML) SOLUTION RESPIRATORY (INHALATION)
Qty: 120 | Refills: 5 | Status: DISCONTINUED | COMMUNITY
Start: 2017-08-30 | End: 2022-04-28

## 2022-04-28 RX ORDER — FLUTICASONE FUROATE AND VILANTEROL TRIFENATATE 200; 25 UG/1; UG/1
200-25 POWDER RESPIRATORY (INHALATION)
Qty: 1 | Refills: 3 | Status: DISCONTINUED | COMMUNITY
Start: 2021-03-31 | End: 2022-04-28

## 2022-04-28 NOTE — ASSESSMENT
[FreeTextEntry1] : This is a 70 y/o F whom went to the OR on 4/18 w/ Dr Jay. 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 Explant of prosthetic valve completed. Debridement of aortic root and abscess with sat in the non-coronary sinus and above the anterior leaflet of the mitral valve. Reconstruction using a 21mm aortic homograft, with reimplantation of right and left main coronary buttons completed. She is now seen at home on 4/28/22 overall doing well. She woke up in pain this morning at the site of her MSI. She had not taken any Tylenol or Oxycodone prior to going to bed last night. She is taking her meds as prescribed. She was hoping for the visiting RN to administer all of her IV abx doses (Ceftriaxone dose is daily / Vanco dose is q12h). I called [facility] to ask on behalf of the pt. As per [facility] agency the pt was educated 2x in regards to IV abx administration and does have a friend who is able to help her. As per the pt she does'nt want to ask her friend to help her as her friend has been through a lot lately with her own health. The pt does not have deficits to learning and she does state that her friend caught on quick to learning the steps of abx administration. [facility] agency was able to offer one or two additional training visits to further assist the pt. Additionally, [facility] provided private nursing agency company names for the pt to contact to set up for RN IV abx administration to be paid out of pocket. The following agency names were provided to the pt: [employer], [employer], [employer], and [facility]. Pt states she will reach out to them for pricing info. She is aware of which f/u appts she needs to schedule still.

## 2022-04-28 NOTE — HISTORY OF PRESENT ILLNESS
[FreeTextEntry1] : This is a 71F RH w/ AS w/ prior open heart aortic valve replacement 2019, HTN, BETSEY, former smoker, Emphysema/ chronic bronchitis, GERD s/p laparoscopic vertical sleeve gastrectomy presents with acute onset speech disturbance, vision change and gait imbalance. On 4/9 CTH showed Right PCA infarct, MRI on 4/10 Acute R occipital lobe infarct in a R PCA vascular distribution with an additional punctate acute infarct involving the L cerebellum and R splenium of the corpus callosum.  4/11 Underwent ECHO-. A large round mass measuring 3.8cm by 3cm, was seen in the left atrium suggestive of atrial myxoma.  It appears to be attached to the atrial septum although no stalk is visualized. Pre-op the pt was treated w/ ceftriaxone for UTI. 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 Explant of prosthetic valve. Debridement of aortic root and abscess with sat in the non-coronary sinus and above the anterior leaflet of the mitral valve. Reconstruction using a 21mm aortic homograft, with reimplantation of right and left main coronary buttons. There was a weakening from the infection of the right atrium adjacent to the abscess cavity which was reinforced with a bovine pericardial patch. On 4/20 EP consulted for CHB/SSS post-op. On 4/21 a Micro PPM was placed. ID, Dr. Sharma, following.  Continued on Vanco 1gram Q12/Ceftriaxone 2gram QD  - Vanco and ceftriaxone for 6 week abx course (til 5/30) necessary -PICC placed. DC’d home on 4/27 as the pt refused rehab.\par Today 4/28 she is seen at home doing overall well besides c/o chest incisional pain upon awaking this morning.

## 2022-04-28 NOTE — REASON FOR VISIT
[Post Hospitalization] : a post hospitalization visit [Other: _____] : [unfilled] [FreeTextEntry1] : FOLLOW YOUR HEART - Transitional Care Management Program - [facility]

## 2022-04-28 NOTE — PHYSICAL EXAM
[Neck Appearance] : the appearance of the neck was normal [Sclera] : the sclera and conjunctiva were normal [Respiration, Rhythm And Depth] : normal respiratory rhythm and effort [] : no respiratory distress [Exaggerated Use Of Accessory Muscles For Inspiration] : no accessory muscle use [Auscultation Breath Sounds / Voice Sounds] : lungs were clear to auscultation bilaterally [Apical Impulse] : the apical impulse was normal [Heart Rate And Rhythm] : heart rate was normal and rhythm regular [Heart Sounds] : normal S1 and S2 [Heart Sounds Gallop] : no gallops [Examination Of The Chest] : the chest was normal in appearance [Chest Visual Inspection Thoracic Asymmetry] : no chest asymmetry [Diminished Respiratory Excursion] : normal chest expansion [Bowel Sounds] : normal bowel sounds [Abdomen Soft] : soft [Abdomen Tenderness] : non-tender [Skin Color & Pigmentation] : normal skin color and pigmentation [Oriented To Time, Place, And Person] : oriented to person, place, and time [Impaired Insight] : insight and judgment were intact [Affect] : the affect was normal [Mood] : the mood was normal [FreeTextEntry1] : RUE PICC line dsg - CDI.

## 2022-04-29 PROBLEM — Z09 POSTOP CHECK: Status: ACTIVE | Noted: 2022-04-29

## 2022-04-29 RX ORDER — MULTIVIT-MIN/FA/LYCOPEN/LUTEIN .4-300-25
TABLET ORAL
Refills: 0 | Status: COMPLETED | COMMUNITY
End: 2022-04-29

## 2022-04-29 RX ORDER — ASPIRIN 81 MG/1
81 TABLET ORAL DAILY
Refills: 0 | Status: ACTIVE | COMMUNITY
Start: 2019-03-11

## 2022-04-29 RX ORDER — ATORVASTATIN CALCIUM 20 MG/1
20 TABLET, FILM COATED ORAL
Qty: 30 | Refills: 3 | Status: ACTIVE | COMMUNITY

## 2022-04-29 RX ORDER — METOPROLOL SUCCINATE 50 MG/1
50 TABLET, EXTENDED RELEASE ORAL
Qty: 30 | Refills: 2 | Status: ACTIVE | COMMUNITY

## 2022-05-04 ENCOUNTER — NON-APPOINTMENT (OUTPATIENT)
Age: 72
End: 2022-05-04

## 2022-05-04 ENCOUNTER — APPOINTMENT (OUTPATIENT)
Dept: ELECTROPHYSIOLOGY | Facility: CLINIC | Age: 72
End: 2022-05-04
Payer: MEDICARE

## 2022-05-04 VITALS
BODY MASS INDEX: 32.39 KG/M2 | DIASTOLIC BLOOD PRESSURE: 80 MMHG | WEIGHT: 165 LBS | HEIGHT: 60 IN | OXYGEN SATURATION: 96 % | HEART RATE: 80 BPM | SYSTOLIC BLOOD PRESSURE: 125 MMHG

## 2022-05-04 PROCEDURE — 99024 POSTOP FOLLOW-UP VISIT: CPT

## 2022-05-04 PROCEDURE — 93279 PRGRMG DEV EVAL PM/LDLS PM: CPT

## 2022-05-04 PROCEDURE — 93000 ELECTROCARDIOGRAM COMPLETE: CPT | Mod: 59

## 2022-05-05 ENCOUNTER — NON-APPOINTMENT (OUTPATIENT)
Age: 72
End: 2022-05-05

## 2022-05-06 ENCOUNTER — APPOINTMENT (OUTPATIENT)
Dept: CARDIOTHORACIC SURGERY | Facility: CLINIC | Age: 72
End: 2022-05-06
Payer: MEDICARE

## 2022-05-06 VITALS
DIASTOLIC BLOOD PRESSURE: 80 MMHG | WEIGHT: 158 LBS | HEIGHT: 60 IN | SYSTOLIC BLOOD PRESSURE: 119 MMHG | BODY MASS INDEX: 31.02 KG/M2 | TEMPERATURE: 98 F | RESPIRATION RATE: 15 BRPM | OXYGEN SATURATION: 95 % | HEART RATE: 84 BPM

## 2022-05-06 DIAGNOSIS — Z09 ENCOUNTER FOR FOLLOW-UP EXAMINATION AFTER COMPLETED TREATMENT FOR CONDITIONS OTHER THAN MALIGNANT NEOPLASM: ICD-10-CM

## 2022-05-06 PROCEDURE — 99024 POSTOP FOLLOW-UP VISIT: CPT

## 2022-05-06 RX ORDER — CEFTRIAXONE 2 G/1
2 INJECTION, POWDER, FOR SOLUTION INTRAMUSCULAR; INTRAVENOUS DAILY
Refills: 0 | Status: COMPLETED | COMMUNITY
End: 2022-05-06

## 2022-05-06 RX ORDER — OXYCODONE 5 MG/1
5 TABLET ORAL EVERY 6 HOURS
Qty: 20 | Refills: 0 | Status: COMPLETED | COMMUNITY
End: 2022-05-06

## 2022-05-06 RX ORDER — METOPROLOL SUCCINATE 50 MG/1
50 TABLET, EXTENDED RELEASE ORAL
Qty: 30 | Refills: 0 | Status: COMPLETED | COMMUNITY
Start: 2022-04-28 | End: 2022-05-06

## 2022-05-06 RX ORDER — PANTOPRAZOLE 40 MG/1
40 TABLET, DELAYED RELEASE ORAL DAILY
Qty: 30 | Refills: 0 | Status: COMPLETED | COMMUNITY
End: 2022-05-06

## 2022-05-06 RX ORDER — VANCOMYCIN HYDROCHLORIDE 1 G/20ML
1 INJECTION, POWDER, LYOPHILIZED, FOR SOLUTION INTRAVENOUS
Refills: 0 | Status: COMPLETED | COMMUNITY
End: 2022-05-06

## 2022-05-06 RX ORDER — SENNOSIDES 8.6 MG TABLETS 8.6 MG/1
8.6 TABLET ORAL
Refills: 0 | Status: COMPLETED | COMMUNITY
End: 2022-05-06

## 2022-05-06 RX ORDER — SPIRONOLACTONE 25 MG/1
25 TABLET ORAL TWICE DAILY
Refills: 0 | Status: COMPLETED | COMMUNITY
End: 2022-05-06

## 2022-05-09 ENCOUNTER — OUTPATIENT (OUTPATIENT)
Dept: OUTPATIENT SERVICES | Facility: HOSPITAL | Age: 72
LOS: 1 days | End: 2022-05-09
Payer: MEDICARE

## 2022-05-09 ENCOUNTER — APPOINTMENT (OUTPATIENT)
Dept: RADIOLOGY | Facility: CLINIC | Age: 72
End: 2022-05-09
Payer: MEDICARE

## 2022-05-09 DIAGNOSIS — Z98.890 OTHER SPECIFIED POSTPROCEDURAL STATES: Chronic | ICD-10-CM

## 2022-05-09 DIAGNOSIS — Z00.8 ENCOUNTER FOR OTHER GENERAL EXAMINATION: ICD-10-CM

## 2022-05-09 PROCEDURE — 71046 X-RAY EXAM CHEST 2 VIEWS: CPT | Mod: 26

## 2022-05-09 PROCEDURE — 71046 X-RAY EXAM CHEST 2 VIEWS: CPT

## 2022-05-10 LAB — MISCELLANEOUS TEST NAME: SIGNIFICANT CHANGE UP

## 2022-05-11 ENCOUNTER — APPOINTMENT (OUTPATIENT)
Dept: NEUROLOGY | Facility: CLINIC | Age: 72
End: 2022-05-11
Payer: MEDICARE

## 2022-05-11 VITALS
RESPIRATION RATE: 16 BRPM | HEIGHT: 60 IN | DIASTOLIC BLOOD PRESSURE: 71 MMHG | SYSTOLIC BLOOD PRESSURE: 130 MMHG | HEART RATE: 103 BPM | BODY MASS INDEX: 30.82 KG/M2 | WEIGHT: 157 LBS

## 2022-05-11 DIAGNOSIS — H53.462 HOMONYMOUS BILATERAL FIELD DEFECTS, LEFT SIDE: ICD-10-CM

## 2022-05-11 PROCEDURE — 99214 OFFICE O/P EST MOD 30 MIN: CPT

## 2022-05-16 ENCOUNTER — APPOINTMENT (OUTPATIENT)
Dept: INFECTIOUS DISEASE | Facility: CLINIC | Age: 72
End: 2022-05-16
Payer: MEDICARE

## 2022-05-16 VITALS
WEIGHT: 155 LBS | HEART RATE: 78 BPM | TEMPERATURE: 97.5 F | RESPIRATION RATE: 16 BRPM | HEIGHT: 60 IN | OXYGEN SATURATION: 95 % | SYSTOLIC BLOOD PRESSURE: 118 MMHG | BODY MASS INDEX: 30.43 KG/M2 | DIASTOLIC BLOOD PRESSURE: 67 MMHG

## 2022-05-16 PROCEDURE — 99213 OFFICE O/P EST LOW 20 MIN: CPT

## 2022-05-16 NOTE — PHYSICAL EXAM
[General Appearance - Alert] : alert [General Appearance - In No Acute Distress] : in no acute distress [Sclera] : the sclera and conjunctiva were normal [PERRL With Normal Accommodation] : pupils were equal in size, round, reactive to light [Extraocular Movements] : extraocular movements were intact [Outer Ear] : the ears and nose were normal in appearance [Oropharynx] : the oropharynx was normal with no thrush [Heart Rate And Rhythm] : heart rate was normal and rhythm regular [Heart Sounds] : normal S1 and S2 [Heart Sounds Gallop] : no gallops [Murmurs] : no murmurs [Heart Sounds Pericardial Friction Rub] : no pericardial rub

## 2022-05-16 NOTE — ASSESSMENT
[FreeTextEntry1] : doing well  4 weeks into therapy for culture negative endocarditis \par presenting  as cva with root infection  The bc were negative preop prior\par  to ab  the or cultures and pcr was  negative    I think we can dc vanco and complete ceftriaxone on 5/30   pt understands need for dental prophylaxis \par RTC  after completing ab  discussed with pt and friend in great detail

## 2022-05-16 NOTE — HISTORY OF PRESENT ILLNESS
[FreeTextEntry1] : pt on vanco and ceftriaxone  OR cultures were negative pcr was negative\par one bc was positive for s christopher  but significance unclear as preop cultures \par prior to ab were all negative \par no fever or complaints doing well

## 2022-05-17 ENCOUNTER — APPOINTMENT (OUTPATIENT)
Dept: PULMONOLOGY | Facility: CLINIC | Age: 72
End: 2022-05-17
Payer: MEDICARE

## 2022-05-17 DIAGNOSIS — Z86.79 OTHER SPECIFIED POSTPROCEDURAL STATES: ICD-10-CM

## 2022-05-17 DIAGNOSIS — Z98.890 OTHER SPECIFIED POSTPROCEDURAL STATES: ICD-10-CM

## 2022-05-17 DIAGNOSIS — R06.2 WHEEZING: ICD-10-CM

## 2022-05-17 DIAGNOSIS — J44.9 CHRONIC OBSTRUCTIVE PULMONARY DISEASE, UNSPECIFIED: ICD-10-CM

## 2022-05-17 PROCEDURE — 99215 OFFICE O/P EST HI 40 MIN: CPT | Mod: 25

## 2022-05-17 PROCEDURE — 94010 BREATHING CAPACITY TEST: CPT

## 2022-05-17 RX ORDER — ATORVASTATIN CALCIUM 20 MG/1
20 TABLET, FILM COATED ORAL
Qty: 90 | Refills: 1 | Status: DISCONTINUED | COMMUNITY
Start: 2022-04-28 | End: 2022-05-17

## 2022-05-18 ENCOUNTER — NON-APPOINTMENT (OUTPATIENT)
Age: 72
End: 2022-05-18

## 2022-05-18 ENCOUNTER — APPOINTMENT (OUTPATIENT)
Dept: OPHTHALMOLOGY | Facility: CLINIC | Age: 72
End: 2022-05-18
Payer: MEDICARE

## 2022-05-18 LAB
CULTURE RESULTS: SIGNIFICANT CHANGE UP
SPECIMEN SOURCE: SIGNIFICANT CHANGE UP

## 2022-05-18 PROCEDURE — 92133 CPTRZD OPH DX IMG PST SGM ON: CPT

## 2022-05-18 PROCEDURE — 92015 DETERMINE REFRACTIVE STATE: CPT

## 2022-05-18 PROCEDURE — 99204 OFFICE O/P NEW MOD 45 MIN: CPT

## 2022-05-18 PROCEDURE — 92083 EXTENDED VISUAL FIELD XM: CPT

## 2022-05-20 NOTE — ASSESSMENT
[FreeTextEntry1] : Trial of bronchodilator therapy via nebulizer.  DuoNeb 2-4 times a day and budesonide twice a day.\par We will hold on corticosteroid therapy secondary to recent endocarditis.  On antibiotics.  If no response will discuss with ID.\par Hold on CT screening head CT in hospital.  Can repeat in 1 year.\par Urged continued smoking cessation.\par Very close observation.  Follow-up in 1 week.\par Follow-up sooner on a as needed basis.\par \par 45 minutes spent in evaluation and management.\par Discussed with patient and daughter.

## 2022-05-20 NOTE — HISTORY OF PRESENT ILLNESS
[Former] : former [TextBox_4] : Admitted for CVA 4/9/22\par Dx endocarditis and abscess. \par Had surgery and PPM.\par Replaced valve and portion of aorta.\par Vanco stopped yesterday and still getting ceftriaxone via PICC\par AT cardio last week and given symbicort for wheezing\par Returned today still wheezing.\par Patient does not notice wheezing.\par Some SOB\par Mild cough in AM.\par \par Surgery was 4/11/22\par \par Had CXR last week told OK\par \par \par  [YearQuit] : 2022

## 2022-05-20 NOTE — PROCEDURE
[FreeTextEntry1] : Spirometry of May 17, 2022.\par There is a severe ventilatory impairment in a combined obstructive and restrictive pattern. \par \par Hospital record noted and reviewed.\par \par \par \par \par  1 / 1 Timur Flynn   \par  \par \par Report date: 5/9/2022 \par  \par  View Order\par \par \par (Report matches study selected on Patient History pane)\par \par \par   \par \par \par \par \par \par \par \par \par EXAM: [id number] - XR CHEST PA LAT 2V - ORDERED BY: MELANIE RENNER\par \par PROCEDURE DATE: 05/09/2022\par \par \par \par INTERPRETATION: PA and lateral chest radiographs\par \par COMPARISON: 4/23/2022 chest.\par \par CLINICAL INFORMATION: Postop shortness of breath.. Pain\par \par FINDINGS: RIGHT PICC line catheter tip in SVC.\par \par \par PULMONARY: The airway is midline.\par There are no airspace consolidations.\par No pleural effusion or pneumothorax.\par \par HEART/VASCULAR: The heart is enlarged in transverse diameter.\par Status post median sternotomy.\par Disconnected wires overlie the anterior heart.\par Micra Transcatheter Pacing System cardiac device within RIGHT ventricle.\par \par BONES: The visualized osseus structures are intact.\par \par IMPRESSION:\par \par No radiographic evidence of active chest disease..\par \par --- End of Report ---\par \par \par \par \par \par \par TIMUR FLYNN MD; Attending Radiologist\par This document has been electronically signed. May 9 2022 8:36PM\par \par \par  1 / 1 Naldo Stuart   \par  \par \par \par \par Report date: 4/17/2022 \par  \par  View Order\par \par \par (Report matches study selected on Patient History pane)\par \par \par   \par \par \par \par \par ACC: [id number] EXAM: CT CHEST\par \par PROCEDURE DATE: 04/17/2022\par \par \par \par INTERPRETATION: CLINICAL INFORMATION: Preoperative evaluation for aortic valve revision.\par \par COMPARISON: Chest CT dated 4/10/2019. Chest radiograph dated 4/12/2022.\par \par CONTRAST/COMPLICATIONS:\par IV Contrast: None.\par Oral Contrast: None.\par Complications: None reported.\par \par PROCEDURE:\par CT scan of the chest was obtained without intravenous contrast.\par \par FINDINGS:\par \par LYMPH NODES: No mediastinal or hilar adenopathy.\par \par HEART/VASCULATURE: The heart is normal in size. Status post aortic valve repair. The thoracic aorta is normal in caliber. Atherosclerotic changes of the aorta and coronary arteries. The pericardium is approximately 6 mm deep to the inferior border of the sternum.\par \par AIRWAYS/LUNGS/PLEURA: Mild linear atelectasis of the left lower lobe, right middle lobe, and lingula. The lungs are otherwise clear. No pleural effusion or pneumothorax.\par \par UPPER ABDOMEN: Status post gastric sleeve.\par \par BONES/SOFT TISSUES: Degenerative changes of the spine. Status post median sternotomy.\par \par IMPRESSION:\par \par Clear lungs with the exception of mild linear atelectasis.\par \par Status post prior aortic valve repair. The pericardium is approximately 6 mm from the inner surface of the sternum.\par \par \par \par --- End of Report ---\par \par \par \par \par LAYNE MALONE MD; Resident Radiologist\par This document has been electronically signed.\par NALDO STUART MD; Attending Radiologist\par This document has been electronically signed. Apr 17 2022 1:24PM\par \par \par \par

## 2022-05-20 NOTE — DISCUSSION/SUMMARY
[FreeTextEntry1] : Status post hospitalization for endocarditis and valvular abscess.  Status post repair.  Still on antibiotic therapy.\par Underlying chronic obstructive pulmonary disease.  Now with wheezing.\par Wheezing likely related to COPD exacerbation.  Discussed with cardiology and no evidence of congestive heart failure.\par CT during hospitalization without significant pulmonary infiltrates.\par Due for follow-up CT of the chest screening protocol appropriate.  Discussed.

## 2022-05-20 NOTE — CONSULT LETTER
[Dear  ___] : Dear ~TANVIR, [Consult Letter:] : I had the pleasure of evaluating your patient, [unfilled]. [Consult Closing:] : Thank you very much for allowing me to participate in the care of this patient.  If you have any questions, please do not hesitate to contact me. [Sincerely,] : Sincerely, [FreeTextEntry2] : Iker Lanza MD [FreeTextEntry3] : Timur Leigh MD FCCP\par

## 2022-05-23 NOTE — BRIEF OPERATIVE NOTE - POST-OP DX
Morbid obesity due to excess calories  11/15/2017    Active  Carole Evans
Detail Level: Zone
Continue Regimen: Spironolactone 100mg qd and Tretinoin 0.025% qhs.

## 2022-05-26 ENCOUNTER — TRANSCRIPTION ENCOUNTER (OUTPATIENT)
Age: 72
End: 2022-05-26

## 2022-05-27 ENCOUNTER — APPOINTMENT (OUTPATIENT)
Dept: PULMONOLOGY | Facility: CLINIC | Age: 72
End: 2022-05-27
Payer: MEDICARE

## 2022-05-27 VITALS
HEART RATE: 80 BPM | SYSTOLIC BLOOD PRESSURE: 120 MMHG | OXYGEN SATURATION: 95 % | DIASTOLIC BLOOD PRESSURE: 86 MMHG | TEMPERATURE: 96.5 F

## 2022-05-27 PROCEDURE — 99213 OFFICE O/P EST LOW 20 MIN: CPT

## 2022-05-29 NOTE — PHYSICAL EXAM
[No Acute Distress] : no acute distress [Supple] : supple [Normal S1, S2] : normal s1, s2 [No Murmurs] : no murmurs [Normal to Percussion] : normal to percussion [No Abnormalities] : no abnormalities [Benign] : benign [No HSM] : no hsm [No Clubbing] : no clubbing [No Cyanosis] : no cyanosis [No Edema] : no edema [TextBox_68] : No significant wheeze.

## 2022-05-29 NOTE — DISCUSSION/SUMMARY
[FreeTextEntry1] : Status post hospitalization for endocarditis and valvular abscess.  Status post repair.  Completing antibiotic therapy.\par Underlying chronic obstructive pulmonary disease.  Prior wheezing significantly improved.\par COPD exacerbation improved.\par CT during hospitalization without significant pulmonary infiltrates.\par Candidate for continued CT chest screening.

## 2022-05-29 NOTE — PROCEDURE
[FreeTextEntry1] : Spirometry of May 17, 2022.\par There is a severe ventilatory impairment in a combined obstructive and restrictive pattern. \par \par Hospital record noted and reviewed.\par \par \par \par \par  1 / 1 Timur Flynn   \par  \par \par Report date: 5/9/2022 \par  \par  View Order\par \par \par (Report matches study selected on Patient History pane)\par \par \par   \par \par \par \par \par \par \par \par \par EXAM: [id number] - XR CHEST PA LAT 2V - ORDERED BY: MELANIE RENNER\par \par PROCEDURE DATE: 05/09/2022\par \par \par \par INTERPRETATION: PA and lateral chest radiographs\par \par COMPARISON: 4/23/2022 chest.\par \par CLINICAL INFORMATION: Postop shortness of breath.. Pain\par \par FINDINGS: RIGHT PICC line catheter tip in SVC.\par \par \par PULMONARY: The airway is midline.\par There are no airspace consolidations.\par No pleural effusion or pneumothorax.\par \par HEART/VASCULAR: The heart is enlarged in transverse diameter.\par Status post median sternotomy.\par Disconnected wires overlie the anterior heart.\par Micra Transcatheter Pacing System cardiac device within RIGHT ventricle.\par \par BONES: The visualized osseus structures are intact.\par \par IMPRESSION:\par \par No radiographic evidence of active chest disease..\par \par --- End of Report ---\par \par \par \par \par \par \par TIMUR FLYNN MD; Attending Radiologist\par This document has been electronically signed. May 9 2022 8:36PM\par \par \par  1 / 1 Naldo Stuart   \par  \par \par \par \par Report date: 4/17/2022 \par  \par  View Order\par \par \par (Report matches study selected on Patient History pane)\par \par \par   \par \par \par \par \par ACC: [id number] EXAM: CT CHEST\par \par PROCEDURE DATE: 04/17/2022\par \par \par \par INTERPRETATION: CLINICAL INFORMATION: Preoperative evaluation for aortic valve revision.\par \par COMPARISON: Chest CT dated 4/10/2019. Chest radiograph dated 4/12/2022.\par \par CONTRAST/COMPLICATIONS:\par IV Contrast: None.\par Oral Contrast: None.\par Complications: None reported.\par \par PROCEDURE:\par CT scan of the chest was obtained without intravenous contrast.\par \par FINDINGS:\par \par LYMPH NODES: No mediastinal or hilar adenopathy.\par \par HEART/VASCULATURE: The heart is normal in size. Status post aortic valve repair. The thoracic aorta is normal in caliber. Atherosclerotic changes of the aorta and coronary arteries. The pericardium is approximately 6 mm deep to the inferior border of the sternum.\par \par AIRWAYS/LUNGS/PLEURA: Mild linear atelectasis of the left lower lobe, right middle lobe, and lingula. The lungs are otherwise clear. No pleural effusion or pneumothorax.\par \par UPPER ABDOMEN: Status post gastric sleeve.\par \par BONES/SOFT TISSUES: Degenerative changes of the spine. Status post median sternotomy.\par \par IMPRESSION:\par \par Clear lungs with the exception of mild linear atelectasis.\par \par Status post prior aortic valve repair. The pericardium is approximately 6 mm from the inner surface of the sternum.\par \par \par \par --- End of Report ---\par \par \par \par \par LAYNE MALONE MD; Resident Radiologist\par This document has been electronically signed.\par NALDO STUART MD; Attending Radiologist\par This document has been electronically signed. Apr 17 2022 1:24PM\par \par \par \par

## 2022-05-29 NOTE — REASON FOR VISIT
[COPD] : COPD [Wheezing] : wheezing [Follow-Up] : a follow-up visit [TextBox_44] :  s/p aortic valve replace s/p endocarditis

## 2022-05-29 NOTE — ASSESSMENT
[FreeTextEntry1] : will cont nebulizer bid with DuoNeb and Pulmicort bid and change Symbicort PRN\par Hold on CT screening had CT in hospital.  Can repeat in 1 year.\par Urged continued smoking cessation.\par Very close observation. r/t 3 weeks for reevaluation and lung function testing.\par Follow-up sooner on a as needed basis.\par \par 25 minutes spent in evaluation and management.\par

## 2022-05-29 NOTE — HISTORY OF PRESENT ILLNESS
[Former] : former [TextBox_4] : Patient feeling significantly improved.\par Still with some shortness of breath.  Wheezing has decreased.  Minimal cough.\par Compliant to bronchodilator therapy.\par \par \par Admitted for CVA 4/9/22\par Dx endocarditis and abscess. \par Surgery was 4/11/22\par \par \par \par \par  [YearQuit] : 2022

## 2022-05-31 ENCOUNTER — APPOINTMENT (OUTPATIENT)
Dept: NEUROLOGY | Facility: CLINIC | Age: 72
End: 2022-05-31

## 2022-06-06 ENCOUNTER — APPOINTMENT (OUTPATIENT)
Dept: INFECTIOUS DISEASE | Facility: CLINIC | Age: 72
End: 2022-06-06
Payer: MEDICARE

## 2022-06-06 VITALS
OXYGEN SATURATION: 95 % | HEIGHT: 60 IN | BODY MASS INDEX: 29.84 KG/M2 | HEART RATE: 82 BPM | TEMPERATURE: 98.1 F | WEIGHT: 152 LBS | SYSTOLIC BLOOD PRESSURE: 141 MMHG | RESPIRATION RATE: 16 BRPM | DIASTOLIC BLOOD PRESSURE: 100 MMHG

## 2022-06-06 PROCEDURE — 99214 OFFICE O/P EST MOD 30 MIN: CPT

## 2022-06-06 NOTE — HISTORY OF PRESENT ILLNESS
[FreeTextEntry1] : finished antibiotics a week ago  feels well no fever chills  no complaints \par walking well for over a mile  no new issues

## 2022-06-06 NOTE — ASSESSMENT
[FreeTextEntry1] : doing great  sp treatment for culture negative endocarditis but had one bc positive \par for S lungen was treated with ceftriaxone and vanco and had negative pcr on valve  doing well\par to check follow up bc and esr

## 2022-06-06 NOTE — PHYSICAL EXAM
[General Appearance - Alert] : alert [General Appearance - In No Acute Distress] : in no acute distress [Sclera] : the sclera and conjunctiva were normal [PERRL With Normal Accommodation] : pupils were equal in size, round, reactive to light [Extraocular Movements] : extraocular movements were intact [Outer Ear] : the ears and nose were normal in appearance [Oropharynx] : the oropharynx was normal with no thrush [Heart Rate And Rhythm] : heart rate was normal and rhythm regular [Heart Sounds] : normal S1 and S2 [Heart Sounds Gallop] : no gallops [Murmurs] : no murmurs [Heart Sounds Pericardial Friction Rub] : no pericardial rub [Bowel Sounds] : normal bowel sounds [Abdomen Soft] : soft [Abdomen Tenderness] : non-tender [] : no hepato-splenomegaly [Abdomen Mass (___ Cm)] : no abdominal mass palpated [Costovertebral Angle Tenderness] : no CVA tenderness

## 2022-06-17 ENCOUNTER — APPOINTMENT (OUTPATIENT)
Dept: NEUROLOGY | Facility: CLINIC | Age: 72
End: 2022-06-17

## 2022-06-20 LAB
BACTERIA BLD CULT: NORMAL
BACTERIA BLD CULT: NORMAL
ERYTHROCYTE [SEDIMENTATION RATE] IN BLOOD BY WESTERGREN METHOD: 53 MM/HR

## 2022-07-25 ENCOUNTER — RX RENEWAL (OUTPATIENT)
Age: 72
End: 2022-07-25

## 2022-07-25 RX ORDER — ALBUTEROL SULFATE 90 UG/1
108 (90 BASE) INHALANT RESPIRATORY (INHALATION)
Qty: 1 | Refills: 1 | Status: ACTIVE | COMMUNITY
Start: 2022-04-28 | End: 1900-01-01

## 2022-07-28 ENCOUNTER — APPOINTMENT (OUTPATIENT)
Dept: NEUROLOGY | Facility: CLINIC | Age: 72
End: 2022-07-28

## 2022-08-02 ENCOUNTER — APPOINTMENT (OUTPATIENT)
Dept: NEUROLOGY | Facility: CLINIC | Age: 72
End: 2022-08-02

## 2022-08-03 ENCOUNTER — APPOINTMENT (OUTPATIENT)
Dept: ELECTROPHYSIOLOGY | Facility: CLINIC | Age: 72
End: 2022-08-03

## 2022-08-04 ENCOUNTER — APPOINTMENT (OUTPATIENT)
Dept: NEUROLOGY | Facility: CLINIC | Age: 72
End: 2022-08-04

## 2022-08-16 ENCOUNTER — RX RENEWAL (OUTPATIENT)
Age: 72
End: 2022-08-16

## 2022-08-16 ENCOUNTER — APPOINTMENT (OUTPATIENT)
Dept: ELECTROPHYSIOLOGY | Facility: CLINIC | Age: 72
End: 2022-08-16

## 2022-08-16 ENCOUNTER — NON-APPOINTMENT (OUTPATIENT)
Age: 72
End: 2022-08-16

## 2022-08-16 VITALS — DIASTOLIC BLOOD PRESSURE: 74 MMHG | HEART RATE: 59 BPM | OXYGEN SATURATION: 95 % | SYSTOLIC BLOOD PRESSURE: 109 MMHG

## 2022-08-16 PROCEDURE — 93291 INTERROG DEV EVAL SCRMS IP: CPT | Mod: 59

## 2022-08-16 PROCEDURE — 93000 ELECTROCARDIOGRAM COMPLETE: CPT | Mod: 59

## 2022-08-16 PROCEDURE — 93279 PRGRMG DEV EVAL PM/LDLS PM: CPT

## 2022-08-22 ENCOUNTER — APPOINTMENT (OUTPATIENT)
Dept: CARDIOTHORACIC SURGERY | Facility: CLINIC | Age: 72
End: 2022-08-22

## 2022-09-20 ENCOUNTER — APPOINTMENT (OUTPATIENT)
Dept: ELECTROPHYSIOLOGY | Facility: CLINIC | Age: 72
End: 2022-09-20

## 2022-09-20 ENCOUNTER — NON-APPOINTMENT (OUTPATIENT)
Age: 72
End: 2022-09-20

## 2022-09-20 PROCEDURE — G2066: CPT

## 2022-09-20 PROCEDURE — 93298 REM INTERROG DEV EVAL SCRMS: CPT

## 2022-10-09 ENCOUNTER — NON-APPOINTMENT (OUTPATIENT)
Age: 72
End: 2022-10-09

## 2022-10-09 NOTE — HISTORY OF PRESENT ILLNESS
[FreeTextEntry1] : Ms. Lal is a 71 year-old woman with PMH CAD, COPD, HTN, BETSEY who presented to [facility] in April 2022 with vision loss and difficulty speaking. Neuro imaging demonstrated an acute right occipital lobe infarct in a right PCA vascular distribution and punctate infarct involving the left cerebellum and right splenium of the corpus callosum. stroke on MRI and a downward projecting anterior communicating artery aneurysm extending from the junction of the right A1 A2 segment measuring 6.7 mm on CTA.\par \par

## 2022-10-18 ENCOUNTER — APPOINTMENT (OUTPATIENT)
Dept: NEUROLOGY | Facility: CLINIC | Age: 72
End: 2022-10-18

## 2022-10-25 ENCOUNTER — NON-APPOINTMENT (OUTPATIENT)
Age: 72
End: 2022-10-25

## 2022-10-25 ENCOUNTER — APPOINTMENT (OUTPATIENT)
Dept: ELECTROPHYSIOLOGY | Facility: CLINIC | Age: 72
End: 2022-10-25

## 2022-10-25 PROCEDURE — G2066: CPT

## 2022-10-25 PROCEDURE — 93298 REM INTERROG DEV EVAL SCRMS: CPT

## 2022-11-29 ENCOUNTER — APPOINTMENT (OUTPATIENT)
Dept: NEUROLOGY | Facility: CLINIC | Age: 72
End: 2022-11-29

## 2022-11-29 ENCOUNTER — APPOINTMENT (OUTPATIENT)
Dept: ELECTROPHYSIOLOGY | Facility: CLINIC | Age: 72
End: 2022-11-29

## 2022-11-29 ENCOUNTER — NON-APPOINTMENT (OUTPATIENT)
Age: 72
End: 2022-11-29

## 2022-11-29 VITALS
HEIGHT: 60 IN | HEART RATE: 71 BPM | DIASTOLIC BLOOD PRESSURE: 82 MMHG | RESPIRATION RATE: 16 BRPM | SYSTOLIC BLOOD PRESSURE: 144 MMHG | WEIGHT: 155 LBS | BODY MASS INDEX: 30.43 KG/M2

## 2022-11-29 PROCEDURE — 99215 OFFICE O/P EST HI 40 MIN: CPT

## 2022-11-29 PROCEDURE — 93298 REM INTERROG DEV EVAL SCRMS: CPT

## 2022-11-29 PROCEDURE — G2066: CPT

## 2022-11-29 NOTE — DISCUSSION/SUMMARY
[FreeTextEntry1] : Mrs. Lal is a 72 year old woman with a PMHx of AS w/ prior open heart aortic valve replacement 2019, HTN, BETSEY, former smoker, emphysema/ chronic bronchitis, GERD s/p laparoscopic vertical sleeve gastrectomy now 7 months s/p right PCA infarct etiology cardioembolic due to endocarditis s/p explant and implant of prosthetic valve. At the time she was found to have an incidental 6.7 mm ACOMM aneurysm. We discussed the risks of having an aneurysm and the likelihood of causing a SAH. Based on size and location of her aneurysm I am recommending a cerebral angiogram and then probable treatment with a WEB device versus coil embolization. The procedure, risks, benefits and alternatives were discussed with the patient. She agrees to the plan. All of their questions and concerns were addressed.

## 2022-11-29 NOTE — REVIEW OF SYSTEMS
[Eyesight Problems] : eyesight problems [Fever] : no fever [Chills] : no chills [Feeling Poorly] : not feeling poorly [Feeling Tired] : not feeling tired [Confused or Disoriented] : no confusion [Memory Lapses or Loss] : no memory loss [Decr. Concentrating Ability] : no decrease in concentrating ability [Difficulty with Language] : no ~M difficulty with language [Changed Thought Patterns] : no change in thought patterns [Repeating Questions] : no repeated questioning about recent events [Facial Weakness] : no facial weakness [Hand Weakness] : no hand weakness [Leg Weakness] : no leg weakness [Poor Coordination] : good coordination [Difficulty Writing] : no difficulty writing [Difficulties in Speech] : no speech difficulties [Numbness] : no numbness [Tingling] : no tingling [Seizures] : no convulsions [Dizziness] : no dizziness [Fainting] : no fainting [Lightheadedness] : no lightheadedness [Vertigo] : no vertigo [Tension Headache] : no tension-type headache [Difficulty Walking] : no difficulty walking [Inability to Walk] : able to walk [Anxiety] : no anxiety [Depression] : no depression [Nosebleeds] : no nosebleeds [Chest Pain] : no chest pain [Palpitations] : no palpitations [Vomiting] : no vomiting [Joint Pain] : no joint pain [Easy Bleeding] : no tendency for easy bleeding [Easy Bruising] : no tendency for easy bruising [FreeTextEntry3] : [facility]

## 2022-11-29 NOTE — PHYSICAL EXAM
[General Appearance - Alert] : alert [General Appearance - Well Nourished] : well nourished [General Appearance - Well Developed] : well developed [Oriented To Time, Place, And Person] : oriented to person, place, and time [Affect] : the affect was normal [Mood] : the mood was normal [Person] : oriented to person [Place] : oriented to place [Time] : oriented to time [Concentration Intact] : normal concentrating ability [Visual Intact] : visual attention was ~T not ~L decreased [Naming Objects] : no difficulty naming common objects [Repeating Phrases] : no difficulty repeating a phrase [Writing A Sentence] : no difficulty writing a sentence [Fluency] : fluency intact [Comprehension] : comprehension intact [Reading] : reading intact [Past History] : adequate knowledge of personal past history [Cranial Nerves Optic (II)] : visual acuity intact bilaterally,  visual fields full to confrontation, pupils equal round and reactive to light [Cranial Nerves Oculomotor (III)] : extraocular motion intact [Cranial Nerves Trigeminal (V)] : facial sensation intact symmetrically [Cranial Nerves Facial (VII)] : face symmetrical [Cranial Nerves Vestibulocochlear (VIII)] : hearing was intact bilaterally [Cranial Nerves Glossopharyngeal (IX)] : tongue and palate midline [Cranial Nerves Accessory (XI - Cranial And Spinal)] : head turning and shoulder shrug symmetric [Cranial Nerves Hypoglossal (XII)] : there was no tongue deviation with protrusion [Motor Tone] : muscle tone was normal in all four extremities [Motor Strength] : muscle strength was normal in all four extremities [No Muscle Atrophy] : normal bulk in all four extremities [Sensation Tactile Decrease] : light touch was intact [Balance] : balance was intact [Extraocular Movements] : extraocular movements were intact [Hearing Threshold Finger Rub Not Smyth] : hearing was normal [Neck Appearance] : the appearance of the neck was normal [] : no respiratory distress [Heart Rate And Rhythm] : heart rate was normal and rhythm regular [Edema] : there was no peripheral edema [Abdomen Soft] : soft [Abnormal Walk] : normal gait [FreeTextEntry1] : [facility]

## 2022-11-29 NOTE — HISTORY OF PRESENT ILLNESS
[FreeTextEntry1] : Mrs. Lal is a 72 year old woman with a PMHx of AS w/ prior open heart aortic valve replacement 2019, HTN, BETSEY, former smoker, emphysema/chronic bronchitis, GERD s/p laparoscopic vertical sleeve gastrectomy who presented to [facility] ED on 04/09/22 with blurry vision, gait imbalance, and speech disturbance. Pt was not a candidate for tpa or mechanical thrombectomy as no LVO. CTH with Right PCA infarct and CTA: 6.7 mm anteriorly and inferiorly projecting anterior communicating artery aneurysm. MRI Head showed Acute R occipital lobe infarct in a R PCA vascular distribution with an additional punctate acute infarct involving the L cerebellum and R splenium of the corpus callosum.  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 s/p explant and reimplant of prosthetic valve and pacemaker during hospital admission. She comes in today to discuss her incidental aneurysm. She has recovered from her stroke, only has residual left HHA. All neuroimaging reviewed personally by me. She is on ASA and Lipitor for stroke prevention. She is functionally independent.

## 2022-12-16 ENCOUNTER — RESULT REVIEW (OUTPATIENT)
Age: 72
End: 2022-12-16

## 2022-12-16 ENCOUNTER — OUTPATIENT (OUTPATIENT)
Dept: OUTPATIENT SERVICES | Facility: HOSPITAL | Age: 72
LOS: 1 days | End: 2022-12-16
Payer: MEDICARE

## 2022-12-16 VITALS
WEIGHT: 158.51 LBS | HEIGHT: 60 IN | SYSTOLIC BLOOD PRESSURE: 102 MMHG | RESPIRATION RATE: 20 BRPM | OXYGEN SATURATION: 98 % | TEMPERATURE: 97 F | HEART RATE: 60 BPM | DIASTOLIC BLOOD PRESSURE: 60 MMHG

## 2022-12-16 DIAGNOSIS — Z86.79 PERSONAL HISTORY OF OTHER DISEASES OF THE CIRCULATORY SYSTEM: ICD-10-CM

## 2022-12-16 DIAGNOSIS — Z01.818 ENCOUNTER FOR OTHER PREPROCEDURAL EXAMINATION: ICD-10-CM

## 2022-12-16 DIAGNOSIS — Z95.2 PRESENCE OF PROSTHETIC HEART VALVE: Chronic | ICD-10-CM

## 2022-12-16 DIAGNOSIS — Z98.84 BARIATRIC SURGERY STATUS: Chronic | ICD-10-CM

## 2022-12-16 DIAGNOSIS — Z98.890 OTHER SPECIFIED POSTPROCEDURAL STATES: Chronic | ICD-10-CM

## 2022-12-16 DIAGNOSIS — Z95.0 PRESENCE OF CARDIAC PACEMAKER: Chronic | ICD-10-CM

## 2022-12-16 DIAGNOSIS — Z95.0 PRESENCE OF CARDIAC PACEMAKER: ICD-10-CM

## 2022-12-16 DIAGNOSIS — U07.1 COVID-19: ICD-10-CM

## 2022-12-16 LAB
ANION GAP SERPL CALC-SCNC: 11 MMOL/L — SIGNIFICANT CHANGE UP (ref 5–17)
APTT BLD: 32.5 SEC — SIGNIFICANT CHANGE UP (ref 27.5–35.5)
BASOPHILS # BLD AUTO: 0.05 K/UL — SIGNIFICANT CHANGE UP (ref 0–0.2)
BASOPHILS NFR BLD AUTO: 0.7 % — SIGNIFICANT CHANGE UP (ref 0–2)
BLD GP AB SCN SERPL QL: SIGNIFICANT CHANGE UP
BUN SERPL-MCNC: 40.1 MG/DL — HIGH (ref 8–20)
CALCIUM SERPL-MCNC: 9.3 MG/DL — SIGNIFICANT CHANGE UP (ref 8.4–10.5)
CHLORIDE SERPL-SCNC: 99 MMOL/L — SIGNIFICANT CHANGE UP (ref 96–108)
CO2 SERPL-SCNC: 27 MMOL/L — SIGNIFICANT CHANGE UP (ref 22–29)
CREAT SERPL-MCNC: 0.99 MG/DL — SIGNIFICANT CHANGE UP (ref 0.5–1.3)
EGFR: 61 ML/MIN/1.73M2 — SIGNIFICANT CHANGE UP
EOSINOPHIL # BLD AUTO: 0.07 K/UL — SIGNIFICANT CHANGE UP (ref 0–0.5)
EOSINOPHIL NFR BLD AUTO: 1 % — SIGNIFICANT CHANGE UP (ref 0–6)
GLUCOSE SERPL-MCNC: 104 MG/DL — HIGH (ref 70–99)
HCT VFR BLD CALC: 40.4 % — SIGNIFICANT CHANGE UP (ref 34.5–45)
HGB BLD-MCNC: 12.9 G/DL — SIGNIFICANT CHANGE UP (ref 11.5–15.5)
IMM GRANULOCYTES NFR BLD AUTO: 0.1 % — SIGNIFICANT CHANGE UP (ref 0–0.9)
INR BLD: 1.01 RATIO — SIGNIFICANT CHANGE UP (ref 0.88–1.16)
LYMPHOCYTES # BLD AUTO: 2.15 K/UL — SIGNIFICANT CHANGE UP (ref 1–3.3)
LYMPHOCYTES # BLD AUTO: 31.9 % — SIGNIFICANT CHANGE UP (ref 13–44)
MCHC RBC-ENTMCNC: 29.7 PG — SIGNIFICANT CHANGE UP (ref 27–34)
MCHC RBC-ENTMCNC: 31.9 GM/DL — LOW (ref 32–36)
MCV RBC AUTO: 93.1 FL — SIGNIFICANT CHANGE UP (ref 80–100)
MONOCYTES # BLD AUTO: 0.53 K/UL — SIGNIFICANT CHANGE UP (ref 0–0.9)
MONOCYTES NFR BLD AUTO: 7.9 % — SIGNIFICANT CHANGE UP (ref 2–14)
MRSA PCR RESULT.: SIGNIFICANT CHANGE UP
NEUTROPHILS # BLD AUTO: 3.92 K/UL — SIGNIFICANT CHANGE UP (ref 1.8–7.4)
NEUTROPHILS NFR BLD AUTO: 58.4 % — SIGNIFICANT CHANGE UP (ref 43–77)
PLATELET # BLD AUTO: 271 K/UL — SIGNIFICANT CHANGE UP (ref 150–400)
POTASSIUM SERPL-MCNC: 4.9 MMOL/L — SIGNIFICANT CHANGE UP (ref 3.5–5.3)
POTASSIUM SERPL-SCNC: 4.9 MMOL/L — SIGNIFICANT CHANGE UP (ref 3.5–5.3)
PROTHROM AB SERPL-ACNC: 11.7 SEC — SIGNIFICANT CHANGE UP (ref 10.5–13.4)
RBC # BLD: 4.34 M/UL — SIGNIFICANT CHANGE UP (ref 3.8–5.2)
RBC # FLD: 12.8 % — SIGNIFICANT CHANGE UP (ref 10.3–14.5)
S AUREUS DNA NOSE QL NAA+PROBE: SIGNIFICANT CHANGE UP
SARS-COV-2 RNA SPEC QL NAA+PROBE: SIGNIFICANT CHANGE UP
SODIUM SERPL-SCNC: 137 MMOL/L — SIGNIFICANT CHANGE UP (ref 135–145)
WBC # BLD: 6.73 K/UL — SIGNIFICANT CHANGE UP (ref 3.8–10.5)
WBC # FLD AUTO: 6.73 K/UL — SIGNIFICANT CHANGE UP (ref 3.8–10.5)

## 2022-12-16 PROCEDURE — 71046 X-RAY EXAM CHEST 2 VIEWS: CPT

## 2022-12-16 PROCEDURE — G0463: CPT

## 2022-12-16 PROCEDURE — 71046 X-RAY EXAM CHEST 2 VIEWS: CPT | Mod: 26

## 2022-12-16 RX ORDER — CHOLECALCIFEROL (VITAMIN D3) 125 MCG
1 CAPSULE ORAL
Qty: 0 | Refills: 0 | DISCHARGE

## 2022-12-16 RX ORDER — SODIUM CHLORIDE 9 MG/ML
3 INJECTION INTRAMUSCULAR; INTRAVENOUS; SUBCUTANEOUS ONCE
Refills: 0 | Status: DISCONTINUED | OUTPATIENT
Start: 2022-12-21 | End: 2023-01-04

## 2022-12-16 NOTE — H&P PST ADULT - NSICDXPASTMEDICALHX_GEN_ALL_CORE_FT
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 ENT note documentation/ patient to follow up with ENT prior to sx    Obesity (BMI 30-39.9)     BETSEY on CPAP     Stroke      PAST MEDICAL HISTORY:  Acid reflux     Aortic valve replaced     Aortic valve stenosis, etiology of cardiac valve disease unspecified     COPD (chronic obstructive pulmonary disease) w/ Emphysema and chronic bronchitis no recent exacerb/no intubation hx    Ex-smoker     H/O cardiac arrhythmia     H/O sleep apnea     History of cerebral aneurysm     HTN (hypertension)     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

## 2022-12-16 NOTE — H&P PST ADULT - HISTORY OF PRESENT ILLNESS
72 yr old pleasant female presents with history of AS s/p open heart valve replacement 2019 , htn, hx sleep apnea ( lost 50 lb s/p gastric bypass) does not use cpap s/p weight loss, former smoker, copd, gerd . Pt had c/o blurry vision and unsteady gait  on 4/9/22 and went to ER. CTA done 6.7 mm anteriorly and inferiorly projecting anterior communicating  artery aneurysm noted. , MRI showed acute right occipital lobe infarct . 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 s/p explant and reimplant of valve and pacemaker was done during admission.  pt is functionally independent , has some left peripheral vision loss and poor memory. Pt is scheduled for cerebral angiogram for evaluation of aneurysm.

## 2022-12-16 NOTE — H&P PST ADULT - NSICDXPASTSURGICALHX_GEN_ALL_CORE_FT
PAST SURGICAL HISTORY:  Cardiac pacemaker     H/O aortic valve replacement     H/O gastric bypass     History of loop recorder     S/P right knee arthroscopy     S/P wrist surgery right

## 2022-12-16 NOTE — H&P PST ADULT - NSICDXPROCEDURE_GEN_ALL_CORE_FT
No significant past surgical history
PROCEDURES:  Angiogram, cerebral, unilateral 16-Dec-2022 13:49:43 aneurysm D'Amico, Karyn L

## 2022-12-16 NOTE — H&P PST ADULT - ASSESSMENT
72 yr old pleasant female presents with history of AS s/p open heart valve replacement 2019 , htn, hx sleep apnea ( lost 50 lb s/p gastric bypass) does not use cpap s/p weight loss, former smoker, copd, gerd . Pt had c/o blurry vision and unsteady gait  on 22 and went to ER. CTA done 6.7 mm anteriorly and inferiorly projecting anterior communicating  artery aneurysm noted. , MRI showed acute right occipital lobe infarct . 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 s/p explant and reimplant of valve and pacemaker was done during admission.  pt is functionally independent , has some left peripheral vision loss and poor memory. Pt is scheduled for cerebral angiogram for evaluation of aneurysm.  pt had covid pcr at pst , is going for cardiac clearance and was treated prophylactically with mupiricin pending MRSA swab results.   OPIOID RISK TOOL    OFELIA EACH BOX THAT APPLIES AND ADD TOTALS AT THE END    FAMILY HISTORY OF SUBSTANCE ABUSE                 FEMALE         MALE                                                Alcohol                             [  ]1 pt          [  ]3pts                                               Illegal Durgs                     [  ]2 pts        [  ]3pts                                               Rx Drugs                           [  ]4 pts        [  ]4 pts    PERSONAL HISTORY OF SUBSTANCE ABUSE                                                                                          Alcohol                             [  ]3 pts       [  ]3 pts                                               Illegal Durgs                     [  ]4 pts        [  ]4 pts                                               Rx Drugs                           [  ]5 pts        [  ]5 pts    AGE BETWEEN 16-45 YEARS                                      [  ]1 pt         [  ]1 pt    HISTORY OF PREADOLESCENT   SEXUAL ABUSE                                                             [  ]3 pts        [  ]0pts    PSYCHOLOGICAL DISEASE                     ADD, OCD, Bipolar, Schizophrenia        [  ]2 pts         [  ]2 pts                      Depression                                               [  ]1 pt           [  ]1 pt           SCORING TOTAL   (add numbers and type here)              (0***)                                     A score of 3 or lower indicated LOW risk for future opiod abuse  A score of 4 to 7 indicated moderate risk for future opiod abuse  A score of 8 or higher indicates a high risk for opiod abuse  CAPRINI VTE 2.0 SCORE [CLOT updated 2019]    AGE RELATED RISK FACTORS                                                       MOBILITY RELATED FACTORS  [ ] Age 41-60 years                                            (1 Point)                    [ ] Bed rest                                                        (1 Point)  [X ] Age: 61-74 years                                           (2 Points)                  [ ] Plaster cast                                                   (2 Points)  [ ] Age= 75 years                                              (3 Points)                    [ ] Bed bound for more than 72 hours                 (2 Points)    DISEASE RELATED RISK FACTORS                                               GENDER SPECIFIC FACTORS  [ ] Edema in the lower extremities                       (1 Point)              [ ] Pregnancy                                                     (1 Point)  [ ] Varicose veins                                               (1 Point)                     [ ] Post-partum < 6 weeks                                   (1 Point)             [ ] BMI > 25 Kg/m2                                            (1 Point)                     [ ] Hormonal therapy  or oral contraception          (1 Point)                 [ ] Sepsis (in the previous month)                        (1 Point)               [ ] History of pregnancy complications                 (1 point)  [ ] Pneumonia or serious lung disease                                               [ ] Unexplained or recurrent                     (1 Point)           (in the previous month)                               (1 Point)  [ ] Abnormal pulmonary function test                     (1 Point)                 SURGERY RELATED RISK FACTORS  [ ] Acute myocardial infarction                              (1 Point)               [ ]  Section                                             (1 Point)  [ ] Congestive heart failure (in the previous month)  (1 Point)      [X] Minor surgery                                                  (1 Point)   [ ] Inflammatory bowel disease                             (1 Point)               [ ] Arthroscopic surgery                                        (2 Points)  [ ] Central venous access                                      (2 Points)                [ ] General surgery lasting more than 45 minutes (2 points)  [ ] Malignancy- Present or previous                   (2 Points)                [ ] Elective arthroplasty                                         (5 points)    [ ] Stroke (in the previous month)                          (5 Points)                                                                                                                                                           HEMATOLOGY RELATED FACTORS                                                 TRAUMA RELATED RISK FACTORS  [ ] Prior episodes of VTE                                     (3 Points)                [ ] Fracture of the hip, pelvis, or leg                       (5 Points)  [ ] Positive family history for VTE                         (3 Points)             [ ] Acute spinal cord injury (in the previous month)  (5 Points)  [ ] Prothrombin 62213 A                                     (3 Points)               [ ] Paralysis  (less than 1 month)                             (5 Points)  [ ] Factor V Leiden                                             (3 Points)                  [ ] Multiple Trauma within 1 month                        (5 Points)  [ ] Lupus anticoagulants                                     (3 Points)                                                           [ ] Anticardiolipin antibodies                               (3 Points)                                                       [ ] High homocysteine in the blood                      (3 Points)                                             [ ] Other congenital or acquired thrombophilia      (3 Points)                                                [ ] Heparin induced thrombocytopenia                  (3 Points)                                     Total Score [     3     ]

## 2022-12-16 NOTE — H&P PST ADULT - NEUROLOGICAL COMMENTS
mild confusion and left peripheral vision loss s/p cva 4/2022 poor historian  aneurysm s/p stroke april 2022

## 2022-12-16 NOTE — H&P PST ADULT - EKG AND INTERPRETATION
done 12/12/22 atrial sensed ventricular paced rhythm with prolonged AV conduction  64   done at pcp office

## 2022-12-21 ENCOUNTER — APPOINTMENT (OUTPATIENT)
Dept: NEUROLOGY | Facility: HOSPITAL | Age: 72
End: 2022-12-21

## 2022-12-21 ENCOUNTER — TRANSCRIPTION ENCOUNTER (OUTPATIENT)
Age: 72
End: 2022-12-21

## 2022-12-21 ENCOUNTER — OUTPATIENT (OUTPATIENT)
Dept: OUTPATIENT SERVICES | Facility: HOSPITAL | Age: 72
LOS: 1 days | End: 2022-12-21
Payer: MEDICARE

## 2022-12-21 ENCOUNTER — RESULT REVIEW (OUTPATIENT)
Age: 72
End: 2022-12-21

## 2022-12-21 VITALS
HEART RATE: 53 BPM | SYSTOLIC BLOOD PRESSURE: 109 MMHG | OXYGEN SATURATION: 96 % | DIASTOLIC BLOOD PRESSURE: 58 MMHG | RESPIRATION RATE: 16 BRPM | TEMPERATURE: 98 F

## 2022-12-21 VITALS
HEART RATE: 67 BPM | DIASTOLIC BLOOD PRESSURE: 66 MMHG | SYSTOLIC BLOOD PRESSURE: 110 MMHG | RESPIRATION RATE: 16 BRPM | OXYGEN SATURATION: 99 %

## 2022-12-21 DIAGNOSIS — Z95.0 PRESENCE OF CARDIAC PACEMAKER: Chronic | ICD-10-CM

## 2022-12-21 DIAGNOSIS — Z98.84 BARIATRIC SURGERY STATUS: Chronic | ICD-10-CM

## 2022-12-21 DIAGNOSIS — R55 SYNCOPE AND COLLAPSE: ICD-10-CM

## 2022-12-21 DIAGNOSIS — Z98.890 OTHER SPECIFIED POSTPROCEDURAL STATES: Chronic | ICD-10-CM

## 2022-12-21 DIAGNOSIS — I72.9 ANEURYSM OF UNSPECIFIED SITE: ICD-10-CM

## 2022-12-21 DIAGNOSIS — Z95.2 PRESENCE OF PROSTHETIC HEART VALVE: Chronic | ICD-10-CM

## 2022-12-21 LAB — ABO RH CONFIRMATION: SIGNIFICANT CHANGE UP

## 2022-12-21 PROCEDURE — 36415 COLL VENOUS BLD VENIPUNCTURE: CPT

## 2022-12-21 PROCEDURE — C1769: CPT

## 2022-12-21 PROCEDURE — 36224 PLACE CATH CAROTD ART: CPT | Mod: 50

## 2022-12-21 PROCEDURE — 76377 3D RENDER W/INTRP POSTPROCES: CPT

## 2022-12-21 PROCEDURE — 36227 PLACE CATH XTRNL CAROTID: CPT

## 2022-12-21 PROCEDURE — C1894: CPT

## 2022-12-21 PROCEDURE — 99213 OFFICE O/P EST LOW 20 MIN: CPT

## 2022-12-21 PROCEDURE — 36226 PLACE CATH VERTEBRAL ART: CPT | Mod: LT

## 2022-12-21 PROCEDURE — 76377 3D RENDER W/INTRP POSTPROCES: CPT | Mod: 26

## 2022-12-21 PROCEDURE — 36226 PLACE CATH VERTEBRAL ART: CPT

## 2022-12-21 PROCEDURE — 36224 PLACE CATH CAROTD ART: CPT

## 2022-12-21 PROCEDURE — 36227 PLACE CATH XTRNL CAROTID: CPT | Mod: 50

## 2022-12-21 PROCEDURE — C1887: CPT

## 2022-12-21 RX ORDER — AMPICILLIN SODIUM AND SULBACTAM SODIUM 250; 125 MG/ML; MG/ML
0 INJECTION, POWDER, FOR SUSPENSION INTRAMUSCULAR; INTRAVENOUS
Qty: 0 | Refills: 0 | DISCHARGE

## 2022-12-21 RX ORDER — SODIUM CHLORIDE 9 MG/ML
1000 INJECTION INTRAMUSCULAR; INTRAVENOUS; SUBCUTANEOUS
Refills: 0 | Status: DISCONTINUED | OUTPATIENT
Start: 2022-12-21 | End: 2023-01-04

## 2022-12-21 NOTE — ASU DISCHARGE PLAN (ADULT/PEDIATRIC) - NS MD DC FALL RISK RISK
For information on Fall & Injury Prevention, visit: [email or web address] OR  [email or web address] OR  https://www.cdc.gov/steadi/patient.html

## 2022-12-21 NOTE — ASU PATIENT PROFILE, ADULT - NSICDXPASTMEDICALHX_GEN_ALL_CORE_FT
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

## 2022-12-21 NOTE — DISCHARGE NOTE NURSING/CASE MANAGEMENT/SOCIAL WORK - PATIENT PORTAL LINK FT
You can access the FollowMyHealth Patient Portal offered by [facility] by registering at the following website: [email or web address]. By joining [employer]’s FollowMyHealth portal, you will also be able to view your health information using other applications (apps) compatible with our system.

## 2022-12-21 NOTE — DISCHARGE NOTE NURSING/CASE MANAGEMENT/SOCIAL WORK - NURSING SECTION COMPLETE
Patient comes to clinic for follow up anticoagulation visit.  DX: DVT, PE Goal range:  2.0-3.0     Last INR was: 2.0 on 1/4/17.  Patient making dietary changes for weight loss and general health.   Todays INR is 2.2. Dose maintained per protocol  Follow up 2 weeks as she plans to continue to add a few more greens to diet.    See Ambulatory Anticoagulation Flow Sheet    Teaching: dose, return date, conditions requiring contact with [facility].     Pt verbalized understanding of instructions and is aware of need to call with any questions or concerns and to report any changes in medications, health, diet and / or lifestyle.     Dr. Hoover is in office today supervising treatment. Note forwarded to physician for review.     
Patient/Caregiver provided printed discharge information.

## 2022-12-21 NOTE — CHART NOTE - NSCHARTNOTEFT_GEN_A_CORE
Interventional Neuro Radiology  Pre-Procedure Note     HPI:72 yr old pleasant female presents with history of AS s/p open heart valve replacement 2019 , htn, hx sleep apnea ( lost 50 lb s/p gastric bypass) does not use cpap s/p weight loss, former smoker, copd, gerd . Pt had c/o blurry vision and unsteady gait  on 4/9/22 and went to ER. CTA done 6.7 mm anteriorly and inferiorly projecting anterior communicating  artery aneurysm noted. , MRI showed acute right occipital lobe infarct . 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 s/p explant and reimplant of valve and pacemaker was done during admission.  pt is functionally independent , has some left peripheral vision loss and poor memory. Pt is scheduled for cerebral angiogram for evaluation of aneurysm.       Neuro Exam: Awake and alert, oriented x3, fluent, normal naming and repetition, follows 3 step commands. Extraocular movements intact, no nystagmus, visual fields full, face symmetric, tongue midline. No drift, 5/5 power x 4 extremities. Normal sensation to LT. Normal finger-to-nose and rapid alternating movements.    NIH SS:  DATE:  TIME:  1A: Level of consciousness (0-3):   1B: Questions (0-2):   1C: Commands (0-2):   2: Gaze (0-2):   3: Visual fields (0-3):   4: Facial palsy (0-3):   MOTOR:  5A: Left arm motor drift (0-4):   5B: Right arm motor drift (0-4):   6A: Left leg motor drift (0-4):   6B: Right leg motor drift (0-4):   7: Limb ataxia (0-2):   SENSORY:  8: Sensation (0-2):   SPEECH:  9: Language (0-3):   10: Dysarthria (0-2):   EXTINCTION:  11: Extinction/inattention (0-2):     TOTAL SCORE:     PAST MEDICAL & SURGICAL HISTORY:  Acid reflux  HTN (hypertension)  Ex-smoker  COPD (chronic obstructive pulmonary disease)  w/ Emphysema and chronic bronchitis no recent exacerb/no intubation hx  Obesity (BMI 30-39.9)  Aortic valve stenosis, etiology of cardiac valve disease unspecified  Leukoplakia of vocal cords  left vocal cord 4/2017 ENT note documentation/ patient to follow up with ENT prior to sx  H/O cardiac arrhythmia  Stroke  H/O sleep apnea  Pacemaker  History of cerebral aneurysm  Aortic valve replaced  S/P right knee arthroscopy  S/P wrist surgery  right  History of loop recorder  H/O aortic valve replacement  Cardiac pacemaker  H/O gastric bypass    Social History:     FAMILY HISTORY:  FH: HTN (hypertension) (Father)    Allergies: No Known Allergies    Current Medications: · 	                pantoprazole 40 mg oral delayed release tablet:  , 1 tab(s) orally once a day (before a meal)  · 	spironolactone 25 mg oral tablet:  , 1 tab(s) orally every 12 hours  · 	furosemide 40 mg oral tablet:    , 1 tab(s) orally once a day  · 	metoprolol succinate 50 mg oral tablet, extended release: , 1 tab(s) orally once a day  · 	aspirin 81 mg oral delayed release tablet:   , 1 tab(s) orally once a day  · 	atorvastatin 20 mg oral tablet:   , 1 tab(s) orally once a day (at bedtime)  · 	acetaminophen 325 mg oral capsule:   , 1 cap(s) orally every 8 hours, As Needed   · 	albuterol 90 mcg/inh inhalation aerosol:   , 2 puff(s) inhaled every 6 hours, As Needed  · 	zolpidem 10 mg oral tablet: 1 tab(s) orally once a day (at bedtime)  · 	irbesartan 300 mg oral tablet: 1 tab(s) orally once a day  · 	budesonide:   , 0.5 milligram(s) orally once a day  · 	Symbicort 160 mcg-4.5 mcg/inh inhalation aerosol: 2 puff(s) inhaled 2 times a day, As Needed  · 	zolpidem 10 mg oral tablet: 1 tab(s) orally once a day (at bedtime)    Labs: see sunrise. Cr 0.99     Blood Bank: see [facility] ABO RH A POS Antibody screen NEG     Assessment/Plan:   Mrs. Lal is a 72 year old woman with a PMHx of AS w/ prior open heart aortic valve replacement 2019, HTN, BETSEY, former smoker, emphysema/ chronic bronchitis, GERD s/p laparoscopic vertical sleeve gastrectomy now 7 months s/p right PCA infarct etiology cardioembolic due to endocarditis s/p explant and implant of prosthetic valve. At the time she was found to have an incidental 6.7 mm ACOMM aneurysm. Presents for a cerebral angiogram and then probable treatment with a WEB device versus coil embolization.    Procedure/ risks/ benefits/ goals/ alternatives were explained. Risks include but are not limited to stroke/ vessel injury/ hemorrhage/ groin hematoma. All questions answered and concerns addressed. Informed content obtained from __________ who verbalizes /expresses full understanding. Consent placed in chart. Interventional Neuro Radiology  Pre-Procedure Note     HPI:72 yr old pleasant female presents with history of AS s/p open heart valve replacement 2019 , htn, hx sleep apnea ( lost 50 lb s/p gastric bypass) does not use cpap s/p weight loss, former smoker, copd, gerd . Pt had c/o blurry vision and unsteady gait  on 4/9/22 and went to ER. CTA done 6.7 mm anteriorly and inferiorly projecting anterior communicating  artery aneurysm noted. , MRI showed acute right occipital lobe infarct . 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 s/p explant and reimplant of valve and pacemaker was done during admission.  pt is functionally independent , has some left peripheral vision loss and poor memory. Pt is scheduled for cerebral angiogram for evaluation of aneurysm.       Neuro Exam: Awake and alert, oriented x3, fluent, normal naming and repetition, follows 3 step commands. Extraocular movements intact, no nystagmus,   visual fields- has a Left Homonymous hemianopsia.  , face symmetric, tongue midline.   No drift, 5/5 power x 4 extremities. Normal sensation to LT. Normal finger-to-nose and rapid alternating movements.    NIH SS:  DATE:  TIME:  1A: Level of consciousness (0-3):   1B: Questions (0-2):   1C: Commands (0-2):   2: Gaze (0-2):   3: Visual fields (0-3):---------------------------------2   4: Facial palsy (0-3):   MOTOR:  5A: Left arm motor drift (0-4):   5B: Right arm motor drift (0-4):   6A: Left leg motor drift (0-4):   6B: Right leg motor drift (0-4):   7: Limb ataxia (0-2):   SENSORY:  8: Sensation (0-2):   SPEECH:  9: Language (0-3):   10: Dysarthria (0-2):   EXTINCTION:  11: Extinction/inattention (0-2):     TOTAL SCORE: -----------------------2    PAST MEDICAL & SURGICAL HISTORY:  Acid reflux  HTN (hypertension)  Ex-smoker  COPD (chronic obstructive pulmonary disease)  w/ Emphysema and chronic bronchitis no recent exacerb/no intubation hx  Obesity (BMI 30-39.9)  Aortic valve stenosis, etiology of cardiac valve disease unspecified  Leukoplakia of vocal cords  left vocal cord 4/2017 ENT note documentation/ patient to follow up with ENT prior to sx  H/O cardiac arrhythmia  Stroke  H/O sleep apnea  Pacemaker  History of cerebral aneurysm  Aortic valve replaced  S/P right knee arthroscopy  S/P wrist surgery  right  History of loop recorder  H/O aortic valve replacement  Cardiac pacemaker  H/O gastric bypass    Social History:     FAMILY HISTORY:  FH: HTN (hypertension) (Father)    Allergies: No Known Allergies    Current Medications: · 	                pantoprazole 40 mg oral delayed release tablet:  , 1 tab(s) orally once a day (before a meal)  · 	spironolactone 25 mg oral tablet:  , 1 tab(s) orally every 12 hours  · 	furosemide 40 mg oral tablet:    , 1 tab(s) orally once a day  · 	metoprolol succinate 50 mg oral tablet, extended release: , 1 tab(s) orally once a day  · 	aspirin 81 mg oral delayed release tablet:   , 1 tab(s) orally once a day  · 	atorvastatin 20 mg oral tablet:   , 1 tab(s) orally once a day (at bedtime)  · 	acetaminophen 325 mg oral capsule:   , 1 cap(s) orally every 8 hours, As Needed   · 	albuterol 90 mcg/inh inhalation aerosol:   , 2 puff(s) inhaled every 6 hours, As Needed  · 	zolpidem 10 mg oral tablet: 1 tab(s) orally once a day (at bedtime)  · 	irbesartan 300 mg oral tablet: 1 tab(s) orally once a day  · 	budesonide:   , 0.5 milligram(s) orally once a day  · 	Symbicort 160 mcg-4.5 mcg/inh inhalation aerosol: 2 puff(s) inhaled 2 times a day, As Needed  · 	zolpidem 10 mg oral tablet: 1 tab(s) orally once a day (at bedtime)    Labs: see sunrise. Cr 0.99     Blood Bank: see sunrise ABO RH A POS Antibody screen NEG     Assessment/Plan:   Mrs. Lal is a 72 year old woman with a PMHx of AS w/ prior open heart aortic valve replacement 2019, HTN, BETSEY, former smoker, emphysema/ chronic bronchitis, GERD s/p laparoscopic vertical sleeve gastrectomy now 7 months s/p right PCA infarct etiology cardioembolic due to endocarditis s/p explant and implant of prosthetic valve. At the time she was found to have an incidental 6.7 mm ACOMM aneurysm. Presents for a cerebral angiogram and then probable treatment with a WEB device versus coil embolization.    Procedure/ risks/ benefits/ goals/ alternatives were explained. Risks include but are not limited to stroke/ vessel injury/ hemorrhage/ groin hematoma. All questions answered and concerns addressed. Informed content obtained from  who verbalizes /expresses full understanding. Consent placed in chart.  Patient was seen and examined by Dr Contreras and  me. History and exam as documented above by PA/NP was confirmed by me.  Agree with plan as outlined above. Interventional Neuro Radiology  Pre-Procedure Note     HPI:72 yr old pleasant female presents with history of AS s/p open heart valve replacement 2019 , htn, hx sleep apnea ( lost 50 lb s/p gastric bypass) does not use cpap s/p weight loss, former smoker, copd, gerd . Pt had c/o blurry vision and unsteady gait  on 4/9/22 and went to ER. CTA done 6.7 mm anteriorly and inferiorly projecting anterior communicating  artery aneurysm noted. , MRI showed acute right occipital lobe infarct . 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 s/p explant and reimplant of valve and pacemaker was done during admission.  pt is functionally independent , has some left peripheral vision loss and poor memory. Pt is scheduled for cerebral angiogram for evaluation of aneurysm.       Neuro Exam: Awake and alert, oriented x3, fluent, normal naming and repetition, follows 3 step commands. Extraocular movements intact, no nystagmus,   visual fields- has a Left Homonymous hemianopsia.  , face symmetric, tongue midline.   No drift, 5/5 power x 4 extremities. Normal sensation to LT. Normal finger-to-nose and rapid alternating movements.    NIH SS:  DATE:  TIME:  1A: Level of consciousness (0-3):   1B: Questions (0-2):   1C: Commands (0-2):   2: Gaze (0-2):   3: Visual fields (0-3):---------------------------------2   4: Facial palsy (0-3):   MOTOR:  5A: Left arm motor drift (0-4):   5B: Right arm motor drift (0-4):   6A: Left leg motor drift (0-4):   6B: Right leg motor drift (0-4):   7: Limb ataxia (0-2):   SENSORY:  8: Sensation (0-2):   SPEECH:  9: Language (0-3):   10: Dysarthria (0-2):   EXTINCTION:  11: Extinction/inattention (0-2):     TOTAL SCORE: -----------------------2    PAST MEDICAL & SURGICAL HISTORY:  Acid reflux  HTN (hypertension)  Ex-smoker  COPD (chronic obstructive pulmonary disease)  w/ Emphysema and chronic bronchitis no recent exacerb/no intubation hx  Obesity (BMI 30-39.9)  Aortic valve stenosis, etiology of cardiac valve disease unspecified  Leukoplakia of vocal cords  left vocal cord 4/2017 ENT note documentation/ patient to follow up with ENT prior to sx  H/O cardiac arrhythmia  Stroke  H/O sleep apnea  Pacemaker  History of cerebral aneurysm  Aortic valve replaced  S/P right knee arthroscopy  S/P wrist surgery  right  History of loop recorder  H/O aortic valve replacement  Cardiac pacemaker  H/O gastric bypass    Social History:     FAMILY HISTORY:  FH: HTN (hypertension) (Father)    Allergies: No Known Allergies    Current Medications: · 	                pantoprazole 40 mg oral delayed release tablet:  , 1 tab(s) orally once a day (before a meal)  · 	spironolactone 25 mg oral tablet:  , 1 tab(s) orally every 12 hours  · 	furosemide 40 mg oral tablet:    , 1 tab(s) orally once a day  · 	metoprolol succinate 50 mg oral tablet, extended release: , 1 tab(s) orally once a day  · 	aspirin 81 mg oral delayed release tablet:   , 1 tab(s) orally once a day  · 	atorvastatin 20 mg oral tablet:   , 1 tab(s) orally once a day (at bedtime)  · 	acetaminophen 325 mg oral capsule:   , 1 cap(s) orally every 8 hours, As Needed   · 	albuterol 90 mcg/inh inhalation aerosol:   , 2 puff(s) inhaled every 6 hours, As Needed  · 	zolpidem 10 mg oral tablet: 1 tab(s) orally once a day (at bedtime)  · 	irbesartan 300 mg oral tablet: 1 tab(s) orally once a day  · 	budesonide:   , 0.5 milligram(s) orally once a day  · 	Symbicort 160 mcg-4.5 mcg/inh inhalation aerosol: 2 puff(s) inhaled 2 times a day, As Needed  · 	zolpidem 10 mg oral tablet: 1 tab(s) orally once a day (at bedtime)    Labs: see sunrise. Cr 0.99     Blood Bank: see sunrise ABO RH A POS Antibody screen NEG     Assessment/Plan:   Mrs. Lal is a 72 year old woman with a PMHx of AS w/ prior open heart aortic valve replacement 2019, HTN, BETSEY, former smoker, emphysema/ chronic bronchitis, GERD s/p laparoscopic vertical sleeve gastrectomy now 7 months s/p right PCA infarct etiology cardioembolic due to endocarditis s/p explant and implant of prosthetic valve. At the time she was found to have an incidental 6.7 mm ACOMM aneurysm. Presents for a cerebral angiogram and then probable treatment with a WEB device versus coil embolization.    Procedure/ risks/ benefits/ goals/ alternatives were explained. Risks include but are not limited to stroke/ vessel injury/ hemorrhage/ groin hematoma. All questions answered and concerns addressed. Informed content obtained from the patient  who verbalizes /expresses full understanding. Consent placed in chart.  Patient was seen and examined by Dr Contreras and  me. History and exam as documented above by PA/NP was confirmed by me.  Agree with plan as outlined above.

## 2022-12-21 NOTE — CHART NOTE - NSCHARTNOTEFT_GEN_A_CORE
Interventional Neuro- Radiology   Procedure Note     Procedure:Selective Cerebral Angiography   PREOPERATIVE DIAGNOSIS: Incidental A-comm aneurysm.  POSTOPERATIVE DIAGNOSIS: Incidental 6.0 mm A-comm aneurysm filling from   the right A1 segment. No other aneurysms. No vascular malformations.    : Dr. Diane MD    First Assist: Dr. Arcos    RN: Whit    Anesthesia: (MAC)     Sheath: 4 fr vert     I/Os/VItals : see nursing/anesthesia report     Preliminary Report:  Under MAC l anesthesia, using a 4 Fr short sheath to the right groin examination of left vertebral artery/ left common carotid artery/ left external carotid artey/  right common carotid artery/ right external carotid artery via selective cerebral angiography demonstrates Incidental 6.0 mm A-comm aneurysm filling from   the right A1 segment. . ( Official note to follow).    Patient tolerated procedure well, vital signs as noted above,  no change in neurological status noted.  Results discussed with patient   Groin sheath d/c'ed  , manual compression held to hemostasis, no active bleeding, no hematoma, soft throughout, + pedal pulses,  quick clot    Patient transferred to PACU     Anticipate d/c 4 hours post procedure. Interventional Neuro- Radiology   Procedure Note     Procedure:Selective Cerebral Angiography   PREOPERATIVE DIAGNOSIS: Incidental A-comm aneurysm.  POSTOPERATIVE DIAGNOSIS: Incidental 6.0 mm A-comm aneurysm filling from   the right A1 segment. No other aneurysms. No vascular malformations.    : Dr. Diane MD    First Assist: Dr. Arcos.    RN: Camille/Sarah    Anesthesia: (MAC)     Sheath: 4 fr vert     I/Os/VItals : see nursing/anesthesia report     Preliminary Report:  Under MAC l anesthesia, using a 4 Fr short sheath to the right groin examination of left vertebral artery/ left common carotid artery/ left external carotid artey/  right common carotid artery/ right external carotid artery via selective cerebral angiography demonstrates Incidental 6.0 mm A-comm aneurysm filling from   the right A1 segment. . ( Official note to follow).    Patient tolerated procedure well, vital signs as noted above,  no change in neurological status noted.  Results discussed with patient   Groin sheath d/c'ed  , manual compression held to hemostasis, no active bleeding, no hematoma, soft throughout, + pedal pulses,  quick clot    Patient transferred to PACU     Anticipate d/c 4 hours post procedure.

## 2023-01-03 ENCOUNTER — NON-APPOINTMENT (OUTPATIENT)
Age: 73
End: 2023-01-03

## 2023-01-03 ENCOUNTER — APPOINTMENT (OUTPATIENT)
Dept: ELECTROPHYSIOLOGY | Facility: CLINIC | Age: 73
End: 2023-01-03
Payer: MEDICARE

## 2023-01-03 PROBLEM — Z86.79 PERSONAL HISTORY OF OTHER DISEASES OF THE CIRCULATORY SYSTEM: Chronic | Status: ACTIVE | Noted: 2022-12-16

## 2023-01-03 PROBLEM — Z95.0 PRESENCE OF CARDIAC PACEMAKER: Chronic | Status: ACTIVE | Noted: 2022-12-16

## 2023-01-03 PROBLEM — Z86.69 PERSONAL HISTORY OF OTHER DISEASES OF THE NERVOUS SYSTEM AND SENSE ORGANS: Chronic | Status: ACTIVE | Noted: 2022-12-16

## 2023-01-03 PROBLEM — I63.9 CEREBRAL INFARCTION, UNSPECIFIED: Chronic | Status: ACTIVE | Noted: 2022-12-16

## 2023-01-03 PROBLEM — Z95.2 PRESENCE OF PROSTHETIC HEART VALVE: Chronic | Status: ACTIVE | Noted: 2022-12-16

## 2023-01-03 PROCEDURE — 93298 REM INTERROG DEV EVAL SCRMS: CPT

## 2023-01-03 PROCEDURE — G2066: CPT

## 2023-01-12 NOTE — DISCHARGE NOTE PROVIDER - NSDCDCMDCOMP_GEN_ALL_CORE
OUTCOME: PT SAYS HE DOES NOT NEED OUR SERVICES      ATTEMPT 1 -KS   This document is complete and the patient is ready for discharge.

## 2023-01-17 ENCOUNTER — APPOINTMENT (OUTPATIENT)
Dept: NEUROLOGY | Facility: CLINIC | Age: 73
End: 2023-01-17
Payer: MEDICARE

## 2023-01-17 VITALS
WEIGHT: 157 LBS | HEIGHT: 60 IN | SYSTOLIC BLOOD PRESSURE: 140 MMHG | BODY MASS INDEX: 30.82 KG/M2 | HEART RATE: 72 BPM | DIASTOLIC BLOOD PRESSURE: 80 MMHG

## 2023-01-17 PROCEDURE — 99215 OFFICE O/P EST HI 40 MIN: CPT

## 2023-01-17 NOTE — REVIEW OF SYSTEMS
[Fever] : no fever [Feeling Poorly] : not feeling poorly [Feeling Tired] : not feeling tired [Confused or Disoriented] : no confusion [Memory Lapses or Loss] : no memory loss [Decr. Concentrating Ability] : no decrease in concentrating ability [Difficulty with Language] : no ~M difficulty with language [Changed Thought Patterns] : no change in thought patterns [Repeating Questions] : no repeated questioning about recent events [Facial Weakness] : no facial weakness [Arm Weakness] : no arm weakness [Hand Weakness] : no hand weakness [Leg Weakness] : no leg weakness [Poor Coordination] : good coordination [Difficulty Writing] : no difficulty writing [Difficulties in Speech] : no speech difficulties [Numbness] : no numbness [Tingling] : no tingling [Seizures] : no convulsions [Dizziness] : no dizziness [Fainting] : no fainting [Lightheadedness] : no lightheadedness [Vertigo] : no vertigo [Tension Headache] : no tension-type headache [Difficulty Walking] : no difficulty walking [Anxiety] : no anxiety [Depression] : no depression [Loss Of Hearing] : no hearing loss [Nosebleeds] : no nosebleeds [Chest Pain] : no chest pain [Palpitations] : no palpitations [Shortness Of Breath] : no shortness of breath [Wheezing] : no wheezing [Abdominal Pain] : no abdominal pain [Vomiting] : no vomiting [Incontinence] : no incontinence [Joint Pain] : no joint pain [Easy Bleeding] : no tendency for easy bleeding [Easy Bruising] : no tendency for easy bruising [FreeTextEntry3] : [facility]

## 2023-01-17 NOTE — DISCUSSION/SUMMARY
[FreeTextEntry1] : Mrs. Lal is a 72 year old woman with a PMHx of AS w/ prior open heart aortic valve replacement 2019, HTN, BETSEY, former smoker, emphysema/ chronic bronchitis, GERD s/p laparoscopic vertical sleeve gastrectomy now 9 months s/p right PCA infarct etiology cardioembolic due to endocarditis s/p explant and implant of prosthetic valve. At the time she was found to have an incidental ACOMM aneurysm. She is now 1 months s/p cerebral angiogram which showed a  6.0 mm A-comm aneurysm filling from the right A1 segment. We discussed based on angiogram results the aneurysm should be treated with a stent and coils. The procedure, risks, benefits and alternatives discussed with patient and friend and in agreement with plan. She will need medical clearance prior to procedure. All of their questions and concerns were addressed.

## 2023-01-17 NOTE — HISTORY OF PRESENT ILLNESS
[FreeTextEntry1] : Mrs. Lal is a 72 year old woman with a PMHx of AS w/ prior open heart aortic valve replacement 2019, HTN, BETSEY, former smoker, emphysema/chronic bronchitis, GERD s/p laparoscopic vertical sleeve gastrectomy who presented to [facility] ED on 04/09/22 with blurry vision, gait imbalance, and speech disturbance. Pt was not a candidate for tpa or mechanical thrombectomy as no LVO. CTH with Right PCA infarct and CTA: 6.7 mm anteriorly and inferiorly projecting anterior communicating artery aneurysm. MRI Head showed Acute R occipital lobe infarct in a R PCA vascular distribution with an additional punctate acute infarct involving the L cerebellum and R splenium of the corpus callosum. 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 s/p explant and reimplant of prosthetic valve and pacemaker during hospital admission. She has recovered from her stroke, only has residual left HHA. She had a cerebral angiogram on 12/21/22 which showed a 6.0 mm A-comm aneurysm filling from the right A1 segment. She comes in today for a follow up visit, she is on ASA and Lipitor for stroke prevention and denies any interval TIA/Stroke symptoms.

## 2023-01-17 NOTE — PHYSICAL EXAM
[General Appearance - Alert] : alert [General Appearance - Well Nourished] : well nourished [General Appearance - Well Developed] : well developed [Oriented To Time, Place, And Person] : oriented to person, place, and time [Affect] : the affect was normal [Mood] : the mood was normal [Person] : oriented to person [Place] : oriented to place [Time] : oriented to time [Concentration Intact] : normal concentrating ability [Visual Intact] : visual attention was ~T not ~L decreased [Naming Objects] : no difficulty naming common objects [Repeating Phrases] : no difficulty repeating a phrase [Writing A Sentence] : no difficulty writing a sentence [Fluency] : fluency intact [Comprehension] : comprehension intact [Reading] : reading intact [Past History] : adequate knowledge of personal past history [Cranial Nerves Oculomotor (III)] : extraocular motion intact [Cranial Nerves Trigeminal (V)] : facial sensation intact symmetrically [Cranial Nerves Facial (VII)] : face symmetrical [Cranial Nerves Vestibulocochlear (VIII)] : hearing was intact bilaterally [Cranial Nerves Glossopharyngeal (IX)] : tongue and palate midline [Cranial Nerves Accessory (XI - Cranial And Spinal)] : head turning and shoulder shrug symmetric [Cranial Nerves Hypoglossal (XII)] : there was no tongue deviation with protrusion [Hemianopsia Homonymous Left] : left homonymous hemianopsia present [Motor Tone] : muscle tone was normal in all four extremities [Motor Strength] : muscle strength was normal in all four extremities [No Muscle Atrophy] : normal bulk in all four extremities [Sensation Tactile Decrease] : light touch was intact [Hearing Threshold Finger Rub Not [place]] : hearing was normal [Neck Appearance] : the appearance of the neck was normal [] : no respiratory distress [Heart Rate And Rhythm] : heart rate was normal and rhythm regular [Edema] : there was no peripheral edema [Abdomen Soft] : soft [Abnormal Walk] : normal gait [FreeTextEntry1] : [facility]

## 2023-02-07 ENCOUNTER — APPOINTMENT (OUTPATIENT)
Dept: ELECTROPHYSIOLOGY | Facility: CLINIC | Age: 73
End: 2023-02-07
Payer: MEDICARE

## 2023-02-07 ENCOUNTER — NON-APPOINTMENT (OUTPATIENT)
Age: 73
End: 2023-02-07

## 2023-02-07 PROCEDURE — 93298 REM INTERROG DEV EVAL SCRMS: CPT

## 2023-02-07 PROCEDURE — G2066: CPT

## 2023-02-14 NOTE — PROGRESS NOTE ADULT - ASSESSMENT
Informed pt of msg  71F RH w/ AS w/ prior open heart aortic valve replacement 2019, HTN, BETSEY, former smoker, Emphysema/ chronic bronchitis, GERD s/p laparoscopic vertical sleeve gastrectomy presents with acute onset speech disturbance, vision change and gait imbalance. Per EMS, LKN: 4/9/22 at 10am per family. Pt's family reported blurry vision, gait imbalance, and speech disturbance (mild loss in fluency). She still endorses blurry vision but denies any weakness, numbness/tingling. Pt takes a baby aspirin 81mg daily. NIHSS: 3, preMRS: 0. No tpa or MT. Neuro exam notable for LHH, possible 4+/5 L hemiparesis. CTH w/     Impression:  Core infarct in the distribution of the right PCA.

## 2023-02-21 ENCOUNTER — APPOINTMENT (OUTPATIENT)
Dept: ELECTROPHYSIOLOGY | Facility: CLINIC | Age: 73
End: 2023-02-21

## 2023-02-22 ENCOUNTER — APPOINTMENT (OUTPATIENT)
Dept: PULMONOLOGY | Facility: CLINIC | Age: 73
End: 2023-02-22

## 2023-03-01 ENCOUNTER — RESULT CHARGE (OUTPATIENT)
Age: 73
End: 2023-03-01

## 2023-03-02 ENCOUNTER — APPOINTMENT (OUTPATIENT)
Dept: PULMONOLOGY | Facility: CLINIC | Age: 73
End: 2023-03-02
Payer: MEDICARE

## 2023-03-02 VITALS — HEART RATE: 60 BPM | OXYGEN SATURATION: 97 % | DIASTOLIC BLOOD PRESSURE: 69 MMHG | SYSTOLIC BLOOD PRESSURE: 122 MMHG

## 2023-03-02 DIAGNOSIS — Z86.73 PERSONAL HISTORY OF TRANSIENT ISCHEMIC ATTACK (TIA), AND CEREBRAL INFARCTION W/OUT RESIDUAL DEFICITS: ICD-10-CM

## 2023-03-02 DIAGNOSIS — R05.9 COUGH, UNSPECIFIED: ICD-10-CM

## 2023-03-02 PROCEDURE — 94727 GAS DIL/WSHOT DETER LNG VOL: CPT

## 2023-03-02 PROCEDURE — 71046 X-RAY EXAM CHEST 2 VIEWS: CPT

## 2023-03-02 PROCEDURE — 94729 DIFFUSING CAPACITY: CPT

## 2023-03-02 PROCEDURE — 94010 BREATHING CAPACITY TEST: CPT

## 2023-03-02 PROCEDURE — 99214 OFFICE O/P EST MOD 30 MIN: CPT | Mod: 25

## 2023-03-02 RX ORDER — ALBUTEROL SULFATE 90 UG/1
108 (90 BASE) INHALANT RESPIRATORY (INHALATION)
Qty: 1 | Refills: 5 | Status: COMPLETED | COMMUNITY
Start: 2022-07-25 | End: 2023-03-02

## 2023-03-03 ENCOUNTER — OUTPATIENT (OUTPATIENT)
Dept: OUTPATIENT SERVICES | Facility: HOSPITAL | Age: 73
LOS: 1 days | End: 2023-03-03
Payer: MEDICARE

## 2023-03-03 VITALS
WEIGHT: 160.5 LBS | TEMPERATURE: 98 F | HEIGHT: 60 IN | DIASTOLIC BLOOD PRESSURE: 68 MMHG | SYSTOLIC BLOOD PRESSURE: 110 MMHG | HEART RATE: 72 BPM | RESPIRATION RATE: 16 BRPM | OXYGEN SATURATION: 97 %

## 2023-03-03 DIAGNOSIS — J44.9 CHRONIC OBSTRUCTIVE PULMONARY DISEASE, UNSPECIFIED: ICD-10-CM

## 2023-03-03 DIAGNOSIS — Z98.890 OTHER SPECIFIED POSTPROCEDURAL STATES: Chronic | ICD-10-CM

## 2023-03-03 DIAGNOSIS — Z98.84 BARIATRIC SURGERY STATUS: Chronic | ICD-10-CM

## 2023-03-03 DIAGNOSIS — Z95.0 PRESENCE OF CARDIAC PACEMAKER: Chronic | ICD-10-CM

## 2023-03-03 DIAGNOSIS — Z01.818 ENCOUNTER FOR OTHER PREPROCEDURAL EXAMINATION: ICD-10-CM

## 2023-03-03 DIAGNOSIS — I63.9 CEREBRAL INFARCTION, UNSPECIFIED: ICD-10-CM

## 2023-03-03 DIAGNOSIS — K21.9 GASTRO-ESOPHAGEAL REFLUX DISEASE WITHOUT ESOPHAGITIS: ICD-10-CM

## 2023-03-03 DIAGNOSIS — I72.9 ANEURYSM OF UNSPECIFIED SITE: ICD-10-CM

## 2023-03-03 DIAGNOSIS — Z29.9 ENCOUNTER FOR PROPHYLACTIC MEASURES, UNSPECIFIED: ICD-10-CM

## 2023-03-03 DIAGNOSIS — Z95.2 PRESENCE OF PROSTHETIC HEART VALVE: Chronic | ICD-10-CM

## 2023-03-03 DIAGNOSIS — I10 ESSENTIAL (PRIMARY) HYPERTENSION: ICD-10-CM

## 2023-03-03 LAB
A1C WITH ESTIMATED AVERAGE GLUCOSE RESULT: 6 % — HIGH (ref 4–5.6)
ALBUMIN SERPL ELPH-MCNC: 4.2 G/DL — SIGNIFICANT CHANGE UP (ref 3.3–5.2)
ALP SERPL-CCNC: 56 U/L — SIGNIFICANT CHANGE UP (ref 40–120)
ALT FLD-CCNC: 22 U/L — SIGNIFICANT CHANGE UP
ANION GAP SERPL CALC-SCNC: 13 MMOL/L — SIGNIFICANT CHANGE UP (ref 5–17)
APTT BLD: 28.8 SEC — SIGNIFICANT CHANGE UP (ref 27.5–35.5)
AST SERPL-CCNC: 22 U/L — SIGNIFICANT CHANGE UP
BASOPHILS # BLD AUTO: 0.04 K/UL — SIGNIFICANT CHANGE UP (ref 0–0.2)
BASOPHILS NFR BLD AUTO: 0.6 % — SIGNIFICANT CHANGE UP (ref 0–2)
BILIRUB SERPL-MCNC: 0.9 MG/DL — SIGNIFICANT CHANGE UP (ref 0.4–2)
BLD GP AB SCN SERPL QL: SIGNIFICANT CHANGE UP
BUN SERPL-MCNC: 33.9 MG/DL — HIGH (ref 8–20)
CALCIUM SERPL-MCNC: 9.1 MG/DL — SIGNIFICANT CHANGE UP (ref 8.4–10.5)
CHLORIDE SERPL-SCNC: 102 MMOL/L — SIGNIFICANT CHANGE UP (ref 96–108)
CO2 SERPL-SCNC: 25 MMOL/L — SIGNIFICANT CHANGE UP (ref 22–29)
CREAT SERPL-MCNC: 1.1 MG/DL — SIGNIFICANT CHANGE UP (ref 0.5–1.3)
EGFR: 53 ML/MIN/1.73M2 — LOW
EOSINOPHIL # BLD AUTO: 0.07 K/UL — SIGNIFICANT CHANGE UP (ref 0–0.5)
EOSINOPHIL NFR BLD AUTO: 1 % — SIGNIFICANT CHANGE UP (ref 0–6)
ESTIMATED AVERAGE GLUCOSE: 126 MG/DL — HIGH (ref 68–114)
GLUCOSE SERPL-MCNC: 99 MG/DL — SIGNIFICANT CHANGE UP (ref 70–99)
HCT VFR BLD CALC: 37.4 % — SIGNIFICANT CHANGE UP (ref 34.5–45)
HGB BLD-MCNC: 12 G/DL — SIGNIFICANT CHANGE UP (ref 11.5–15.5)
IMM GRANULOCYTES NFR BLD AUTO: 0.3 % — SIGNIFICANT CHANGE UP (ref 0–0.9)
INR BLD: 1 RATIO — SIGNIFICANT CHANGE UP (ref 0.88–1.16)
LYMPHOCYTES # BLD AUTO: 2.18 K/UL — SIGNIFICANT CHANGE UP (ref 1–3.3)
LYMPHOCYTES # BLD AUTO: 30 % — SIGNIFICANT CHANGE UP (ref 13–44)
MCHC RBC-ENTMCNC: 30.2 PG — SIGNIFICANT CHANGE UP (ref 27–34)
MCHC RBC-ENTMCNC: 32.1 GM/DL — SIGNIFICANT CHANGE UP (ref 32–36)
MCV RBC AUTO: 94 FL — SIGNIFICANT CHANGE UP (ref 80–100)
MONOCYTES # BLD AUTO: 0.74 K/UL — SIGNIFICANT CHANGE UP (ref 0–0.9)
MONOCYTES NFR BLD AUTO: 10.2 % — SIGNIFICANT CHANGE UP (ref 2–14)
MRSA PCR RESULT.: SIGNIFICANT CHANGE UP
NEUTROPHILS # BLD AUTO: 4.22 K/UL — SIGNIFICANT CHANGE UP (ref 1.8–7.4)
NEUTROPHILS NFR BLD AUTO: 57.9 % — SIGNIFICANT CHANGE UP (ref 43–77)
PLATELET # BLD AUTO: 225 K/UL — SIGNIFICANT CHANGE UP (ref 150–400)
POTASSIUM SERPL-MCNC: 4.7 MMOL/L — SIGNIFICANT CHANGE UP (ref 3.5–5.3)
POTASSIUM SERPL-SCNC: 4.7 MMOL/L — SIGNIFICANT CHANGE UP (ref 3.5–5.3)
PROT SERPL-MCNC: 6.9 G/DL — SIGNIFICANT CHANGE UP (ref 6.6–8.7)
PROTHROM AB SERPL-ACNC: 11.6 SEC — SIGNIFICANT CHANGE UP (ref 10.5–13.4)
RBC # BLD: 3.98 M/UL — SIGNIFICANT CHANGE UP (ref 3.8–5.2)
RBC # FLD: 13.9 % — SIGNIFICANT CHANGE UP (ref 10.3–14.5)
S AUREUS DNA NOSE QL NAA+PROBE: SIGNIFICANT CHANGE UP
SODIUM SERPL-SCNC: 139 MMOL/L — SIGNIFICANT CHANGE UP (ref 135–145)
WBC # BLD: 7.27 K/UL — SIGNIFICANT CHANGE UP (ref 3.8–10.5)
WBC # FLD AUTO: 7.27 K/UL — SIGNIFICANT CHANGE UP (ref 3.8–10.5)

## 2023-03-03 PROCEDURE — 93010 ELECTROCARDIOGRAM REPORT: CPT

## 2023-03-03 PROCEDURE — 93005 ELECTROCARDIOGRAM TRACING: CPT

## 2023-03-03 RX ORDER — ZOLPIDEM TARTRATE 10 MG/1
1 TABLET ORAL
Qty: 0 | Refills: 0 | DISCHARGE

## 2023-03-03 NOTE — H&P PST ADULT - ASSESSMENT
CAPRINI VTE 2.0 SCORE [CLOT updated 2019]    AGE RELATED RISK FACTORS                                                       MOBILITY RELATED FACTORS  [ ] Age 41-60 years                                            (1 Point)                    [ ] Bed rest                                                        (1 Point)  [ ] Age: 61-74 years                                           (2 Points)                  [ ] Plaster cast                                                   (2 Points)  [ ] Age= 75 years                                              (3 Points)                    [ ] Bed bound for more than 72 hours                 (2 Points)    DISEASE RELATED RISK FACTORS                                               GENDER SPECIFIC FACTORS  [ ] Edema in the lower extremities                       (1 Point)              [ ] Pregnancy                                                     (1 Point)  [ ] Varicose veins                                               (1 Point)                     [ ] Post-partum < 6 weeks                                   (1 Point)             [ ] BMI > 25 Kg/m2                                            (1 Point)                     [ ] Hormonal therapy  or oral contraception          (1 Point)                 [ ] Sepsis (in the previous month)                        (1 Point)               [ ] History of pregnancy complications                 (1 point)  [ ] Pneumonia or serious lung disease                                               [ ] Unexplained or recurrent                     (1 Point)           (in the previous month)                               (1 Point)  [ ] Abnormal pulmonary function test                     (1 Point)                 SURGERY RELATED RISK FACTORS  [ ] Acute myocardial infarction                              (1 Point)               [ ]  Section                                             (1 Point)  [ ] Congestive heart failure (in the previous month)  (1 Point)      [ ] Minor surgery                                                  (1 Point)   [ ] Inflammatory bowel disease                             (1 Point)               [ ] Arthroscopic surgery                                        (2 Points)  [ ] Central venous access                                      (2 Points)                [ ] General surgery lasting more than 45 minutes (2 points)  [ ] Malignancy- Present or previous                   (2 Points)                [ ] Elective arthroplasty                                         (5 points)    [ ] Stroke (in the previous month)                          (5 Points)                                                                                                                                                           HEMATOLOGY RELATED FACTORS                                                 TRAUMA RELATED RISK FACTORS  [ ] Prior episodes of VTE                                     (3 Points)                [ ] Fracture of the hip, pelvis, or leg                       (5 Points)  [ ] Positive family history for VTE                         (3 Points)             [ ] Acute spinal cord injury (in the previous month)  (5 Points)  [ ] Prothrombin 32395 A                                     (3 Points)               [ ] Paralysis  (less than 1 month)                             (5 Points)  [ ] Factor V Leiden                                             (3 Points)                  [ ] Multiple Trauma within 1 month                        (5 Points)  [ ] Lupus anticoagulants                                     (3 Points)                                                           [ ] Anticardiolipin antibodies                               (3 Points)                                                       [ ] High homocysteine in the blood                      (3 Points)                                             [ ] Other congenital or acquired thrombophilia      (3 Points)                                                [ ] Heparin induced thrombocytopenia                  (3 Points)                                     Total Score [          ]  OPIOID RISK TOOL    OFELIA EACH BOX THAT APPLIES AND ADD TOTALS AT THE END    FAMILY HISTORY OF SUBSTANCE ABUSE                 FEMALE         MALE                                                Alcohol                             [  ]1 pt          [  ]3pts                                               Illegal Drugs                     [  ]2 pts        [  ]3pts                                               Rx Drugs                           [  ]4 pts        [  ]4 pts    PERSONAL HISTORY OF SUBSTANCE ABUSE                                                                                          Alcohol                             [  ]3 pts       [  ]3 pts                                               Illegal Drugs                     [  ]4 pts        [  ]4 pts                                               Rx Drugs                           [  ]5 pts        [  ]5 pts    AGE BETWEEN 16-45 YEARS                                      [  ]1 pt         [  ]1 pt    HISTORY OF PREADOLESCENT   SEXUAL ABUSE                                                             [  ]3 pts        [  ]0pts    PSYCHOLOGICAL DISEASE                     ADD, OCD, Bipolar, Schizophrenia        [  ]2 pts         [  ]2 pts                      Depression                                               [  ]1 pt           [  ]1 pt           SCORING TOTAL   (add numbers and type here)              (***)                                     A score of 3 or lower indicated LOW risk for future opioid abuse  A score of 4 to 7 indicated moderate risk for future opioid abuse  A score of 8 or higher indicates a high risk for opioid abuse 72 yr old pleasant female presents with history of AS s/p open heart valve replacement  , htn, hx sleep apnea ( lost 50 lb s/p gastric bypass) does not use cpap s/p weight loss, former smoker, copd, Gerd . Pt had c/o blurry vision and unsteady gait  on 22 and went to ER. CTA done 6.7 mm anteriorly and inferiorly projecting anterior communicating  artery aneurysm noted. , MRI showed acute right occipital lobe infarct . 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 s/p explant and reimplant of valve and pacemaker was done during admission(2022)  pt is functionally independent , has some left peripheral vision loss and poor memory. She had a cerebral angiogram on 22 which showed a 6.0 mm A-comm aneurysm filling from the right A1 segment. Now she is scheduled for a aneurysm embolization on with stent and coil on 3/17/23 by dr. Contreras. Brilinta 90 mg BID ordered for patient to start 03/15. Covid vaccine series completed, card in chart, covid test is on 3/13/23. Medical Clearance pending     medications reviewed, instructions given on what medications to take and what not to take. She can continue with all of her AM meds in the AM of surgery( Symbicort Irbesartan, Albuterol, Metoprolol, ASA, Brilinta, Spironolactone)      CAPRINI VTE 2.0 SCORE [CLOT updated 2019]    AGE RELATED RISK FACTORS                                                       MOBILITY RELATED FACTORS  [ ] Age 41-60 years                                            (1 Point)                    [ ] Bed rest                                                        (1 Point)  [x ] Age: 61-74 years                                           (2 Points)                  [ ] Plaster cast                                                   (2 Points)  [ ] Age= 75 years                                              (3 Points)                    [ ] Bed bound for more than 72 hours                 (2 Points)    DISEASE RELATED RISK FACTORS                                               GENDER SPECIFIC FACTORS  [ ] Edema in the lower extremities                       (1 Point)              [ ] Pregnancy                                                     (1 Point)  [ ] Varicose veins                                               (1 Point)                     [ ] Post-partum < 6 weeks                                   (1 Point)             [x ] BMI > 25 Kg/m2                                            (1 Point)                     [ ] Hormonal therapy  or oral contraception          (1 Point)                 [ ] Sepsis (in the previous month)                        (1 Point)               [ ] History of pregnancy complications                 (1 point)  [ ] Pneumonia or serious lung disease                                               [ ] Unexplained or recurrent                     (1 Point)           (in the previous month)                               (1 Point)  [ ] Abnormal pulmonary function test                     (1 Point)                 SURGERY RELATED RISK FACTORS  [ ] Acute myocardial infarction                              (1 Point)               [ ]  Section                                             (1 Point)  [ ] Congestive heart failure (in the previous month)  (1 Point)      [ x] Minor surgery                                                  (1 Point)   [ ] Inflammatory bowel disease                             (1 Point)               [ ] Arthroscopic surgery                                        (2 Points)  [ ] Central venous access                                      (2 Points)                [ ] General surgery lasting more than 45 minutes (2 points)  [ ] Malignancy- Present or previous                   (2 Points)                [ ] Elective arthroplasty                                         (5 points)    [ ] Stroke (in the previous month)                          (5 Points)                                                                                                                                                           HEMATOLOGY RELATED FACTORS                                                 TRAUMA RELATED RISK FACTORS  [ ] Prior episodes of VTE                                     (3 Points)                [ ] Fracture of the hip, pelvis, or leg                       (5 Points)  [ ] Positive family history for VTE                         (3 Points)             [ ] Acute spinal cord injury (in the previous month)  (5 Points)  [ ] Prothrombin 86215 A                                     (3 Points)               [ ] Paralysis  (less than 1 month)                             (5 Points)  [ ] Factor V Leiden                                             (3 Points)                  [ ] Multiple Trauma within 1 month                        (5 Points)  [ ] Lupus anticoagulants                                     (3 Points)                                                           [ ] Anticardiolipin antibodies                               (3 Points)                                                       [ ] High homocysteine in the blood                      (3 Points)                                             [ ] Other congenital or acquired thrombophilia      (3 Points)                                                [ ] Heparin induced thrombocytopenia                  (3 Points)                                     Total Score [  4        ]  OPIOID RISK TOOL    OFELIA EACH BOX THAT APPLIES AND ADD TOTALS AT THE END    FAMILY HISTORY OF SUBSTANCE ABUSE                 FEMALE         MALE                                                Alcohol                             [  ]1 pt          [  ]3pts                                               Illegal Drugs                     [  ]2 pts        [  ]3pts                                               Rx Drugs                           [  ]4 pts        [  ]4 pts    PERSONAL HISTORY OF SUBSTANCE ABUSE                                                                                          Alcohol                             [  ]3 pts       [  ]3 pts                                               Illegal Drugs                     [  ]4 pts        [  ]4 pts                                               Rx Drugs                           [  ]5 pts        [  ]5 pts    AGE BETWEEN 16-45 YEARS                                      [  ]1 pt         [  ]1 pt    HISTORY OF PREADOLESCENT   SEXUAL ABUSE                                                             [  ]3 pts        [  ]0pts    PSYCHOLOGICAL DISEASE                     ADD, OCD, Bipolar, Schizophrenia        [  ]2 pts         [  ]2 pts                      Depression                                               [  ]1 pt           [  ]1 pt           SCORING TOTAL   (add numbers and type here)              ( 0)                                     A score of 3 or lower indicated LOW risk for future opioid abuse  A score of 4 to 7 indicated moderate risk for future opioid abuse  A score of 8 or higher indicates a high risk for opioid abuse

## 2023-03-03 NOTE — H&P PST ADULT - NSANTHOSAYNRD_GEN_A_CORE
No. BETSEY screening performed.  STOP BANG Legend: 0-2 = LOW Risk; 3-4 = INTERMEDIATE Risk; 5-8 = HIGH Risk Yes

## 2023-03-03 NOTE — H&P PST ADULT - PROBLEM SELECTOR PLAN 6
Aneurysm embolization on with stent and coil on 3/17/23 by dr. Contreras. Brilinta 90 mg BID ordered for patient to start 03/15. Covid vaccine series completed, card in chart, covid test is on 3/13/23. Medical Clearance pending

## 2023-03-03 NOTE — H&P PST ADULT - HISTORY OF PRESENT ILLNESS
72 yr old pleasant female presents with history of AS s/p open heart valve replacement 2019 , htn, hx sleep apnea ( lost 50 lb s/p gastric bypass) does not use cpap s/p weight loss, former smoker, copd, gerd . Pt had c/o blurry vision and unsteady gait  on 4/9/22 and went to ER. CTA done 6.7 mm anteriorly and inferiorly projecting anterior communicating  artery aneurysm noted. , MRI showed acute right occipital lobe infarct . 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 s/p explant and reimplant of valve and pacemaker was done during admission.  pt is functionally independent , has some left peripheral vision loss and poor memory. Pt is scheduled for cerebral angiogram for evaluation of aneurysm.  72 yr old pleasant female presents with history of AS s/p open heart valve replacement 2019 , htn, hx sleep apnea ( lost 50 lb s/p gastric bypass) does not use cpap s/p weight loss, former smoker, copd, gerd . Pt had c/o blurry vision and unsteady gait  on 4/9/22 and went to ER. CTA done 6.7 mm anteriorly and inferiorly projecting anterior communicating  artery aneurysm noted. , MRI showed acute right occipital lobe infarct . 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 s/p explant and reimplant of valve and pacemaker was done during admission(April 2022)  pt is functionally independent , has some left peripheral vision loss and poor memory. She had a cerebral angiogram on 12/21/22 which showed a 6.0 mm A-comm aneurysm filling from the right A1 segment. Now she is scheduled for a aneurysm embolization on with stent and coil on 3/17/23 by dr. Contreras. Brilinta 90 mg BID ordered for patient to start 03/15. Covid vaccine series completed, card in chart, covid test is on 3/13/23. Medical Clearance pending  72 yr old pleasant female presents with history of AS s/p open heart valve replacement 2019 , HTN, hx sleep apnea ( lost 50 lb s/p gastric bypass) does not use cpap s/p weight loss, former smoker, copd, gerd . Pt had c/o blurry vision and unsteady gait  on 4/9/22 and went to ER. CTA done 6.7 mm anteriorly and inferiorly projecting anterior communicating  artery aneurysm noted. , MRI showed acute right occipital lobe infarct . 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 s/p explant and reimplant of valve and pacemaker was done during admission(April 2022)  pt is functionally independent , has some left peripheral vision loss and poor memory. She had a cerebral angiogram on 12/21/22 which showed a 6.0 mm A-comm aneurysm filling from the right A1 segment. Now she is scheduled for a aneurysm embolization on with stent and coil on 3/17/23 by dr. Contreras. Brilinta 90 mg BID ordered for patient to start 03/15. Covid vaccine series completed, card in chart, covid test is on 3/13/23. Medical Clearance pending

## 2023-03-04 NOTE — ASSESSMENT
[FreeTextEntry1] : Start Trelegy 100/62.5/25 mcg Ellipta, 1 puff daily for COPD, sample given for 2 weeks.  Ordered Symbicort HFA for COPD on a regular basis.\par Albuterol HFA as needed for shortness of breath.\par Return for pulmonary follow-up with Dr. Leigh.

## 2023-03-04 NOTE — PROCEDURE
[FreeTextEntry1] : Pulmonary function test performed in my office today: Spirometry shows moderate obstructive airway disease; lung volumes: Within normal limits with air trapping; diffusion shows moderate impairment.\par ____________\par \par  Xray Chest 2 Views PA/Lat             Final\par \par No Documents Attached\par \par \par \par \par   \par Chest x-ray PA and lateral views performed in my office today showed s/p sternotomy wires/surgical clips, clear lungs, no evidence of infiltrates or pleural effusions. \par \par \par  Ordered by: SANTIAGO REED       Collected/Examined: 02Mar2023 09:53AM       \par Verification Required       Stage: Final       \par  Performed at: In Office       Performed by: ELPIDIO RODRIGUEZ       Resulted: 02Mar2023 09:53AM       Last Updated: 02Mar2023 09:54AM

## 2023-03-04 NOTE — HISTORY OF PRESENT ILLNESS
[TextBox_4] : Acute visit for cough \par \par Had bronchitis about one month ago; has had a dry and occasional productive cough and post nasal drip since having the bronchitis; worse in the morning\par \par Uses albuterol every day, about twice a day; does not use Symbicort (possibly due to cost)\par \par Having brain surgery due to aneurysm on March 17th

## 2023-03-08 ENCOUNTER — APPOINTMENT (OUTPATIENT)
Dept: INTERNAL MEDICINE | Facility: CLINIC | Age: 73
End: 2023-03-08
Payer: MEDICARE

## 2023-03-08 VITALS
HEART RATE: 60 BPM | TEMPERATURE: 98 F | SYSTOLIC BLOOD PRESSURE: 130 MMHG | DIASTOLIC BLOOD PRESSURE: 72 MMHG | BODY MASS INDEX: 31.61 KG/M2 | OXYGEN SATURATION: 97 % | WEIGHT: 161 LBS | HEIGHT: 60 IN

## 2023-03-08 DIAGNOSIS — Z00.00 ENCOUNTER FOR GENERAL ADULT MEDICAL EXAMINATION W/OUT ABNORMAL FINDINGS: ICD-10-CM

## 2023-03-08 DIAGNOSIS — Z01.818 ENCOUNTER FOR OTHER PREPROCEDURAL EXAMINATION: ICD-10-CM

## 2023-03-08 DIAGNOSIS — Z01.812 ENCOUNTER FOR PREPROCEDURAL LABORATORY EXAMINATION: ICD-10-CM

## 2023-03-08 PROCEDURE — G0439: CPT

## 2023-03-08 PROCEDURE — 36415 COLL VENOUS BLD VENIPUNCTURE: CPT

## 2023-03-14 ENCOUNTER — NON-APPOINTMENT (OUTPATIENT)
Age: 73
End: 2023-03-14

## 2023-03-14 ENCOUNTER — APPOINTMENT (OUTPATIENT)
Dept: ELECTROPHYSIOLOGY | Facility: CLINIC | Age: 73
End: 2023-03-14
Payer: MEDICARE

## 2023-03-14 PROBLEM — Z00.00 HEALTH MAINTENANCE EXAMINATION: Status: ACTIVE | Noted: 2023-03-14

## 2023-03-14 PROBLEM — Z01.818 PRE-OPERATIVE EXAMINATION: Status: ACTIVE | Noted: 2023-03-08

## 2023-03-14 LAB
ALBUMIN SERPL ELPH-MCNC: 4.5 G/DL
ALP BLD-CCNC: 57 U/L
ALT SERPL-CCNC: 24 U/L
ANION GAP SERPL CALC-SCNC: 16 MMOL/L
APTT BLD: 28.6 SEC
AST SERPL-CCNC: 22 U/L
BASOPHILS # BLD AUTO: 0.04 K/UL
BASOPHILS NFR BLD AUTO: 0.6 %
BILIRUB SERPL-MCNC: 0.8 MG/DL
BUN SERPL-MCNC: 41 MG/DL
CALCIUM SERPL-MCNC: 9.4 MG/DL
CHLORIDE SERPL-SCNC: 102 MMOL/L
CHOLEST SERPL-MCNC: 172 MG/DL
CO2 SERPL-SCNC: 22 MMOL/L
CREAT SERPL-MCNC: 0.99 MG/DL
EGFR: 61 ML/MIN/1.73M2
EOSINOPHIL # BLD AUTO: 0.09 K/UL
EOSINOPHIL NFR BLD AUTO: 1.3 %
ESTIMATED AVERAGE GLUCOSE: 123 MG/DL
GLUCOSE SERPL-MCNC: 97 MG/DL
HBA1C MFR BLD HPLC: 5.9 %
HCT VFR BLD CALC: 40.3 %
HDLC SERPL-MCNC: 91 MG/DL
HGB BLD-MCNC: 12.8 G/DL
IMM GRANULOCYTES NFR BLD AUTO: 0.1 %
LDLC SERPL CALC-MCNC: 71 MG/DL
LYMPHOCYTES # BLD AUTO: 1.79 K/UL
LYMPHOCYTES NFR BLD AUTO: 25.6 %
MAN DIFF?: NORMAL
MCHC RBC-ENTMCNC: 30.9 PG
MCHC RBC-ENTMCNC: 31.8 GM/DL
MCV RBC AUTO: 97.3 FL
MONOCYTES # BLD AUTO: 0.69 K/UL
MONOCYTES NFR BLD AUTO: 9.9 %
NEUTROPHILS # BLD AUTO: 4.37 K/UL
NEUTROPHILS NFR BLD AUTO: 62.5 %
NONHDLC SERPL-MCNC: 82 MG/DL
PLATELET # BLD AUTO: 250 K/UL
POTASSIUM SERPL-SCNC: 5.1 MMOL/L
PROT SERPL-MCNC: 6.8 G/DL
RBC # BLD: 4.14 M/UL
RBC # FLD: 14.6 %
SODIUM SERPL-SCNC: 139 MMOL/L
TRIGL SERPL-MCNC: 54 MG/DL
WBC # FLD AUTO: 6.99 K/UL

## 2023-03-14 PROCEDURE — 93298 REM INTERROG DEV EVAL SCRMS: CPT

## 2023-03-14 PROCEDURE — G2066: CPT

## 2023-03-14 NOTE — HEALTH RISK ASSESSMENT
[Yes] : Yes [Monthly or less (1 pt)] : Monthly or less (1 point) [1 or 2 (0 pts)] : 1 or 2 (0 points) [Never (0 pts)] : Never (0 points) [No falls in past year] : Patient reported no falls in the past year [None] : None [# of Members in Household ___] :  household currently consist of [unfilled] member(s) [Retired] : retired [High School] : high school [] :  [# Of Children ___] : has [unfilled] children [> 15 Years] : > 15 Years [de-identified] : Walking [de-identified] : Regular, 2-3 meals per day, cooks own meals [Sexually Active] : not sexually active [MammogramComments] : 4-5 years ago [PapSmearComments] : 4-5 years ago [BoneDensityComments] : Not sure [ColonoscopyComments] : Not sure of last [de-identified] : 4-5 cigarettes per day x 25 years

## 2023-03-14 NOTE — HISTORY OF PRESENT ILLNESS
Pharmacist Note  Warfarin Dosing  Consult provided for this 61 y. o.female to manage warfarin for Atrial Fibrillation    INR Goal: 2 - 3    Home regimen/ tablet size: 4 mg daily    Drugs that may increase INR: None  Drugs that may decrease INR: None  Other current anticoagulants/ drugs that may increase bleeding risk: None  Risk factors: None  Daily INR ordered: YES    Recent Labs     03/06/19  1850   HGB 11.9   INR 2.5*     Date               INR                  Dose  3/6  2.5  4 mg  (took PTA)                                                                                Assessment/ Plan:  No warfarin needed tonight. Took dose PTA. Pharmacy will continue to monitor daily and adjust therapy as indicated. Please contact the pharmacist at  for outpatient recommendations if needed. [FreeTextEntry1] : New Patient CPE and medical clearance prior to an aneurysm embolization on 3/17 with stent and coil with Dr. Contreras on March 17th at [facility] [de-identified] : Ms. ORTIZ is a 72 year year old female that presents to the office as a new patient for a CPE. The patient also requires medical clearance prior to a aneurysm embolization procedure on 3/17. The patient has a history of COPD, s/p AAA repair, stroke, AS w/ prior open heart aortic valve replacement 2019, HTN, BETSEY, former smoker, emphysema/chronic bronchitis, GERD s/p laparoscopic vertical sleeve gastrectomy. The patient is also 11 months s/p right PCA infarct - cardioembolic due to endocarditis s/p explant and implant of prosthetic valve. The patient feels well at this time and has no complaints. She is able to 1-2 flights of stairs without stopping due to shortness of breath or chest pain. The patient was cleared by pulmonology for the proposed procedure. The patient denies any abnormal bleeding or bruising and reports no adverse reactions to anesthesia.

## 2023-03-17 ENCOUNTER — APPOINTMENT (OUTPATIENT)
Dept: NEUROLOGY | Facility: HOSPITAL | Age: 73
End: 2023-03-17

## 2023-03-17 ENCOUNTER — INPATIENT (INPATIENT)
Facility: HOSPITAL | Age: 73
LOS: 0 days | Discharge: HOME | DRG: 27 | End: 2023-03-18
Attending: PSYCHIATRY & NEUROLOGY | Admitting: PSYCHIATRY & NEUROLOGY
Payer: MEDICARE

## 2023-03-17 VITALS
OXYGEN SATURATION: 98 % | HEART RATE: 71 BPM | TEMPERATURE: 97 F | RESPIRATION RATE: 14 BRPM | DIASTOLIC BLOOD PRESSURE: 64 MMHG | SYSTOLIC BLOOD PRESSURE: 121 MMHG

## 2023-03-17 DIAGNOSIS — Z95.0 PRESENCE OF CARDIAC PACEMAKER: Chronic | ICD-10-CM

## 2023-03-17 DIAGNOSIS — Z98.890 OTHER SPECIFIED POSTPROCEDURAL STATES: Chronic | ICD-10-CM

## 2023-03-17 DIAGNOSIS — Z95.2 PRESENCE OF PROSTHETIC HEART VALVE: Chronic | ICD-10-CM

## 2023-03-17 DIAGNOSIS — I72.9 ANEURYSM OF UNSPECIFIED SITE: ICD-10-CM

## 2023-03-17 DIAGNOSIS — Z98.84 BARIATRIC SURGERY STATUS: Chronic | ICD-10-CM

## 2023-03-17 LAB
GLUCOSE BLDC GLUCOMTR-MCNC: 171 MG/DL — HIGH (ref 70–99)
MRSA PCR RESULT.: SIGNIFICANT CHANGE UP
PA ADP PRP-ACNC: 96 PRU — LOW (ref 180–376)
PLATELET RESPONSE ASPIRIN RESULT: 541 ARU — SIGNIFICANT CHANGE UP (ref 350–700)
S AUREUS DNA NOSE QL NAA+PROBE: SIGNIFICANT CHANGE UP

## 2023-03-17 PROCEDURE — 99291 CRITICAL CARE FIRST HOUR: CPT

## 2023-03-17 PROCEDURE — 61624 TCAT PERM OCCLS/EMBOLJ CNS: CPT

## 2023-03-17 PROCEDURE — 36224 PLACE CATH CAROTD ART: CPT | Mod: RT

## 2023-03-17 PROCEDURE — 75894 X-RAYS TRANSCATH THERAPY: CPT | Mod: 26

## 2023-03-17 PROCEDURE — 36228 PLACE CATH INTRACRANIAL ART: CPT | Mod: RT

## 2023-03-17 PROCEDURE — 99222 1ST HOSP IP/OBS MODERATE 55: CPT | Mod: 25

## 2023-03-17 PROCEDURE — 75898 FOLLOW-UP ANGIOGRAPHY: CPT | Mod: 26

## 2023-03-17 PROCEDURE — 70496 CT ANGIOGRAPHY HEAD: CPT | Mod: 26

## 2023-03-17 RX ORDER — IRBESARTAN 75 MG/1
1 TABLET ORAL
Qty: 0 | Refills: 0 | DISCHARGE

## 2023-03-17 RX ORDER — BUDESONIDE, MICRONIZED 100 %
0.5 POWDER (GRAM) MISCELLANEOUS
Qty: 0 | Refills: 0 | DISCHARGE

## 2023-03-17 RX ORDER — METOPROLOL TARTRATE 50 MG
50 TABLET ORAL
Refills: 0 | Status: DISCONTINUED | OUTPATIENT
Start: 2023-03-18 | End: 2023-03-18

## 2023-03-17 RX ORDER — ACETAMINOPHEN 500 MG
650 TABLET ORAL EVERY 6 HOURS
Refills: 0 | Status: DISCONTINUED | OUTPATIENT
Start: 2023-03-17 | End: 2023-03-18

## 2023-03-17 RX ORDER — ATORVASTATIN CALCIUM 80 MG/1
20 TABLET, FILM COATED ORAL AT BEDTIME
Refills: 0 | Status: DISCONTINUED | OUTPATIENT
Start: 2023-03-17 | End: 2023-03-18

## 2023-03-17 RX ORDER — INSULIN LISPRO 100/ML
VIAL (ML) SUBCUTANEOUS
Refills: 0 | Status: DISCONTINUED | OUTPATIENT
Start: 2023-03-17 | End: 2023-03-17

## 2023-03-17 RX ORDER — DEXTROSE 50 % IN WATER 50 %
12.5 SYRINGE (ML) INTRAVENOUS ONCE
Refills: 0 | Status: DISCONTINUED | OUTPATIENT
Start: 2023-03-17 | End: 2023-03-17

## 2023-03-17 RX ORDER — BUDESONIDE AND FORMOTEROL FUMARATE DIHYDRATE 160; 4.5 UG/1; UG/1
2 AEROSOL RESPIRATORY (INHALATION)
Refills: 0 | Status: DISCONTINUED | OUTPATIENT
Start: 2023-03-17 | End: 2023-03-18

## 2023-03-17 RX ORDER — FUROSEMIDE 40 MG
1 TABLET ORAL
Qty: 0 | Refills: 0 | DISCHARGE

## 2023-03-17 RX ORDER — BUDESONIDE AND FORMOTEROL FUMARATE DIHYDRATE 160; 4.5 UG/1; UG/1
2 AEROSOL RESPIRATORY (INHALATION)
Qty: 0 | Refills: 0 | DISCHARGE

## 2023-03-17 RX ORDER — LANOLIN ALCOHOL/MO/W.PET/CERES
5 CREAM (GRAM) TOPICAL AT BEDTIME
Refills: 0 | Status: DISCONTINUED | OUTPATIENT
Start: 2023-03-17 | End: 2023-03-18

## 2023-03-17 RX ORDER — TICAGRELOR 90 MG/1
90 TABLET ORAL EVERY 12 HOURS
Refills: 0 | Status: DISCONTINUED | OUTPATIENT
Start: 2023-03-17 | End: 2023-03-18

## 2023-03-17 RX ORDER — SPIRONOLACTONE 25 MG/1
25 TABLET, FILM COATED ORAL EVERY 12 HOURS
Refills: 0 | Status: DISCONTINUED | OUTPATIENT
Start: 2023-03-17 | End: 2023-03-18

## 2023-03-17 RX ORDER — POLYETHYLENE GLYCOL 3350 17 G/17G
17 POWDER, FOR SOLUTION ORAL DAILY
Refills: 0 | Status: DISCONTINUED | OUTPATIENT
Start: 2023-03-17 | End: 2023-03-18

## 2023-03-17 RX ORDER — ALBUTEROL 90 UG/1
2 AEROSOL, METERED ORAL EVERY 6 HOURS
Refills: 0 | Status: DISCONTINUED | OUTPATIENT
Start: 2023-03-17 | End: 2023-03-18

## 2023-03-17 RX ORDER — SENNA PLUS 8.6 MG/1
2 TABLET ORAL AT BEDTIME
Refills: 0 | Status: DISCONTINUED | OUTPATIENT
Start: 2023-03-17 | End: 2023-03-18

## 2023-03-17 RX ORDER — ALBUTEROL 90 UG/1
2 AEROSOL, METERED ORAL
Qty: 0 | Refills: 0 | DISCHARGE

## 2023-03-17 RX ORDER — DEXTROSE 50 % IN WATER 50 %
25 SYRINGE (ML) INTRAVENOUS ONCE
Refills: 0 | Status: DISCONTINUED | OUTPATIENT
Start: 2023-03-17 | End: 2023-03-17

## 2023-03-17 RX ORDER — ZOLPIDEM TARTRATE 10 MG/1
1 TABLET ORAL
Qty: 0 | Refills: 0 | DISCHARGE

## 2023-03-17 RX ORDER — LOSARTAN POTASSIUM 100 MG/1
100 TABLET, FILM COATED ORAL DAILY
Refills: 0 | Status: DISCONTINUED | OUTPATIENT
Start: 2023-03-17 | End: 2023-03-18

## 2023-03-17 RX ORDER — ASPIRIN/CALCIUM CARB/MAGNESIUM 324 MG
81 TABLET ORAL
Refills: 0 | Status: DISCONTINUED | OUTPATIENT
Start: 2023-03-17 | End: 2023-03-18

## 2023-03-17 RX ORDER — CHLORHEXIDINE GLUCONATE 213 G/1000ML
1 SOLUTION TOPICAL
Refills: 0 | Status: DISCONTINUED | OUTPATIENT
Start: 2023-03-17 | End: 2023-03-18

## 2023-03-17 RX ORDER — FUROSEMIDE 40 MG
40 TABLET ORAL DAILY
Refills: 0 | Status: DISCONTINUED | OUTPATIENT
Start: 2023-03-17 | End: 2023-03-18

## 2023-03-17 RX ORDER — SODIUM CHLORIDE 9 MG/ML
1000 INJECTION INTRAMUSCULAR; INTRAVENOUS; SUBCUTANEOUS
Refills: 0 | Status: DISCONTINUED | OUTPATIENT
Start: 2023-03-17 | End: 2023-03-17

## 2023-03-17 RX ORDER — PANTOPRAZOLE SODIUM 20 MG/1
40 TABLET, DELAYED RELEASE ORAL
Refills: 0 | Status: DISCONTINUED | OUTPATIENT
Start: 2023-03-17 | End: 2023-03-18

## 2023-03-17 RX ADMIN — TICAGRELOR 90 MILLIGRAM(S): 90 TABLET ORAL at 17:49

## 2023-03-17 RX ADMIN — BUDESONIDE AND FORMOTEROL FUMARATE DIHYDRATE 2 PUFF(S): 160; 4.5 AEROSOL RESPIRATORY (INHALATION) at 20:34

## 2023-03-17 RX ADMIN — ATORVASTATIN CALCIUM 20 MILLIGRAM(S): 80 TABLET, FILM COATED ORAL at 21:16

## 2023-03-17 RX ADMIN — Medication 5 MILLIGRAM(S): at 21:16

## 2023-03-17 RX ADMIN — SPIRONOLACTONE 25 MILLIGRAM(S): 25 TABLET, FILM COATED ORAL at 17:49

## 2023-03-17 RX ADMIN — SENNA PLUS 2 TABLET(S): 8.6 TABLET ORAL at 21:16

## 2023-03-17 NOTE — PROVIDER CONTACT NOTE (OTHER) - SITUATION
Pt's SBP goal 100-140. Currently SBP 90. Pt alert and asymptomatic. Hypotensive during case where regina gtt was started but d/c'ed prior to arriving to unit.

## 2023-03-17 NOTE — CONSULT NOTE ADULT - CRITICAL CARE ATTENDING COMMENT
Pt seen and examined. Agree with above assessment and plan.    Neurointact    - Q1 hour Neuro checks, Q1 hour Vitals  - HOB 30 degrees, Neck midline position  - Maintain normothermia, PO acetaminophen for temp>38 C or pain  - Monitor groin  - Pain management & Sedation: tylenol PRN

## 2023-03-17 NOTE — CONSULT NOTE ADULT - SUBJECTIVE AND OBJECTIVE BOX
HPI:  72F with PMH AS with valve replacement 2019, HTN, GERD who presented today for cerebral angiogram with aneurysm embolization. In April 2022, patient had episode of blurred vision and unsteady gait which prompted patient to the visit the ED, CTA was performed which showed acomm aneurysm. Patient had stroke w/u including MRI which showed L occipital infarct, and patient was also found to have bacterial endocarditis on the mechanical valve requiring valve replacement. Patient then presented for diagnostic cerebral angiogram in December 2022 which confirmed acomm aneurysm. Patient was prescribed Brilinta 90 BID in preparation for the procedure today. Patient admits to L side vision loss but otherwise denies HA, weakness, numbness, tingling, CP, SOB, N/V.    Patient underwent acomm aneurysm coiling and stenting, extubated post procedure. Neuro ICU was consulted for post op admission.      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 ENT note documentation/ patient to follow up with ENT prior to sx  H/O cardiac arrhythmia  Stroke  H/O sleep apnea  Pacemaker  History of cerebral aneurysm  Aortic valve replaced  S/P right knee arthroscopy  S/P wrist surgery, right  History of loop recorder  H/O aortic valve replacement  cardiac pacemaker  H/O gastric bypass      FAMILY HISTORY:  FH: HTN (hypertension) (Father)      Allergies  No Known Allergies      REVIEW OF SYSTEMS  Negative except as noted in HPI  CONSTITUTIONAL: No fever, weight loss, or fatigue  EYES: No eye pain, visual disturbances, or discharge  ENMT:  No difficulty hearing, tinnitus, vertigo; No sinus or throat pain  NECK: No pain or stiffness  RESPIRATORY: No cough, wheezing, chills or hemoptysis; No shortness of breath  CARDIOVASCULAR: No chest pain, palpitations, dizziness, or leg swelling  GASTROINTESTINAL: No abdominal or epigastric pain. No nausea, vomiting, or hematemesis; No diarrhea or constipation. No melena or hematochezia.  GENITOURINARY: No dysuria, frequency, hematuria, or incontinence  NEUROLOGICAL: No headaches, memory loss, loss of strength, numbness, or tremors  MUSCULOSKELETAL: No joint pain or swelling; No muscle, back, or extremity pain      HOME MEDICATIONS:  Home Medications:  albuterol 90 mcg/inh inhalation aerosol: 2 puff(s) inhaled every 6 hours, As Needed (03 Mar 2023 14:55)  Brilinta (ticagrelor) 90 mg oral tablet: 1 tab(s) orally 2 times a day (03 Mar 2023 14:55)  budesonide: 0.5 milligram(s) orally once a day (03 Mar 2023 14:55)  furosemide 40 mg oral tablet: 1 tab(s) orally once a day (03 Mar 2023 14:55)  irbesartan 300 mg oral tablet: 1 tab(s) orally once a day (03 Mar 2023 14:55)  Symbicort 160 mcg-4.5 mcg/inh inhalation aerosol: 2 puff(s) inhaled 2 times a day, As Needed (03 Mar 2023 14:55)  zolpidem 10 mg oral tablet: 1 tab(s) orally once a day (at bedtime) (03 Mar 2023 14:55)      Vital Signs Last 24 Hrs  BP: 156/78  HR: 55  SpO2: 100%  RR: 10      Physical Exam:  Constitutional: NAD, lying in bed  Neuro  * Mental Status:  GCS 15: Awake, alert, oriented to conversation. No aphasia or difficulty speaking. No dysarthria.  * Cranial Nerves: PERRL, EOMI, tongue midline, no gaze deviation. L homonymous hemianopsia   * Motor: RUE 5/5, LUE 5/5, RLE 5/5, LLE 5/5, no drift or dysmetria  * Sensory: Sensation intact to light touch  * Reflexes: not assessed   Cardiovascular: Regular rate and rhythm.  Eyes: See neurologic examination with detailed examination of eyes.  ENT: No JVD, Trachea Midline  Respiratory: non labored breathing   Gastrointestinal: Soft, nontender, nondistended.  Genitourinary: [ ] Sharma, [ x ] No Sharma.   Musculoskeletal: No muscle wasting noted, (See neurologic assessment for full muscle strength assessment)   Skin:  no wounds, no redness, no abrasions noted  Hematologic / Lymph / Immunologic: No bleeding from IV sites or wounds, s      LABS:  3/3/23: WBC 7.3, hgb 12.0, hct 37.4, platelets 225  3/3/23: PTT 28.8, INR 1.0  3/3/23: Na 139, K 4.7, Cl 102, CO2 25, BUN 33.9, Cr 1.10, glucose 99      RADIOLOGY & ADDITIONAL STUDIES:  No new imaging to review

## 2023-03-17 NOTE — CHART NOTE - NSCHARTNOTEFT_GEN_A_CORE
Neurointerventional Surgery  Pre-Procedure Note     This is a 72y R hand dominant Female    HPI:  72F with PMH AS with valve replacement 2019, HTN, GERD who presents today for cerebral angiogram with aneurysm embolization. In April 2022, patient had episode of blurred vision and unsteady gait which prompted patient to the visit the ED, CTA was performed which showed acomm aneurysm. Patient had stroke w/u including MRI which showed L occipital infarct, and patient was also found to have bacterial endocarditis on the mechanical valve requiring valve replacement. Patient then presented for diagnostic cerebral angiogram in December 2022 which confirmed acomm aneurysm. Patient was prescribed Brilinta 90 BID in preparation for the procedure today. Patient admits to L side vision loss but otherwise denies HA, weakness, numnbess, tingling, CP, SOB, N/V.       Allergies: No Known Allergies      PAST MEDICAL & SURGICAL HISTORY:  Acid reflux      HTN (hypertension)      Ex-smoker      COPD (chronic obstructive pulmonary disease)  w/ Emphysema and chronic bronchitis no recent exacerb/no intubation hx      Obesity (BMI 30-39.9)      Aortic valve stenosis, etiology of cardiac valve disease unspecified      Leukoplakia of vocal cords  left vocal cord 4/2017 ENT note documentation/ patient to follow up with ENT prior to sx      H/O cardiac arrhythmia      Stroke      H/O sleep apnea      Pacemaker      History of cerebral aneurysm      Aortic valve replaced      S/P right knee arthroscopy      S/P wrist surgery  right      History of loop recorder      H/O aortic valve replacement      Cardiac pacemaker      H/O gastric bypass          Social History:   Social History:    FAMILY HISTORY:  FH: HTN (hypertension) (Father)        Current Medications:       Physical Exam:  Constitutional: NAD, lying in bed  Neuro  * Mental Status:  GCS 15: Awake, alert, oriented to conversation. No aphasia or difficulty speaking. No dysarthria. Able to name objects and their function.  * Cranial Nerves: Cnii-Cnxii grossly intact. PERRL, EOMI, tongue midline, no gaze deviation  * Motor: RUE 5/5, LUE 5/5, RLE 5/5, LLE 5/5, no drift or dysmetria  * Sensory: Sensation intact to light touch  * Reflexes: not assessed   Cardiovascular: Regular rate and rhythm.  Eyes: See neurologic examination with detailed examination of eyes.  ENT: No JVD, Trachea Midline  Respiratory: non labored breathing   Gastrointestinal: Soft, nontender, nondistended.  Genitourinary: [ ] Sharma, [ x ] No Sharma.   Musculoskeletal: No muscle wasting noted, (See neurologic assessment for full muscle strength assessment) No pretibial edema appreciated, no appreciable calf tenderness.  Skin:  no wounds, no redness, no abrasions noted  Hematologic / Lymph / Immunologic: No bleeding from IV sites or wounds, No lymphadenopathy, No Hives or allergic type skin lesions    NIH SS:  DATE:  TIME:  1A: Level of consciousness (0-3): 0  1B: Questions (0-2): 0    1C: Commands (0-2): 0  2: Gaze (0-2): 0  3: Visual fields (0-3): 0  4: Facial palsy (0-3): 0  MOTOR:  5A: Left arm motor drift (0-4): 0  5B: Right arm motor drift (0-4): 0  6A: Left leg motor drift (0-4): 0  6B: Right leg motor drift (0-4): 0  7: Limb ataxia (0-2): 0  SENSORY:  8: Sensation (0-2): 0  SPEECH:  9: Language (0-3): 0  10: Dysarthria (0-2): 0  EXTINCTION:  11: Extinction/inattention (0-2): 0    TOTAL SCORE:     Labs:                         Assessment/Plan:   This is a 72y  year old right / left hand dominant Female  presents with   Patient presents to neuro-IR for selective cerebral angiography.     Procedure, goals, risks, benefits and alternatives  were discussed with patient and (patient's family).  All questions were answered.  Risks include but are not limited to stroke, vessel injury, hemorrhage, and or groin hematoma.  Patient demonstrates understanding  of all risks involved with this procedure and wishes to continue.   Appropriate  consent was obtained from patient and consent is in the patient's chart. Neurointerventional Surgery  Pre-Procedure Note     This is a 72y R hand dominant Female    HPI:  72F with PMH AS with valve replacement 2019, HTN, GERD who presents today for cerebral angiogram with aneurysm embolization. In April 2022, patient had episode of blurred vision and unsteady gait which prompted patient to the visit the ED, CTA was performed which showed acomm aneurysm. Patient had stroke w/u including MRI which showed L occipital infarct, and patient was also found to have bacterial endocarditis on the mechanical valve requiring valve replacement. Patient then presented for diagnostic cerebral angiogram in December 2022 which confirmed acomm aneurysm. Patient was prescribed Brilinta 90 BID in preparation for the procedure today. Patient admits to L side vision loss but otherwise denies HA, weakness, numbness, tingling, CP, SOB, N/V.       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 No aphasia or difficulty speaking. No dysarthria. Able to name objects and their function.  * Cranial Nerves: PERRL, EOMI, tongue midline, no gaze deviation, L homonymous hemianopsia   * Motor: RUE 5/5, LUE 5/5, RLE 5/5, LLE 5/5, no drift or dysmetria  * Sensory: Sensation intact to light touch  * Reflexes: not assessed   Cardiovascular: Regular rate and rhythm.  Eyes: See neurologic examination with detailed examination of eyes.  ENT: No JVD, Trachea Midline  Respiratory: non labored breathing   Gastrointestinal: Soft, nontender, nondistended.  Genitourinary: [ ] Sharma, [ x ] No Sharma.   Musculoskeletal: No muscle wasting noted, (See neurologic assessment for full muscle strength assessment)  Skin:  no wounds, no redness, no abrasions noted  Hematologic / Lymph / Immunologic: No bleeding from IV sites or wounds,       [facility] SS:  DATE: 3/17/23  TIME: 0750  1A: Level of consciousness (0-3): 0  1B: Questions (0-2): 0    1C: Commands (0-2): 0  2: Gaze (0-2): 0  3: Visual fields (0-3): 1  4: Facial palsy (0-3): 0  MOTOR:  5A: Left arm motor drift (0-4): 0  5B: Right arm motor drift (0-4): 0  6A: Left leg motor drift (0-4): 0  6B: Right leg motor drift (0-4): 0  7: Limb ataxia (0-2): 0  SENSORY:  8: Sensation (0-2): 0  SPEECH:  9: Language (0-3): 0  10: Dysarthria (0-2): 0  EXTINCTION:  11: Extinction/inattention (0-2): 0    TOTAL SCORE: 1      Labs:   3/3/23: WBC 7.3, hgb 12.0, hct 37.4, platelets 225  3/3/23: PTT 28.8, INR 1.0  3/3/23: Na 139, K 4.7, Cl 102, CO2 25, BUN 33.9, Cr 1.10, glucose 99      Assessment/Plan:   This is a 72y  year old right hand dominant Female  presents with acomm aneurysm. Patient presents to neuro-IR for selective cerebral angiography with embolization.    Procedure, goals, risks, benefits and alternatives  were discussed with patient and.  All questions were answered.  Risks include but are not limited to stroke, vessel injury, hemorrhage, and or groin hematoma.  Patient demonstrates understanding  of all risks involved with this procedure and wishes to continue.   Appropriate  consent was obtained from patient and consent is in the patient's chart.

## 2023-03-17 NOTE — PATIENT PROFILE ADULT - FALL HARM RISK - HARM RISK INTERVENTIONS

## 2023-03-17 NOTE — CONSULT NOTE ADULT - ASSESSMENT
Assessment:  72F with PMH AS s/p valve repair and replacement 2/2 endocarditis, CVA, HTN, GERD who presented for elective acomm aneurysm embolization.      Plan:  Neuro:  - Q1 hour Neuro checks, Q1 hour Vitals  - HOB 30 degrees, Neck midline position  - Maintain normothermia, PO acetaminophen for temp>38 C or pain  - Monitor groin  - Pain management & Sedation: tylenol PRN  - Turn and Position Q2  /  Activity ad denisha, with assistance  	  CV:  - SBP Goal 100-140  - BP regimen: PRN hydralazine / labetalol, losartan 100, lasix 40     Pulm:  - Symbicort, budesonide, albuterol   - Supplemental O2 PRN to maintain Spo2>92%  - Chest PT, OOB, Pulmonary Toilet    GI:  - Nutrition: advance as tolerated  - Bowel regimen: senna, miralax   	  Gu:  - Sharma   - Fluids: NS @ 75   - I&O Q1 hour  - Monitor Electrolytes & Renal Function    Heme:  - Monitor H&H  - ASA 81, Brilinta 90 BID, P2Y12 in am   - Chemical DVT prophylaxis: * Chemical DVT prophylaxis is contraindicated due to risk of bleeding  - Mechanical DVT Prophylaxis: Maintain B/L LE sequential compression devices  	  ID:  - Monitor WBC and Temperature  		  Endo  - Monitor BGL, maintain <180 Assessment:  72F with PMH AS s/p valve repair and replacement 2/2 endocarditis, CVA, HTN, GERD who presented for elective acomm aneurysm embolization.      Plan:  Neuro:  - Q1 hour Neuro checks, Q1 hour Vitals  - HOB 30 degrees, Neck midline position  - Maintain normothermia, PO acetaminophen for temp>38 C or pain  - Monitor groin  - Pain management & Sedation: tylenol PRN  - Turn and Position Q2  /  Activity ad denisha, with assistance  	  CV:  - SBP Goal 100-140  - BP regimen: losartan 100, lasix 40, spironolactone 25 BID, metoprolol 50 ER daily   - Atorvastatin 20      Pulm:  - Symbicort, albuterol PRN    - Supplemental O2 PRN to maintain Spo2>92%  - Chest PT, OOB, Pulmonary Toilet    GI:  - Nutrition: advance as tolerated  - Bowel regimen: senna, miralax   - Protonix 40   	  Gu:  - Sharma   - Fluids: NS @ 75   - I&O Q1 hour  - Monitor Electrolytes & Renal Function    Heme:  - Monitor H&H  - ASA 81, Brilinta 90 BID, P2Y12 in am   - Chemical DVT prophylaxis: * Chemical DVT prophylaxis is contraindicated due to risk of bleeding  - Mechanical DVT Prophylaxis: Maintain B/L LE sequential compression devices  	  ID:  - Monitor WBC and Temperature  		  Endo  - Monitor BGL, maintain <180

## 2023-03-17 NOTE — CHART NOTE - NSCHARTNOTEFT_GEN_A_CORE
Neurointerventional Surgery Post Procedure Note    Procedure: Selective Cerebral Angiography     Pre- Procedure Diagnosis: acomm aneurysm   Post- Procedure Diagnosis: acomm aneurysm s/p coil and stent embolization    : Dr. Johnson  Physician Assistant: Safia Cook   Nurse: Fatemeh Cristobal  Anesthesiologist: Dr. Josue                    Radiology Tech: Naldo Heath     Sheath:  5 [place] Sheath    I/Os: estimated blood loss less than 10cc,  IV fluids 1500cc, Urine output 700cc, Contrast: visipaque  90cc, ABX 2g ancef, 5000 units heparin     Coils: 4x6, 3.5x6, 2.5x4, 2.5x4, 2x2, 2x2, 1x2, atlas 3x21 stent    Vitals:  /58  HR 77  Spo2 99 %    Preliminary Report:  Under a 5 [place] sheath via the right groin under MAC sedation via right internal carotid artery, a selective cerebral angiography  was performed and reveals acomm aneurysm s/p coil and stent embolization. ( Official note to follow).    Patient tolerated procedure well.  Patient remains hemodynamically stable, no change in neurological status compared to baseline.  Results were discussed with patient, patient's family and Neurosurgery.  Right groin sheath  was discontinued using Star [employer] device. Hemostasis was obtained with approximately 15 minutes of manual compression.     No active bleeding, no hematoma, no ecchymosis.   Quick clot and safeguard balloon dressing applied at 1200  Patient transferred to neuro ICU in stable condition.

## 2023-03-17 NOTE — PROVIDER CONTACT NOTE (OTHER) - ACTION/TREATMENT ORDERED:
Dr. Ivey to address SBP goal with neurosx team with the new goal of .
PA aware. No new orders or recommendations.

## 2023-03-18 ENCOUNTER — TRANSCRIPTION ENCOUNTER (OUTPATIENT)
Age: 73
End: 2023-03-18

## 2023-03-18 VITALS — TEMPERATURE: 98 F

## 2023-03-18 LAB
ANION GAP SERPL CALC-SCNC: 12 MMOL/L — SIGNIFICANT CHANGE UP (ref 5–17)
BUN SERPL-MCNC: 43 MG/DL — HIGH (ref 8–20)
CALCIUM SERPL-MCNC: 8.8 MG/DL — SIGNIFICANT CHANGE UP (ref 8.4–10.5)
CHLORIDE SERPL-SCNC: 102 MMOL/L — SIGNIFICANT CHANGE UP (ref 96–108)
CO2 SERPL-SCNC: 20 MMOL/L — LOW (ref 22–29)
CREAT SERPL-MCNC: 1.09 MG/DL — SIGNIFICANT CHANGE UP (ref 0.5–1.3)
EGFR: 54 ML/MIN/1.73M2 — LOW
GLUCOSE SERPL-MCNC: 159 MG/DL — HIGH (ref 70–99)
HCT VFR BLD CALC: 30.9 % — LOW (ref 34.5–45)
HGB BLD-MCNC: 10.3 G/DL — LOW (ref 11.5–15.5)
MAGNESIUM SERPL-MCNC: 2 MG/DL — SIGNIFICANT CHANGE UP (ref 1.6–2.6)
MCHC RBC-ENTMCNC: 30.8 PG — SIGNIFICANT CHANGE UP (ref 27–34)
MCHC RBC-ENTMCNC: 33.3 GM/DL — SIGNIFICANT CHANGE UP (ref 32–36)
MCV RBC AUTO: 92.5 FL — SIGNIFICANT CHANGE UP (ref 80–100)
PA ADP PRP-ACNC: 95 PRU — LOW (ref 180–376)
PHOSPHATE SERPL-MCNC: 4.1 MG/DL — SIGNIFICANT CHANGE UP (ref 2.4–4.7)
PLATELET # BLD AUTO: 188 K/UL — SIGNIFICANT CHANGE UP (ref 150–400)
POTASSIUM SERPL-MCNC: 4.8 MMOL/L — SIGNIFICANT CHANGE UP (ref 3.5–5.3)
POTASSIUM SERPL-SCNC: 4.8 MMOL/L — SIGNIFICANT CHANGE UP (ref 3.5–5.3)
RBC # BLD: 3.34 M/UL — LOW (ref 3.8–5.2)
RBC # FLD: 14 % — SIGNIFICANT CHANGE UP (ref 10.3–14.5)
SODIUM SERPL-SCNC: 134 MMOL/L — LOW (ref 135–145)
WBC # BLD: 9.64 K/UL — SIGNIFICANT CHANGE UP (ref 3.8–10.5)
WBC # FLD AUTO: 9.64 K/UL — SIGNIFICANT CHANGE UP (ref 3.8–10.5)

## 2023-03-18 PROCEDURE — C1887: CPT

## 2023-03-18 PROCEDURE — 83735 ASSAY OF MAGNESIUM: CPT

## 2023-03-18 PROCEDURE — 87640 STAPH A DNA AMP PROBE: CPT

## 2023-03-18 PROCEDURE — 36415 COLL VENOUS BLD VENIPUNCTURE: CPT

## 2023-03-18 PROCEDURE — 94640 AIRWAY INHALATION TREATMENT: CPT

## 2023-03-18 PROCEDURE — C1894: CPT

## 2023-03-18 PROCEDURE — 99233 SBSQ HOSP IP/OBS HIGH 50: CPT

## 2023-03-18 PROCEDURE — 76377 3D RENDER W/INTRP POSTPROCES: CPT

## 2023-03-18 PROCEDURE — 85576 BLOOD PLATELET AGGREGATION: CPT

## 2023-03-18 PROCEDURE — 75898 FOLLOW-UP ANGIOGRAPHY: CPT

## 2023-03-18 PROCEDURE — C9399: CPT

## 2023-03-18 PROCEDURE — 80048 BASIC METABOLIC PNL TOTAL CA: CPT

## 2023-03-18 PROCEDURE — 85027 COMPLETE CBC AUTOMATED: CPT

## 2023-03-18 PROCEDURE — 87641 MR-STAPH DNA AMP PROBE: CPT

## 2023-03-18 PROCEDURE — 36228 PLACE CATH INTRACRANIAL ART: CPT

## 2023-03-18 PROCEDURE — C1769: CPT

## 2023-03-18 PROCEDURE — 75894 X-RAYS TRANSCATH THERAPY: CPT

## 2023-03-18 PROCEDURE — 82962 GLUCOSE BLOOD TEST: CPT

## 2023-03-18 PROCEDURE — 36224 PLACE CATH CAROTD ART: CPT

## 2023-03-18 PROCEDURE — 61624 TCAT PERM OCCLS/EMBOLJ CNS: CPT

## 2023-03-18 PROCEDURE — 84100 ASSAY OF PHOSPHORUS: CPT

## 2023-03-18 PROCEDURE — C1889: CPT

## 2023-03-18 RX ORDER — TICAGRELOR 90 MG/1
1 TABLET ORAL
Qty: 0 | Refills: 0 | DISCHARGE

## 2023-03-18 RX ORDER — ASPIRIN/CALCIUM CARB/MAGNESIUM 324 MG
1 TABLET ORAL
Qty: 90 | Refills: 0
Start: 2023-03-18 | End: 2023-06-15

## 2023-03-18 RX ORDER — TICAGRELOR 90 MG/1
1 TABLET ORAL
Qty: 180 | Refills: 0
Start: 2023-03-18 | End: 2023-06-15

## 2023-03-18 RX ADMIN — Medication 81 MILLIGRAM(S): at 06:14

## 2023-03-18 RX ADMIN — LOSARTAN POTASSIUM 100 MILLIGRAM(S): 100 TABLET, FILM COATED ORAL at 06:15

## 2023-03-18 RX ADMIN — Medication 40 MILLIGRAM(S): at 06:14

## 2023-03-18 RX ADMIN — PANTOPRAZOLE SODIUM 40 MILLIGRAM(S): 20 TABLET, DELAYED RELEASE ORAL at 06:15

## 2023-03-18 RX ADMIN — BUDESONIDE AND FORMOTEROL FUMARATE DIHYDRATE 2 PUFF(S): 160; 4.5 AEROSOL RESPIRATORY (INHALATION) at 08:21

## 2023-03-18 RX ADMIN — SPIRONOLACTONE 25 MILLIGRAM(S): 25 TABLET, FILM COATED ORAL at 06:14

## 2023-03-18 RX ADMIN — Medication 50 MILLIGRAM(S): at 06:14

## 2023-03-18 RX ADMIN — CHLORHEXIDINE GLUCONATE 1 APPLICATION(S): 213 SOLUTION TOPICAL at 06:15

## 2023-03-18 RX ADMIN — TICAGRELOR 90 MILLIGRAM(S): 90 TABLET ORAL at 06:15

## 2023-03-18 NOTE — DISCHARGE NOTE PROVIDER - HOSPITAL COURSE
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 Patient underwent angiogram 3/17/23 s/p stent assisted coil embolization which resulted in complete occlusion by the coil mass. Patient is stable for discharge home with plan for close follow up with Dr. Contreras.

## 2023-03-18 NOTE — PROGRESS NOTE ADULT - SUBJECTIVE AND OBJECTIVE BOX
Neurointerventional follow up    HPI:   73 YO F who was found to have Acom aneurysm underwent endovascular procedure yesterday with coiling of Acom aneurysm. Procedure went well and pt denies any focal neurological symptoms today      REVIEW OF SYSTEMS: 10 systems were reviewed and are negative except whats in HPI  PMH: Acid reflux, HTN (hypertension), BETSEY on CPAP, ,Ex-smoker, COPD (chronic obstructive pulmonary disease), Obesity (BMI 30-39.9), Aortic valve stenosis, etiology of cardiac valve disease unspecified, Leukoplakia of vocal cords,  H/O cardiac arrhythmia, Stroke, H/O sleep, apnea, Pacemaker, History of cerebral aneurysm, Aortic valve replaced     PSH: S/P right knee arthroscopy, S/P wrist surgery, History of loop recorder, H/O aortic valve replacement, Cardiac pacemaker, H/O gastric bypass    FAMILY HISTORY: FH: HTN (hypertension) (Father)  SOCIAL HISTORY:  No history of tobacco or alcohol use   Allergies: No Known Allergies    Vital Signs Last 24 Hrs  T(C): 36.7 (18 Mar 2023 12:01), Max: 36.7 (17 Mar 2023 16:40)  T(F): 98.1 (18 Mar 2023 12:01), Max: 98.1 (18 Mar 2023 12:01)  HR: 69 (18 Mar 2023 12:00) (60 - 73)  BP: 115/68 (18 Mar 2023 12:00) (82/46 - 144/99)  BP(mean): 83 (18 Mar 2023 12:00) (59 - 115)  RR: 19 (18 Mar 2023 12:00) (14 - 23)  SpO2: 99% (18 Mar 2023 12:00) (94% - 100%)    Parameters below as of 18 Mar 2023 12:00  Patient On (Oxygen Delivery Method): room air      MEDICATIONS    acetaminophen     Tablet .. 650 milliGRAM(s) Oral every 6 hours PRN  albuterol    90 MICROgram(s) HFA Inhaler 2 Puff(s) Inhalation every 6 hours PRN  aspirin  chewable 81 milliGRAM(s) Oral <User Schedule>  atorvastatin 20 milliGRAM(s) Oral at bedtime  budesonide 160 MICROgram(s)/formoterol 4.5 MICROgram(s) Inhaler 2 Puff(s) Inhalation two times a day  chlorhexidine 2% Cloths 1 Application(s) Topical <User Schedule>  furosemide    Tablet 40 milliGRAM(s) Oral daily  losartan 100 milliGRAM(s) Oral daily  melatonin 5 milliGRAM(s) Oral at bedtime  metoprolol succinate ER 50 milliGRAM(s) Oral <User Schedule>  pantoprazole    Tablet 40 milliGRAM(s) Oral before breakfast  polyethylene glycol 3350 17 Gram(s) Oral daily  senna 2 Tablet(s) Oral at bedtime  spironolactone 25 milliGRAM(s) Oral every 12 hours  ticagrelor 90 milliGRAM(s) Oral every 12 hours        LABS:  CBC Full  -  ( 18 Mar 2023 03:11 )  WBC Count : 9.64 K/uL  RBC Count : 3.34 M/uL  Hemoglobin : 10.3 g/dL  Hematocrit : 30.9 %  Platelet Count - Automated : 188 K/uL  Mean Cell Volume : 92.5 fl  Mean Cell Hemoglobin : 30.8 pg  Mean Cell Hemoglobin Concentration : 33.3 gm/dL  Auto Neutrophil # : x  Auto Lymphocyte # : x  Auto Monocyte # : x  Auto Eosinophil # : x  Auto Basophil # : x  Auto Neutrophil % : x  Auto Lymphocyte % : x  Auto Monocyte % : x  Auto Eosinophil % : x  Auto Basophil % : x      03-18    134<L>  |  102  |  43.0<H>  ----------------------------<  159<H>  4.8   |  20.0<L>  |  1.09    Ca    8.8      18 Mar 2023 03:11  Phos  4.1     03-18  Mg     2.0     03-18      On Neurological Examination:  GENERAL Exam:     Nontoxic , No Acute Distress   Mental Status - Patient is  awake, alert and oriented X3.  Speech is fluent. Patient can name, repeat and follow commands correctly. There is no dysarthria.   Cranial Nerves - PERRL, EOMI,  Visual fields are full to finger counting, no gross facial asymmetry, tongue/uvula midline  Motor Exam -   Right upper ---5/5 No drift  Left upper ---5/5 No drift  Right lower ---5/5 No drift  Left lower  ---5/5 No drift  Sensory: Intact to light touch and pinprick bilaterally  Coord: FTN intact bilaterally   Gait -  Not assessed.                              NIHSS 0

## 2023-03-18 NOTE — DISCHARGE NOTE PROVIDER - NSDCCPTREATMENT_GEN_ALL_CORE_FT
PRINCIPAL PROCEDURE  Procedure: Embolization, aneurysm, intracranial  Findings and Treatment: S/p stent assisted coiling of anterior communicating artery aneurysm 3/17/23 with Dr. Trino Contreras

## 2023-03-18 NOTE — PROGRESS NOTE ADULT - ASSESSMENT
Assessment:  72F with PMH AS s/p valve repair and replacement 2/2 endocarditis, CVA, HTN, GERD who presented for elective acomm aneurysm embolization.      Plan:  Neuro:  - Q4hour Neuro checks, Q1 hour Vitals  - HOB 30 degrees, Neck midline position  - Maintain normothermia, PO acetaminophen for temp>38 C or pain  - Monitor groin  - Pain management & Sedation: tylenol PRN  - Turn and Position Q2  /  Activity ad denisha, with assistance  	  CV:  - SBP Goal 100-140  - BP regimen: losartan 100, lasix 40, spironolactone 25 BID, metoprolol 50 ER daily   - Atorvastatin 20      Pulm:  - Symbicort, albuterol PRN    - Supplemental O2 PRN to maintain Spo2>92%  - Chest PT, OOB, Pulmonary Toilet    GI:  - Nutrition: advance as tolerated  - Bowel regimen: senna, miralax   - Protonix 40   	  Gu:  - I&O Q1 hour  - Monitor Electrolytes & Renal Function    Heme:  - Monitor H&H  - ASA 81, Brilinta 90 BID, P2Y12 in am   - Chemical DVT prophylaxis: * Chemical DVT prophylaxis is contraindicated due to risk of bleeding  - Mechanical DVT Prophylaxis: Maintain B/L LE sequential compression devices  	  ID:  - Monitor WBC and Temperature  		  Endo  - Monitor BGL, maintain <180 Assessment:  72F with PMH AS s/p valve repair and replacement 2/2 endocarditis, CVA, HTN, GERD who presented for elective acomm aneurysm embolization.      Plan:  Neuro:  - Q4hour Neuro checks, Q1 hour Vitals  - HOB 30 degrees, Neck midline position  - Maintain normothermia, PO acetaminophen for temp>38 C or pain  - Monitor groin  - Pain management & Sedation: tylenol PRN  - Turn and Position Q2  /  Activity ad denisha, with assistance  - continue ASA and brillinta  	  CV:  - SBP Goal 100-140  - BP regimen: losartan 100, lasix 40, spironolactone 25 BID, metoprolol 50 ER daily   - Atorvastatin 20      Pulm:  - Symbicort, albuterol PRN    - Supplemental O2 PRN to maintain Spo2>92%  - Chest PT, OOB, Pulmonary Toilet    GI:  - Nutrition: advance as tolerated  - Bowel regimen: senna, miralax   - Protonix 40   	  Gu:  - I&O Q1 hour  - Monitor Electrolytes & Renal Function    Heme:  - Monitor H&H  - ASA 81, Brilinta 90 BID   - Chemical DVT prophylaxis: * Chemical DVT prophylaxis is contraindicated due to risk of bleeding  - Mechanical DVT Prophylaxis: Maintain B/L LE sequential compression devices  	  ID:  - Monitor WBC and Temperature  		  Endo  - Monitor BGL, maintain <180

## 2023-03-18 NOTE — DISCHARGE NOTE NURSING/CASE MANAGEMENT/SOCIAL WORK - NSDCPEFALRISK_GEN_ALL_CORE
For information on Fall & Injury Prevention, visit: [email or web address] OR  [email or web address] OR  https://www.cdc.gov/steadi/patient.html

## 2023-03-18 NOTE — DISCHARGE NOTE PROVIDER - NSDCCPCAREPLAN_GEN_ALL_CORE_FT
PRINCIPAL DISCHARGE DIAGNOSIS  Diagnosis: Anterior communicating artery aneurysm  Assessment and Plan of Treatment:

## 2023-03-18 NOTE — DISCHARGE NOTE PROVIDER - CARE PROVIDER_API CALL
Trino Contreras)  Neurology; Vascular Neurology  [address] [place] [address]  Phone: [phone]  Fax: [phone]  Follow Up Time: 1 week

## 2023-03-18 NOTE — PROGRESS NOTE ADULT - ASSESSMENT
73 YO F w/Acom aneurysm s/p coiling - neurologically intact  - continue ASA 81mg daily and Brilinta BID  - outpatient follow up w/Dr. Contreras in one week

## 2023-03-18 NOTE — DISCHARGE NOTE PROVIDER - NSDCFUADDINST_GEN_ALL_CORE_FT
You had treatment of your anterior communicating artery aneurysm with atlas stenting and coils. It is important that you do not miss any doses of your antiplatelet medications. If you miss a dose, you are at risk of causing a large stroke! You should take your Brilinta 90 mg every morning and night, and Aspirin 81 mg daily.     Groin incision care: You may remove your groin dressing today. You may shower today 3/18/23. Do not keep your leg submerged in water for 3 days after your procedure (avoid baths, hot tubs, pools, etc.). Avoid any heavy lifting, straining, or strenuous exercise for 5 days. If you have any bleeding in your groin, hold pressure in the area for 10-15 minutes. If the bleeding does not stop, call the office of Dr. Contreras and present to your nearest emergency room for urgent evaluation. If you have any pain, use Tylenol 650 mg every 6 hours as needed, with a maximum daily dose of 4000 mg. Avoid using medications such as Ibuprofen, Motrin, or Advil, as these can increase your risk for bleeding.     Follow up with Dr. Contreras in his office in 1-2 weeks.    Call our office if you have any other questions or concerns! You had treatment of your anterior communicating artery aneurysm with atlas stenting and coils. It is important that you do not miss any doses of your antiplatelet medications. If you miss a dose, you are at risk of causing a large stroke! You should take your Brilinta 90 mg every morning and night, and Aspirin 81 mg daily.     Groin incision care: You may remove your groin dressing today. You may shower today 3/18/23. Do not keep your leg submerged in water for 3 days after your procedure (avoid baths, hot tubs, pools, etc.). Avoid any heavy lifting, straining, or strenuous exercise for 5 days. If you have any bleeding in your groin, hold pressure in the area for 10-15 minutes. If the bleeding does not stop, call the office of Dr. Contreras and present to your nearest emergency room for urgent evaluation. If you have any pain, use Tylenol 650 mg every 6 hours as needed, with a maximum daily dose of 4000 mg. Avoid using medications such as Ibuprofen, Motrin, or Advil, as these can increase your risk for bleeding.     Follow up with Dr. Contreras in his office in 1 week.    Call our office if you have any other questions or concerns!

## 2023-03-18 NOTE — DISCHARGE NOTE NURSING/CASE MANAGEMENT/SOCIAL WORK - PATIENT PORTAL LINK FT
You can access the FollowMyHealth Patient Portal offered by [facility] by registering at the following website: [email or web address]. By joining [employer]’s FollowMyHealth portal, you will also be able to view your health information using other applications (apps) compatible with our system.

## 2023-03-18 NOTE — DISCHARGE NOTE PROVIDER - NSDCMRMEDTOKEN_GEN_ALL_CORE_FT
albuterol 90 mcg/inh inhalation aerosol: 2 puff(s) inhaled every 6 hours, As Needed  aspirin 81 mg oral delayed release tablet: 1 tab(s) orally once a day  atorvastatin 20 mg oral tablet: 1 tab(s) orally once a day (at bedtime)  Brilinta (ticagrelor) 90 mg oral tablet: 1 tab(s) orally 2 times a day  budesonide: 0.5 milligram(s) orally once a day  furosemide 40 mg oral tablet: 1 tab(s) orally once a day  irbesartan 300 mg oral tablet: 1 tab(s) orally once a day  metoprolol succinate 50 mg oral tablet, extended release: 1 tab(s) orally once a day  pantoprazole 40 mg oral delayed release tablet: 1 tab(s) orally once a day (before a meal)  spironolactone 25 mg oral tablet: 1 tab(s) orally every 12 hours  Symbicort 160 mcg-4.5 mcg/inh inhalation aerosol: 2 puff(s) inhaled 2 times a day, As Needed  zolpidem 10 mg oral tablet: 1 tab(s) orally once a day (at bedtime)

## 2023-03-18 NOTE — PROGRESS NOTE ADULT - SUBJECTIVE AND OBJECTIVE BOX
Chief complaint:   Patient is a 72y old  Female who presents with a chief complaint of   HPI:        24hr EVENTS:      ROS: [ ]  Unable to assess due to mental status   All other systems negative    -----------------------------------------------------------------------------------------------------------------------------------------------------------------------------------  ICU Vital Signs Last 24 Hrs  T(C): 36.7 (18 Mar 2023 07:46), Max: 36.7 (17 Mar 2023 16:40)  T(F): 98 (18 Mar 2023 07:46), Max: 98 (17 Mar 2023 16:40)  HR: 64 (18 Mar 2023 07:00) (60 - 80)  BP: 94/82 (18 Mar 2023 07:00) (90/69 - 144/99)  BP(mean): 88 (18 Mar 2023 07:00) (60 - 115)  ABP: --  ABP(mean): --  RR: 19 (18 Mar 2023 07:00) (14 - 23)  SpO2: 95% (18 Mar 2023 07:00) (94% - 100%)    O2 Parameters below as of 18 Mar 2023 07:00  Patient On (Oxygen Delivery Method): room air            I&O's Summary    17 Mar 2023 07:01  -  18 Mar 2023 07:00  --------------------------------------------------------  IN: 1890 mL / OUT: 1900 mL / NET: -10 mL        MEDICATIONS  (STANDING):  aspirin  chewable 81 milliGRAM(s) Oral <User Schedule>  atorvastatin 20 milliGRAM(s) Oral at bedtime  budesonide 160 MICROgram(s)/formoterol 4.5 MICROgram(s) Inhaler 2 Puff(s) Inhalation two times a day  chlorhexidine 2% Cloths 1 Application(s) Topical <User Schedule>  furosemide    Tablet 40 milliGRAM(s) Oral daily  losartan 100 milliGRAM(s) Oral daily  melatonin 5 milliGRAM(s) Oral at bedtime  metoprolol succinate ER 50 milliGRAM(s) Oral <User Schedule>  pantoprazole    Tablet 40 milliGRAM(s) Oral before breakfast  polyethylene glycol 3350 17 Gram(s) Oral daily  senna 2 Tablet(s) Oral at bedtime  spironolactone 25 milliGRAM(s) Oral every 12 hours  ticagrelor 90 milliGRAM(s) Oral every 12 hours      RESPIRATORY:        IMAGING:   Recent imaging studies were reviewed.    LAB RESULTS:                          10.3   9.64  )-----------( 188      ( 18 Mar 2023 03:11 )             30.9       03-18    134<L>  |  102  |  43.0<H>  ----------------------------<  159<H>  4.8   |  20.0<L>  |  1.09    Ca    8.8      18 Mar 2023 03:11  Phos  4.1     03-18  Mg     2.0     03-18    -----------------------------------------------------------------------------------------------------------------------------------------------------------------------------------    PHYSICAL EXAM:  General: Calm, intubated  HEENT: MMM  Neuro:  -Mental status- No acute distress  -CN- PERRL 3mm, EOMI, tongue midline, face symmetric    CV: RRR  Pulm: clear to auscultation  Abd: Soft, nontender, nondistended  Ext: no noted edema in lower ext  Skin: warm, dry       Chief complaint:   Patient is a 72y old  Female who presents with a chief complaint of aneurysm  HPI:    24hr EVENTS:  s/o coiling and stenting    ROS: no complaints  All other systems negative    -----------------------------------------------------------------------------------------------------------------------------------------------------------------------------------  ICU Vital Signs Last 24 Hrs  T(C): 36.7 (18 Mar 2023 07:46), Max: 36.7 (17 Mar 2023 16:40)  T(F): 98 (18 Mar 2023 07:46), Max: 98 (17 Mar 2023 16:40)  HR: 64 (18 Mar 2023 07:00) (60 - 80)  BP: 94/82 (18 Mar 2023 07:00) (90/69 - 144/99)  BP(mean): 88 (18 Mar 2023 07:00) (60 - 115)  ABP: --  ABP(mean): --  RR: 19 (18 Mar 2023 07:00) (14 - 23)  SpO2: 95% (18 Mar 2023 07:00) (94% - 100%)    O2 Parameters below as of 18 Mar 2023 07:00  Patient On (Oxygen Delivery Method): room air            I&O's Summary    17 Mar 2023 07:01  -  18 Mar 2023 07:00  --------------------------------------------------------  IN: 1890 mL / OUT: 1900 mL / NET: -10 mL        MEDICATIONS  (STANDING):  aspirin  chewable 81 milliGRAM(s) Oral <User Schedule>  atorvastatin 20 milliGRAM(s) Oral at bedtime  budesonide 160 MICROgram(s)/formoterol 4.5 MICROgram(s) Inhaler 2 Puff(s) Inhalation two times a day  chlorhexidine 2% Cloths 1 Application(s) Topical <User Schedule>  furosemide    Tablet 40 milliGRAM(s) Oral daily  losartan 100 milliGRAM(s) Oral daily  melatonin 5 milliGRAM(s) Oral at bedtime  metoprolol succinate ER 50 milliGRAM(s) Oral <User Schedule>  pantoprazole    Tablet 40 milliGRAM(s) Oral before breakfast  polyethylene glycol 3350 17 Gram(s) Oral daily  senna 2 Tablet(s) Oral at bedtime  spironolactone 25 milliGRAM(s) Oral every 12 hours  ticagrelor 90 milliGRAM(s) Oral every 12 hours    IMAGING:   Recent imaging studies were reviewed.    LAB RESULTS:                        10.3   9.64  )-----------( 188      ( 18 Mar 2023 03:11 )             30.9       03-18    134<L>  |  102  |  43.0<H>  ----------------------------<  159<H>  4.8   |  20.0<L>  |  1.09    Ca    8.8      18 Mar 2023 03:11  Phos  4.1     03-18  Mg     2.0     03-18    -----------------------------------------------------------------------------------------------------------------------------------------------------------------------------------    PHYSICAL EXAM:  General: Calm, laying in bed  HEENT: MMM  Neuro:  -Mental status- No acute distress, AOx3, conversational, following commands  -CN- PERRL 3mm, EOMI, tongue midline, face symmetric  -Motor- full strength in all ext  -Sensation- intact to LT   -Coordination- no dysmetria noted    CV: RRR  Pulm: Clear to auscultation  Abd: Soft, nontender, nondistended  Ext: No edema  Skin: warm, dry

## 2023-03-20 ENCOUNTER — NON-APPOINTMENT (OUTPATIENT)
Age: 73
End: 2023-03-20

## 2023-03-20 RX ORDER — IRBESARTAN 300 MG/1
300 TABLET ORAL
Qty: 90 | Refills: 1 | Status: ACTIVE | COMMUNITY
Start: 2022-11-10 | End: 1900-01-01

## 2023-03-23 ENCOUNTER — APPOINTMENT (OUTPATIENT)
Dept: INTERNAL MEDICINE | Facility: CLINIC | Age: 73
End: 2023-03-23
Payer: MEDICARE

## 2023-03-23 VITALS
HEART RATE: 70 BPM | SYSTOLIC BLOOD PRESSURE: 130 MMHG | OXYGEN SATURATION: 95 % | WEIGHT: 161 LBS | TEMPERATURE: 97.8 F | BODY MASS INDEX: 31.61 KG/M2 | HEIGHT: 60 IN | DIASTOLIC BLOOD PRESSURE: 80 MMHG

## 2023-03-23 PROCEDURE — 99213 OFFICE O/P EST LOW 20 MIN: CPT

## 2023-03-23 NOTE — HISTORY OF PRESENT ILLNESS
[Post-hospitalization from ___ Hospital] : Post-hospitalization from [unfilled] Hospital [Admitted on: ___] : The patient was admitted on [unfilled] [Discharged on ___] : discharged on [unfilled] [Discharge Summary] : discharge summary [Pertinent Labs] : pertinent labs [Discharge Med List] : discharge medication list [Med Reconciliation] : medication reconciliation has been completed [Patient Contacted By: ____] : and contacted by [unfilled] [FreeTextEntry3] : Admitted for elective cerebral angiogram and s/p stent assisted coil for ACOM aneurysm [FreeTextEntry2] : Ms. ORTIZ is a 72 year old female who presents to the office for follow after being admitted for an elective cerebral angiogram and s/p stent assisted coil for ACOM aneurysm. The surgery went very well and resulted in complete occlusion of the aneurysm with the coil mass. The patient was kept overnight for observation and had no post-operative complications. She is currently taking ASA 81mg once daily and Brilinta BID - tolerating well. The patient reports no complaints today and feels relieved that the surgery is over. She will be following up with neurology, Dr. Contreras on March 28th.

## 2023-03-28 ENCOUNTER — APPOINTMENT (OUTPATIENT)
Dept: NEUROLOGY | Facility: CLINIC | Age: 73
End: 2023-03-28
Payer: MEDICARE

## 2023-03-28 VITALS
HEIGHT: 60 IN | SYSTOLIC BLOOD PRESSURE: 100 MMHG | WEIGHT: 161 LBS | DIASTOLIC BLOOD PRESSURE: 58 MMHG | BODY MASS INDEX: 31.61 KG/M2 | HEART RATE: 73 BPM

## 2023-03-28 PROCEDURE — 99215 OFFICE O/P EST HI 40 MIN: CPT

## 2023-03-28 NOTE — REVIEW OF SYSTEMS
[Fever] : no fever [Chills] : no chills [Feeling Poorly] : not feeling poorly [Feeling Tired] : not feeling tired [Confused or Disoriented] : no confusion [Memory Lapses or Loss] : no memory loss [Decr. Concentrating Ability] : no decrease in concentrating ability [Difficulty with Language] : no ~M difficulty with language [Changed Thought Patterns] : no change in thought patterns [Repeating Questions] : no repeated questioning about recent events [Facial Weakness] : no facial weakness [Arm Weakness] : no arm weakness [Hand Weakness] : no hand weakness [Leg Weakness] : no leg weakness [Poor Coordination] : good coordination [Difficulty Writing] : no difficulty writing [Difficulties in Speech] : no speech difficulties [Numbness] : no numbness [Tingling] : no tingling [Seizures] : no convulsions [Dizziness] : no dizziness [Fainting] : no fainting [Lightheadedness] : no lightheadedness [Vertigo] : no vertigo [Tension Headache] : no tension-type headache [Difficulty Walking] : no difficulty walking [Anxiety] : no anxiety [Depression] : no depression [Eyesight Problems] : no eyesight problems [Loss Of Hearing] : no hearing loss [Chest Pain] : no chest pain [Palpitations] : no palpitations [Shortness Of Breath] : no shortness of breath [Wheezing] : no wheezing [Vomiting] : no vomiting [Incontinence] : no incontinence [Joint Pain] : no joint pain [Easy Bleeding] : no tendency for easy bleeding [Easy Bruising] : no tendency for easy bruising

## 2023-03-28 NOTE — HISTORY OF PRESENT ILLNESS
[FreeTextEntry1] : Mrs. Lal is a 72 year old woman with a PMHx of AS w/ prior open heart aortic valve replacement 2019, HTN, BETSEY, former smoker, emphysema/chronic bronchitis, GERD s/p laparoscopic vertical sleeve gastrectomy who presented to [facility] ED on 04/09/22 with blurry vision, gait imbalance, and speech disturbance. Pt was not a candidate for tpa or mechanical thrombectomy as no LVO. CTH with Right PCA infarct and CTA: 6.7 mm anteriorly and inferiorly projecting anterior communicating artery aneurysm. MRI Head showed Acute R occipital lobe infarct in a R PCA vascular distribution with an additional punctate acute infarct involving the L cerebellum and R splenium of the corpus callosum. 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 s/p explant and reimplant of prosthetic valve and pacemaker during hospital admission. She has recovered from her stroke, only has residual left HHA. She had a cerebral angiogram on 12/21/22 which showed a 6.0 mm A-comm aneurysm filling from the right A1 segment. On 03/17/23 she underwent complete aneurysm occlusion post [place] stent assisted coil embolization. She comes in today for a follow up visit. She is on ASA and Brilinta and denies any stroke/TIA symptoms.

## 2023-03-28 NOTE — PHYSICAL EXAM
[General Appearance - Alert] : alert [General Appearance - Well Nourished] : well nourished [General Appearance - Well Developed] : well developed [Oriented To Time, Place, And Person] : oriented to person, place, and time [Affect] : the affect was normal [Person] : oriented to person [Place] : oriented to place [Time] : oriented to time [Concentration Intact] : normal concentrating ability [Visual Intact] : visual attention was ~T not ~L decreased [Naming Objects] : no difficulty naming common objects [Repeating Phrases] : no difficulty repeating a phrase [Writing A Sentence] : no difficulty writing a sentence [Fluency] : fluency intact [Comprehension] : comprehension intact [Reading] : reading intact [Past History] : adequate knowledge of personal past history [Cranial Nerves Optic (II)] : visual acuity intact bilaterally,  visual fields full to confrontation, pupils equal round and reactive to light [Cranial Nerves Oculomotor (III)] : extraocular motion intact [Cranial Nerves Trigeminal (V)] : facial sensation intact symmetrically [Cranial Nerves Facial (VII)] : face symmetrical [Cranial Nerves Vestibulocochlear (VIII)] : hearing was intact bilaterally [Cranial Nerves Glossopharyngeal (IX)] : tongue and palate midline [Cranial Nerves Accessory (XI - Cranial And Spinal)] : head turning and shoulder shrug symmetric [Cranial Nerves Hypoglossal (XII)] : there was no tongue deviation with protrusion [Motor Tone] : muscle tone was normal in all four extremities [Motor Strength] : muscle strength was normal in all four extremities [No Muscle Atrophy] : normal bulk in all four extremities [Motor Handedness Right-Handed] : the patient is right hand dominant [Sensation Tactile Decrease] : light touch was intact [Balance] : balance was intact [Full Visual Field] : full visual field [] : no respiratory distress [Heart Rate And Rhythm] : heart rate was normal and rhythm regular [Abdomen Soft] : soft [Abnormal Walk] : normal gait [Paresis Pronator Drift Right-Sided] : no pronator drift on the right [Paresis Pronator Drift Left-Sided] : no pronator drift on the left [Motor Strength Upper Extremities Bilaterally] : strength was normal in both upper extremities [Motor Strength Lower Extremities Bilaterally] : strength was normal in both lower extremities

## 2023-03-28 NOTE — DISCUSSION/SUMMARY
[FreeTextEntry1] : Mrs. Lal is a 72 year old woman with a PMHx of AS w/ prior open heart aortic valve replacement 2019, HTN, BETSEY, former smoker, emphysema/ chronic bronchitis, GERD s/p laparoscopic vertical sleeve gastrectomy now 9 months s/p right PCA infarct etiology cardioembolic due to endocarditis s/p explant and implant of prosthetic valve. At the time she was found to have an incidental ACOMM aneurysm. She is now 10 days s/p  Complete aneurysm occlusion post stent assisted coil embolization.\par Plan for MRA HEAD with contrast and NOVA now and then in 6 months, 09/2023. If MRA is stable we will stop the Brilinta at 3 months and only continue the ASA. I will call her with MRA results. All of her questions and concerns were addressed.

## 2023-04-04 ENCOUNTER — OFFICE (OUTPATIENT)
Dept: URBAN - METROPOLITAN AREA CLINIC 27 | Facility: CLINIC | Age: 73
Setting detail: OPHTHALMOLOGY
End: 2023-04-04
Payer: MEDICARE

## 2023-04-04 DIAGNOSIS — H53.462: ICD-10-CM

## 2023-04-04 DIAGNOSIS — H25.13: ICD-10-CM

## 2023-04-04 DIAGNOSIS — H31.002: ICD-10-CM

## 2023-04-04 PROCEDURE — 92004 COMPRE OPH EXAM NEW PT 1/>: CPT | Performed by: OPHTHALMOLOGY

## 2023-04-04 PROCEDURE — 92201 OPSCPY EXTND RTA DRAW UNI/BI: CPT | Performed by: OPHTHALMOLOGY

## 2023-04-04 ASSESSMENT — KERATOMETRY
METHOD_AUTO_MANUAL: AUTO
OS_K2POWER_DIOPTERS: 43.75
OD_K2POWER_DIOPTERS: 43.75
OD_AXISANGLE_DEGREES: 090
OS_K1POWER_DIOPTERS: 43.25
OS_AXISANGLE_DEGREES: 056
OD_K1POWER_DIOPTERS: 43.75

## 2023-04-04 ASSESSMENT — REFRACTION_AUTOREFRACTION
OD_SPHERE: -1.75
OD_CYLINDER: +2.00
OS_SPHERE: +4.00
OD_AXIS: 108
OS_CYLINDER: +0.75
OS_AXIS: 024

## 2023-04-04 ASSESSMENT — REFRACTION_CURRENTRX
OD_ADD: +2.00
OS_SPHERE: +3.75
OS_VPRISM_DIRECTION: PROGS
OS_ADD: +1.50
OD_AXIS: 117
OS_CYLINDER: SPH
OD_OVR_VA: 20/
OD_CYLINDER: +2.25
OD_SPHERE: -0.50
OS_OVR_VA: 20/
OD_VPRISM_DIRECTION: PROGS

## 2023-04-04 ASSESSMENT — REFRACTION_MANIFEST
OS_VA1: 20/50
OS_AXIS: 024
OD_AXIS: 108
OS_SPHERE: +4.00
OD_VA1: 20/80
OD_CYLINDER: +2.00
OD_SPHERE: -1.75
OS_CYLINDER: +0.75

## 2023-04-04 ASSESSMENT — VISUAL ACUITY
OD_BCVA: 20/30+1
OS_BCVA: 20/50-2

## 2023-04-04 ASSESSMENT — SPHEQUIV_DERIVED
OD_SPHEQUIV: -0.75
OD_SPHEQUIV: -0.75
OS_SPHEQUIV: 4.375
OS_SPHEQUIV: 4.375

## 2023-04-04 ASSESSMENT — TONOMETRY
OS_IOP_MMHG: 13
OD_IOP_MMHG: 13
OS_IOP_MMHG: 12

## 2023-04-04 ASSESSMENT — AXIALLENGTH_DERIVED
OD_AL: 23.794
OD_AL: 23.794
OS_AL: 22.0022
OS_AL: 22.0022

## 2023-04-17 ENCOUNTER — APPOINTMENT (OUTPATIENT)
Dept: ELECTROPHYSIOLOGY | Facility: CLINIC | Age: 73
End: 2023-04-17
Payer: MEDICARE

## 2023-04-17 ENCOUNTER — NON-APPOINTMENT (OUTPATIENT)
Age: 73
End: 2023-04-17

## 2023-04-17 PROCEDURE — 93298 REM INTERROG DEV EVAL SCRMS: CPT

## 2023-04-17 PROCEDURE — G2066: CPT

## 2023-05-22 ENCOUNTER — NON-APPOINTMENT (OUTPATIENT)
Age: 73
End: 2023-05-22

## 2023-05-22 ENCOUNTER — APPOINTMENT (OUTPATIENT)
Dept: ELECTROPHYSIOLOGY | Facility: CLINIC | Age: 73
End: 2023-05-22
Payer: MEDICARE

## 2023-05-22 PROCEDURE — 93298 REM INTERROG DEV EVAL SCRMS: CPT

## 2023-05-22 PROCEDURE — G2066: CPT

## 2023-06-23 ENCOUNTER — APPOINTMENT (OUTPATIENT)
Dept: ELECTROPHYSIOLOGY | Facility: CLINIC | Age: 73
End: 2023-06-23
Payer: MEDICARE

## 2023-06-23 ENCOUNTER — NON-APPOINTMENT (OUTPATIENT)
Age: 73
End: 2023-06-23

## 2023-06-23 PROCEDURE — 93298 REM INTERROG DEV EVAL SCRMS: CPT

## 2023-06-23 PROCEDURE — G2066: CPT

## 2023-07-28 ENCOUNTER — APPOINTMENT (OUTPATIENT)
Dept: ELECTROPHYSIOLOGY | Facility: CLINIC | Age: 73
End: 2023-07-28
Payer: MEDICARE

## 2023-07-28 ENCOUNTER — NON-APPOINTMENT (OUTPATIENT)
Age: 73
End: 2023-07-28

## 2023-07-28 PROCEDURE — G2066: CPT

## 2023-07-28 PROCEDURE — 93298 REM INTERROG DEV EVAL SCRMS: CPT

## 2023-08-11 ENCOUNTER — APPOINTMENT (OUTPATIENT)
Dept: PULMONOLOGY | Facility: CLINIC | Age: 73
End: 2023-08-11
Payer: MEDICARE

## 2023-08-11 VITALS
DIASTOLIC BLOOD PRESSURE: 78 MMHG | OXYGEN SATURATION: 94 % | SYSTOLIC BLOOD PRESSURE: 120 MMHG | HEART RATE: 61 BPM | RESPIRATION RATE: 16 BRPM

## 2023-08-11 DIAGNOSIS — R06.02 SHORTNESS OF BREATH: ICD-10-CM

## 2023-08-11 PROCEDURE — 94060 EVALUATION OF WHEEZING: CPT

## 2023-08-11 PROCEDURE — ZZZZZ: CPT

## 2023-08-11 PROCEDURE — 71046 X-RAY EXAM CHEST 2 VIEWS: CPT

## 2023-08-11 PROCEDURE — 94729 DIFFUSING CAPACITY: CPT

## 2023-08-11 PROCEDURE — 88738 HGB QUANT TRANSCUTANEOUS: CPT

## 2023-08-11 PROCEDURE — 94727 GAS DIL/WSHOT DETER LNG VOL: CPT

## 2023-08-11 PROCEDURE — 99214 OFFICE O/P EST MOD 30 MIN: CPT | Mod: 25

## 2023-08-11 RX ORDER — OXYCODONE 5 MG/1
5 TABLET ORAL EVERY 6 HOURS
Qty: 30 | Refills: 0 | Status: DISCONTINUED | COMMUNITY
Start: 2022-04-28 | End: 2023-08-11

## 2023-08-11 NOTE — REASON FOR VISIT
[Follow-Up] : a follow-up visit [COPD] : COPD [Wheezing] : wheezing [TextBox_44] :  s/p aortic valve replace s/p endocarditis

## 2023-08-11 NOTE — DISCUSSION/SUMMARY
[FreeTextEntry1] : Chronic obstructive pulmonary disease with exacerbation. Increased wheezing and decrease in function. Candidate for continued CT chest screening. Former smoker 40 pk/yr discontinued 2013.

## 2023-08-11 NOTE — ASSESSMENT
[FreeTextEntry1] : Trial of Breztri if too expensive will change back to HFN.  Course prednisone Course azithro. F/U 3 weeks sooner PRN Repeat CT 1 year from prior. Follow-up in 3 weeks or sooner on a as needed basis.  35 minutes spent in evaluation and management.

## 2023-08-11 NOTE — PROCEDURE
[FreeTextEntry1] : 08/11/2023 Pulmonary function testing There is a moderate ventilatory impairment in a combined obstructive and restrictive pattern. There is no significant bronchodilator responce. There is elevation in the RV/TLC ratio indicative of possible air trapping. There is a moderate diffusion impairment. Corrects to mild with lung volume correction  Mild decrease in function compared to March 2023.          1 / 1 Timur Flynn       Report date: 5/9/2022     View Order   (Report matches study selected on Patient History pane)              EXAM: [id number] - XR CHEST PA LAT 2V - ORDERED BY: MELANIE RENNER  PROCEDURE DATE: 05/09/2022    INTERPRETATION: PA and lateral chest radiographs  COMPARISON: 4/23/2022 chest.  CLINICAL INFORMATION: Postop shortness of breath.. Pain  FINDINGS: RIGHT PICC line catheter tip in SVC.   PULMONARY: The airway is midline. There are no airspace consolidations. No pleural effusion or pneumothorax.  HEART/VASCULAR: The heart is enlarged in transverse diameter. Status post median sternotomy. Disconnected wires overlie the anterior heart. Micra Transcatheter Pacing System cardiac device within RIGHT ventricle.  BONES: The visualized osseus structures are intact.  IMPRESSION:  No radiographic evidence of active chest disease..  --- End of Report ---       TIMUR FLYNN MD; Attending Radiologist This document has been electronically signed. May 9 2022 8:36PM    1 / 1 Naldo Stuart         Report date: 4/17/2022     View Order   (Report matches study selected on Patient History pan)          ACC: [id number] EXAM: CT CHEST  PROCEDURE DATE: 04/17/2022    INTERPRETATION: CLINICAL INFORMATION: Preoperative evaluation for aortic valve revision.  COMPARISON: Chest CT dated 4/10/2019. Chest radiograph dated 4/12/2022.  CONTRAST/COMPLICATIONS: IV Contrast: None. Oral Contrast: None. Complications: None reported.  PROCEDURE: CT scan of the chest was obtained without intravenous contrast.  FINDINGS:  LYMPH NODES: No mediastinal or hilar adenopathy.  HEART/VASCULATURE: The heart is normal in size. Status post aortic valve repair. The thoracic aorta is normal in caliber. Atherosclerotic changes of the aorta and coronary arteries. The pericardium is approximately 6 mm deep to the inferior border of the sternum.  AIRWAYS/LUNGS/PLEURA: Mild linear atelectasis of the left lower lobe, right middle lobe, and lingula. The lungs are otherwise clear. No pleural effusion or pneumothorax.  UPPER ABDOMEN: Status post gastric sleeve.  BONES/SOFT TISSUES: Degenerative changes of the spine. Status post median sternotomy.  IMPRESSION:  Clear lungs with the exception of mild linear atelectasis.  Status post prior aortic valve repair. The pericardium is approximately 6 mm from the inner surface of the sternum.    --- End of Report ---     LAYNE MALONE MD; Resident Radiologist This document has been electronically signed. NALDO STUART MD; Attending Radiologist This document has been electronically signed. Apr 17 2022 1:24PM

## 2023-08-14 LAB — POCT - HEMOGLOBIN (HGB), QUANTITATIVE, TRANSCUTANEOUS: 11.9

## 2023-08-17 ENCOUNTER — NON-APPOINTMENT (OUTPATIENT)
Age: 73
End: 2023-08-17

## 2023-08-17 ENCOUNTER — APPOINTMENT (OUTPATIENT)
Dept: ELECTROPHYSIOLOGY | Facility: CLINIC | Age: 73
End: 2023-08-17
Payer: MEDICARE

## 2023-08-17 VITALS
WEIGHT: 161 LBS | BODY MASS INDEX: 31.61 KG/M2 | OXYGEN SATURATION: 95 % | SYSTOLIC BLOOD PRESSURE: 121 MMHG | DIASTOLIC BLOOD PRESSURE: 86 MMHG | HEIGHT: 60 IN | HEART RATE: 63 BPM

## 2023-08-17 PROCEDURE — 93000 ELECTROCARDIOGRAM COMPLETE: CPT | Mod: 59

## 2023-08-17 PROCEDURE — 93291 INTERROG DEV EVAL SCRMS IP: CPT | Mod: 59

## 2023-08-17 PROCEDURE — 93279 PRGRMG DEV EVAL PM/LDLS PM: CPT

## 2023-09-01 ENCOUNTER — APPOINTMENT (OUTPATIENT)
Dept: PULMONOLOGY | Facility: CLINIC | Age: 73
End: 2023-09-01

## 2023-09-18 ENCOUNTER — APPOINTMENT (OUTPATIENT)
Dept: ELECTROPHYSIOLOGY | Facility: CLINIC | Age: 73
End: 2023-09-18
Payer: MEDICARE

## 2023-09-18 ENCOUNTER — NON-APPOINTMENT (OUTPATIENT)
Age: 73
End: 2023-09-18

## 2023-09-18 PROCEDURE — G2066: CPT

## 2023-09-18 PROCEDURE — 93298 REM INTERROG DEV EVAL SCRMS: CPT

## 2023-10-20 ENCOUNTER — NON-APPOINTMENT (OUTPATIENT)
Age: 73
End: 2023-10-20

## 2023-10-20 ENCOUNTER — APPOINTMENT (OUTPATIENT)
Dept: ELECTROPHYSIOLOGY | Facility: CLINIC | Age: 73
End: 2023-10-20
Payer: MEDICARE

## 2023-10-21 PROCEDURE — G2066: CPT

## 2023-10-21 PROCEDURE — 93298 REM INTERROG DEV EVAL SCRMS: CPT

## 2023-10-23 ENCOUNTER — APPOINTMENT (OUTPATIENT)
Dept: PULMONOLOGY | Facility: CLINIC | Age: 73
End: 2023-10-23
Payer: MEDICARE

## 2023-10-23 VITALS — OXYGEN SATURATION: 95 % | HEART RATE: 60 BPM | DIASTOLIC BLOOD PRESSURE: 65 MMHG | SYSTOLIC BLOOD PRESSURE: 102 MMHG

## 2023-10-23 PROCEDURE — 99214 OFFICE O/P EST MOD 30 MIN: CPT | Mod: 25

## 2023-10-23 PROCEDURE — 94010 BREATHING CAPACITY TEST: CPT

## 2023-10-23 RX ORDER — BUDESONIDE AND FORMOTEROL FUMARATE DIHYDRATE 160; 4.5 UG/1; UG/1
160-4.5 AEROSOL RESPIRATORY (INHALATION)
Qty: 1 | Refills: 2 | Status: DISCONTINUED | COMMUNITY
Start: 2022-07-25 | End: 2023-10-23

## 2023-10-23 RX ORDER — CEFTRIAXONE 2 G/1
2 INJECTION, POWDER, FOR SOLUTION INTRAMUSCULAR; INTRAVENOUS DAILY
Refills: 0 | Status: DISCONTINUED | COMMUNITY
Start: 2022-04-28 | End: 2023-10-23

## 2023-10-23 RX ORDER — BUDESONIDE AND FORMOTEROL FUMARATE DIHYDRATE 160; 4.5 UG/1; UG/1
160-4.5 AEROSOL RESPIRATORY (INHALATION) TWICE DAILY
Qty: 3 | Refills: 3 | Status: DISCONTINUED | COMMUNITY
Start: 2023-03-02 | End: 2023-10-23

## 2023-11-08 ENCOUNTER — APPOINTMENT (OUTPATIENT)
Dept: MRI IMAGING | Facility: HOSPITAL | Age: 73
End: 2023-11-08

## 2023-11-22 ENCOUNTER — APPOINTMENT (OUTPATIENT)
Dept: ELECTROPHYSIOLOGY | Facility: CLINIC | Age: 73
End: 2023-11-22
Payer: MEDICARE

## 2023-11-22 ENCOUNTER — NON-APPOINTMENT (OUTPATIENT)
Age: 73
End: 2023-11-22

## 2023-11-23 PROCEDURE — G2066: CPT | Mod: NC

## 2023-11-23 PROCEDURE — 93298 REM INTERROG DEV EVAL SCRMS: CPT | Mod: NC

## 2023-11-27 ENCOUNTER — APPOINTMENT (OUTPATIENT)
Dept: PULMONOLOGY | Facility: CLINIC | Age: 73
End: 2023-11-27

## 2023-12-27 ENCOUNTER — NON-APPOINTMENT (OUTPATIENT)
Age: 73
End: 2023-12-27

## 2023-12-27 ENCOUNTER — APPOINTMENT (OUTPATIENT)
Dept: ELECTROPHYSIOLOGY | Facility: CLINIC | Age: 73
End: 2023-12-27
Payer: MEDICARE

## 2023-12-27 NOTE — PROGRESS NOTE ADULT - ASSESSMENT
71 year old with AVR; COPD, GERD, GBP, presented with recent cva.  During workup, an echo revealed an atrial myxoma.  Last 24 she had a fever of 101. Denies -prior fevers, but states she was treated for pna prior to this admission  She is very confused but denies any complaints at present     currently on ceftriaxone; bc pending UA negative  no obvious source of fever, atrial myxomas can cause fever but it is a diagnosis of exclusion .  Need to also rule out PVE.    chest xray. negative  [facility] awaiting . blood cultures  Yes

## 2023-12-28 PROCEDURE — G2066: CPT

## 2023-12-28 PROCEDURE — 93298 REM INTERROG DEV EVAL SCRMS: CPT

## 2024-01-23 ENCOUNTER — NON-APPOINTMENT (OUTPATIENT)
Age: 74
End: 2024-01-23

## 2024-01-23 ENCOUNTER — OUTPATIENT (OUTPATIENT)
Dept: OUTPATIENT SERVICES | Facility: HOSPITAL | Age: 74
LOS: 1 days | End: 2024-01-23
Payer: MEDICARE

## 2024-01-23 ENCOUNTER — APPOINTMENT (OUTPATIENT)
Dept: CT IMAGING | Facility: CLINIC | Age: 74
End: 2024-01-23
Payer: MEDICARE

## 2024-01-23 VITALS — BODY MASS INDEX: 31.61 KG/M2 | WEIGHT: 161 LBS | HEIGHT: 60 IN

## 2024-01-23 DIAGNOSIS — Z98.890 OTHER SPECIFIED POSTPROCEDURAL STATES: Chronic | ICD-10-CM

## 2024-01-23 DIAGNOSIS — J44.9 CHRONIC OBSTRUCTIVE PULMONARY DISEASE, UNSPECIFIED: ICD-10-CM

## 2024-01-23 DIAGNOSIS — Z95.0 PRESENCE OF CARDIAC PACEMAKER: Chronic | ICD-10-CM

## 2024-01-23 DIAGNOSIS — Z87.891 PERSONAL HISTORY OF NICOTINE DEPENDENCE: ICD-10-CM

## 2024-01-23 DIAGNOSIS — Z98.84 BARIATRIC SURGERY STATUS: Chronic | ICD-10-CM

## 2024-01-23 DIAGNOSIS — Z95.2 PRESENCE OF PROSTHETIC HEART VALVE: Chronic | ICD-10-CM

## 2024-01-23 PROCEDURE — 71271 CT THORAX LUNG CANCER SCR C-: CPT

## 2024-01-23 PROCEDURE — 71271 CT THORAX LUNG CANCER SCR C-: CPT | Mod: 26

## 2024-01-23 NOTE — HISTORY OF PRESENT ILLNESS
[Other Location: e.g. School (Enter Location, City,State)___] : at [unfilled], at the time of the visit. [Medical Office: ([facility])___] : at the medical office located in  [Verbal consent obtained from patient] : the patient, [unfilled] [Former] : Former [TextBox_13] : Referred by Dr. Leigh  Ms. ILENE ORTIZ is a 73 year old woman with a history of nicotine dependence   Over the telephone today we reviewed and confirmed that the patient meets screening eligibility criteria:  -Age: 73 years old  Smoking status:  -Former smoker  -Number of pack(s) per day:1   -Number of years smoked: 30  -Number of pack years smokin  -Number of years since quitting smoking:10    Ms. ORTIZ denies any personal history of lung cancer. Denies any s/s of lung cancer. H/o lung ca in sister. H/o COPD. Denies any history of occupational exposures [YearQuit] : 2014 [PacksperDay] : 1 [N_Years] : 30 [PacksperYear] : 30

## 2024-01-31 ENCOUNTER — NON-APPOINTMENT (OUTPATIENT)
Age: 74
End: 2024-01-31

## 2024-01-31 ENCOUNTER — APPOINTMENT (OUTPATIENT)
Dept: ELECTROPHYSIOLOGY | Facility: CLINIC | Age: 74
End: 2024-01-31
Payer: MEDICARE

## 2024-02-01 PROCEDURE — 93298 REM INTERROG DEV EVAL SCRMS: CPT

## 2024-02-04 ENCOUNTER — NON-APPOINTMENT (OUTPATIENT)
Age: 74
End: 2024-02-04

## 2024-02-22 ENCOUNTER — RESULT CHARGE (OUTPATIENT)
Age: 74
End: 2024-02-22

## 2024-02-23 ENCOUNTER — NON-APPOINTMENT (OUTPATIENT)
Age: 74
End: 2024-02-23

## 2024-02-23 ENCOUNTER — APPOINTMENT (OUTPATIENT)
Dept: ELECTROPHYSIOLOGY | Facility: CLINIC | Age: 74
End: 2024-02-23
Payer: MEDICARE

## 2024-02-23 VITALS
OXYGEN SATURATION: 95 % | WEIGHT: 186 LBS | DIASTOLIC BLOOD PRESSURE: 78 MMHG | BODY MASS INDEX: 36.52 KG/M2 | SYSTOLIC BLOOD PRESSURE: 111 MMHG | HEART RATE: 66 BPM | HEIGHT: 60 IN

## 2024-02-23 PROCEDURE — 93000 ELECTROCARDIOGRAM COMPLETE: CPT | Mod: 59

## 2024-02-23 PROCEDURE — 93279 PRGRMG DEV EVAL PM/LDLS PM: CPT

## 2024-03-22 ENCOUNTER — APPOINTMENT (OUTPATIENT)
Dept: PULMONOLOGY | Facility: CLINIC | Age: 74
End: 2024-03-22
Payer: MEDICARE

## 2024-03-22 VITALS — SYSTOLIC BLOOD PRESSURE: 163 MMHG | OXYGEN SATURATION: 95 % | HEART RATE: 78 BPM | DIASTOLIC BLOOD PRESSURE: 52 MMHG

## 2024-03-22 DIAGNOSIS — G47.00 INSOMNIA, UNSPECIFIED: ICD-10-CM

## 2024-03-22 DIAGNOSIS — J44.1 CHRONIC OBSTRUCTIVE PULMONARY DISEASE WITH (ACUTE) EXACERBATION: ICD-10-CM

## 2024-03-22 PROCEDURE — 71046 X-RAY EXAM CHEST 2 VIEWS: CPT

## 2024-03-22 PROCEDURE — 99214 OFFICE O/P EST MOD 30 MIN: CPT

## 2024-03-22 RX ORDER — TRAZODONE HYDROCHLORIDE 50 MG/1
50 TABLET ORAL
Qty: 30 | Refills: 3 | Status: ACTIVE | COMMUNITY
Start: 2024-03-22 | End: 1900-01-01

## 2024-03-22 NOTE — PHYSICAL EXAM
[No Acute Distress] : no acute distress [Normal S1, S2] : normal s1, s2 [Supple] : supple [No Murmurs] : no murmurs [Normal to Percussion] : normal to percussion [No Abnormalities] : no abnormalities [Benign] : benign [No HSM] : no hsm [No Cyanosis] : no cyanosis [No Clubbing] : no clubbing [No Edema] : no edema [TextBox_68] : 1 plus wheeze.

## 2024-03-22 NOTE — DISCUSSION/SUMMARY
[FreeTextEntry1] : Chronic obstructive pulmonary disease with significant exacerbation.  Likely infectious in origin. Candidate for continued CT chest screening. Former smoker 40 pk/yr discontinued 2013.

## 2024-03-22 NOTE — HISTORY OF PRESENT ILLNESS
[Former] : former [TextBox_4] : almost 10 days ago had uri and developed cough with mucus color, yellow white and increase wheeze and sob. taking Tylenol crusted eyes past 3 days no chest pain. breztri too expensive. Has been off.  Using albuterol about 3-4 times a day, and nebulizer. Desires to go back on Ambien for sleep.     Admitted for CVA 4/9/22 Dx endocarditis and abscess.  Surgery was 4/11/22      [TextBox_11] : 1 [TextBox_13] : 40 [YearQuit] : 2013

## 2024-03-22 NOTE — PROCEDURE
[FreeTextEntry1] : Ct Jan 23 October 23, 2023.  Spirometry. There is a moderate ventilatory impairment in a combined obstructive and restrictive pattern.  Very mild increase in flow rates compared to August 2023.         1 / 1 Timur Flynn       Report date: 5/9/2022     View Order   (Report matches study selected on Patient History pan)              EXAM: [id number] - XR CHEST PA LAT 2V - ORDERED BY: MELANIE RENNER  PROCEDURE DATE: 05/09/2022    INTERPRETATION: PA and lateral chest radiographs  COMPARISON: 4/23/2022 chest.  CLINICAL INFORMATION: Postop shortness of breath.. Pain  FINDINGS: RIGHT PICC line catheter tip in SVC.   PULMONARY: The airway is midline. There are no airspace consolidations. No pleural effusion or pneumothorax.  HEART/VASCULAR: The heart is enlarged in transverse diameter. Status post median sternotomy. Disconnected wires overlie the anterior heart. Micra Transcatheter Pacing System cardiac device within RIGHT ventricle.  BONES: The visualized osseus structures are intact.  IMPRESSION:  No radiographic evidence of active chest disease..  --- End of Report ---       TIMUR FLYNN MD; Attending Radiologist This document has been electronically signed. May 9 2022 8:36PM    1 / 1 Naldo Stuart         Report date: 4/17/2022     View Order   (Report matches study selected on Patient History pane)          ACC: [id number] EXAM: CT CHEST  PROCEDURE DATE: 04/17/2022    INTERPRETATION: CLINICAL INFORMATION: Preoperative evaluation for aortic valve revision.  COMPARISON: Chest CT dated 4/10/2019. Chest radiograph dated 4/12/2022.  CONTRAST/COMPLICATIONS: IV Contrast: None. Oral Contrast: None. Complications: None reported.  PROCEDURE: CT scan of the chest was obtained without intravenous contrast.  FINDINGS:  LYMPH NODES: No mediastinal or hilar adenopathy.  HEART/VASCULATURE: The heart is normal in size. Status post aortic valve repair. The thoracic aorta is normal in caliber. Atherosclerotic changes of the aorta and coronary arteries. The pericardium is approximately 6 mm deep to the inferior border of the sternum.  AIRWAYS/LUNGS/PLEURA: Mild linear atelectasis of the left lower lobe, right middle lobe, and lingula. The lungs are otherwise clear. No pleural effusion or pneumothorax.  UPPER ABDOMEN: Status post gastric sleeve.  BONES/SOFT TISSUES: Degenerative changes of the spine. Status post median sternotomy.  IMPRESSION:  Clear lungs with the exception of mild linear atelectasis.  Status post prior aortic valve repair. The pericardium is approximately 6 mm from the inner surface of the sternum.    --- End of Report ---     LAYNE MALONE MD; Resident Radiologist This document has been electronically signed. NALDO STUART MD; Attending Radiologist This document has been electronically signed. Apr 17 2022 1:24PM

## 2024-03-22 NOTE — REASON FOR VISIT
[Follow-Up] : a follow-up visit [Wheezing] : wheezing [COPD] : COPD [TextBox_44] :  s/p aortic valve replace s/p endocarditis

## 2024-03-22 NOTE — ASSESSMENT
[FreeTextEntry1] : Course of prednisone. Course of Zithromax. Change nebulizer treatments DuoNeb and budesonide. Repeat screening ct 1 year from last . task sent Follow-up in 1 month or sooner on a as needed basis.  Obtain lung function on return to office. Follow-up sooner on a as needed basis.   35 minutes spent in evaluation management and review of studies.

## 2024-03-25 NOTE — PATIENT PROFILE ADULT. - CAREGIVER
[Hyperlipidemia] : hyperlipidemia [CV Risk Factors and Non-Cardiac Disease] : CV risk factors and non-cardiac disease [Hypertension] : hypertension [Coronary Artery Disease] : coronary artery disease Declines

## 2024-03-29 ENCOUNTER — NON-APPOINTMENT (OUTPATIENT)
Age: 74
End: 2024-03-29

## 2024-03-29 ENCOUNTER — APPOINTMENT (OUTPATIENT)
Dept: ELECTROPHYSIOLOGY | Facility: CLINIC | Age: 74
End: 2024-03-29
Payer: MEDICARE

## 2024-03-29 PROCEDURE — 93298 REM INTERROG DEV EVAL SCRMS: CPT

## 2024-04-26 ENCOUNTER — APPOINTMENT (OUTPATIENT)
Dept: PULMONOLOGY | Facility: CLINIC | Age: 74
End: 2024-04-26
Payer: MEDICARE

## 2024-04-26 VITALS — HEART RATE: 74 BPM | DIASTOLIC BLOOD PRESSURE: 77 MMHG | OXYGEN SATURATION: 94 % | SYSTOLIC BLOOD PRESSURE: 117 MMHG

## 2024-04-26 DIAGNOSIS — R91.8 OTHER NONSPECIFIC ABNORMAL FINDING OF LUNG FIELD: ICD-10-CM

## 2024-04-26 LAB — POCT - HEMOGLOBIN (HGB), QUANTITATIVE, TRANSCUTANEOUS: 9.1

## 2024-04-26 PROCEDURE — ZZZZZ: CPT

## 2024-04-26 PROCEDURE — 94729 DIFFUSING CAPACITY: CPT

## 2024-04-26 PROCEDURE — 94618 PULMONARY STRESS TESTING: CPT

## 2024-04-26 PROCEDURE — 88738 HGB QUANT TRANSCUTANEOUS: CPT

## 2024-04-26 PROCEDURE — 94060 EVALUATION OF WHEEZING: CPT

## 2024-04-26 PROCEDURE — 99214 OFFICE O/P EST MOD 30 MIN: CPT | Mod: 25

## 2024-04-26 PROCEDURE — 94727 GAS DIL/WSHOT DETER LNG VOL: CPT

## 2024-04-26 RX ORDER — PREDNISONE 10 MG/1
10 TABLET ORAL
Qty: 40 | Refills: 0 | Status: DISCONTINUED | COMMUNITY
Start: 2023-08-11 | End: 2024-04-26

## 2024-04-26 RX ORDER — PREDNISONE 10 MG/1
10 TABLET ORAL
Qty: 18 | Refills: 0 | Status: DISCONTINUED | COMMUNITY
Start: 2023-10-23 | End: 2024-04-26

## 2024-04-26 RX ORDER — PREDNISONE 10 MG/1
10 TABLET ORAL
Qty: 40 | Refills: 0 | Status: DISCONTINUED | COMMUNITY
Start: 2024-03-22 | End: 2024-04-26

## 2024-04-26 RX ORDER — AZITHROMYCIN 250 MG/1
250 TABLET, FILM COATED ORAL
Qty: 7 | Refills: 0 | Status: DISCONTINUED | COMMUNITY
Start: 2023-08-11 | End: 2024-04-26

## 2024-04-26 RX ORDER — CEFUROXIME AXETIL 500 MG/1
500 TABLET ORAL
Qty: 14 | Refills: 0 | Status: DISCONTINUED | COMMUNITY
Start: 2024-03-27 | End: 2024-04-26

## 2024-04-26 RX ORDER — AZITHROMYCIN 250 MG/1
250 TABLET, FILM COATED ORAL
Qty: 1 | Refills: 0 | Status: DISCONTINUED | COMMUNITY
Start: 2024-03-22 | End: 2024-04-26

## 2024-04-26 RX ORDER — AZITHROMYCIN 250 MG/1
250 TABLET, FILM COATED ORAL
Qty: 1 | Refills: 0 | Status: DISCONTINUED | COMMUNITY
Start: 2023-10-23 | End: 2024-04-26

## 2024-04-26 NOTE — PHYSICAL EXAM
[No Acute Distress] : no acute distress [Supple] : supple [Normal S1, S2] : normal s1, s2 [No Murmurs] : no murmurs [Normal to Percussion] : normal to percussion [No Abnormalities] : no abnormalities [Benign] : benign [No HSM] : no hsm [No Clubbing] : no clubbing [No Cyanosis] : no cyanosis [No Edema] : no edema [TextBox_68] : slight wheeze left base

## 2024-04-26 NOTE — PROCEDURE
[FreeTextEntry1] : Ct Jan 23 04/26/2024 Pulmonary function testing These data demonstrate a moderate obstructive ventilatory deficit.  There is no significant bronchodilator response.  There is elevation in the RV/TLC ratio indicative of possible air trapping. Normal Lung Volumes. There is a moderate diffusion impairment. Corrects to normal with lung volume correction  No significant change in function compared to August 2023.  April 26, 2024 6-minute walk attached. Walked 1120 feet.  Limited by wheezing and shortness of breath.  No desaturation.

## 2024-04-26 NOTE — ASSESSMENT
[FreeTextEntry1] : Con nebulizer treatments DuoNeb and budesonide. Pulm Rehab. Discussed exercise program how to perform on her own. Cardiology follow-up. Repeat screening ct 1 year from last . task sent Jan 2025 Follow-up in 3 months Follow-up sooner on a as needed basis.   35 minutes spent in evaluation management and review of studies.

## 2024-04-26 NOTE — HISTORY OF PRESENT ILLNESS
[Former] : former [TextBox_4] : after last visit took steroid taper with help in wheeze. no chest pains. now taking albuterol about 3-4 times a day  does have nebulizer and doing DuoNeb and budesonide bid. has productive cough and still has sob. on Lasix daily Using albuterol about 3-4 times a day, and nebulizer. cant walk far without sob     Admitted for CVA 4/9/22 Dx endocarditis and abscess.  Surgery was 4/11/22      [TextBox_11] : 1 [TextBox_13] : 40 [YearQuit] : 2013

## 2024-04-26 NOTE — REASON FOR VISIT
[Follow-Up] : a follow-up visit [COPD] : COPD [TextBox_44] :  s/p aortic valve replace s/p endocarditis

## 2024-04-26 NOTE — DISCUSSION/SUMMARY
[FreeTextEntry1] : Chronic obstructive pulmonary disease status post exacerbation now improved.  Clinically and functionally essentially stable. Dyspnea on exertion likely related to underlying pulmonary disease as well as weight and conditioning.  No significant desaturation. Candidate for continued CT chest screening. Former smoker 40 pk/yr discontinued 2013.

## 2024-05-03 ENCOUNTER — APPOINTMENT (OUTPATIENT)
Dept: ELECTROPHYSIOLOGY | Facility: CLINIC | Age: 74
End: 2024-05-03
Payer: MEDICARE

## 2024-05-03 ENCOUNTER — NON-APPOINTMENT (OUTPATIENT)
Age: 74
End: 2024-05-03

## 2024-05-03 PROCEDURE — 93298 REM INTERROG DEV EVAL SCRMS: CPT

## 2024-05-31 ENCOUNTER — APPOINTMENT (OUTPATIENT)
Dept: INTERNAL MEDICINE | Facility: CLINIC | Age: 74
End: 2024-05-31
Payer: MEDICARE

## 2024-05-31 VITALS
HEIGHT: 60 IN | OXYGEN SATURATION: 94 % | HEART RATE: 68 BPM | TEMPERATURE: 98.2 F | WEIGHT: 169 LBS | DIASTOLIC BLOOD PRESSURE: 82 MMHG | BODY MASS INDEX: 33.18 KG/M2 | SYSTOLIC BLOOD PRESSURE: 130 MMHG

## 2024-05-31 DIAGNOSIS — J30.2 OTHER SEASONAL ALLERGIC RHINITIS: ICD-10-CM

## 2024-05-31 DIAGNOSIS — H53.452 OTHER LOCALIZED VISUAL FIELD DEFECT, LEFT EYE: ICD-10-CM

## 2024-05-31 PROCEDURE — 36415 COLL VENOUS BLD VENIPUNCTURE: CPT

## 2024-05-31 PROCEDURE — 99215 OFFICE O/P EST HI 40 MIN: CPT | Mod: 25

## 2024-05-31 NOTE — REVIEW OF SYSTEMS
[Vision Problems] : vision problems [Negative] : Heme/Lymph [Lower Ext Edema] : lower extremity edema [Shortness Of Breath] : shortness of breath [Wheezing] : wheezing [Dyspnea on Exertion] : dyspnea on exertion

## 2024-06-05 DIAGNOSIS — I72.9 ANEURYSM OF UNSPECIFIED SITE: ICD-10-CM

## 2024-06-06 ENCOUNTER — APPOINTMENT (OUTPATIENT)
Dept: INTERNAL MEDICINE | Facility: CLINIC | Age: 74
End: 2024-06-06
Payer: MEDICARE

## 2024-06-06 DIAGNOSIS — I44.2 ATRIOVENTRICULAR BLOCK, COMPLETE: ICD-10-CM

## 2024-06-06 DIAGNOSIS — J44.9 CHRONIC OBSTRUCTIVE PULMONARY DISEASE, UNSPECIFIED: ICD-10-CM

## 2024-06-06 PROCEDURE — 99443: CPT

## 2024-06-06 NOTE — PHYSICAL EXAM
[No Acute Distress] : no acute distress [Well Nourished] : well nourished [Well Developed] : well developed [Well-Appearing] : well-appearing [Normal Sclera/Conjunctiva] : normal sclera/conjunctiva [PERRL] : pupils equal round and reactive to light [EOMI] : extraocular movements intact [Normal Outer Ear/Nose] : the outer ears and nose were normal in appearance [Normal Oropharynx] : the oropharynx was normal [No JVD] : no jugular venous distention [No Lymphadenopathy] : no lymphadenopathy [Supple] : supple [Thyroid Normal, No Nodules] : the thyroid was normal and there were no nodules present [No Respiratory Distress] : no respiratory distress  [No Accessory Muscle Use] : no accessory muscle use [Normal Rate] : normal rate  [Regular Rhythm] : with a regular rhythm [Normal S1, S2] : normal S1 and S2 [No Abdominal Bruit] : a ~M bruit was not heard ~T in the abdomen [Pedal Pulses Present] : the pedal pulses are present [No Palpable Aorta] : no palpable aorta [No Extremity Clubbing/Cyanosis] : no extremity clubbing/cyanosis [Soft] : abdomen soft [Non Tender] : non-tender [Non-distended] : non-distended [No Masses] : no abdominal mass palpated [No HSM] : no HSM [Normal Bowel Sounds] : normal bowel sounds [Normal Posterior Cervical Nodes] : no posterior cervical lymphadenopathy [Normal Anterior Cervical Nodes] : no anterior cervical lymphadenopathy [No CVA Tenderness] : no CVA  tenderness [No Spinal Tenderness] : no spinal tenderness [No Joint Swelling] : no joint swelling [Grossly Normal Strength/Tone] : grossly normal strength/tone [No Rash] : no rash [Coordination Grossly Intact] : coordination grossly intact [No Focal Deficits] : no focal deficits [Normal Affect] : the affect was normal [Normal Insight/Judgement] : insight and judgment were intact

## 2024-06-06 NOTE — HISTORY OF PRESENT ILLNESS
[FreeTextEntry1] : Follow up [de-identified] : Ms. ORTIZ is a 73 year old female who presents to the office for follow up: PMHx of AS w/ prior open heart aortic valve replacement 2019, HTN, BETSEY, former smoker, emphysema/chronic bronchitis, GERD s/p laparoscopic vertical sleeve gastrectomy s/p elective cerebral angiogram and s/p stent assisted coil for ACOM aneurysm- March 2023 Follows w/ neurology- Dr. Contreras Using albuterol, inhaler, nebulizer regularly - has expiratory wheeze Some left peripheral vision abnormality - Oph referral provided  Pt still driving - does not drive at night  On ASA, Brilinta Hx of endocarditis - s/p explant and reimplant of prosthetic valve and pacemaker Plan for MRA head for surveillance of aneurism - however has Pacemaker - advised neuro f/up Dr. Rodas is cardiologist Would consider alternative to Metoprolol due to lung condition/wheeze  F/W pulmonology for COPD/wheezing-  S/P screening chest CT- Jan 2024  Trazodone for insomnia now taking albuterol about 3-4 times a day Does have nebulizer and doing DuoNeb and budesonide bid  Considering Pulm rehab? Cardiology f/up - Dr. Rodas  130/82 169lb  Recent Hgb in April - 9.1, decreased from prior of 11.9 - pt denies dark stools, BRBPR  Pt fasting, Labs today- anemia workup Has been taking statin - Atorvastatin 20mg

## 2024-06-07 ENCOUNTER — APPOINTMENT (OUTPATIENT)
Dept: ELECTROPHYSIOLOGY | Facility: CLINIC | Age: 74
End: 2024-06-07
Payer: MEDICARE

## 2024-06-07 ENCOUNTER — NON-APPOINTMENT (OUTPATIENT)
Age: 74
End: 2024-06-07

## 2024-06-07 PROCEDURE — 93298 REM INTERROG DEV EVAL SCRMS: CPT

## 2024-06-11 ENCOUNTER — APPOINTMENT (OUTPATIENT)
Dept: CARDIOLOGY | Facility: CLINIC | Age: 74
End: 2024-06-11
Payer: MEDICARE

## 2024-06-11 ENCOUNTER — LABORATORY RESULT (OUTPATIENT)
Age: 74
End: 2024-06-11

## 2024-06-11 ENCOUNTER — NON-APPOINTMENT (OUTPATIENT)
Age: 74
End: 2024-06-11

## 2024-06-11 VITALS
HEIGHT: 60 IN | BODY MASS INDEX: 36.52 KG/M2 | RESPIRATION RATE: 16 BRPM | WEIGHT: 186 LBS | OXYGEN SATURATION: 94 % | TEMPERATURE: 97.6 F | SYSTOLIC BLOOD PRESSURE: 132 MMHG | HEART RATE: 75 BPM | DIASTOLIC BLOOD PRESSURE: 83 MMHG

## 2024-06-11 DIAGNOSIS — R73.03 PREDIABETES.: ICD-10-CM

## 2024-06-11 DIAGNOSIS — Z95.0 PRESENCE OF CARDIAC PACEMAKER: ICD-10-CM

## 2024-06-11 DIAGNOSIS — Z95.2 PRESENCE OF PROSTHETIC HEART VALVE: ICD-10-CM

## 2024-06-11 DIAGNOSIS — Z86.69 PERSONAL HISTORY OF OTHER DISEASES OF THE NERVOUS SYSTEM AND SENSE ORGANS: ICD-10-CM

## 2024-06-11 DIAGNOSIS — Z87.891 PERSONAL HISTORY OF NICOTINE DEPENDENCE: ICD-10-CM

## 2024-06-11 DIAGNOSIS — I63.9 CEREBRAL INFARCTION, UNSPECIFIED: ICD-10-CM

## 2024-06-11 DIAGNOSIS — Z98.890 OTHER SPECIFIED POSTPROCEDURAL STATES: ICD-10-CM

## 2024-06-11 DIAGNOSIS — I10 ESSENTIAL (PRIMARY) HYPERTENSION: ICD-10-CM

## 2024-06-11 DIAGNOSIS — Z90.3 ACQUIRED ABSENCE OF STOMACH [PART OF]: ICD-10-CM

## 2024-06-11 LAB
ALBUMIN SERPL ELPH-MCNC: 4.2 G/DL
ALP BLD-CCNC: 68 U/L
ALT SERPL-CCNC: 18 U/L
ANION GAP SERPL CALC-SCNC: 15 MMOL/L
APPEARANCE: CLEAR
AST SERPL-CCNC: 19 U/L
BACTERIA: NEGATIVE /HPF
BILIRUB SERPL-MCNC: 0.8 MG/DL
BILIRUBIN URINE: NEGATIVE
BLOOD URINE: NEGATIVE
BUN SERPL-MCNC: 17 MG/DL
CALCIUM SERPL-MCNC: 9.1 MG/DL
CAST: 0 /LPF
CHLORIDE SERPL-SCNC: 106 MMOL/L
CHOLEST SERPL-MCNC: 197 MG/DL
CO2 SERPL-SCNC: 22 MMOL/L
COLOR: YELLOW
CREAT SERPL-MCNC: 0.89 MG/DL
EGFR: 68 ML/MIN/1.73M2
EPITHELIAL CELLS: 4 /HPF
ESTIMATED AVERAGE GLUCOSE: 128 MG/DL
FERRITIN SERPL-MCNC: 14 NG/ML
FOLATE SERPL-MCNC: 10.5 NG/ML
GLUCOSE QUALITATIVE U: NEGATIVE MG/DL
GLUCOSE SERPL-MCNC: 98 MG/DL
HBA1C MFR BLD HPLC: 6.1 %
HCT VFR BLD CALC: 40.5 %
HDLC SERPL-MCNC: 93 MG/DL
HGB BLD-MCNC: 12.1 G/DL
IRON SATN MFR SERPL: 20 %
IRON SERPL-MCNC: 88 UG/DL
KETONES URINE: NEGATIVE MG/DL
LDLC SERPL CALC-MCNC: 91 MG/DL
LEUKOCYTE ESTERASE URINE: NEGATIVE
MCHC RBC-ENTMCNC: 27.6 PG
MCHC RBC-ENTMCNC: 29.9 GM/DL
MCV RBC AUTO: 92.5 FL
MICROSCOPIC-UA: NORMAL
NITRITE URINE: NEGATIVE
NONHDLC SERPL-MCNC: 104 MG/DL
PH URINE: 5.5
PLATELET # BLD AUTO: 185 K/UL
POTASSIUM SERPL-SCNC: 4.8 MMOL/L
PROT SERPL-MCNC: 6.7 G/DL
PROTEIN URINE: NEGATIVE MG/DL
RBC # BLD: 4.38 M/UL
RBC # FLD: 15.5 %
RED BLOOD CELLS URINE: 0 /HPF
SODIUM SERPL-SCNC: 143 MMOL/L
SPECIFIC GRAVITY URINE: 1.03
TIBC SERPL-MCNC: 433 UG/DL
TRIGL SERPL-MCNC: 71 MG/DL
TSH SERPL-ACNC: 0.76 UIU/ML
UIBC SERPL-MCNC: 346 UG/DL
UROBILINOGEN URINE: 0.2 MG/DL
VIT B12 SERPL-MCNC: 416 PG/ML
WBC # FLD AUTO: 6.66 K/UL
WHITE BLOOD CELLS URINE: 0 /HPF

## 2024-06-11 PROCEDURE — 99204 OFFICE O/P NEW MOD 45 MIN: CPT

## 2024-06-11 PROCEDURE — G2211 COMPLEX E/M VISIT ADD ON: CPT

## 2024-06-11 PROCEDURE — 93000 ELECTROCARDIOGRAM COMPLETE: CPT

## 2024-06-11 NOTE — PHYSICAL EXAM
DISPLAY PLAN FREE TEXT [Well Developed] : well developed [Well Nourished] : well nourished [No Acute Distress] : no acute distress [Normal Conjunctiva] : normal conjunctiva [Normal Venous Pressure] : normal venous pressure [No Carotid Bruit] : no carotid bruit [Normal S1, S2] : normal S1, S2 [No Rub] : no rub [5th Left ICS - MCL] : palpated at the 5th LICS in the midclavicular line [Normal] : normal [No Precordial Heave] : no precordial heave was noted [Normal Rate] : normal [Rhythm Regular] : regular [No Gallop] : no gallop heard [II] : a grade 2 [No Pitting Edema] : no pitting edema present [2+] : left 2+ [No Abnormalities] : the abdominal aorta was not enlarged and no bruit was heard [Clear Lung Fields] : clear lung fields [Good Air Entry] : good air entry [No Respiratory Distress] : no respiratory distress  [Soft] : abdomen soft [Non Tender] : non-tender [No Masses/organomegaly] : no masses/organomegaly [Normal Bowel Sounds] : normal bowel sounds [Normal Gait] : normal gait [No Edema] : no edema [No Cyanosis] : no cyanosis [No Clubbing] : no clubbing [No Varicosities] : no varicosities [No Rash] : no rash [No Skin Lesions] : no skin lesions [Moves all extremities] : moves all extremities [No Focal Deficits] : no focal deficits [Normal Speech] : normal speech [Alert and Oriented] : alert and oriented [Normal memory] : normal memory

## 2024-06-11 NOTE — ED ADULT NURSE NOTE - INTEGUMENTARY WDL
Called patient's guardian about the reminder below, no answer received, message left asking for call back.      ----- Message from Saida Rich MD sent at 6/5/2024  2:05 PM EDT -----  Call to check in to see how she is doing please and thank you!    Copied to CM as well.     Color consistent with ethnicity/race, warm, dry intact, resilient.

## 2024-06-17 NOTE — DISCUSSION/SUMMARY
[FreeTextEntry1] : This is a 73-year-old female with status post aortic valve replacement twice most recently April 2022 after endocarditis, (initial surgery 2019), non-insulin-dependent diabetes mellitus, COPD, hypertension, sleep apnea, status post gastric sleeve surgery, GERD, status post cerebral aneurysm and stent/coil placement March 2023, status post permanent pacemaker and loop recorder in place, who comes in for cardiac consultation. The patient is looking to consolidate all of her care within [facility]. She has no history of rheumatic fever.  She does not drink excessive caffeine or alcohol. Cardiac risk factors include smoking, hypertension, non-insulin-dependent diabetes mellitus, Electrocardiogram done June 11, 2024 demonstrates a ventricular paced rhythm with atrial sensing, and atrial premature contractions and occasional ventricular premature contraction. Blood work done May 31, 2024 demonstrated a potassium of 4.8, hemoglobin A1c of 6.1, cholesterol 197, HDL 93, LDL calculated 91, non-HDL cholesterol 104, triglycerides 71 mg/dL. The patient's LDL target is less than 100 mg/dL and preferably closer to 70 mg/dL. The patient will continue on her current dose of Lipitor 20 mg daily for now. Is unclear why she is on dual antiplatelet therapy with aspirin 81 mg daily as well as Brilinta which was recently reduced to 60 mg twice per day. Cardiac catheterization done April 13, 2022 demonstrated normal left main artery, 40% lesion in the proximal left anterior descending artery, 60% lesion in the mid left anterior descending artery, mild atherosclerosis in the left circumflex artery, mild atherosclerosis in the right coronary artery, and moderate atherosclerosis in the right posterolateral branch, ramus branch had mild atherosclerosis. Surgical procedure done April 18, 2022 by Dr. 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 PMH: In April 2022, the patient was admitted with an acute cerebrovascular accident, her echocardiogram demonstrated a large left atrial mass.  She was seen in consultation by cardiothoracic surgery.  Cardiac catheterization was done which revealed "normal coronary arteries".  She was evaluated by Dr. Jose Jay of cardiothoracic surgery and diagnosed with an aortic abscess and ultimately underwent aortic valve replacement and aortic homograft replacement she was sent home on intravenous antibiotics Rocephin and vancomycin. I have recommended the patient see Dr. Su of electrophysiology regarding her permanent micra pacemaker and loop recorder.  She does not know who is following her pacemaker or loop recorder. I have also asked her to obtain the results of her cardiac catheterization and prior cardiac operative reports for my review.  (She may have had her aortic valve replacement surgery at [facility] with Dr. Jay). Echo Doppler examination done June 4, 2024 demonstrated mild mitral valve regurgitation, bioprosthetic aortic valve functioning normally, mild tricuspid valve regurgitation, moderate left atrial dilatation and an reduced left ventricular function with estimated ejection fraction of 42%.  The patient is also instructed to follow-up with Dr. Leigh of pulmonary medicine.  Her friend reports that after she left her pulmonologist office she was still short of breath than her cardiologist had placed her on prednisone and changed her inhaler therapy.  The patient and her friend are also concerned that her metoprolol XL may be affecting her pulmonary status. She still on dual antiplatelet therapy with Brilinta which is associated with dyspnea and 14% of patients.  It is unclear why she needs to be on dual antiplatelet therapy at this time. She will have new blood work done today including lipoprotein a, lipoprotein B, high-sensitivity C-reactive protein, direct LDL cholesterol, lipid profile. She is instructed follow-up with her primary care physician, neurologist, and the electrophysiology team. She certainly needs to improve her diabetic state.  She is interested in GLP-1 agonist therapy which may be an adjunct to her diabetic care and could provide some weight loss. Further recommendation will be made after I am able to review her chart, and prior notes from her previous cardiologist. She is currently hemodynamically stable from a cardiac standpoint. She will follow-up with me in 4 to 6 weeks.   Thank you for allowing to participate in care of your patient.

## 2024-06-17 NOTE — REASON FOR VISIT
[CV Risk Factors and Non-Cardiac Disease] : CV risk factors and non-cardiac disease [Hypertension] : hypertension [FreeTextEntry1] : Patient is a 73 year old female with PMHx of HTN, aortic stenosis, COPD, BETSEY, pre-DM, CVA, history of Micra pacemaker in right ventricle, history of AV replacement, presenting today as new patient for cardiac evaluation.   Cardiac Risk factors: HTN, pre-DM, CVA history, former smoker  Family history of CAD, stent: Sister  Family history of myocardial infarction Mother (79), Brother (63), Father   Labs 05/31/24: Triglyceride 71, cholesterol 197, HDL 93, LDL calc 91, non , K 4.8, TSH 0.76, a1C 6.1

## 2024-06-30 ENCOUNTER — RESULT CHARGE (OUTPATIENT)
Age: 74
End: 2024-06-30

## 2024-07-01 ENCOUNTER — APPOINTMENT (OUTPATIENT)
Dept: ELECTROPHYSIOLOGY | Facility: CLINIC | Age: 74
End: 2024-07-01
Payer: MEDICARE

## 2024-07-01 ENCOUNTER — NON-APPOINTMENT (OUTPATIENT)
Age: 74
End: 2024-07-01

## 2024-07-01 VITALS
DIASTOLIC BLOOD PRESSURE: 76 MMHG | WEIGHT: 184.3 LBS | BODY MASS INDEX: 36.18 KG/M2 | OXYGEN SATURATION: 94 % | SYSTOLIC BLOOD PRESSURE: 111 MMHG | HEART RATE: 67 BPM | HEIGHT: 60 IN

## 2024-07-01 PROCEDURE — 93279 PRGRMG DEV EVAL PM/LDLS PM: CPT

## 2024-07-01 PROCEDURE — 93000 ELECTROCARDIOGRAM COMPLETE: CPT | Mod: 59

## 2024-07-02 ENCOUNTER — NON-APPOINTMENT (OUTPATIENT)
Age: 74
End: 2024-07-02

## 2024-07-22 ENCOUNTER — APPOINTMENT (OUTPATIENT)
Dept: PULMONOLOGY | Facility: CLINIC | Age: 74
End: 2024-07-22

## 2024-08-01 ENCOUNTER — NON-APPOINTMENT (OUTPATIENT)
Age: 74
End: 2024-08-01

## 2024-08-01 ENCOUNTER — APPOINTMENT (OUTPATIENT)
Dept: ELECTROPHYSIOLOGY | Facility: CLINIC | Age: 74
End: 2024-08-01

## 2024-08-01 PROCEDURE — 93298 REM INTERROG DEV EVAL SCRMS: CPT

## 2024-08-30 NOTE — PRE-OP CHECKLIST - HAIR REMOVAL
Patients mother is calling in with concerns about the prescription for birth control that was sent to the pharmacy. Due to the patients insurance she has to have the specific medication Ronald sent in but the pharmacy is trying to fill something different.    I have attempted to reach the pharmacy, to try and help figure out what was going on but had to leave a message for them to call us back.     Patients mother is aware we are waiting to hear back from the pharmacy and will let her know what we find out.    
hair removal not indicated

## 2024-09-03 ENCOUNTER — APPOINTMENT (OUTPATIENT)
Dept: CARDIOLOGY | Facility: CLINIC | Age: 74
End: 2024-09-03
Payer: MEDICARE

## 2024-09-03 ENCOUNTER — APPOINTMENT (OUTPATIENT)
Dept: PULMONOLOGY | Facility: CLINIC | Age: 74
End: 2024-09-03
Payer: MEDICARE

## 2024-09-03 VITALS
DIASTOLIC BLOOD PRESSURE: 82 MMHG | WEIGHT: 186 LBS | BODY MASS INDEX: 36.52 KG/M2 | TEMPERATURE: 98.2 F | SYSTOLIC BLOOD PRESSURE: 122 MMHG | RESPIRATION RATE: 16 BRPM | HEART RATE: 63 BPM | HEIGHT: 60 IN | OXYGEN SATURATION: 96 %

## 2024-09-03 VITALS — SYSTOLIC BLOOD PRESSURE: 122 MMHG | HEART RATE: 60 BPM | OXYGEN SATURATION: 94 % | DIASTOLIC BLOOD PRESSURE: 67 MMHG

## 2024-09-03 DIAGNOSIS — Z95.2 PRESENCE OF PROSTHETIC HEART VALVE: ICD-10-CM

## 2024-09-03 DIAGNOSIS — Z87.891 PERSONAL HISTORY OF NICOTINE DEPENDENCE: ICD-10-CM

## 2024-09-03 DIAGNOSIS — Z86.79 OTHER SPECIFIED POSTPROCEDURAL STATES: ICD-10-CM

## 2024-09-03 DIAGNOSIS — I34.0 NONRHEUMATIC MITRAL (VALVE) INSUFFICIENCY: ICD-10-CM

## 2024-09-03 DIAGNOSIS — Z90.3 ACQUIRED ABSENCE OF STOMACH [PART OF]: ICD-10-CM

## 2024-09-03 DIAGNOSIS — Z98.890 OTHER SPECIFIED POSTPROCEDURAL STATES: ICD-10-CM

## 2024-09-03 DIAGNOSIS — Z95.0 PRESENCE OF CARDIAC PACEMAKER: ICD-10-CM

## 2024-09-03 DIAGNOSIS — Z91.89 OTHER SPECIFIED PERSONAL RISK FACTORS, NOT ELSEWHERE CLASSIFIED: ICD-10-CM

## 2024-09-03 DIAGNOSIS — I63.9 CEREBRAL INFARCTION, UNSPECIFIED: ICD-10-CM

## 2024-09-03 DIAGNOSIS — Z86.69 PERSONAL HISTORY OF OTHER DISEASES OF THE NERVOUS SYSTEM AND SENSE ORGANS: ICD-10-CM

## 2024-09-03 DIAGNOSIS — R91.1 SOLITARY PULMONARY NODULE: ICD-10-CM

## 2024-09-03 DIAGNOSIS — E66.9 OBESITY, UNSPECIFIED: ICD-10-CM

## 2024-09-03 DIAGNOSIS — J44.9 CHRONIC OBSTRUCTIVE PULMONARY DISEASE, UNSPECIFIED: ICD-10-CM

## 2024-09-03 PROCEDURE — G2211 COMPLEX E/M VISIT ADD ON: CPT

## 2024-09-03 PROCEDURE — 94060 EVALUATION OF WHEEZING: CPT

## 2024-09-03 PROCEDURE — 99214 OFFICE O/P EST MOD 30 MIN: CPT

## 2024-09-03 PROCEDURE — 99214 OFFICE O/P EST MOD 30 MIN: CPT | Mod: 25

## 2024-09-03 RX ORDER — METOPROLOL SUCCINATE 50 MG/1
50 TABLET, EXTENDED RELEASE ORAL
Qty: 90 | Refills: 0 | Status: ACTIVE | COMMUNITY
Start: 2024-09-03 | End: 1900-01-01

## 2024-09-03 NOTE — ASSESSMENT
[FreeTextEntry1] : This is a 73-year-old female with status post aortic valve replacement twice most recently April 2022 after endocarditis, (initial surgery 2019), non-insulin-dependent diabetes mellitus, COPD, hypertension, sleep apnea, status post gastric sleeve surgery, GERD, status post cerebral aneurysm and stent/coil placement March 2023, status post permanent pacemaker and loop recorder in place, who comes in for cardiac follow-up evaluation.  *Per patient, she has history of an abandoned wire in her heart that was left during either her pacemaker placement or aortic valve repair. She is unable to complete either a MRI or MRA due to this.   She has no history of rheumatic fever.  She does not drink excessive caffeine or alcohol.  Cardiac risk factors include smoking, hypertension, non-insulin-dependent diabetes mellitus.  HPI: She is feeling generally well today and denies chest pain, dizziness, heart palpitations, recent episodes of syncope or falls, SOB, or dyspnea at this time.  Current Medications: Aspirin 81 mg daily, Atorvastatin 20 mg daily, Furosemide 80 mg daily, Irbesartan 300 mg daily, Metoprolol succinate 50 mg daily, and Spironolactone 25 mg BID.   She presents today with her friend (Bonnie) who is very involved with management of her care. She explained that her Furosemide 80 mg was supposed to only be a short-term increase due to patient experiencing increased work of breathing a few months ago. Prior dosage was Furosemide 40 mg daily along with Spironolactone 25 mg BID (patient has only been taking Spironolactone 25 mg once daily since increasing her Furosemide dosage). Recommended that she change medications back to her original regimen of Furosemide 40 mg daily and Spironolactone to 25 mg BID.   Patient is also interested in GLP-1 agonist medications in order to improve her cardiometabolic health and reduce cardiovascular risk factors. She is a good candidate for Zepbound or Wegovy which may also be an adjunct to her diabetic care while also providing weight loss.   CARDIOMETABOLIC/WEIGHT LOSS MANAGEMENT: Patient denies any personal or family history of MTC (medullary thyroid carcinoma) or MEN 2 (multiple endocrine neoplasia syndrome type 2) and denies pregnancy. Patient was told that Wegovy/Zepbound are contraindicated with this clinical history. Patient was counseled regarding symptoms of thyroid tumors (e.g., a mass in the neck, dysphagia, dyspnea, persistent hoarseness). Patient was also told that there is a risk of pancreatitis with use of Wegovy/Zepbound. They were told to be aware of persistent severe abdominal pain, sometimes radiating to the back with or without vomiting. If pancreatitis is suspected, they are to stop the medication and contact her PCP. If pancreatitis is confirmed she cannot restart. Patient was informed that adherence to a diet and exercise program in addition to taking the medication will optimize weight loss. They were also encouraged to drink 8 glasses of water daily.  **Patient friend (Bonnie) very upset that the appointment was scheduled with myself instead of Dr. Rey as she was under the impression he wanted to see her to discuss her medical history and records further at today's visit. Explained to both her and the patient that Dr. Rey is still involved in the plan of care, but the patient was placed on my schedule in order to allow for collaboration in continued care. There seems to have been a miscommunication between the friend and the patient as to who the appointment was with for today, as follow-up appointment sheet given to patient at last visit June 2024 has "[facility]" circled and appointments are confirmed with information as to date, time, and provider. This was explained to both patient and her friend at the visit. Friend states that she doesn't think they will continue care at this office in the future and will be reaching out to [facility] for complaint.    BLOOD PRESSURE: Well controlled.   BLOOW WORK:  *LDL target goal < 100* -Blood work done June 2024 demonstrated triglycerides 66, cholesterol 185, HDL 90, LDL 83, non-HDL 95, LDL direct 83, Lipoprotein a level 92.5.  -Blood work done May 31, 2024 demonstrated a potassium of 4.8, hemoglobin A1c of 6.1, cholesterol 197, HDL 93, LDL calculated 91, non-HDL cholesterol 104, triglycerides 71 mg/dL.   TESTING/REPORTS: -EKG done 07/01/2024 demonstrated sinus rhythm rate 72 bpm with ventricular pacing.   -Electrocardiogram done June 11, 2024 demonstrates a ventricular paced rhythm with atrial sensing, and atrial premature contractions and occasional ventricular premature contraction.  -Echo Doppler examination done June 4, 2024 demonstrated mild mitral valve regurgitation, bioprosthetic aortic valve functioning normally, mild tricuspid valve regurgitation, moderate left atrial dilatation and an reduced left ventricular function with estimated ejection fraction of 42%.  -Cardiac catheterization done April 13, 2022 demonstrated normal left main artery, 40% lesion in the proximal left anterior descending artery, 60% lesion in the mid left anterior descending artery, mild atherosclerosis in the left circumflex artery, mild atherosclerosis in the right coronary artery, and moderate atherosclerosis in the right posterolateral branch, ramus branch had mild atherosclerosis.  -Surgical procedure done April 18, 2022 by Dr. 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  -PMH: In April 2022, the patient was admitted with an acute cerebrovascular accident, her echocardiogram demonstrated a large left atrial mass.  She was seen in consultation by cardiothoracic surgery.  Cardiac catheterization was done which revealed "normal coronary arteries".  She was evaluated by Dr. Jose Jay of cardiothoracic surgery and diagnosed with an aortic abscess and ultimately underwent aortic valve replacement and aortic homograft replacement she was sent home on intravenous antibiotics Rocephin and vancomycin. I have recommended the patient see Dr. Su of electrophysiology regarding her permanent micra pacemaker and loop recorder.  She does not know who is following her pacemaker or loop recorder. I have also asked her to obtain the results of her cardiac catheterization and prior cardiac operative reports for my review.  (She may have had her aortic valve replacement surgery at [facility] with Dr. Jay).   PLAN: -She will decrease her Furosemide to 40 mg daily and increase her Spironolactone to 25 mg BID.  -She will continue her other current medications and contact the office if problems arise before next follow-up appointment. -If she decides to continue care at our office, Zepbound/Wegovy can be considered for cardiometabolic weight loss assistance.  -She will follow-up with all of her specialists routinely.    I have discussed the plan of care with ILENE ORTIZ and she will follow up in 3 months. They are compliant with all of their medications.     The patient understands that aerobic exercises must be increased to 40 minutes 4 times/week and a detailed discussion of lifestyle modification was done today.   The patient has a good understanding of the diagnosis, treatment plan and lifestyle modification.   They will contact me at the office for any questions with their care or any changes in their health status.   The patient was discussed with supervision physician Dr. Ramesh Rey at the time of the visit and the plan of care will be carried out as noted above.     DORETHA Giles NP

## 2024-09-03 NOTE — ASSESSMENT
[FreeTextEntry1] : Con nebulizer treatments DuoNeb and budesonide.  Explained can use DuoNeb up to 4 times a day.  Budesonide twice a day. Discussed cardiac meds needs to follow-up with cardiology. Pulm Rehab. Discussed exercise program how to perform on her own. Repeat screening ct 1 year from last task sent Jan 2025 Okay to continue beta-blocker. Follow-up in 3 months. Follow-up sooner on a as needed basis.   35 minutes spent in evaluation management and review of studies.

## 2024-09-03 NOTE — DISCUSSION/SUMMARY
[FreeTextEntry1] : Dr. Rey-(PRIOR VISIT and PMH WITH Dr. Rey): This is a 73-year-old female with status post aortic valve replacement twice most recently April 2022 after endocarditis, (initial surgery 2019), non-insulin-dependent diabetes mellitus, COPD, hypertension, sleep apnea, status post gastric sleeve surgery, GERD, status post cerebral aneurysm and stent/coil placement March 2023, status post permanent pacemaker and loop recorder in place, who comes in for cardiac consultation. The patient is looking to consolidate all of her care within [facility]. She has no history of rheumatic fever.  She does not drink excessive caffeine or alcohol. Cardiac risk factors include smoking, hypertension, non-insulin-dependent diabetes mellitus, Electrocardiogram done June 11, 2024 demonstrates a ventricular paced rhythm with atrial sensing, and atrial premature contractions and occasional ventricular premature contraction. Blood work done May 31, 2024 demonstrated a potassium of 4.8, hemoglobin A1c of 6.1, cholesterol 197, HDL 93, LDL calculated 91, non-HDL cholesterol 104, triglycerides 71 mg/dL. The patient's LDL target is less than 100 mg/dL and preferably closer to 70 mg/dL. The patient will continue on her current dose of Lipitor 20 mg daily for now. Is unclear why she is on dual antiplatelet therapy with aspirin 81 mg daily as well as Brilinta which was recently reduced to 60 mg twice per day. Cardiac catheterization done April 13, 2022 demonstrated normal left main artery, 40% lesion in the proximal left anterior descending artery, 60% lesion in the mid left anterior descending artery, mild atherosclerosis in the left circumflex artery, mild atherosclerosis in the right coronary artery, and moderate atherosclerosis in the right posterolateral branch, ramus branch had mild atherosclerosis. Surgical procedure done April 18, 2022 by Dr. 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 PMH: In April 2022, the patient was admitted with an acute cerebrovascular accident, her echocardiogram demonstrated a large left atrial mass.  She was seen in consultation by cardiothoracic surgery.  Cardiac catheterization was done which revealed "normal coronary arteries".  She was evaluated by Dr. Jose Jay of cardiothoracic surgery and diagnosed with an aortic abscess and ultimately underwent aortic valve replacement and aortic homograft replacement she was sent home on intravenous antibiotics Rocephin and vancomycin. I have recommended the patient see Dr. Su of electrophysiology regarding her permanent micra pacemaker and loop recorder.  She does not know who is following her pacemaker or loop recorder. I have also asked her to obtain the results of her cardiac catheterization and prior cardiac operative reports for my review.  (She may have had her aortic valve replacement surgery at [facility] with Dr. Jay). Echo Doppler examination done June 4, 2024 demonstrated mild mitral valve regurgitation, bioprosthetic aortic valve functioning normally, mild tricuspid valve regurgitation, moderate left atrial dilatation and an reduced left ventricular function with estimated ejection fraction of 42%.  The patient is also instructed to follow-up with Dr. Leigh of pulmonary medicine.  Her friend reports that after she left her pulmonologist office she was still short of breath than her cardiologist had placed her on prednisone and changed her inhaler therapy.  The patient and her friend are also concerned that her metoprolol XL may be affecting her pulmonary status. She still on dual antiplatelet therapy with Brilinta which is associated with dyspnea and 14% of patients.  It is unclear why she needs to be on dual antiplatelet therapy at this time. She will have new blood work done today including lipoprotein a, lipoprotein B, high-sensitivity C-reactive protein, direct LDL cholesterol, lipid profile. She is instructed follow-up with her primary care physician, neurologist, and the electrophysiology team. She certainly needs to improve her diabetic state.  She is interested in GLP-1 agonist therapy which may be an adjunct to her diabetic care and could provide some weight loss. Further recommendation will be made after I am able to review her chart, and prior notes from her previous cardiologist. She is currently hemodynamically stable from a cardiac standpoint. She will follow-up with me in 4 to 6 weeks.   Thank you for allowing to participate in care of your patient.

## 2024-09-03 NOTE — DISCUSSION/SUMMARY
[FreeTextEntry1] : Chronic obstructive pulmonary disease  Clinically and functionally essentially stable. Dyspnea on exertion likely related to underlying pulmonary disease as well as weight and conditioning.  No significant desaturation. Candidate for continued CT chest screening. Former smoker 40 pk/yr discontinued 2013.

## 2024-09-03 NOTE — REASON FOR VISIT
[CV Risk Factors and Non-Cardiac Disease] : CV risk factors and non-cardiac disease [Hypertension] : hypertension [FreeTextEntry3] : Dr. Avelino Villagomez [FreeTextEntry1] : Patient is a 73 year old female with PMHx of HTN, aortic stenosis, COPD, BETSEY, pre-DM, CVA, history of Micra pacemaker in right ventricle, history of AV replacement, presenting today as new patient for cardiac evaluation.   Cardiac Risk factors: HTN, pre-DM, CVA history, former smoker  Family history of CAD, stent: Sister  Family history of myocardial infarction Mother (79), Brother (63), Father   Labs 05/31/24: Triglyceride 71, cholesterol 197, HDL 93, LDL calc 91, non , K 4.8, TSH 0.76, a1C 6.1

## 2024-09-03 NOTE — PROCEDURE
[FreeTextEntry1] : Ct Jan 24 09/03/2024 Pulmonary function testing These data demonstrate a moderate obstructive ventilatory deficit. There is no significant bronchodilator responce.  Mild increase in flow rates compared to April 2024.    April 26, 2024 6-minute walk attached. Walked 1120 feet.  Limited by wheezing and shortness of breath.  No desaturation.

## 2024-09-03 NOTE — HISTORY OF PRESENT ILLNESS
[Former] : former [TextBox_4] : recently saw cardio Dr Rey, saw NP took of brilinta  on Lasix 80 and Spirolactone with decrease in weight, today decreased to 40 mg had recent labs with Lg Villagomez pcp can't have a MRI because of an abandoned wire in her heart has pacemaker 2022 now taking albuterol about 1-2 a day.  does have nebulizer and doing DuoNeb and budesonide now only 3 times a week has productive cough and still has sob. cant walk far without sob.     Admitted for CVA 4/9/22 Dx endocarditis and abscess.  Surgery was 4/11/22      [TextBox_11] : 1 [TextBox_13] : 40 [YearQuit] : 2013

## 2024-09-03 NOTE — PHYSICAL EXAM

## 2024-09-05 ENCOUNTER — NON-APPOINTMENT (OUTPATIENT)
Age: 74
End: 2024-09-05

## 2024-09-05 ENCOUNTER — APPOINTMENT (OUTPATIENT)
Dept: ELECTROPHYSIOLOGY | Facility: CLINIC | Age: 74
End: 2024-09-05
Payer: MEDICARE

## 2024-09-05 PROCEDURE — 93298 REM INTERROG DEV EVAL SCRMS: CPT

## 2024-09-06 ENCOUNTER — APPOINTMENT (OUTPATIENT)
Dept: INTERNAL MEDICINE | Facility: CLINIC | Age: 74
End: 2024-09-06

## 2024-09-06 VITALS
TEMPERATURE: 98.6 F | HEART RATE: 61 BPM | BODY MASS INDEX: 36.52 KG/M2 | HEIGHT: 60 IN | SYSTOLIC BLOOD PRESSURE: 124 MMHG | DIASTOLIC BLOOD PRESSURE: 79 MMHG | OXYGEN SATURATION: 96 % | WEIGHT: 186 LBS

## 2024-09-06 DIAGNOSIS — J44.9 CHRONIC OBSTRUCTIVE PULMONARY DISEASE, UNSPECIFIED: ICD-10-CM

## 2024-09-06 DIAGNOSIS — R73.03 PREDIABETES.: ICD-10-CM

## 2024-09-06 DIAGNOSIS — I10 ESSENTIAL (PRIMARY) HYPERTENSION: ICD-10-CM

## 2024-09-06 PROCEDURE — 99214 OFFICE O/P EST MOD 30 MIN: CPT

## 2024-09-06 PROCEDURE — 36415 COLL VENOUS BLD VENIPUNCTURE: CPT

## 2024-09-06 NOTE — HISTORY OF PRESENT ILLNESS
[FreeTextEntry1] : Follow up [de-identified] : Ms. ORTIZ is a 73 year old female who presents to the office for follow up: PMHx of HTN, aortic stenosis, COPD, BETSEY, pre-DM, CVA, history of Micra pacemaker in right ventricle, history of AV replacement  Reviewed labs from June 2024  Lipid profile WNL A1C 6.1  Pt f/w cardiology Spironolactone 25mg BID Lasix 40mg qd Irbesartan 300mg qd Metoprolol ER 50 qd Using albuterol 1-2x/day - sees pulm Has Medtronic PPM  124/79 186lb  Will provide referral for cardiology

## 2024-09-07 LAB
ALBUMIN SERPL ELPH-MCNC: 4.5 G/DL
ALP BLD-CCNC: 65 U/L
ALT SERPL-CCNC: 26 U/L
ANION GAP SERPL CALC-SCNC: 14 MMOL/L
AST SERPL-CCNC: 24 U/L
BILIRUB SERPL-MCNC: 0.8 MG/DL
BUN SERPL-MCNC: 37 MG/DL
CALCIUM SERPL-MCNC: 9.3 MG/DL
CHLORIDE SERPL-SCNC: 101 MMOL/L
CO2 SERPL-SCNC: 26 MMOL/L
CREAT SERPL-MCNC: 1.17 MG/DL
EGFR: 49 ML/MIN/1.73M2
ESTIMATED AVERAGE GLUCOSE: 137 MG/DL
GLUCOSE SERPL-MCNC: 103 MG/DL
HBA1C MFR BLD HPLC: 6.4 %
HCT VFR BLD CALC: 40.5 %
HGB BLD-MCNC: 12.2 G/DL
MCHC RBC-ENTMCNC: 28.2 PG
MCHC RBC-ENTMCNC: 30.1 GM/DL
MCV RBC AUTO: 93.8 FL
PLATELET # BLD AUTO: 243 K/UL
POTASSIUM SERPL-SCNC: 4.9 MMOL/L
PROT SERPL-MCNC: 6.7 G/DL
RBC # BLD: 4.32 M/UL
RBC # FLD: 16.3 %
SODIUM SERPL-SCNC: 141 MMOL/L
WBC # FLD AUTO: 7.02 K/UL

## 2024-09-24 ENCOUNTER — APPOINTMENT (OUTPATIENT)
Dept: NEUROLOGY | Facility: CLINIC | Age: 74
End: 2024-09-24
Payer: MEDICARE

## 2024-09-24 VITALS
SYSTOLIC BLOOD PRESSURE: 161 MMHG | WEIGHT: 186 LBS | HEIGHT: 60 IN | HEART RATE: 70 BPM | DIASTOLIC BLOOD PRESSURE: 83 MMHG | BODY MASS INDEX: 36.52 KG/M2

## 2024-09-24 DIAGNOSIS — I72.9 ANEURYSM OF UNSPECIFIED SITE: ICD-10-CM

## 2024-09-24 PROCEDURE — G2211 COMPLEX E/M VISIT ADD ON: CPT

## 2024-09-24 PROCEDURE — 99215 OFFICE O/P EST HI 40 MIN: CPT

## 2024-09-24 NOTE — PHYSICAL EXAM
[General Appearance - Alert] : alert [General Appearance - Well Nourished] : well nourished [General Appearance - Well Developed] : well developed [Oriented To Time, Place, And Person] : oriented to person, place, and time [Affect] : the affect was normal [Mood] : the mood was normal [Person] : oriented to person [Place] : oriented to place [Time] : oriented to time [Concentration Intact] : normal concentrating ability [Visual Intact] : visual attention was ~T not ~L decreased [Naming Objects] : no difficulty naming common objects [Repeating Phrases] : no difficulty repeating a phrase [Writing A Sentence] : no difficulty writing a sentence [Fluency] : fluency intact [Comprehension] : comprehension intact [Reading] : reading intact [Past History] : adequate knowledge of personal past history [Cranial Nerves Optic (II)] : visual acuity intact bilaterally,  visual fields full to confrontation, pupils equal round and reactive to light [Cranial Nerves Oculomotor (III)] : extraocular motion intact [Cranial Nerves Trigeminal (V)] : facial sensation intact symmetrically [Cranial Nerves Facial (VII)] : face symmetrical [Cranial Nerves Vestibulocochlear (VIII)] : hearing was intact bilaterally [Cranial Nerves Glossopharyngeal (IX)] : tongue and palate midline [Cranial Nerves Accessory (XI - Cranial And Spinal)] : head turning and shoulder shrug symmetric [Cranial Nerves Hypoglossal (XII)] : there was no tongue deviation with protrusion [Motor Tone] : muscle tone was normal in all four extremities [Motor Strength] : muscle strength was normal in all four extremities [No Muscle Atrophy] : normal bulk in all four extremities [Sensation Tactile Decrease] : light touch was intact [Balance] : balance was intact [Full Visual Field] : full visual field [Hearing Threshold Finger Rub Not [place]] : hearing was normal [Neck Appearance] : the appearance of the neck was normal [] : no respiratory distress [Abnormal Walk] : normal gait

## 2024-09-24 NOTE — DISCUSSION/SUMMARY
[FreeTextEntry1] : Mrs. Lal is a 73 year old woman with a PMHx of AS w/ prior open heart aortic valve replacement 2019, HTN, BETSEY, former smoker, emphysema/ chronic bronchitis, GERD s/p laparoscopic vertical sleeve gastrectomy, MIcra pacemaker (not MRI compatible) now 2.5 years s/p right PCA infarct etiology cardioembolic due to endocarditis s/p explant and implant of prosthetic valve. At the time she was found to have an incidental ACOMM aneurysm. She is 1.5 years s/p complete aneurysm occlusion post stent assisted coil embolization. She was lost to follow, but now comes in for a follow up. Plan for repeat cerebral angiogram in the next few weeks to confirm long-term occlusion. The procedure, risks, benefits and alternatives discussed in detail.

## 2024-09-24 NOTE — HISTORY OF PRESENT ILLNESS
[FreeTextEntry1] : Mrs. Lal is a 73 year old woman with a PMHx of AS w/ prior open heart aortic valve replacement 2019, HTN, BETSEY, former smoker, emphysema/chronic bronchitis, GERD s/p laparoscopic vertical sleeve gastrectomy, MIcra pacemaker (not MRI compatible) who presented to [facility] ED on 04/09/22 with blurry vision, gait imbalance, and speech disturbance. Pt was not a candidate for tpa or mechanical thrombectomy as no LVO. CTH with Right PCA infarct and CTA: 6.7 mm anteriorly and inferiorly projecting anterior communicating artery aneurysm. MRI Head showed Acute R occipital lobe infarct in a R PCA vascular distribution with an additional punctate acute infarct involving the L cerebellum and R splenium of the corpus callosum. 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 s/p explant and reimplant of prosthetic valve and pacemaker during hospital admission. She has recovered from her stroke, only has residual left HHA. She had a cerebral angiogram on 12/21/22 which showed a 6.0 mm A-comm aneurysm filling from the right A1 segment. On 03/17/23 she underwent complete aneurysm occlusion post [place] stent assisted coil embolization. She comes in today for a follow up visit, she was lost to follow up and did not get the MRA as her Pacemaker is not mri compatible. She is doing well, stopped the Brilinta and now only on ASA.

## 2024-09-24 NOTE — REVIEW OF SYSTEMS
[Feeling Poorly] : not feeling poorly [Feeling Tired] : not feeling tired [Confused or Disoriented] : no confusion [Memory Lapses or Loss] : no memory loss [Decr. Concentrating Ability] : no decrease in concentrating ability [Changed Thought Patterns] : no change in thought patterns [Repeating Questions] : no repeated questioning about recent events [Arm Weakness] : no arm weakness [Leg Weakness] : no leg weakness [Poor Coordination] : good coordination [Difficulty Writing] : no difficulty writing [Difficulties in Speech] : no speech difficulties [Numbness] : no numbness [Seizures] : no convulsions [Dizziness] : no dizziness [Fainting] : no fainting [Lightheadedness] : no lightheadedness [Vertigo] : no vertigo [Tension Headache] : no tension-type headache [Difficulty Walking] : no difficulty walking [Anxiety] : no anxiety [Depression] : no depression [Eyesight Problems] : no eyesight problems [Loss Of Hearing] : no hearing loss [Vomiting] : no vomiting [Joint Pain] : no joint pain [Easy Bleeding] : no tendency for easy bleeding [Easy Bruising] : no tendency for easy bruising

## 2024-10-09 ENCOUNTER — APPOINTMENT (OUTPATIENT)
Dept: ELECTROPHYSIOLOGY | Facility: CLINIC | Age: 74
End: 2024-10-09
Payer: MEDICARE

## 2024-10-09 ENCOUNTER — NON-APPOINTMENT (OUTPATIENT)
Age: 74
End: 2024-10-09

## 2024-10-09 PROCEDURE — 93298 REM INTERROG DEV EVAL SCRMS: CPT

## 2024-10-16 ENCOUNTER — APPOINTMENT (OUTPATIENT)
Dept: NEUROLOGY | Facility: HOSPITAL | Age: 74
End: 2024-10-16

## 2024-10-29 ENCOUNTER — APPOINTMENT (OUTPATIENT)
Dept: NEUROLOGY | Facility: CLINIC | Age: 74
End: 2024-10-29

## 2024-11-13 ENCOUNTER — APPOINTMENT (OUTPATIENT)
Dept: ELECTROPHYSIOLOGY | Facility: CLINIC | Age: 74
End: 2024-11-13
Payer: MEDICARE

## 2024-11-13 ENCOUNTER — NON-APPOINTMENT (OUTPATIENT)
Age: 74
End: 2024-11-13

## 2024-11-13 PROCEDURE — 93298 REM INTERROG DEV EVAL SCRMS: CPT

## 2024-11-27 NOTE — PRE-ANESTHESIA EVALUATION ADULT - MALLAMPATI CLASS
Class II - visualization of the soft palate, fauces, and uvula
Class III - visualization of the soft palate and the base of the uvula
no

## 2024-12-05 ENCOUNTER — APPOINTMENT (OUTPATIENT)
Dept: ELECTROPHYSIOLOGY | Facility: CLINIC | Age: 74
End: 2024-12-05

## 2024-12-05 ENCOUNTER — NON-APPOINTMENT (OUTPATIENT)
Age: 74
End: 2024-12-05

## 2024-12-05 ENCOUNTER — RESULT CHARGE (OUTPATIENT)
Age: 74
End: 2024-12-05

## 2024-12-05 VITALS
HEART RATE: 66 BPM | OXYGEN SATURATION: 95 % | HEIGHT: 60 IN | WEIGHT: 192 LBS | SYSTOLIC BLOOD PRESSURE: 115 MMHG | BODY MASS INDEX: 37.69 KG/M2 | DIASTOLIC BLOOD PRESSURE: 74 MMHG

## 2024-12-05 PROCEDURE — 93291 INTERROG DEV EVAL SCRMS IP: CPT | Mod: 59

## 2024-12-05 PROCEDURE — 93000 ELECTROCARDIOGRAM COMPLETE: CPT | Mod: 59

## 2024-12-05 PROCEDURE — 93279 PRGRMG DEV EVAL PM/LDLS PM: CPT

## 2024-12-12 ENCOUNTER — APPOINTMENT (OUTPATIENT)
Dept: CARDIOLOGY | Facility: CLINIC | Age: 74
End: 2024-12-12

## 2025-01-03 ENCOUNTER — APPOINTMENT (OUTPATIENT)
Dept: PULMONOLOGY | Facility: CLINIC | Age: 75
End: 2025-01-03

## 2025-01-07 ENCOUNTER — APPOINTMENT (OUTPATIENT)
Dept: NEUROLOGY | Facility: CLINIC | Age: 75
End: 2025-01-07

## 2025-01-09 ENCOUNTER — NON-APPOINTMENT (OUTPATIENT)
Age: 75
End: 2025-01-09

## 2025-01-09 ENCOUNTER — APPOINTMENT (OUTPATIENT)
Dept: ELECTROPHYSIOLOGY | Facility: CLINIC | Age: 75
End: 2025-01-09
Payer: MEDICARE

## 2025-01-09 PROCEDURE — 93298 REM INTERROG DEV EVAL SCRMS: CPT

## 2025-01-23 NOTE — PATIENT PROFILE ADULT - NSPROPOAURINARYCATHETER_GEN_A_NUR
[FreeTextEntry1] : EXAM Left thumb with mild swelling, skin intact. +ttp at MCP, unable to stress. Able to flex and extend at IP. Sensation intact throughout. <2sec cap refill.  Left thumb radiographs with no fracture nor dislocation. Mild MP arthrosis. Left thumb MRI with complete UCL insertional tear, +Stener  ASSESSMENT/PLAN Left thumb UCL tear, complete, Stener - reviewed MRI and radiographs with patient which revealed complete rupture of thumb UCL from insertion on proximal phalanx. Did discuss nonoperative vs operative management with patient, but that in light of complete rupture, she is indicated for left thumb UCL repair vs reconstruction. Risks, benefits and alternatives to surgery were discuss including risk of continued instability, pain, stiffness, neurovascular injury, infection, wound dehiscence, DVT and medical complications associated with anesthesia. Patient understood this discussion, questions were answered and patient agreed to proceed with the surgery.  Discussed mild MP arthrosis and that pain from arthrosis will not improve. Focus of procedure is on restoring stability to thumb MP.  Plan for left thumb ulnar collateral ligament repair vs [place] [facility]  F/u 10 days after surgery. no

## 2025-02-04 ENCOUNTER — APPOINTMENT (OUTPATIENT)
Dept: PULMONOLOGY | Facility: CLINIC | Age: 75
End: 2025-02-04
Payer: MEDICARE

## 2025-02-04 VITALS
HEART RATE: 68 BPM | TEMPERATURE: 98.2 F | OXYGEN SATURATION: 93 % | DIASTOLIC BLOOD PRESSURE: 66 MMHG | SYSTOLIC BLOOD PRESSURE: 116 MMHG

## 2025-02-04 DIAGNOSIS — J44.9 CHRONIC OBSTRUCTIVE PULMONARY DISEASE, UNSPECIFIED: ICD-10-CM

## 2025-02-04 DIAGNOSIS — R91.8 OTHER NONSPECIFIC ABNORMAL FINDING OF LUNG FIELD: ICD-10-CM

## 2025-02-04 DIAGNOSIS — Z87.09 PERSONAL HISTORY OF OTHER DISEASES OF THE RESPIRATORY SYSTEM: ICD-10-CM

## 2025-02-04 DIAGNOSIS — R06.02 SHORTNESS OF BREATH: ICD-10-CM

## 2025-02-04 DIAGNOSIS — Z86.69 PERSONAL HISTORY OF OTHER DISEASES OF THE NERVOUS SYSTEM AND SENSE ORGANS: ICD-10-CM

## 2025-02-04 DIAGNOSIS — G47.19 OTHER HYPERSOMNIA: ICD-10-CM

## 2025-02-04 LAB — POCT - HEMOGLOBIN (HGB), QUANTITATIVE, TRANSCUTANEOUS: 14.9

## 2025-02-04 PROCEDURE — 94060 EVALUATION OF WHEEZING: CPT

## 2025-02-04 PROCEDURE — 88738 HGB QUANT TRANSCUTANEOUS: CPT

## 2025-02-04 PROCEDURE — 94729 DIFFUSING CAPACITY: CPT

## 2025-02-04 PROCEDURE — 99214 OFFICE O/P EST MOD 30 MIN: CPT | Mod: 25

## 2025-02-04 PROCEDURE — ZZZZZ: CPT

## 2025-02-04 PROCEDURE — 94727 GAS DIL/WSHOT DETER LNG VOL: CPT

## 2025-02-04 RX ORDER — NEBULIZER ACCESSORIES
KIT MISCELLANEOUS
Qty: 1 | Refills: 5 | Status: ACTIVE | COMMUNITY
Start: 2025-02-04 | End: 1900-01-01

## 2025-02-12 ENCOUNTER — APPOINTMENT (OUTPATIENT)
Dept: ELECTROPHYSIOLOGY | Facility: CLINIC | Age: 75
End: 2025-02-12

## 2025-02-12 ENCOUNTER — NON-APPOINTMENT (OUTPATIENT)
Age: 75
End: 2025-02-12

## 2025-02-12 VITALS — HEIGHT: 60 IN | WEIGHT: 192 LBS | BODY MASS INDEX: 37.69 KG/M2

## 2025-02-12 DIAGNOSIS — Z87.891 PERSONAL HISTORY OF NICOTINE DEPENDENCE: ICD-10-CM

## 2025-02-12 PROCEDURE — 93298 REM INTERROG DEV EVAL SCRMS: CPT

## 2025-02-14 ENCOUNTER — APPOINTMENT (OUTPATIENT)
Dept: CT IMAGING | Facility: CLINIC | Age: 75
End: 2025-02-14

## 2025-02-14 ENCOUNTER — OUTPATIENT (OUTPATIENT)
Dept: OUTPATIENT SERVICES | Facility: HOSPITAL | Age: 75
LOS: 1 days | End: 2025-02-14
Payer: MEDICARE

## 2025-02-14 DIAGNOSIS — Z98.890 OTHER SPECIFIED POSTPROCEDURAL STATES: Chronic | ICD-10-CM

## 2025-02-14 DIAGNOSIS — Z98.84 BARIATRIC SURGERY STATUS: Chronic | ICD-10-CM

## 2025-02-14 DIAGNOSIS — Z95.2 PRESENCE OF PROSTHETIC HEART VALVE: Chronic | ICD-10-CM

## 2025-02-14 DIAGNOSIS — Z87.891 PERSONAL HISTORY OF NICOTINE DEPENDENCE: ICD-10-CM

## 2025-02-14 PROCEDURE — 71271 CT THORAX LUNG CANCER SCR C-: CPT | Mod: 26

## 2025-02-14 PROCEDURE — 71271 CT THORAX LUNG CANCER SCR C-: CPT

## 2025-02-27 ENCOUNTER — NON-APPOINTMENT (OUTPATIENT)
Age: 75
End: 2025-02-27

## 2025-03-03 ENCOUNTER — NON-APPOINTMENT (OUTPATIENT)
Age: 75
End: 2025-03-03

## 2025-03-26 ENCOUNTER — APPOINTMENT (OUTPATIENT)
Dept: ELECTROPHYSIOLOGY | Facility: CLINIC | Age: 75
End: 2025-03-26
Payer: MEDICARE

## 2025-03-26 ENCOUNTER — NON-APPOINTMENT (OUTPATIENT)
Age: 75
End: 2025-03-26

## 2025-03-26 PROCEDURE — 93298 REM INTERROG DEV EVAL SCRMS: CPT

## 2025-04-30 ENCOUNTER — RX RENEWAL (OUTPATIENT)
Age: 75
End: 2025-04-30

## 2025-04-30 ENCOUNTER — APPOINTMENT (OUTPATIENT)
Dept: ELECTROPHYSIOLOGY | Facility: CLINIC | Age: 75
End: 2025-04-30

## 2025-04-30 PROCEDURE — 93298 REM INTERROG DEV EVAL SCRMS: CPT

## 2025-06-02 ENCOUNTER — APPOINTMENT (OUTPATIENT)
Dept: PULMONOLOGY | Facility: CLINIC | Age: 75
End: 2025-06-02

## 2025-06-04 ENCOUNTER — APPOINTMENT (OUTPATIENT)
Dept: ELECTROPHYSIOLOGY | Facility: CLINIC | Age: 75
End: 2025-06-04
Payer: MEDICARE

## 2025-06-04 ENCOUNTER — NON-APPOINTMENT (OUTPATIENT)
Age: 75
End: 2025-06-04

## 2025-06-04 PROCEDURE — 93298 REM INTERROG DEV EVAL SCRMS: CPT

## 2025-06-05 ENCOUNTER — NON-APPOINTMENT (OUTPATIENT)
Age: 75
End: 2025-06-05

## 2025-06-10 ENCOUNTER — OUTPATIENT (OUTPATIENT)
Dept: OUTPATIENT SERVICES | Facility: HOSPITAL | Age: 75
LOS: 1 days | End: 2025-06-10
Payer: MEDICARE

## 2025-06-10 ENCOUNTER — TRANSCRIPTION ENCOUNTER (OUTPATIENT)
Age: 75
End: 2025-06-10

## 2025-06-10 VITALS
RESPIRATION RATE: 18 BRPM | OXYGEN SATURATION: 95 % | TEMPERATURE: 98 F | DIASTOLIC BLOOD PRESSURE: 55 MMHG | HEART RATE: 68 BPM | SYSTOLIC BLOOD PRESSURE: 114 MMHG

## 2025-06-10 VITALS
SYSTOLIC BLOOD PRESSURE: 113 MMHG | OXYGEN SATURATION: 95 % | HEIGHT: 61 IN | HEART RATE: 73 BPM | WEIGHT: 179.9 LBS | TEMPERATURE: 98 F | DIASTOLIC BLOOD PRESSURE: 70 MMHG | RESPIRATION RATE: 19 BRPM

## 2025-06-10 DIAGNOSIS — Z90.3 ACQUIRED ABSENCE OF STOMACH [PART OF]: Chronic | ICD-10-CM

## 2025-06-10 DIAGNOSIS — Z98.890 OTHER SPECIFIED POSTPROCEDURAL STATES: Chronic | ICD-10-CM

## 2025-06-10 DIAGNOSIS — R94.39 ABNORMAL RESULT OF OTHER CARDIOVASCULAR FUNCTION STUDY: ICD-10-CM

## 2025-06-10 DIAGNOSIS — Z95.0 PRESENCE OF CARDIAC PACEMAKER: Chronic | ICD-10-CM

## 2025-06-10 LAB
ANION GAP SERPL CALC-SCNC: 12 MMOL/L — SIGNIFICANT CHANGE UP (ref 5–17)
BUN SERPL-MCNC: 44 MG/DL — HIGH (ref 7–23)
CALCIUM SERPL-MCNC: 9.4 MG/DL — SIGNIFICANT CHANGE UP (ref 8.4–10.5)
CHLORIDE SERPL-SCNC: 103 MMOL/L — SIGNIFICANT CHANGE UP (ref 96–108)
CO2 SERPL-SCNC: 23 MMOL/L — SIGNIFICANT CHANGE UP (ref 22–31)
CREAT SERPL-MCNC: 1.31 MG/DL — HIGH (ref 0.5–1.3)
EGFR: 43 ML/MIN/1.73M2 — LOW
EGFR: 43 ML/MIN/1.73M2 — LOW
GLUCOSE SERPL-MCNC: 100 MG/DL — HIGH (ref 70–99)
HCT VFR BLD CALC: 44.3 % — SIGNIFICANT CHANGE UP (ref 34.5–45)
HGB BLD-MCNC: 14.4 G/DL — SIGNIFICANT CHANGE UP (ref 11.5–15.5)
MCHC RBC-ENTMCNC: 29.9 PG — SIGNIFICANT CHANGE UP (ref 27–34)
MCHC RBC-ENTMCNC: 32.5 G/DL — SIGNIFICANT CHANGE UP (ref 32–36)
MCV RBC AUTO: 92.1 FL — SIGNIFICANT CHANGE UP (ref 80–100)
NRBC BLD AUTO-RTO: 0 /100 WBCS — SIGNIFICANT CHANGE UP (ref 0–0)
PLATELET # BLD AUTO: 234 K/UL — SIGNIFICANT CHANGE UP (ref 150–400)
POTASSIUM SERPL-MCNC: 4.5 MMOL/L — SIGNIFICANT CHANGE UP (ref 3.5–5.3)
POTASSIUM SERPL-SCNC: 4.5 MMOL/L — SIGNIFICANT CHANGE UP (ref 3.5–5.3)
RBC # BLD: 4.81 M/UL — SIGNIFICANT CHANGE UP (ref 3.8–5.2)
RBC # FLD: 14.5 % — SIGNIFICANT CHANGE UP (ref 10.3–14.5)
SODIUM SERPL-SCNC: 138 MMOL/L — SIGNIFICANT CHANGE UP (ref 135–145)
WBC # BLD: 7.93 K/UL — SIGNIFICANT CHANGE UP (ref 3.8–10.5)
WBC # FLD AUTO: 7.93 K/UL — SIGNIFICANT CHANGE UP (ref 3.8–10.5)

## 2025-06-10 RX ORDER — METOPROLOL SUCCINATE 50 MG/1
50 TABLET, EXTENDED RELEASE ORAL DAILY
Refills: 0 | Status: DISCONTINUED | OUTPATIENT
Start: 2025-06-10 | End: 2025-06-24

## 2025-06-10 RX ORDER — ASPIRIN 325 MG
81 TABLET ORAL DAILY
Refills: 0 | Status: DISCONTINUED | OUTPATIENT
Start: 2025-06-11 | End: 2025-06-24

## 2025-06-10 RX ORDER — ASPIRIN 325 MG
1 TABLET ORAL
Qty: 0 | Refills: 0 | DISCHARGE

## 2025-06-10 RX ORDER — CLOPIDOGREL BISULFATE 75 MG/1
75 TABLET, FILM COATED ORAL DAILY
Refills: 0 | Status: DISCONTINUED | OUTPATIENT
Start: 2025-06-11 | End: 2025-06-24

## 2025-06-10 RX ORDER — CLOPIDOGREL BISULFATE 75 MG/1
1 TABLET, FILM COATED ORAL
Qty: 30 | Refills: 0
Start: 2025-06-10 | End: 2025-07-09

## 2025-06-10 RX ORDER — TRAZODONE HCL 100 MG
1 TABLET ORAL
Refills: 0 | DISCHARGE

## 2025-06-10 RX ORDER — IRBESARTAN 75 MG/1
1 TABLET ORAL
Refills: 0 | DISCHARGE

## 2025-06-10 RX ORDER — TIRZEPATIDE 7.5 MG/.5ML
7.5 INJECTION, SOLUTION SUBCUTANEOUS
Refills: 0 | DISCHARGE

## 2025-06-10 RX ORDER — ALBUTEROL SULFATE 2.5 MG/3ML
2 VIAL, NEBULIZER (ML) INHALATION
Refills: 0 | DISCHARGE

## 2025-06-10 RX ORDER — SPIRONOLACTONE 25 MG
25 TABLET ORAL
Refills: 0 | Status: DISCONTINUED | OUTPATIENT
Start: 2025-06-10 | End: 2025-06-24

## 2025-06-10 RX ORDER — ACETAMINOPHEN 500 MG/5ML
1000 LIQUID (ML) ORAL ONCE
Refills: 0 | Status: COMPLETED | OUTPATIENT
Start: 2025-06-10 | End: 2025-06-10

## 2025-06-10 RX ORDER — ATORVASTATIN CALCIUM 80 MG/1
40 TABLET, FILM COATED ORAL AT BEDTIME
Refills: 0 | Status: DISCONTINUED | OUTPATIENT
Start: 2025-06-10 | End: 2025-06-24

## 2025-06-10 RX ORDER — METOPROLOL SUCCINATE 50 MG/1
1 TABLET, EXTENDED RELEASE ORAL
Refills: 0 | DISCHARGE

## 2025-06-10 RX ORDER — ATORVASTATIN CALCIUM 80 MG/1
1 TABLET, FILM COATED ORAL
Qty: 30 | Refills: 0
Start: 2025-06-10 | End: 2025-07-09

## 2025-06-10 RX ORDER — FENTANYL CITRATE-0.9 % NACL/PF 100MCG/2ML
12.5 SYRINGE (ML) INTRAVENOUS ONCE
Refills: 0 | Status: DISCONTINUED | OUTPATIENT
Start: 2025-06-10 | End: 2025-06-10

## 2025-06-10 RX ORDER — FUROSEMIDE 10 MG/ML
1 INJECTION INTRAMUSCULAR; INTRAVENOUS
Refills: 0 | DISCHARGE

## 2025-06-10 RX ORDER — SPIRONOLACTONE 25 MG
1 TABLET ORAL
Refills: 0 | DISCHARGE

## 2025-06-10 RX ORDER — APIXABAN 2.5 MG/1
1 TABLET, FILM COATED ORAL
Qty: 0 | Refills: 0 | DISCHARGE

## 2025-06-10 RX ORDER — ATORVASTATIN CALCIUM 80 MG/1
1 TABLET, FILM COATED ORAL
Refills: 0 | DISCHARGE

## 2025-06-10 RX ADMIN — Medication 1000 MILLIGRAM(S): at 12:51

## 2025-06-10 RX ADMIN — Medication 12.5 MICROGRAM(S): at 13:21

## 2025-06-10 RX ADMIN — Medication 12.5 MICROGRAM(S): at 13:15

## 2025-06-10 RX ADMIN — Medication 400 MILLIGRAM(S): at 12:34

## 2025-06-10 NOTE — ASU DISCHARGE PLAN (ADULT/PEDIATRIC) - CALL YOUR DOCTOR IF YOU HAVE ANY OF THE FOLLOWING:
He does, lets put it as that Wound/Surgical Site with redness, or foul smelling discharge or pus/Numbness, tingling, color or temperature change to extremity

## 2025-06-10 NOTE — ASU PREOP CHECKLIST - ASSESSMENT, HISTORY & PHYSICAL COMPLETED AND ON MEDICAL RECORD
done Denies fevers, chills, nausea, vomiting, diarrhea, constipation, abdominal pain, urinary symptoms, chest pain, palpitations, shortness of breath, dyspnea on exertion, syncope/near syncope, cough/URI symptoms, weakness, numbness, focal deficits, visual changes, gait or balance changes, dizziness

## 2025-06-10 NOTE — ASU DISCHARGE PLAN (ADULT/PEDIATRIC) - FINANCIAL ASSISTANCE
[facility] provides services at a reduced cost to those who are determined to be eligible through [facility]’s financial assistance program. Information regarding [facility]’s financial assistance program can be found by going to [email or web address] or by calling [phone].

## 2025-06-10 NOTE — H&P CARDIOLOGY - NSICDXPASTSURGICALHX_GEN_ALL_CORE_FT
PAST SURGICAL HISTORY:  Cardiac pacemaker     H/O aortic valve replacement     H/O cerebral aneurysm repair     H/O gastric sleeve     History of loop recorder

## 2025-06-10 NOTE — ASU DISCHARGE PLAN (ADULT/PEDIATRIC) - CARE PROVIDER_API CALL
Dennis Wiseman  Cardiology  [address] [place] [address], [facility] [address]  [place] [address]  Phone: [phone]  Fax: [phone]  Follow Up Time: 2 weeks

## 2025-06-10 NOTE — ASU DISCHARGE PLAN (ADULT/PEDIATRIC) - NS MD DC FALL RISK RISK
For information on Fall & Injury Prevention, visit: [email or web address] OR  [email or web address] OR  https://www.cdc.gov/steadi/patient.html

## 2025-06-10 NOTE — CHART NOTE - NSCHARTNOTEFT_GEN_A_CORE
Removal of Femoral Sheath  s/p 6/10 LAD DESx1 via RFA,  at 925am, RFA sheath due at 1225.  Triple therapy for 1 week then drop ASA.  ASA and Plavix loaded.     Pulses in the (right) lower extremity are (palpable). The patient was placed in the supine position. The insertion site was identified and the sutures were removed per protocol.  The ___5_ [place] femoral sheath was then removed. Direct pressure was applied for  ____20__ minutes.     Monitoring of the (right) groin and both lower extremities including neuro-vascular checks and vital signs every 15 minutes x 4, then every 30 minutes x 2, then every 1 hour was ordered.    Complications: None  cardiac rehab refused   R/B/A of ASA & plavix discussed, pt verbalizes understanding.    Comments:
FELIPA BOO    Patient referred to Cardiac Rehab s/p PCI to LAD.             *Education on benefits of Cardiac Rehab provided to patient: Yes         *Referral and Prescription Given for Cardiac Rehab : Yes         *Pt given list of locations & instructed to contact their insurance company to review list of participating providers: Yes         *Pt instructed to bring Cardiac Rehab prescription with them to Cardiology Follow up appointment for assistance with enrollment: Yes         *Pt discharged with copies detail cardiovascular history, medications, testing/treatments: Yes.    Pt referred to cardiac rehab post PCI but patient has refused at this time. Information provided to patient to take home and discuss further with primary cardiologist.     06-10-25 @ 11:48

## 2025-06-10 NOTE — H&P CARDIOLOGY - HISTORY OF PRESENT ILLNESS
75 y/o Female, Former smoker with PMHx of Afib on Eliquis, COPD, HTN, HLD, Cerebral aneurysm( s/p Coiling/ stents in 2023), endocarditis s/p AVR, AV block, s/p Micra PPM, JOEL, CVA( No residuals), presents today for [facility] following an abnormal NST ( result not available). The TTE from 2024 shows EF is 42%. Pt reports dyspnea on exertion, denies chest pain/ palpitations. Pt is seen by Dr. Wiseman and referred for C

## 2025-06-10 NOTE — ASU PATIENT PROFILE, ADULT - FALL HARM RISK - UNIVERSAL INTERVENTIONS
Bed in lowest position, wheels locked, appropriate side rails in place/Call bell, personal items and telephone in reach/Instruct patient to call for assistance before getting out of bed or chair/Non-slip footwear when patient is out of bed/[place] to call system/Physically safe environment - no spills, clutter or unnecessary equipment/Purposeful Proactive Rounding/Room/bathroom lighting operational, light cord in reach

## 2025-06-10 NOTE — ASU DISCHARGE PLAN (ADULT/PEDIATRIC) - ASU DC SPECIAL INSTRUCTIONSFT
Wound Care:     The day AFTER your procedure remove bandage GENTLY, and clean using  mild soap and gentle warm, water stream, pat dry, leave OPEN to air.  YOU MAY SHOWER.   DO NOT apply lotions, creams, ointments, powder, parfumes to your incision site.  DO NOT SOAK your site for 1 week (no baths, no pools, no tubs, etc...)  Check  your groin. A small amount of bruising, and soreness are normal    ACTIVITY: for 24 hours   - DO NOT DRIVE  - DO NOT make any important decisions or sign legal documents   - DO NOT operate heavy machineries   - you may resume sexual activity in 48 hours, unless otherwise instructed by your cardiologist     Your procedure was done through the [facility]: for the NEXT 5 DAYS  - Limit climbing stairs, DO NOT soak in bathtub or pool  - no strenuous activities, pushing, pulling, straining  - Do not lift anything 10 Lbs or heavier     MEDICATION:   take your medications as explained (see discharge paperwork)   You received a STENT in your heart, you will be taking antiplatelet medication, Plavix, to KEEP YOUR STENT OPEN.  Take as prescribed DO NOT STOP taking them without consulting with your cardiologist first.     Follow heart healthy diet recommended by your doctor, if you smoke STOP SMOKING, second hand smoking is harmful as well for your health.  Do not let anyone smoke near you or yours (may call [phone] for center of tobacco control if you or family need assistance).     CALL your doctor to make appointment in 2 WEEKS     ***CALL YOUR DOCTOR***  if you experience: fever, chills, body aches, or severe pain, swelling, redness, heat or yellow discharge at incision site  If you experience Bleeding or excruciating pain at the procedural site, swelling ( golf ball size) at your procedural site  If you experience CHEST PAIN  If you experience extremity numbness, tingling, temperature change (of your procedural site)   If you are unable to reach your doctor, you may contact:   -Cardiology Office at [facility] at [phone] or   - [facility] [phone]  - [facility] [phone]

## 2025-06-10 NOTE — H&P CARDIOLOGY - NSICDXPASTMEDICALHX_GEN_ALL_CORE_FT
PAST MEDICAL HISTORY:  Afib     COPD exacerbation     CVA (cerebrovascular accident)     DM (diabetes mellitus)     GERD (gastroesophageal reflux disease)     H/O aortic valve stenosis     H/O endocarditis     History of cerebral aneurysm     History of complete AV block     JOEL (obstructive sleep apnea)

## 2025-06-25 NOTE — PHYSICAL THERAPY INITIAL EVALUATION ADULT - FUNCTIONAL LIMITATIONS, PT EVAL
The sheath was removed from the left AV fistula.  Manual pressure applied and site sutured closed. self-care/home management/community/leisure

## 2025-07-02 ENCOUNTER — APPOINTMENT (OUTPATIENT)
Dept: ELECTROPHYSIOLOGY | Facility: CLINIC | Age: 75
End: 2025-07-02

## 2025-07-02 ENCOUNTER — NON-APPOINTMENT (OUTPATIENT)
Age: 75
End: 2025-07-02

## 2025-07-02 VITALS
WEIGHT: 184.74 LBS | BODY MASS INDEX: 36.08 KG/M2 | DIASTOLIC BLOOD PRESSURE: 80 MMHG | SYSTOLIC BLOOD PRESSURE: 140 MMHG | HEART RATE: 76 BPM | OXYGEN SATURATION: 96 %

## 2025-07-02 PROCEDURE — 93291 INTERROG DEV EVAL SCRMS IP: CPT | Mod: 59

## 2025-07-02 PROCEDURE — 93279 PRGRMG DEV EVAL PM/LDLS PM: CPT

## 2025-07-02 PROCEDURE — 93000 ELECTROCARDIOGRAM COMPLETE: CPT | Mod: 59

## 2025-07-07 ENCOUNTER — APPOINTMENT (OUTPATIENT)
Dept: PULMONOLOGY | Facility: CLINIC | Age: 75
End: 2025-07-07
Payer: MEDICARE

## 2025-07-07 VITALS
HEART RATE: 53 BPM | WEIGHT: 184 LBS | HEIGHT: 60 IN | DIASTOLIC BLOOD PRESSURE: 82 MMHG | RESPIRATION RATE: 16 BRPM | BODY MASS INDEX: 36.12 KG/M2 | SYSTOLIC BLOOD PRESSURE: 119 MMHG | OXYGEN SATURATION: 96 %

## 2025-07-07 PROBLEM — G47.33 OSA (OBSTRUCTIVE SLEEP APNEA): Status: ACTIVE | Noted: 2025-07-07

## 2025-07-07 PROCEDURE — 94060 EVALUATION OF WHEEZING: CPT

## 2025-07-07 PROCEDURE — 95012 NITRIC OXIDE EXP GAS DETER: CPT

## 2025-07-07 PROCEDURE — 99214 OFFICE O/P EST MOD 30 MIN: CPT | Mod: 25

## 2025-07-07 RX ORDER — CLOPIDOGREL BISULFATE 75 MG/1
75 TABLET, FILM COATED ORAL
Refills: 0 | Status: ACTIVE | COMMUNITY
Start: 2025-07-07

## 2025-07-07 RX ORDER — APIXABAN 5 MG/1
5 TABLET, FILM COATED ORAL
Refills: 0 | Status: ACTIVE | COMMUNITY
Start: 2025-07-07

## 2025-07-07 RX ORDER — TIRZEPATIDE 7.5 MG/.5ML
7.5 INJECTION, SOLUTION SUBCUTANEOUS
Refills: 0 | Status: ACTIVE | COMMUNITY
Start: 2025-07-07

## 2025-07-09 RX ORDER — CLOPIDOGREL BISULFATE 75 MG/1
1 TABLET, FILM COATED ORAL
Qty: 90 | Refills: 3
Start: 2025-07-09 | End: 2026-07-03

## 2025-07-15 NOTE — PROGRESS NOTE ADULT - PROBLEM SELECTOR PLAN 2
Endometrial Biopsy     CHIEF COMPLAINT:  72 year old female here for endometrial biopsy.    Indication: Thickened endometrium on pelvic ultrasound on 5/2/25, incidental finding.    LMP: No LMP recorded. Patient is postmenopausal.  No postmenopausal bleeding.     Endometrial biopsy procedure explained to patient including risks (bleeding, pain, cramping, infection, inadequate biopsy, and the rare risk of perforation) and benefits and agrees to proceed with endometrial biopsy.      Verbal and signed informed consent obtained.     Visit Vitals  BP (!) 143/66 (BP Location: LUE - Left upper extremity, Patient Position: Sitting, Cuff Size: Large Adult)   Pulse 72   Temp 97.8 °F (36.6 °C) (Oral)   Resp 14   Ht 5' 1\" (1.549 m)   Wt 104.8 kg (231 lb)   SpO2 94%   BMI 43.65 kg/m²         PROCEDURE   Uterus-normal size, mobile, nontender.   Speculum inserted into vagina. Vagina with no gross lesions; Cervix with no gross lesions.   Vagina and cervix painted with betadine.   Cervix grasped with single tooth tenaculum.   Cervical os stenosis present.  Used sound to dilate cervix.  Sounded to 6 cm.  Pipelle inserted into uterine cavity. Pipelle passed in usual fashion obtaining small amount of tissue which was placed in formalin jar.   Tenaculum removed, tenaculum sites hemostatic.   Speculum removed.   Patient tolerated procedure moderately well.  Had back pain from position during the procedure.   Scant sample was obtained and patient asked for procedure to be terminated. No complications.   EBL<1ml.   Specimens: EMBx      Post procedure instructions:  Nothing in the vaginal canal for 1 week, (no douching, tampons, sexual activity or toys). Patient may have some vaginal bleeding or pelvic cramping over the next few days.   Limit heavy exercise and lifting for the next 24 hours.   Call if heavy bleeding, fevers, or increasing pain.  Patient should receive a call from me with results in 7-10 days, if she has not by 10 days,  please call our clinic to check on status.       Gretel Mcknight MD  Department of Family Medicine  [facility]    Continue Lopressor 25mg Q12    Continue Lasix 40mg PO QD    Continue Aldactone 25mg Q12 Continue Lopressor 25mg Q12  Continue Lasix 40mg PO QD  Continue Aldactone 25mg Q12

## 2025-08-04 ENCOUNTER — NON-APPOINTMENT (OUTPATIENT)
Age: 75
End: 2025-08-04

## 2025-08-04 ENCOUNTER — APPOINTMENT (OUTPATIENT)
Dept: ELECTROPHYSIOLOGY | Facility: CLINIC | Age: 75
End: 2025-08-04
Payer: MEDICARE

## 2025-08-04 PROCEDURE — 93298 REM INTERROG DEV EVAL SCRMS: CPT

## 2025-08-28 ENCOUNTER — RX RENEWAL (OUTPATIENT)
Age: 75
End: 2025-08-28

## 2025-09-08 ENCOUNTER — NON-APPOINTMENT (OUTPATIENT)
Age: 75
End: 2025-09-08

## 2025-09-08 ENCOUNTER — APPOINTMENT (OUTPATIENT)
Dept: ELECTROPHYSIOLOGY | Facility: CLINIC | Age: 75
End: 2025-09-08
Payer: MEDICARE

## 2025-09-08 PROCEDURE — 93298 REM INTERROG DEV EVAL SCRMS: CPT

## (undated) DEVICE — STABILIZER HAND ASSISTANT ATTACHMENT W STABLESOFT 2S

## (undated) DEVICE — CONNECTOR "Y" 3/8 X 1/4 X 3/8"

## (undated) DEVICE — SUT TICRON 2-0 36" CV-316 DA

## (undated) DEVICE — NDL HYPO NONSAFE 20G X 1.5" (YELLOW)

## (undated) DEVICE — SAW BLADE MICROAIRE STERNUM 1X34X9.4MM

## (undated) DEVICE — TUBING IV SET MICROCLAVE ADAPTER

## (undated) DEVICE — PACK CARDIAC YELLOW

## (undated) DEVICE — SYR LUER LOK 3CC

## (undated) DEVICE — NDL HYPO SAFE 20G X 1.5" (YELLOW)

## (undated) DEVICE — SUT PLEDGET SOFT LARGE 3/8" X 3/16" X 1/16" X6

## (undated) DEVICE — GLV 8.5 PROTEXIS (WHITE)

## (undated) DEVICE — DRAIN PLEUROVAC

## (undated) DEVICE — GOWN TRIMAX XXL

## (undated) DEVICE — VESSEL LOOP EXTRA MAXI-BLUE 0.200" X 22"

## (undated) DEVICE — BLADE SCALPEL SAFETYLOCK #11

## (undated) DEVICE — DRAPE MAYO STAND 30"

## (undated) DEVICE — GLV 7.5 PROTEXIS (WHITE)

## (undated) DEVICE — SYR ASEPTO

## (undated) DEVICE — SYR TB 1CC 25G X 5/8 (BLUE)

## (undated) DEVICE — SUT VICRYL 1 36" CTX UNDYED

## (undated) DEVICE — PREP CHLORAPREP HI-LITE ORANGE 26ML

## (undated) DEVICE — SUT DOUBLE 6 WIRE STERNAL

## (undated) DEVICE — LAP PAD 18 X 18"

## (undated) DEVICE — SUT PROLENE 3-0 36" SH

## (undated) DEVICE — SUT STAINLESS STEEL 5 18" CCS

## (undated) DEVICE — CONNECTOR "Y" 1/2 X 3/8 X 3/8"

## (undated) DEVICE — TUBING SUCTION 20FT

## (undated) DEVICE — SUT SILK 5-0 60" TIES

## (undated) DEVICE — VENTING ADAPTER "Y" (RED/BLUE) 7.5"

## (undated) DEVICE — SUT SILK 2-0 18" SH (POP-OFF)

## (undated) DEVICE — SUT VICRYL 3-0 27" CT-1

## (undated) DEVICE — Device

## (undated) DEVICE — CONNECTOR STRAIGHT 3/8 X 3/8"

## (undated) DEVICE — SENSOR MYOCARDIAL TEMP 15MM

## (undated) DEVICE — NDL COUNTER FOAM AND MAGNET 40-70

## (undated) DEVICE — DRAIN CHANNEL 32FR ROUND HUBLESS FULL FLUTED

## (undated) DEVICE — TUBING CANNULA SALTER LABS TENDER GRIP ADULT

## (undated) DEVICE — GLV 7 PROTEXIS (WHITE)

## (undated) DEVICE — STOPCOCK 4-WAY (BLUE) DISCOFIX SPIN-LOCK CONNECTOR

## (undated) DEVICE — GLV 6.5 PROTEXIS (WHITE)

## (undated) DEVICE — FOLEY TRAY 16FR 5CC LF LUBRISIL ADVANCE TEMP CLOSED

## (undated) DEVICE — BEAVER BLADE MINI SHARP ALL ROUND (BLUE)

## (undated) DEVICE — MEDICATION LABELS W MARKER

## (undated) DEVICE — SOL IRR POUR H2O 250ML

## (undated) DEVICE — SUT ETHIBOND 1 30" CT-1

## (undated) DEVICE — SUT GORETEX CV-4 (3-0) 36" TH-18

## (undated) DEVICE — SOL IRR POUR NS 0.9% 500ML

## (undated) DEVICE — DEFIBRILLATOR PAD PRE-CONNECT ADULT/CHILD

## (undated) DEVICE — CONNECTOR CARDIAC 1:1 FOR HUBLESS DRAINS

## (undated) DEVICE — WARMING BLANKET FULL ADULT

## (undated) DEVICE — PACK UNIVERSAL CARDIAC

## (undated) DEVICE — POSITIONER FOAM HEADREST (PINK)

## (undated) DEVICE — CHEST DRAIN OASIS DRY SUCTION WATER SEAL

## (undated) DEVICE — SYR LUER LOK 20CC

## (undated) DEVICE — DRAPE 1/2 SHEET 40X57"

## (undated) DEVICE — LIVANOVA SMART PERFUSION TUBING 0.375X3/32" X 6FT

## (undated) DEVICE — NDL HYPO SAFE 18G X 1.5" (PINK)

## (undated) DEVICE — SOL NORMOSOL-R PH7.4 1000ML

## (undated) DEVICE — TUBING KIT FAST START ATF 40

## (undated) DEVICE — PAD MONITORING LOW LEVEL II

## (undated) DEVICE — TUBING SUCTION NON CONDUCTIVE 316X18" STERILE

## (undated) DEVICE — TUBING TRUWAVE PRESSURE MALE/FEMALE 72"

## (undated) DEVICE — SUT VICRYL 0 36" CTX UNDYED

## (undated) DEVICE — CONNECTOR STRAIGHT 3/8 X 3/8" W LUER LOCK

## (undated) DEVICE — DRAIN DRAINGE CHEST DRY ADL DUAL

## (undated) DEVICE — SUT MONOCRYL 4-0 18" PS-2

## (undated) DEVICE — GETINGE VASOVIEW 7 ENDOSCOPIC VESSEL HARVESTING SYSTEM

## (undated) DEVICE — SAW BLADE MICROAIRE STERNUM 1.1X50X42MM

## (undated) DEVICE — VESSEL LOOP ASPEN SUPERMAXI BLUE

## (undated) DEVICE — VENODYNE/SCD SLEEVE CALF MEDIUM

## (undated) DEVICE — SUMP PERICARDIAL 20FR 1/4" ADULT

## (undated) DEVICE — DRAPE TOWEL BLUE 17" X 24"

## (undated) DEVICE — NDL TAPR FR EYE 1/2 CIR 3

## (undated) DEVICE — SUT BOOT STANDARD (ASSORTED) 5 PAIR

## (undated) DEVICE — FILTER REINFUSION FOR SALVAGED BLOOD DISP

## (undated) DEVICE — PACK CUSTOM W/INSPIRE OXYGENATOR

## (undated) DEVICE — FEEDING TUBE NG SUMP 16FR 48"

## (undated) DEVICE — DRSG TEGADERM 6"X8"

## (undated) DEVICE — SPECIMEN CONTAINER 100ML

## (undated) DEVICE — AORTIC PUNCH 4.0MM LONG LENGTH HANDLE

## (undated) DEVICE — GOWN TRIMAX LG

## (undated) DEVICE — VASCULAR DILATOR KIT 8,12,16,20, 24FR

## (undated) DEVICE — BLADE SCALPEL SAFETYLOCK #10

## (undated) DEVICE — STAPLER SKIN VISI-STAT 35 WIDE

## (undated) DEVICE — SUCTION YANKAUER BULBOUS TIP W VENT

## (undated) DEVICE — SPECIMEN CONTAINER 90ML

## (undated) DEVICE — DRAPE SLUSH / WARMER 44 X 66"

## (undated) DEVICE — BLADE SCALPEL SAFETYLOCK #15

## (undated) DEVICE — DRAPE ULTRASOUND PROBE COVER W ULTRA GEL 18X120CM

## (undated) DEVICE — POSITIONER FOAM EGG CRATE ULNAR 2PCS (PINK)

## (undated) DEVICE — DRSG OPSITE 2.5 X 2"

## (undated) DEVICE — SUCTION YANKAUER BULBOUS TIP NO VENT

## (undated) DEVICE — SUT PROLENE 4-0 30" SH-1

## (undated) DEVICE — SYR LUER LOK 50CC

## (undated) DEVICE — SUT SOFSILK 0 30" TIES

## (undated) DEVICE — SUCTION YANKAUER NO CONTROL VENT

## (undated) DEVICE — PACING CABLE (BROWN) A/V TEMP SCREW DOWN 12FT

## (undated) DEVICE — SYNOVIS VASCULAR PROBE 1.5MM 15CM

## (undated) DEVICE — DRSG DERMABOND PRINEO 60CM

## (undated) DEVICE — SUT SILK 0 30" KS

## (undated) DEVICE — SOL IRR BAG NS 0.9% 3000ML

## (undated) DEVICE — GLV 8 PROTEXIS (WHITE)

## (undated) DEVICE — SUT TICRON 2-0 30" CV-305 DA

## (undated) DEVICE — DRSG OPSITE 13.75 X 4"

## (undated) DEVICE — DRAPE 3/4 SHEET W REINFORCEMENT 56X77"

## (undated) DEVICE — TOURNIQUET SET 12FR (1 RED, 1 BLUE, 1 SNARE) 7"

## (undated) DEVICE — SUT TICRON 2-0 30" CV-305 DA W PLEDGET

## (undated) DEVICE — DRAPE IOBAN 33" X 23"

## (undated) DEVICE — SYR LUER LOK 30CC

## (undated) DEVICE — HEMOCONCENTRATOR PERFUSION ID TUBING 1/4"

## (undated) DEVICE — ADAPTER DLP MALE 1/4" X 2" (CLEAR) BARBED